# Patient Record
Sex: FEMALE | Race: WHITE | NOT HISPANIC OR LATINO | Employment: OTHER | URBAN - METROPOLITAN AREA
[De-identification: names, ages, dates, MRNs, and addresses within clinical notes are randomized per-mention and may not be internally consistent; named-entity substitution may affect disease eponyms.]

---

## 2017-11-05 ENCOUNTER — OFFICE VISIT (OUTPATIENT)
Dept: URGENT CARE | Facility: CLINIC | Age: 67
End: 2017-11-05

## 2017-11-05 PROCEDURE — G0463 HOSPITAL OUTPT CLINIC VISIT: HCPCS

## 2017-11-05 PROCEDURE — 99213 OFFICE O/P EST LOW 20 MIN: CPT

## 2017-11-05 PROCEDURE — 90714 TD VACC NO PRESV 7 YRS+ IM: CPT

## 2017-11-05 PROCEDURE — 12042 INTMD RPR N-HF/GENIT2.6-7.5: CPT

## 2017-11-06 NOTE — PROGRESS NOTES
Assessment  1  Thumb laceration, right, initial encounter (883 0) (S60 011S)    Discussion/Summary  Discussion Summary:   Tdap given10 days for suture removalwound dry for 48 hoursw soap and water and apply abx ointment daily covered while at work  Understands and agrees with treatment plan: The treatment plan was reviewed with the patient/guardian  The patient/guardian understands and agrees with the treatment plan      Chief Complaint  1  Hand Problem  Chief Complaint Free Text Note Form: Washing a mug and the handle broke  Laceration right hand under thumb  Happened at 10:30 this morning  History of Present Illness  HPI: Pt cut her R thumb on a broken mug handle  No numbness, normal movement and ROM of the thumb  Hospital Based Practices Required Assessment:    Prefered Language is  english  Primary Language is  english  Review of Systems  Focused-Female:   Integumentary: as noted in HPI  Social History   · Never a smoker    Current Meds   1  Levothroid 75 MCG TABS; Therapy: (Recorded:05Nov2017) to Recorded   2  Propranolol HCl - 10 MG Oral Tablet; Therapy: (Recorded:05Nov2017) to Recorded   3  TraMADol HCl - 50 MG Oral Tablet; Therapy: (Recorded:05Nov2017) to Recorded    Allergies  1  Codeine   2  Keflex   3  Penicillins    Vitals  Signs   Recorded: 72MPG2504 11:37AM   Temperature: 96 9 F  Heart Rate: 88  Respiration: 16  Height: 5 ft 6 in  Weight: 124 lb   BMI Calculated: 20 01 kg/m2  BSA Calculated: 1 63 m2  O2 Saturation: 97    Physical Exam    Skin   Skin and subcutaneous tissue: Abnormal  -- 3cm curvilinear laceration R  thenar eminence deep to subcutaneous tissue, FAROM thumb, normal tendon function, NSI  Procedure    Procedure: wound repair  The wound was located on the R  thenar eminence,-- and was 3 cm in length  The wound was simple  The wound involved the underlying fascia-- and-- was curved  The wound was linear   the neurovascular exam was normal, but-- there was no motor deficit  there was no tendon injury  The site was prepped with Betadine, cleansed and irrigated extensively  Anesthesia: Local anesthetic was administered  Lidocaine 3 ml, 2%, without epinephrine was injected  Closure: The cutaneous layer was closed with 7 sutures of 4-0 Prolene--   simple interrupted sutures were used for the skin closure  Dressing: a sterile dressing was placed-- and-- an antibiotic ointment was applied  Patient Status:  the patient tolerated the procedure well  Complications:  excessive bleeding was noted        Signatures   Electronically signed by : Mk Ching DO; Nov 5 2017 12:18PM EST                       (Author)

## 2017-11-15 ENCOUNTER — GENERIC CONVERSION - ENCOUNTER (OUTPATIENT)
Dept: OTHER | Facility: OTHER | Age: 67
End: 2017-11-15

## 2018-01-18 NOTE — MISCELLANEOUS
Message  Return to work or school:   Bravo Watters is under my professional care  She was seen in my office on 11/5/2017        TYLER Navarro        Signatures   Electronically signed by : Jyoti Jain DO; Nov 5 2017  3:15PM EST                       (Author)

## 2018-01-22 VITALS
HEART RATE: 86 BPM | TEMPERATURE: 98 F | DIASTOLIC BLOOD PRESSURE: 90 MMHG | RESPIRATION RATE: 18 BRPM | SYSTOLIC BLOOD PRESSURE: 160 MMHG | OXYGEN SATURATION: 98 %

## 2019-08-02 ENCOUNTER — TELEPHONE (OUTPATIENT)
Dept: GASTROENTEROLOGY | Facility: CLINIC | Age: 69
End: 2019-08-02

## 2019-08-02 NOTE — TELEPHONE ENCOUNTER
Received referral from pt's pcp to schedule pt for ov; scanned referral into pt's chart  Please call pt to schedule, thank you!

## 2019-09-13 ENCOUNTER — TELEPHONE (OUTPATIENT)
Dept: GASTROENTEROLOGY | Facility: CLINIC | Age: 69
End: 2019-09-13

## 2019-09-13 ENCOUNTER — OFFICE VISIT (OUTPATIENT)
Dept: GASTROENTEROLOGY | Facility: CLINIC | Age: 69
End: 2019-09-13

## 2019-09-13 VITALS
DIASTOLIC BLOOD PRESSURE: 82 MMHG | HEART RATE: 86 BPM | HEIGHT: 66 IN | WEIGHT: 96 LBS | SYSTOLIC BLOOD PRESSURE: 136 MMHG | BODY MASS INDEX: 15.43 KG/M2

## 2019-09-13 DIAGNOSIS — R11.2 NAUSEA AND VOMITING, INTRACTABILITY OF VOMITING NOT SPECIFIED, UNSPECIFIED VOMITING TYPE: Primary | ICD-10-CM

## 2019-09-13 DIAGNOSIS — R79.89 ABNORMAL LFTS: ICD-10-CM

## 2019-09-13 DIAGNOSIS — R63.4 WEIGHT LOSS: ICD-10-CM

## 2019-09-13 DIAGNOSIS — R19.7 DIARRHEA, UNSPECIFIED TYPE: ICD-10-CM

## 2019-09-13 DIAGNOSIS — Z11.59 SCREENING FOR VIRAL DISEASE: ICD-10-CM

## 2019-09-13 LAB — HCV AB SER-ACNC: NEGATIVE

## 2019-09-13 PROCEDURE — 99214 OFFICE O/P EST MOD 30 MIN: CPT | Performed by: INTERNAL MEDICINE

## 2019-09-13 RX ORDER — CYCLOBENZAPRINE HCL 10 MG
10 TABLET ORAL 3 TIMES DAILY PRN
COMMUNITY

## 2019-09-13 RX ORDER — CALCIUM/MAGNESIUM/ZINC 333-133 MG
TABLET ORAL 3 TIMES DAILY PRN
COMMUNITY

## 2019-09-13 RX ORDER — CHROMIUM 200 MCG
TABLET ORAL 3 TIMES DAILY PRN
COMMUNITY

## 2019-09-13 RX ORDER — PROCHLORPERAZINE MALEATE 10 MG
10 TABLET ORAL EVERY 6 HOURS PRN
Status: ON HOLD | COMMUNITY
End: 2020-12-22 | Stop reason: CLARIF

## 2019-09-13 RX ORDER — BIOTIN 5 MG
CAPSULE ORAL 3 TIMES DAILY
COMMUNITY

## 2019-09-13 RX ORDER — FEXOFENADINE HCL AND PSEUDOEPHEDRINE HCI 60; 120 MG/1; MG/1
1 TABLET, EXTENDED RELEASE ORAL 2 TIMES DAILY PRN
COMMUNITY

## 2019-09-13 RX ORDER — ASTRAGALUS ROOT 100 %
470 POWDER (GRAM) MISCELLANEOUS 3 TIMES DAILY PRN
COMMUNITY

## 2019-09-13 RX ORDER — COVID-19 ANTIGEN TEST
KIT MISCELLANEOUS EVERY 12 HOURS PRN
COMMUNITY
End: 2020-12-07

## 2019-09-13 RX ORDER — MELATONIN
7000 DAILY
COMMUNITY

## 2019-09-13 RX ORDER — LOPERAMIDE HYDROCHLORIDE 2 MG/1
2 CAPSULE ORAL DAILY
COMMUNITY

## 2019-09-13 NOTE — TELEPHONE ENCOUNTER
Patient cancelled her combo that I scheduled today  Patient did not want to keep her procedure at this time d/t self pay  She will call us back to schedule  Please follow up with her  She did not have her prep today

## 2019-09-13 NOTE — PROGRESS NOTES
[facility] - Outpatient Consultation  Ramiro Landa 81 y o  female MRN: [id number]  Encounter: [id number]          ASSESSMENT AND PLAN:      1  Nausea and vomiting, intractability of vomiting not specified, unspecified vomiting type  2  Diarrhea, unspecified type  3  Weight loss  Eight months of persistent symptoms of unclear etiology  Malignancy needs to be excluded  Inflammatory bowel disease, peptic ulcer disease, celiac disease, and functional bowel disease are all in the differential   - EGD and colonoscopy at [place] [address]    4  Abnormal LFTs  Mildly increased ALT, AST, and ALK PHOS  Unclear if related to systemic issue or primary liver problem  - Antimitochondrial antibody; Future  - JULEE Screen w/ Reflex to Titer/Pattern; Future  - Celiac Disease Comprehensive Panel; Future  - Ceruloplasmin; Future  - Hepatitis C Ab W/Refl To HCV RNA, Qn, PCR; Future  - Hepatitis B surface antigen; Future  - Hepatitis B surface antibody; Future  - Iron, TIBC and Ferritin Panel; Future  - Hepatic function panel; Future  - US abdomen limited; Future    ______________________________________________________________________    HPI:  The patient is seen in the office for evaluation per the request of Dr Penny Blanchard  She reports that she was well from a GI standpoint until the beginning of the year when she began having significant nausea with intermittent vomiting and diarrhea  She reports that she has vomiting about 5 times a week  She will throw up either liquid or just dry heaves  She denies any hematemesis or coffee-ground emesis  Her symptoms are not exacerbated by eating but she reports that she has no appetite  She has lost about 25 pounds since this is been going on  He she reports some mild upper abdominal pain  She reports diarrhea  She is moving her bowels 7-8 times a day  Her baseline bowel movement pattern is 1 bowel movement every 2-3 days    They are anywhere from watery to formed  She denies any rectal bleeding  She had 1 episode of black stool that she attributed to beef broth  She has never had a colonoscopy  She admits to nocturnal stooling but denies any fecal incontinence  She denies any change in her medications, supplements, or diet when the symptoms started  An evaluation by her PCP included negative stool studies  Blood work was normal except for a minimally increased LFTs, elevated BUN, and hypokalemia  REVIEW OF SYSTEMS:    CONSTITUTIONAL: Denies any fever, chills, & rigors  Admits to fatigue and weight loss  HEENT: No earache or tinnitus  Denies hearing loss or visual disturbances  CARDIOVASCULAR: No chest pain or palpitations  RESPIRATORY: Denies any cough, hemoptysis, shortness of breath  Admits to some dyspnea on exertion  GASTROINTESTINAL: As noted in the History of Present Illness  GENITOURINARY: No problems with urination  Denies any hematuria or dysuria  NEUROLOGIC: No dizziness or vertigo, denies headaches  MUSCULOSKELETAL:  Admits to muscle or joint pain  SKIN: Denies skin rashes or itching  ENDOCRINE: Denies excessive thirst  Denies intolerance to heat or cold  PSYCHOSOCIAL: Denies depression but admits to some anxiety  Denies any recent memory loss         Historical Information   Past Medical History:   Diagnosis Date    Chronic fatigue syndrome with fibromyalgia     Hx of migraine headaches     Hypothyroidism      Past Surgical History:   Procedure Laterality Date    BARTHOLIN GLAND CYST EXCISION      CARPAL TUNNEL RELEASE Bilateral     TONSILLECTOMY      TUBAL LIGATION       Social History   Social History     Substance and Sexual Activity   Alcohol Use Not Currently     Social History     Substance and Sexual Activity   Drug Use Not on file     Social History     Tobacco Use   Smoking Status Current Every Day Smoker   Smokeless Tobacco Never Used     Family History   Problem Relation Age of Onset    Colon polyps Mother     Heart disease Mother     Heart disease Father     Colon cancer Neg Hx        Meds/Allergies       Current Outpatient Medications:     5-Hydroxytryptophan (5-HTP PO)    B Complex-Biotin-FA (TH VITAMIN B 50/B-COMPLEX) TABS    bismuth subsalicylate (PEPTO BISMOL) 524 mg/30 mL oral suspension    Capsaicin 0 025 % GEL    cholecalciferol (VITAMIN D3) 1,000 units tablet    COLCHICINE PO    cyclobenzaprine (FLEXERIL) 10 mg tablet    Echinacea 400 MG CAPS    fexofenadine-pseudoephedrine (ALLEGRA-D)  MG per tablet    GINKGO BILOBA PLUS PO    Hyaluronic Acid 20-60 MG CAPS    L-Lysine 500 MG CAPS    loperamide (IMODIUM) 2 mg capsule    Naproxen Sodium (ALEVE) 220 MG CAPS    prochlorperazine (COMPAZINE) 10 mg tablet    S-Adenosylmethionine (RADHA-E) 200 MG TABS    Tragacanth (ASTRAGALUS ROOT) POWD    Allergies   Allergen Reactions    Clarithromycin     Codeine     Naproxen     Other      PHOSPHATE    Oxycodone     Penicillins     Potassium Chloride     Trazodone            Objective     Blood pressure 136/82, pulse 86, height 5' 6" (1 676 m), weight 43 5 kg (96 lb)  Body mass index is 15 49 kg/m²  PHYSICAL EXAM:      General Appearance:   Alert, cooperative, no distress   HEENT:   Normocephalic, atraumatic, anicteric      Neck:  Supple, symmetrical, trachea midline   Lungs:   Clear to auscultation bilaterally; no rales, rhonchi or wheezing; respirations unlabored    Heart[de-identified]   Regular rate and rhythm; no murmur, rub, or gallop     Abdomen:   Soft, mild right upper quadrant tenderness without rebound or guarding, non-distended; normal bowel sounds; no masses, no organomegaly    Genitalia:   Deferred    Rectal:   Deferred    Extremities:  No cyanosis, clubbing or edema    Pulses:  2+ and symmetric    Skin:  No jaundice, rashes, or lesions    Lymph nodes:  No palpable cervical lymphadenopathy        Lab Results:   Sodium 138, potassium 3 0, chloride 103, bicarb 28, BUN 27, creatinine 1 09, glucose 81  Albumin 3 8, total bilirubin 0 4, AST 72, ALT 56, alkaline phosphatase 150  CBC and sed rate normal  (8/2/19)      Radiology Results:   No results found

## 2019-09-13 NOTE — LETTER
September 13, 2019     [address] [place] [address]    Patient: Chrissy Flanagan   YOB: 1950   Date of Visit: 9/13/2019       Dear Dr Luis Enrique Correia:    Thank you for referring Broderick Grad to me for evaluation  Below are my notes for this consultation  If you have questions, please do not hesitate to call me  I look forward to following your patient along with you  Sincerely,        Matthew Hyde MD        CC: No Recipients  Matthew Hyde MD  9/13/2019  1:45 PM  Sign at close encounter  [facility] - Outpatient Consultation  Chrissy Flanagan 81 y o  female MRN: [id number]  Encounter: [id number]          ASSESSMENT AND PLAN:      1  Nausea and vomiting, intractability of vomiting not specified, unspecified vomiting type  2  Diarrhea, unspecified type  3  Weight loss  Eight months of persistent symptoms of unclear etiology  Malignancy needs to be excluded  Inflammatory bowel disease, peptic ulcer disease, celiac disease, and functional bowel disease are all in the differential   - EGD and colonoscopy at [place] [address]    4  Abnormal LFTs  Mildly increased ALT, AST, and ALK PHOS  Unclear if related to systemic issue or primary liver problem  - Antimitochondrial antibody; Future  - JULEE Screen w/ Reflex to Titer/Pattern; Future  - Celiac Disease Comprehensive Panel; Future  - Ceruloplasmin; Future  - Hepatitis C Ab W/Refl To HCV RNA, Qn, PCR; Future  - Hepatitis B surface antigen; Future  - Hepatitis B surface antibody; Future  - Iron, TIBC and Ferritin Panel; Future  - Hepatic function panel; Future  - US abdomen limited; Future    ______________________________________________________________________    HPI:  The patient is seen in the office for evaluation per the request of Dr Luis Enrique Correia    She reports that she was well from a GI standpoint until the beginning of the year when she began having significant nausea with intermittent vomiting and diarrhea  She reports that she has vomiting about 5 times a week  She will throw up either liquid or just dry heaves  She denies any hematemesis or coffee-ground emesis  Her symptoms are not exacerbated by eating but she reports that she has no appetite  She has lost about 25 pounds since this is been going on  He she reports some mild upper abdominal pain  She reports diarrhea  She is moving her bowels 7-8 times a day  Her baseline bowel movement pattern is 1 bowel movement every 2-3 days  They are anywhere from watery to formed  She denies any rectal bleeding  She had 1 episode of black stool that she attributed to beef broth  She has never had a colonoscopy  She admits to nocturnal stooling but denies any fecal incontinence  She denies any change in her medications, supplements, or diet when the symptoms started  An evaluation by her PCP included negative stool studies  Blood work was normal except for a minimally increased LFTs, elevated BUN, and hypokalemia  REVIEW OF SYSTEMS:    CONSTITUTIONAL: Denies any fever, chills, & rigors  Admits to fatigue and weight loss  HEENT: No earache or tinnitus  Denies hearing loss or visual disturbances  CARDIOVASCULAR: No chest pain or palpitations  RESPIRATORY: Denies any cough, hemoptysis, shortness of breath  Admits to some dyspnea on exertion  GASTROINTESTINAL: As noted in the History of Present Illness  GENITOURINARY: No problems with urination  Denies any hematuria or dysuria  NEUROLOGIC: No dizziness or vertigo, denies headaches  MUSCULOSKELETAL:  Admits to muscle or joint pain  SKIN: Denies skin rashes or itching  ENDOCRINE: Denies excessive thirst  Denies intolerance to heat or cold  PSYCHOSOCIAL: Denies depression but admits to some anxiety  Denies any recent memory loss         Historical Information   Past Medical History:   Diagnosis Date    Chronic fatigue syndrome with fibromyalgia     Hx of migraine headaches     Hypothyroidism      Past Surgical History:   Procedure Laterality Date    BARTHOLIN GLAND CYST EXCISION      CARPAL TUNNEL RELEASE Bilateral     TONSILLECTOMY      TUBAL LIGATION       Social History   Social History     Substance and Sexual Activity   Alcohol Use Not Currently     Social History     Substance and Sexual Activity   Drug Use Not on file     Social History     Tobacco Use   Smoking Status Current Every Day Smoker   Smokeless Tobacco Never Used     Family History   Problem Relation Age of Onset    Colon polyps Mother     Heart disease Mother     Heart disease Father     Colon cancer Neg Hx        Meds/Allergies       Current Outpatient Medications:     5-Hydroxytryptophan (5-HTP PO)    B Complex-Biotin-FA (TH VITAMIN B 50/B-COMPLEX) TABS    bismuth subsalicylate (PEPTO BISMOL) 524 mg/30 mL oral suspension    Capsaicin 0 025 % GEL    cholecalciferol (VITAMIN D3) 1,000 units tablet    COLCHICINE PO    cyclobenzaprine (FLEXERIL) 10 mg tablet    Echinacea 400 MG CAPS    fexofenadine-pseudoephedrine (ALLEGRA-D)  MG per tablet    GINKGO BILOBA PLUS PO    Hyaluronic Acid 20-60 MG CAPS    L-Lysine 500 MG CAPS    loperamide (IMODIUM) 2 mg capsule    Naproxen Sodium (ALEVE) 220 MG CAPS    prochlorperazine (COMPAZINE) 10 mg tablet    S-Adenosylmethionine (RADHA-E) 200 MG TABS    Tragacanth (ASTRAGALUS ROOT) POWD    Allergies   Allergen Reactions    Clarithromycin     Codeine     Naproxen     Other      PHOSPHATE    Oxycodone     Penicillins     Potassium Chloride     Trazodone            Objective     Blood pressure 136/82, pulse 86, height 5' 6" (1 676 m), weight 43 5 kg (96 lb)  Body mass index is 15 49 kg/m²          PHYSICAL EXAM:      General Appearance:   Alert, cooperative, no distress   HEENT:   Normocephalic, atraumatic, anicteric      Neck:  Supple, symmetrical, trachea midline   Lungs:   Clear to auscultation bilaterally; no rales, rhonchi or wheezing; respirations unlabored    Heart[de-identified]   Regular rate and rhythm; no murmur, rub, or gallop  Abdomen:   Soft, mild right upper quadrant tenderness without rebound or guarding, non-distended; normal bowel sounds; no masses, no organomegaly    Genitalia:   Deferred    Rectal:   Deferred    Extremities:  No cyanosis, clubbing or edema    Pulses:  2+ and symmetric    Skin:  No jaundice, rashes, or lesions    Lymph nodes:  No palpable cervical lymphadenopathy        Lab Results:   Sodium 138, potassium 3 0, chloride 103, bicarb 28, BUN 27, creatinine 1 09, glucose 81  Albumin 3 8, total bilirubin 0 4, AST 72, ALT 56, alkaline phosphatase 150  CBC and sed rate normal  (8/2/19)      Radiology Results:   No results found

## 2019-09-23 NOTE — TELEPHONE ENCOUNTER
I returned call, no answer (identifies as patient)  Left message that serologic workup is negative per [facility] note and that Imodium is listed on med list  Is she taking that (Adalberto Morel is only mentioned in her message)  Asked that she call 9/24 to update

## 2019-09-23 NOTE — TELEPHONE ENCOUNTER
Pt states she talked w/ [place] but has nurse questions: asks if Dr Jesus Fitch can prescribe something for diarrhea?/Pepto not working; is OK for 2 days then loose  Also, asks for results of labs 9/13 at [facility] [phone]

## 2019-09-23 NOTE — TELEPHONE ENCOUNTER
Called patient, she questioned the payment policy  I placed her on a brief hold to verify with billing  She hung up    Tried calling back and it went to her voicemail where I left a message   Per Lyly Wolf, ok to pay $200/month if scheduled at [address] [place] [address]

## 2019-09-23 NOTE — TELEPHONE ENCOUNTER
Talked to patient, she cannot afford that payment plan  She is applying for medical assistance/social security and will call us after she is enrolled

## 2019-09-24 ENCOUNTER — TRANSCRIBE ORDERS (OUTPATIENT)
Dept: ADMINISTRATIVE | Facility: HOSPITAL | Age: 69
End: 2019-09-24

## 2019-09-24 DIAGNOSIS — R79.89 ABNORMAL LFTS: Primary | ICD-10-CM

## 2019-09-24 NOTE — TELEPHONE ENCOUNTER
Pt left [facility] stating June Sammijhonathan called about her labs and script for medicine; asks for  [phone]

## 2019-09-24 NOTE — TELEPHONE ENCOUNTER
I spoke with patient and reviewed that she can take the loperamide 2mg up to 4 times a day if needed  She will schedule procedure when insurance obtained  Patient called back requesting hard copy of lab results be mailed to her home address

## 2019-10-21 ENCOUNTER — TELEPHONE (OUTPATIENT)
Dept: GASTROENTEROLOGY | Facility: CLINIC | Age: 69
End: 2019-10-21

## 2019-10-21 NOTE — TELEPHONE ENCOUNTER
Pt states she had blood work done 9/16; got some results but celiac and mitochondrial ab were pending/asks for CB w/ results to [phone]

## 2019-10-21 NOTE — TELEPHONE ENCOUNTER
Returned call, no answer, no identifying info, left message to call back   Celiac in normal range and mitochondrial ab resulted as negative

## 2019-12-06 ENCOUNTER — TRANSCRIBE ORDERS (OUTPATIENT)
Dept: ADMINISTRATIVE | Facility: HOSPITAL | Age: 69
End: 2019-12-06

## 2019-12-06 DIAGNOSIS — R63.4 LOSS OF WEIGHT: ICD-10-CM

## 2019-12-06 DIAGNOSIS — R19.7 DIARRHEA, UNSPECIFIED TYPE: Primary | ICD-10-CM

## 2019-12-11 ENCOUNTER — HOSPITAL ENCOUNTER (OUTPATIENT)
Dept: RADIOLOGY | Facility: HOSPITAL | Age: 69
Discharge: HOME | End: 2019-12-11
Payer: COMMERCIAL

## 2019-12-11 DIAGNOSIS — R19.7 DIARRHEA, UNSPECIFIED TYPE: ICD-10-CM

## 2019-12-11 DIAGNOSIS — R63.4 LOSS OF WEIGHT: ICD-10-CM

## 2019-12-11 PROCEDURE — 74220 X-RAY XM ESOPHAGUS 1CNTRST: CPT

## 2020-05-08 ENCOUNTER — TRANSCRIBE ORDERS (OUTPATIENT)
Dept: ADMINISTRATIVE | Facility: HOSPITAL | Age: 70
End: 2020-05-08

## 2020-05-08 DIAGNOSIS — R10.11 RUQ ABDOMINAL PAIN: Primary | ICD-10-CM

## 2020-06-11 ENCOUNTER — TELEPHONE (OUTPATIENT)
Dept: GASTROENTEROLOGY | Facility: CLINIC | Age: 70
End: 2020-06-11

## 2020-07-02 ENCOUNTER — CLINICAL SUPPORT (OUTPATIENT)
Dept: GASTROENTEROLOGY | Facility: CLINIC | Age: 70
End: 2020-07-02

## 2020-07-02 VITALS — BODY MASS INDEX: 16.88 KG/M2 | WEIGHT: 105 LBS | HEIGHT: 66 IN

## 2020-07-02 DIAGNOSIS — R19.7 DIARRHEA, UNSPECIFIED TYPE: Primary | ICD-10-CM

## 2020-07-02 NOTE — PROGRESS NOTES
Phone prep for combo dx diarrhea meds reviewed instructions given for clenpiq rx sample at the    Pt aware of covid test

## 2020-07-03 DIAGNOSIS — Z11.59 SCREENING FOR VIRAL DISEASE: ICD-10-CM

## 2020-07-03 PROCEDURE — U0003 INFECTIOUS AGENT DETECTION BY NUCLEIC ACID (DNA OR RNA); SEVERE ACUTE RESPIRATORY SYNDROME CORONAVIRUS 2 (SARS-COV-2) (CORONAVIRUS DISEASE [COVID-19]), AMPLIFIED PROBE TECHNIQUE, MAKING USE OF HIGH THROUGHPUT TECHNOLOGIES AS DESCRIBED BY CMS-2020-01-R: HCPCS

## 2020-07-06 ENCOUNTER — HOSPITAL ENCOUNTER (OUTPATIENT)
Dept: ULTRASOUND IMAGING | Facility: HOSPITAL | Age: 70
Discharge: HOME | End: 2020-07-06
Payer: MEDICARE

## 2020-07-06 DIAGNOSIS — R10.11 RUQ ABDOMINAL PAIN: ICD-10-CM

## 2020-07-06 LAB — SARS-COV-2 RNA SPEC QL NAA+PROBE: NOT DETECTED

## 2020-07-06 PROCEDURE — 76705 ECHO EXAM OF ABDOMEN: CPT

## 2020-07-09 ENCOUNTER — ANESTHESIA EVENT (OUTPATIENT)
Dept: GASTROENTEROLOGY | Facility: AMBULATORY SURGERY CENTER | Age: 70
End: 2020-07-09

## 2020-07-09 ENCOUNTER — HOSPITAL ENCOUNTER (OUTPATIENT)
Dept: GASTROENTEROLOGY | Facility: AMBULATORY SURGERY CENTER | Age: 70
Discharge: HOME | End: 2020-07-09
Payer: MEDICARE

## 2020-07-09 ENCOUNTER — ANESTHESIA (OUTPATIENT)
Dept: GASTROENTEROLOGY | Facility: AMBULATORY SURGERY CENTER | Age: 70
End: 2020-07-09

## 2020-07-09 VITALS
OXYGEN SATURATION: 95 % | HEART RATE: 79 BPM | TEMPERATURE: 98.9 F | DIASTOLIC BLOOD PRESSURE: 87 MMHG | SYSTOLIC BLOOD PRESSURE: 184 MMHG | RESPIRATION RATE: 22 BRPM

## 2020-07-09 DIAGNOSIS — R63.4 WEIGHT LOSS: ICD-10-CM

## 2020-07-09 DIAGNOSIS — R11.2 NAUSEA AND VOMITING, INTRACTABILITY OF VOMITING NOT SPECIFIED, UNSPECIFIED VOMITING TYPE: ICD-10-CM

## 2020-07-09 DIAGNOSIS — R19.7 DIARRHEA, UNSPECIFIED TYPE: ICD-10-CM

## 2020-07-09 PROCEDURE — 88305 TISSUE EXAM BY PATHOLOGIST: CPT | Performed by: PATHOLOGY

## 2020-07-09 PROCEDURE — 45385 COLONOSCOPY W/LESION REMOVAL: CPT | Performed by: INTERNAL MEDICINE

## 2020-07-09 PROCEDURE — 45381 COLONOSCOPY SUBMUCOUS NJX: CPT | Performed by: INTERNAL MEDICINE

## 2020-07-09 PROCEDURE — 43239 EGD BIOPSY SINGLE/MULTIPLE: CPT | Performed by: INTERNAL MEDICINE

## 2020-07-09 PROCEDURE — 1123F ACP DISCUSS/DSCN MKR DOCD: CPT | Performed by: INTERNAL MEDICINE

## 2020-07-09 RX ORDER — SODIUM CHLORIDE 9 MG/ML
50 INJECTION, SOLUTION INTRAVENOUS ONCE
Status: COMPLETED | OUTPATIENT
Start: 2020-07-09 | End: 2020-07-09

## 2020-07-09 RX ORDER — PROPOFOL 10 MG/ML
INJECTION, EMULSION INTRAVENOUS AS NEEDED
Status: DISCONTINUED | OUTPATIENT
Start: 2020-07-09 | End: 2020-07-09 | Stop reason: SURG

## 2020-07-09 RX ORDER — SODIUM CHLORIDE 9 MG/ML
INJECTION, SOLUTION INTRAVENOUS CONTINUOUS PRN
Status: DISCONTINUED | OUTPATIENT
Start: 2020-07-09 | End: 2020-07-09 | Stop reason: SURG

## 2020-07-09 RX ADMIN — PROPOFOL 40 MG: 10 INJECTION, EMULSION INTRAVENOUS at 11:30

## 2020-07-09 RX ADMIN — PROPOFOL 20 MG: 10 INJECTION, EMULSION INTRAVENOUS at 11:57

## 2020-07-09 RX ADMIN — PROPOFOL 20 MG: 10 INJECTION, EMULSION INTRAVENOUS at 12:05

## 2020-07-09 RX ADMIN — PROPOFOL 20 MG: 10 INJECTION, EMULSION INTRAVENOUS at 11:53

## 2020-07-09 RX ADMIN — PROPOFOL 10 MG: 10 INJECTION, EMULSION INTRAVENOUS at 11:34

## 2020-07-09 RX ADMIN — PROPOFOL 10 MG: 10 INJECTION, EMULSION INTRAVENOUS at 12:16

## 2020-07-09 RX ADMIN — PROPOFOL 20 MG: 10 INJECTION, EMULSION INTRAVENOUS at 11:41

## 2020-07-09 RX ADMIN — PROPOFOL 20 MG: 10 INJECTION, EMULSION INTRAVENOUS at 11:59

## 2020-07-09 RX ADMIN — PROPOFOL 20 MG: 10 INJECTION, EMULSION INTRAVENOUS at 12:11

## 2020-07-09 RX ADMIN — PROPOFOL 20 MG: 10 INJECTION, EMULSION INTRAVENOUS at 11:55

## 2020-07-09 RX ADMIN — PROPOFOL 20 MG: 10 INJECTION, EMULSION INTRAVENOUS at 11:37

## 2020-07-09 RX ADMIN — PROPOFOL 20 MG: 10 INJECTION, EMULSION INTRAVENOUS at 11:49

## 2020-07-09 RX ADMIN — PROPOFOL 20 MG: 10 INJECTION, EMULSION INTRAVENOUS at 11:51

## 2020-07-09 RX ADMIN — PROPOFOL 20 MG: 10 INJECTION, EMULSION INTRAVENOUS at 11:33

## 2020-07-09 RX ADMIN — PROPOFOL 20 MG: 10 INJECTION, EMULSION INTRAVENOUS at 11:43

## 2020-07-09 RX ADMIN — PROPOFOL 20 MG: 10 INJECTION, EMULSION INTRAVENOUS at 12:14

## 2020-07-09 RX ADMIN — PROPOFOL 20 MG: 10 INJECTION, EMULSION INTRAVENOUS at 11:35

## 2020-07-09 RX ADMIN — PROPOFOL 20 MG: 10 INJECTION, EMULSION INTRAVENOUS at 12:01

## 2020-07-09 RX ADMIN — SODIUM CHLORIDE 50 ML/HR: 9 INJECTION, SOLUTION INTRAVENOUS at 10:59

## 2020-07-09 RX ADMIN — PROPOFOL 20 MG: 10 INJECTION, EMULSION INTRAVENOUS at 12:03

## 2020-07-09 RX ADMIN — PROPOFOL 20 MG: 10 INJECTION, EMULSION INTRAVENOUS at 11:31

## 2020-07-09 RX ADMIN — PROPOFOL 20 MG: 10 INJECTION, EMULSION INTRAVENOUS at 11:45

## 2020-07-09 RX ADMIN — SODIUM CHLORIDE: 9 INJECTION, SOLUTION INTRAVENOUS at 11:23

## 2020-07-09 RX ADMIN — PROPOFOL 20 MG: 10 INJECTION, EMULSION INTRAVENOUS at 12:09

## 2020-07-09 RX ADMIN — PROPOFOL 20 MG: 10 INJECTION, EMULSION INTRAVENOUS at 11:47

## 2020-07-09 RX ADMIN — PROPOFOL 20 MG: 10 INJECTION, EMULSION INTRAVENOUS at 11:39

## 2020-07-09 RX ADMIN — PROPOFOL 20 MG: 10 INJECTION, EMULSION INTRAVENOUS at 12:07

## 2020-07-09 NOTE — QUICK NOTE
bp remains elevated postop 140-186/80-90 recommended that pt keep a record of her BPs and to see her family doctor about her elevated BP

## 2020-07-09 NOTE — ANESTHESIA PREPROCEDURE EVALUATION
Review of Systems/Medical History  Patient summary reviewed  Chart reviewed  No history of anesthetic complications     Cardiovascular  Exercise tolerance (METS): >4,  Hypertension ,   Comment: High BP, no meds  ,  Pulmonary  Smoker cigarette smoker  , COPD , Shortness of breath,        GI/Hepatic  Dysphagia,   GERD poorly controlled, Bowel prep       Negative  ROS        Endo/Other  History of thyroid disease , hypothyroidism,      GYN  Negative gynecology ROS          Hematology  Negative hematology ROS      Musculoskeletal  Back pain , cervical pain, Osteoarthritis,   Arthritis     Neurology    Fibromyalgia   Psychology   Anxiety,              Physical Exam    Airway    Mallampati score: I  TM Distance: >3 FB  Neck ROM: full     Dental   lower dentures and upper dentures,     Cardiovascular  Rhythm: regular, Rate: normal, Cardiovascular exam normal    Pulmonary  Decreased breath sounds,     Other Findings        Anesthesia Plan  ASA Score- 3     Anesthesia Type- IV sedation with anesthesia with ASA Monitors  Additional Monitors:   Airway Plan:         Plan Factors- Patient instructed to abstain from smoking on day of procedure  Patient smoked on day of surgery  Induction- intravenous  Postoperative Plan-     Informed Consent- Anesthetic plan and risks discussed with patient

## 2020-07-09 NOTE — DISCHARGE INSTRUCTIONS
Colorectal Polyps   WHAT YOU NEED TO KNOW:   What are colorectal polyps? Colorectal polyps are small growths of tissue in the lining of the colon and rectum  Most polyps are hyperplastic polyps and are usually benign (noncancerous)  Certain types of polyps, called adenomatous polyps, may turn into cancer  What increases my risk of colorectal polyps? The exact cause of colorectal polyps is unknown  The following may increase your risk:  · Older age    · A diet of foods high in fat and low in fiber     · Family history of polyps    · Intestinal diseases, such as Crohn's disease or ulcerative colitis    · An unhealthy lifestyle, such as physical inactivity, smoking, or drinking alcohol    · Obesity  What are the signs and symptoms of colorectal polyps? · Blood in your bowel movement or bleeding from the rectum    · Change in bowel movement habits, such as diarrhea and constipation    · Abdominal pain  How are colorectal polyps diagnosed? You should have fecal blood screening once a year for colorectal disease if you are over 48years old  You should be screened earlier if you have an intestinal disease or a family history of polyps or colorectal cancer  During this screening, a sample of your bowel movement is checked for blood, which may be an early sign of colorectal polyps or cancer  You may also need any of the following tests:  · Digital rectal exam:  Your healthcare provider will examine your anus and use a finger to check your rectum for polyps  · Barium enema: A barium enema is an x-ray of the colon  A tube is put into your anus, and a liquid called barium is put through the tube  Barium is used so that caregivers can see your colon better on the x-ray film  · Virtual colonoscopy: This is a CT scan that takes pictures of the inside of your colon and rectum  A small, flexible tube is put into your rectum and air or carbon dioxide (gas) is used to expand your colon   This lets healthcare providers clearly see your colon and any polyps on a monitor  · Colonoscopy or sigmoidoscopy: These procedures help your healthcare provider see the inside of your colon using a flexible tube with a small light and camera on the end  During a sigmoidoscopy, your healthcare provider will only look at rectum and lower colon  During a colonoscopy, healthcare providers will look at the full length of your colon  Healthcare providers may remove a small amount of tissue from the colon for a biopsy  How are colorectal polyps treated? · Polyp removal:  Polyps may be removed during your sigmoidoscopy or colonoscopy  · Polypectomy: This is surgery to remove your polyps  You may need laparoscopic or open surgery, depending on the type, size, and number of polyps that you have  Laparoscopy is done by inserting a small, flexible scope into incisions made on your abdomen  Open surgery is done by making a larger incision on your abdomen   What are the risks of colorectal polyps? You may bleed during a colonoscopy procedure  Your bowel may be perforated (torn) when polyps are removed  This may lead to an open abdominal surgery  During surgery, you may bleed too much or get an infection  Adenomatous polyps that are not removed may turn into cancer and become more difficult to treat  Where can I find support and more information? · Shaye Rees ([facility])  [address], [place] [address]  Phone: [phone]  Web Address: [facility]  When should I contact my healthcare provider? · You have a fever  · You have chills, a cough, or feel weak and achy  · You have abdominal pain that does not go away or gets worse after you take medicine  · Your abdomen is swollen  · You are losing weight without trying  · You have questions or concerns about your condition or care  When should I seek immediate care or call 911? · You have sudden shortness of breath  · You have a fast heart rate, fast breathing, or are too dizzy to stand up  · You have severe abdominal pain  · You see blood in your bowel movement  CARE AGREEMENT:   You have the right to help plan your care  Learn about your health condition and how it may be treated  Discuss treatment options with your caregivers to decide what care you want to receive  You always have the right to refuse treatment  The above information is an  only  It is not intended as medical advice for individual conditions or treatments  Talk to your doctor, nurse or pharmacist before following any medical regimen to see if it is safe and effective for you  © 2017 [address]  Information is for End User's use only and may not be sold, redistributed or otherwise used for commercial purposes  All illustrations and images included in CareNotes® are the copyrighted property of [employer] M [employer]  or Moses Brannon  Hemorrhoids   WHAT YOU NEED TO KNOW:   What are hemorrhoids? Hemorrhoids are swollen blood vessels inside your rectum (internal hemorrhoids) or on your anus (external hemorrhoids)  Sometimes a hemorrhoid may prolapse  This means it extends out of your anus  What increases my risk for hemorrhoids? · Pregnancy or obesity    · Straining or sitting for a long time during bowel movements    · Liver disease    · Weak muscles around the anus caused by older age, rectal surgery, or anal intercourse    · A lack of physical activity    · Chronic diarrhea or constipation    · A low-fiber diet  What are the signs and symptoms of hemorrhoids?    · Pain or itching around your anus or inside your rectum    · Swelling or bumps around your anus    · Bright red blood in your bowel movement, on the toilet paper, or in the toilet bowl    · Tissue bulging out of your anus (prolapsed hemorrhoids)    · Incontinence (poor control over urine or bowel movements)  How are hemorrhoids diagnosed? Your healthcare provider will ask about your symptoms, the foods you eat, and your bowel movements  He will examine your anus for external hemorrhoids  You may need the following:  · A digital rectal exam  is a test to check for hemorrhoids  Your healthcare provider will put a gloved finger inside your anus to feel for the hemorrhoids  · An anoscopy  is a test that uses a scope (small tube with a light and camera on the end) to look at your hemorrhoids  How are hemorrhoids treated? Treatment will depend on your symptoms  You may need any of the following:  · Medicines  can help decrease pain and swelling, and soften your bowel movement  The medicine may be a pill, pad, cream, or ointment  · Procedures  may be used to shrink or remove your hemorrhoid  Examples include rubber-band ligation, sclerotherapy, and photocoagulation  These procedures may be done in your healthcare provider's office  Ask your healthcare provider for more information about these procedures  · Surgery  may be needed to shrink or remove your hemorrhoids  How can I manage my symptoms? · Apply ice on your anus for 15 to 20 minutes every hour or as directed  Use an ice pack, or put crushed ice in a plastic bag  Cover it with a towel before you apply it to your anus  Ice helps prevent tissue damage and decreases swelling and pain  · Take a sitz bath  Fill a bathtub with 4 to 6 inches of warm water  You may also use a sitz bath pan that fits inside a toilet bowl  Sit in the sitz bath for 15 minutes  Do this 3 times a day, and after each bowel movement  The warm water can help decrease pain and swelling  · Keep your anal area clean  Gently wash the area with warm water daily  Soap may irritate the area  After a bowel movement, wipe with moist towelettes or wet toilet paper  Dry toilet paper can irritate the area  How can I help prevent hemorrhoids?    · Do not strain to have a bowel movement  Do not sit on the toilet too long  These actions can increase pressure on the tissues in your rectum and anus  · Drink plenty of liquids  Liquids can help prevent constipation  Ask how much liquid to drink each day and which liquids are best for you  · Eat a variety of high-fiber foods  Examples include fruits, vegetables, and whole grains  Ask your healthcare provider how much fiber you need each day  You may need to take a fiber supplement  · Exercise as directed  Exercise, such as walking, may make it easier to have a bowel movement  Ask your healthcare provider to help you create an exercise plan  · Do not have anal sex  Anal sex can weaken the skin around your rectum and anus  · Avoid heavy lifting  This can cause straining and increase your risk for another hemorrhoid  When should I seek immediate care? · You have severe pain in your rectum or around your anus  · You have severe pain in your abdomen and you are vomiting  · You have bleeding from your anus that soaks through your underwear  When should I contact my healthcare provider? · You have frequent and painful bowel movements  · Your hemorrhoid looks or feels more swollen than usual      · You do not have a bowel movement for 2 days or more  · You see or feel tissue coming through your anus  · You have questions or concerns about your condition or care  CARE AGREEMENT:   You have the right to help plan your care  Learn about your health condition and how it may be treated  Discuss treatment options with your caregivers to decide what care you want to receive  You always have the right to refuse treatment  The above information is an  only  It is not intended as medical advice for individual conditions or treatments  Talk to your doctor, nurse or pharmacist before following any medical regimen to see if it is safe and effective for you    © 2017 [facility] Information is for End User's use only and may not be sold, redistributed or otherwise used for commercial purposes  All illustrations and images included in CareNotes® are the copyrighted property of [employer] M [employer]  or Moses Brannon  Gastritis   WHAT YOU NEED TO KNOW:   What is gastritis? Gastritis is inflammation or irritation of the lining of your stomach  What increases my risk for gastritis? · Infection with bacteria, a virus, or a parasite    · NSAIDs, aspirin, or steroid medicine    · Use of tobacco products or alcohol    · Trauma such as an injury to your stomach or intestine    · Autoimmune disorders such as diabetes, thyroid disease, or Crohn disease    · Stress    · Age older than 60 years    · Illegal drugs, such as cocaine  What are the signs and symptoms of gastritis? · Stomach pain, burning, or tenderness when you press on it    · Stomach fullness or tightness    · Nausea or vomiting    · Loss of appetite, or feeling full quickly when you eat    · Bad breath    · Fatigue or feeling more tired than usual    · Heartburn  How is gastritis diagnosed? Your healthcare provider will ask about your signs and symptoms and examine you  You may need any of the following:  · Blood tests  may be used to show an infection, dehydration, or anemia (low red blood cell levels)  · A bowel movement sample  may be tested for blood or the germ that may be causing your gastritis  · A breath test  may show if H pylori is causing your gastritis  You will be given a liquid to drink  Then you will breathe into a bag  Your healthcare provider will measure the amount of carbon dioxide in your breath  Extra amounts of carbon dioxide may mean you have an H pylori infection  · An endoscopy  may be used to look for irritation or bleeding in your stomach  Your healthcare provider will use an endoscope (tube with a light and camera on the end) during the procedure   He or she may take a sample from your stomach to be tested  How is gastritis treated? Your symptoms may go away without treatment  Treatment will depend on what is causing your gastritis  Your healthcare provider may recommend changes to the medicines you take  Medicines may be given to help treat a bacterial infection or decrease stomach acid  How can I manage or prevent gastritis? · Do not smoke  Nicotine and other chemicals in cigarettes and cigars can make your symptoms worse and cause lung damage  Ask your healthcare provider for information if you currently smoke and need help to quit  E-cigarettes or smokeless tobacco still contain nicotine  Talk to your healthcare provider before you use these products  · Do not drink alcohol  Alcohol can prevent healing and make your gastritis worse  Talk to your healthcare provider if you need help to stop drinking  · Do not take NSAIDs or aspirin unless directed  These and similar medicines can cause irritation of your stomach lining  If your healthcare provider says it is okay to take NSAIDs, take them with food  · Do not eat foods that cause irritation  Foods such as oranges and salsa can cause burning or pain  Eat a variety of healthy foods  Examples include fruits (not citrus), vegetables, low-fat dairy products, beans, whole-grain breads, and lean meats and fish  Try to eat small meals, and drink water with your meals  Do not eat for at least 3 hours before you go to bed  · Find ways to relax and decrease stress  Stress can increase stomach acid and make gastritis worse  Activities such as yoga, meditation, or listening to music can help you relax  Spend time with friends, or do things you enjoy  Call 911 for any of the following:   · You develop chest pain or shortness of breath  When should I seek immediate care? · You vomit blood  · You have black or bloody bowel movements  · You have severe stomach or back pain  When should I contact my healthcare provider? · You have a fever  · You have new or worsening symptoms, even after treatment  · You have questions or concerns about your condition or care  CARE AGREEMENT:   You have the right to help plan your care  Learn about your health condition and how it may be treated  Discuss treatment options with your caregivers to decide what care you want to receive  You always have the right to refuse treatment  The above information is an  only  It is not intended as medical advice for individual conditions or treatments  Talk to your doctor, nurse or pharmacist before following any medical regimen to see if it is safe and effective for you  © 2017 [facility] Information is for End User's use only and may not be sold, redistributed or otherwise used for commercial purposes  All illustrations and images included in CareNotes® are the copyrighted property of A D A M , Inc  or Moses Brannon

## 2020-07-09 NOTE — H&P
History and Physical - [facility]  Joshua Childress 78 y o  female MRN: [id number]    HPI: Joshua Childress is a 78y o  year old female who presents for EGD and colonoscopy secondary to nausea/vomiting, diarrhea, and weight loss    REVIEW OF SYSTEMS: Per the HPI, and otherwise unremarkable      Historical Information   Past Medical History:   Diagnosis Date    Chronic fatigue syndrome with fibromyalgia     DDD (degenerative disc disease), cervical     Hx of migraine headaches     Hypothyroidism      Past Surgical History:   Procedure Laterality Date    BARTHOLIN GLAND CYST EXCISION      CARPAL TUNNEL RELEASE Bilateral     TONSILLECTOMY      TUBAL LIGATION       Social History   Social History     Substance and Sexual Activity   Alcohol Use Not Currently     Social History     Substance and Sexual Activity   Drug Use Not on file     Social History     Tobacco Use   Smoking Status Current Every Day Smoker    Packs/day: 1 00    Types: Cigarettes   Smokeless Tobacco Never Used     Family History   Problem Relation Age of Onset    Colon polyps Mother     Heart disease Mother     Heart disease Father     Colon cancer Neg Hx        Meds/Allergies       Current Outpatient Medications:     Capsaicin 0 025 % GEL    L-Lysine 500 MG CAPS    Naproxen Sodium (ALEVE) 220 MG CAPS    S-Adenosylmethionine (RADHA-E) 200 MG TABS    Sod Picosulfate-Mag Ox-Cit Acd (Clenpiq) 10-3 5-12 MG-GM -GM/160ML SOLN    Tragacanth (ASTRAGALUS ROOT) POWD    5-Hydroxytryptophan (5-HTP PO)    B Complex-Biotin-FA (TH VITAMIN B 50/B-COMPLEX) TABS    bismuth subsalicylate (PEPTO BISMOL) 524 mg/30 mL oral suspension    cholecalciferol (VITAMIN D3) 1,000 units tablet    COLCHICINE PO    cyclobenzaprine (FLEXERIL) 10 mg tablet    Echinacea 400 MG CAPS    fexofenadine-pseudoephedrine (ALLEGRA-D)  MG per tablet    GINKGO BILOBA PLUS PO    Hyaluronic Acid 20-60 MG CAPS    loperamide (IMODIUM) 2 mg capsule    prochlorperazine (COMPAZINE) 10 mg tablet  No current facility-administered medications for this encounter  Allergies   Allergen Reactions    Clarithromycin     Codeine Other (See Comments)     lethergic    Naproxen     Other      PHOSPHATE    Oxycodone Other (See Comments)     lethergic    Trazodone Other (See Comments)     lethergic    Penicillins Rash       Objective     BP (!) 194/93   Pulse 99   Temp 98 9 °F (37 2 °C) (Temporal)   Resp 19   SpO2 97%     PHYSICAL EXAM    Gen: NAD AAOx3  CV: S1S2 RRR no m/r/g  CHEST: Clear b/l no c/r/w  ABD: soft, +BS NT/ND  EXT: no edema    ASSESSMENT/PLAN:  This is a 78y o  year old female here for EGD and colonoscopy secondary to nausea/vomiting, diarrhea, and weight loss, and she is stable and optimized for her procedure

## 2020-07-09 NOTE — ANESTHESIA POSTPROCEDURE EVALUATION
Post-Op Assessment Note    CV Status:  Stable  Pain Score: 0    Pain management: adequate     Mental Status:  Sleepy   Hydration Status:  Euvolemic   PONV Controlled:  Controlled   Airway Patency:  Patent   Post Op Vitals Reviewed: Yes      Staff: Anesthesiologist           BP     Temp      Pulse     Resp      SpO2

## 2020-07-21 ENCOUNTER — TELEPHONE (OUTPATIENT)
Dept: GASTROENTEROLOGY | Facility: CLINIC | Age: 70
End: 2020-07-21

## 2020-08-20 ENCOUNTER — OFFICE VISIT (OUTPATIENT)
Dept: GASTROENTEROLOGY | Facility: CLINIC | Age: 70
End: 2020-08-20
Payer: MEDICARE

## 2020-08-20 VITALS
TEMPERATURE: 96.9 F | DIASTOLIC BLOOD PRESSURE: 84 MMHG | BODY MASS INDEX: 16.07 KG/M2 | HEIGHT: 66 IN | WEIGHT: 100 LBS | SYSTOLIC BLOOD PRESSURE: 160 MMHG

## 2020-08-20 DIAGNOSIS — R10.31 RLQ ABDOMINAL PAIN: ICD-10-CM

## 2020-08-20 DIAGNOSIS — R63.4 WEIGHT LOSS: ICD-10-CM

## 2020-08-20 DIAGNOSIS — R10.31 RIGHT LOWER QUADRANT ABDOMINAL PAIN: ICD-10-CM

## 2020-08-20 DIAGNOSIS — R19.7 DIARRHEA, UNSPECIFIED TYPE: Primary | ICD-10-CM

## 2020-08-20 DIAGNOSIS — R79.89 ABNORMAL LFTS: ICD-10-CM

## 2020-08-20 DIAGNOSIS — Z86.010 HISTORY OF COLON POLYPS: ICD-10-CM

## 2020-08-20 PROCEDURE — 99214 OFFICE O/P EST MOD 30 MIN: CPT | Performed by: NURSE PRACTITIONER

## 2020-08-20 NOTE — PROGRESS NOTES
[facility] - Outpatient Follow-up Note  Darian Barnes 78 y o  female MRN: [id number]  Encounter: [id number]    ASSESSMENT AND PLAN:      1  Diarrhea, unspecified type  History of diarrhea that has resolved by taking Imodium daily  Celiac panel negative  A recent upper endoscopy with small-bowel biopsy and colonoscopy without any evidence of celiac disease, microscopic colitis or inflammatory bowel disease  Suspect diarrhea predominant IBS  Exclude thyroid dysregulation  - check TSH level  - continue Imodium as needed    2  Weight loss  Reports a 20 lb weight loss over the past couple of months  Exclude malignancy  Consider hypothyroidism  A recent abdominal ultrasound, EGD and colonoscopy without any explanation  Possibly a component of anxiety contributing, as she does admit to being under tremendous amount of stress recently    - TSH + Free T4; Future  - CBC  - CMP  - CT abdomen pelvis w contrast; Future    3  History of colon polyps  4  Family history of colon polyps  5  Screening colonoscopy  Large sessile polyp measuring 15 mm in the cecum status post partial piecemeal removal by cold biopsy; removed in piecemeal fashion hot snare with saline lift and retrieval of specimen; tattooed  A 3 year recall colonoscopy advised  - will arrange for a repeat colonoscopy in October of this year    4  RLQ abdominal pain  Episodic issues with right lower quadrant abdominal pain for the past several months  Unlikely an appendicitis  A recent colonoscopy without ileitis  - CT abdomen pelvis w contrast; Future    5  Abnormal LFTs  History of elevated LFTs, resolved  Viral, metabolic and autoimmune etiologies excluded via the appropriate serology  Denies frequent alcohol intake         Followup Appointment:  After procedure  ______________________________________________________________________    Chief Complaint   Patient presents with    Follow-up     scope HPI:    Returns to the office following a recent upper and lower endoscopy that was performed for the indication of nausea, vomiting, diarrhea, right lower quadrant abdominal pain and weight loss  No further issues with nausea vomiting  Diarrhea controlled by taking Imodium daily  Symptoms began a few months ago  Right lower quadrant abdominal pain tends to be episodic without any precipitating or alleviating factors  Denies any fevers, chills, melena or rectal bleeding  She does admit to being under an increased amount of stress over this time frame  Historical Information   Past Medical History:   Diagnosis Date    Chronic fatigue syndrome with fibromyalgia     DDD (degenerative disc disease), cervical     Hx of migraine headaches     Hypothyroidism      Past Surgical History:   Procedure Laterality Date    BARTHOLIN GLAND CYST EXCISION      CARPAL TUNNEL RELEASE Bilateral     COLONOSCOPY  07/09/2020     15 mm sessile polyp in the cecum removed by polypectomy  A 3 recall    TONSILLECTOMY      TUBAL LIGATION      UPPER GASTROINTESTINAL ENDOSCOPY  07/09/2020     small hiatal hernia  Pathology negative       Social History     Substance and Sexual Activity   Alcohol Use Not Currently     Social History     Substance and Sexual Activity   Drug Use Not on file     Social History     Tobacco Use   Smoking Status Current Every Day Smoker    Packs/day: 1 00    Types: Cigarettes   Smokeless Tobacco Never Used     Family History   Problem Relation Age of Onset    Colon polyps Mother     Heart disease Mother     Heart disease Father     Colon cancer Neg Hx          Current Outpatient Medications:     5-Hydroxytryptophan (5-HTP PO)    B Complex-Biotin-FA (TH VITAMIN B 50/B-COMPLEX) TABS    bismuth subsalicylate (PEPTO BISMOL) 524 mg/30 mL oral suspension    Capsaicin 0 025 % GEL    cholecalciferol (VITAMIN D3) 1,000 units tablet    COLCHICINE PO    cyclobenzaprine (FLEXERIL) 10 mg tablet    Echinacea 400 MG CAPS    fexofenadine-pseudoephedrine (ALLEGRA-D)  MG per tablet    GINKGO BILOBA PLUS PO    Hyaluronic Acid 20-60 MG CAPS    L-Lysine 500 MG CAPS    loperamide (IMODIUM) 2 mg capsule    Naproxen Sodium (ALEVE) 220 MG CAPS    prochlorperazine (COMPAZINE) 10 mg tablet    S-Adenosylmethionine (RADHA-E) 200 MG TABS    Tragacanth (ASTRAGALUS ROOT) POWD  Allergies   Allergen Reactions    Clarithromycin     Codeine Other (See Comments)     lethergic    Naproxen     Other      PHOSPHATE    Oxycodone Other (See Comments)     lethergic    Trazodone Other (See Comments)     lethergic    Penicillins Rash     Reviewed medications and allergies and updated as indicated    ROS:  Positive for anxiety, fatigue, weight loss, muscle and joint pain  Otherwise 10 point review of systems negative  PHYSICAL EXAM:    Blood pressure 160/84, temperature (!) 96 9 °F (36 1 °C), height 5' 6" (1 676 m), weight 45 4 kg (100 lb)  Body mass index is 16 14 kg/m²  General Appearance: NAD, cooperative, alert, cachectic  Eyes: Anicteric  ENT:  Normocephalic   Neck:  Appears normal  Resp:  Clear to auscultation bilaterally; no rales, rhonchi or wheezing; respirations unlabored   CV:  S1 S2, Regular rate and rhythm; no murmur, rub, or gallop  GI:  Soft, non-tender, non-distended; normal bowel sounds; no masses, no organomegaly   Rectal: Deferred  Musculoskeletal: No cyanosis, clubbing or edema  Normal ROM  Skin:  No jaundice, rashes, or lesions   Psych: Normal affect, good eye contact  Neuro: No gross deficits, AAOx3    Lab Results:   No results found for: WBC, HGB, HCT, MCV, PLT  No results found for: NA, K, CL, CO2, ANIONGAP, BUN, CREATININE, GLUCOSE, GLUF, CALCIUM, CORRECTEDCA, AST, ALT, ALKPHOS, PROT, BILITOT, EGFR  No results found for: IRON, TIBC, FERRITIN  No results found for: LIPASE    Radiology Results:   No results found

## 2020-11-02 ENCOUNTER — APPOINTMENT (EMERGENCY)
Dept: RADIOLOGY | Facility: HOSPITAL | Age: 70
DRG: 180 | End: 2020-11-02
Payer: MEDICARE

## 2020-11-02 ENCOUNTER — HOSPITAL ENCOUNTER (INPATIENT)
Facility: HOSPITAL | Age: 70
LOS: 3 days | Discharge: HOME | DRG: 180 | End: 2020-11-05
Attending: EMERGENCY MEDICINE | Admitting: INTERNAL MEDICINE
Payer: MEDICARE

## 2020-11-02 ENCOUNTER — APPOINTMENT (EMERGENCY)
Dept: CT IMAGING | Facility: HOSPITAL | Age: 70
DRG: 180 | End: 2020-11-02
Payer: MEDICARE

## 2020-11-02 DIAGNOSIS — R09.02 HYPOXIA: ICD-10-CM

## 2020-11-02 DIAGNOSIS — J90 PLEURAL EFFUSION: Primary | ICD-10-CM

## 2020-11-02 DIAGNOSIS — R91.8 LUNG MASS: ICD-10-CM

## 2020-11-02 DIAGNOSIS — R06.02 SHORTNESS OF BREATH: ICD-10-CM

## 2020-11-02 PROBLEM — R03.0 ELEVATED BLOOD PRESSURE READING: Status: ACTIVE | Noted: 2020-11-02

## 2020-11-02 PROBLEM — Z72.0 TOBACCO ABUSE: Status: ACTIVE | Noted: 2020-11-02

## 2020-11-02 PROBLEM — R93.89 ABNORMAL CT OF THE CHEST: Status: ACTIVE | Noted: 2020-11-02

## 2020-11-02 PROBLEM — R77.8 ELEVATED TROPONIN: Status: ACTIVE | Noted: 2020-11-02

## 2020-11-02 LAB
ALBUMIN SERPL BCP-MCNC: 3 G/DL (ref 3.5–5)
ALP SERPL-CCNC: 135 U/L (ref 46–116)
ALT SERPL W P-5'-P-CCNC: 21 U/L (ref 12–78)
ANION GAP SERPL CALCULATED.3IONS-SCNC: 7 MMOL/L (ref 4–13)
AST SERPL W P-5'-P-CCNC: 18 U/L (ref 5–45)
BASOPHILS # BLD AUTO: 0.04 THOUSANDS/ΜL (ref 0–0.1)
BASOPHILS NFR BLD AUTO: 0 % (ref 0–1)
BILIRUB SERPL-MCNC: 0.4 MG/DL (ref 0.2–1)
BUN SERPL-MCNC: 17 MG/DL (ref 5–25)
CALCIUM ALBUM COR SERPL-MCNC: 9.4 MG/DL (ref 8.3–10.1)
CALCIUM SERPL-MCNC: 8.6 MG/DL (ref 8.3–10.1)
CHLORIDE SERPL-SCNC: 104 MMOL/L (ref 100–108)
CHOLEST SERPL-MCNC: 110 MG/DL (ref 50–200)
CO2 SERPL-SCNC: 28 MMOL/L (ref 21–32)
CREAT SERPL-MCNC: 0.84 MG/DL (ref 0.6–1.3)
EOSINOPHIL # BLD AUTO: 0.05 THOUSAND/ΜL (ref 0–0.61)
EOSINOPHIL NFR BLD AUTO: 1 % (ref 0–6)
ERYTHROCYTE [DISTWIDTH] IN BLOOD BY AUTOMATED COUNT: 12.3 % (ref 11.6–15.1)
GFR SERPL CREATININE-BSD FRML MDRD: 72 ML/MIN/1.73SQ M
GLUCOSE SERPL-MCNC: 116 MG/DL (ref 65–140)
HCT VFR BLD AUTO: 42.3 % (ref 34.8–46.1)
HDLC SERPL-MCNC: 44 MG/DL
HGB BLD-MCNC: 14.4 G/DL (ref 11.5–15.4)
IMM GRANULOCYTES # BLD AUTO: 0.04 THOUSAND/UL (ref 0–0.2)
IMM GRANULOCYTES NFR BLD AUTO: 0 % (ref 0–2)
LDLC SERPL CALC-MCNC: 54 MG/DL (ref 0–100)
LYMPHOCYTES # BLD AUTO: 1.04 THOUSANDS/ΜL (ref 0.6–4.47)
LYMPHOCYTES NFR BLD AUTO: 12 % (ref 14–44)
MCH RBC QN AUTO: 32.8 PG (ref 26.8–34.3)
MCHC RBC AUTO-ENTMCNC: 34 G/DL (ref 31.4–37.4)
MCV RBC AUTO: 96 FL (ref 82–98)
MONOCYTES # BLD AUTO: 0.75 THOUSAND/ΜL (ref 0.17–1.22)
MONOCYTES NFR BLD AUTO: 8 % (ref 4–12)
NEUTROPHILS # BLD AUTO: 7.04 THOUSANDS/ΜL (ref 1.85–7.62)
NEUTS SEG NFR BLD AUTO: 79 % (ref 43–75)
NRBC BLD AUTO-RTO: 0 /100 WBCS
PLATELET # BLD AUTO: 376 THOUSANDS/UL (ref 149–390)
PMV BLD AUTO: 8.9 FL (ref 8.9–12.7)
POTASSIUM SERPL-SCNC: 3.5 MMOL/L (ref 3.5–5.3)
PROT SERPL-MCNC: 6.4 G/DL (ref 6.4–8.2)
RBC # BLD AUTO: 4.39 MILLION/UL (ref 3.81–5.12)
SARS-COV-2 RNA RESP QL NAA+PROBE: NEGATIVE
SODIUM SERPL-SCNC: 139 MMOL/L (ref 136–145)
TRIGL SERPL-MCNC: 60 MG/DL
TROPONIN I SERPL-MCNC: 0.05 NG/ML
TROPONIN I SERPL-MCNC: 0.06 NG/ML
TSH SERPL DL<=0.05 MIU/L-ACNC: 1.66 UIU/ML (ref 0.36–3.74)
WBC # BLD AUTO: 8.96 THOUSAND/UL (ref 4.31–10.16)

## 2020-11-02 PROCEDURE — 93005 ELECTROCARDIOGRAM TRACING: CPT

## 2020-11-02 PROCEDURE — 99285 EMERGENCY DEPT VISIT HI MDM: CPT | Performed by: PHYSICIAN ASSISTANT

## 2020-11-02 PROCEDURE — 99285 EMERGENCY DEPT VISIT HI MDM: CPT

## 2020-11-02 PROCEDURE — 84484 ASSAY OF TROPONIN QUANT: CPT | Performed by: NURSE PRACTITIONER

## 2020-11-02 PROCEDURE — 99222 1ST HOSP IP/OBS MODERATE 55: CPT | Performed by: INTERNAL MEDICINE

## 2020-11-02 PROCEDURE — 84484 ASSAY OF TROPONIN QUANT: CPT | Performed by: PHYSICIAN ASSISTANT

## 2020-11-02 PROCEDURE — G1004 CDSM NDSC: HCPCS

## 2020-11-02 PROCEDURE — 71045 X-RAY EXAM CHEST 1 VIEW: CPT

## 2020-11-02 PROCEDURE — 84443 ASSAY THYROID STIM HORMONE: CPT | Performed by: NURSE PRACTITIONER

## 2020-11-02 PROCEDURE — 87635 SARS-COV-2 COVID-19 AMP PRB: CPT | Performed by: PHYSICIAN ASSISTANT

## 2020-11-02 PROCEDURE — 71260 CT THORAX DX C+: CPT

## 2020-11-02 PROCEDURE — 85025 COMPLETE CBC W/AUTO DIFF WBC: CPT | Performed by: PHYSICIAN ASSISTANT

## 2020-11-02 PROCEDURE — 87040 BLOOD CULTURE FOR BACTERIA: CPT | Performed by: NURSE PRACTITIONER

## 2020-11-02 PROCEDURE — 80061 LIPID PANEL: CPT | Performed by: NURSE PRACTITIONER

## 2020-11-02 PROCEDURE — 36415 COLL VENOUS BLD VENIPUNCTURE: CPT | Performed by: PHYSICIAN ASSISTANT

## 2020-11-02 PROCEDURE — 80053 COMPREHEN METABOLIC PANEL: CPT | Performed by: PHYSICIAN ASSISTANT

## 2020-11-02 PROCEDURE — 83036 HEMOGLOBIN GLYCOSYLATED A1C: CPT | Performed by: NURSE PRACTITIONER

## 2020-11-02 PROCEDURE — 84145 PROCALCITONIN (PCT): CPT | Performed by: NURSE PRACTITIONER

## 2020-11-02 RX ORDER — PROCHLORPERAZINE MALEATE 5 MG/1
10 TABLET ORAL ONCE
Status: COMPLETED | OUTPATIENT
Start: 2020-11-02 | End: 2020-11-02

## 2020-11-02 RX ORDER — CYCLOBENZAPRINE HCL 10 MG
10 TABLET ORAL 3 TIMES DAILY PRN
Status: DISCONTINUED | OUTPATIENT
Start: 2020-11-02 | End: 2020-11-02

## 2020-11-02 RX ORDER — COLCHICINE 0.6 MG/1
0.6 TABLET ORAL 2 TIMES DAILY PRN
Status: DISCONTINUED | OUTPATIENT
Start: 2020-11-02 | End: 2020-11-02

## 2020-11-02 RX ORDER — METOPROLOL TARTRATE 5 MG/5ML
5 INJECTION INTRAVENOUS EVERY 8 HOURS PRN
Status: DISCONTINUED | OUTPATIENT
Start: 2020-11-02 | End: 2020-11-05 | Stop reason: HOSPADM

## 2020-11-02 RX ORDER — POTASSIUM CHLORIDE 20 MEQ/1
40 TABLET, EXTENDED RELEASE ORAL ONCE
Status: COMPLETED | OUTPATIENT
Start: 2020-11-02 | End: 2020-11-02

## 2020-11-02 RX ORDER — NICOTINE 21 MG/24HR
1 PATCH, TRANSDERMAL 24 HOURS TRANSDERMAL DAILY
Status: DISCONTINUED | OUTPATIENT
Start: 2020-11-03 | End: 2020-11-05 | Stop reason: HOSPADM

## 2020-11-02 RX ADMIN — METOPROLOL TARTRATE 5 MG: 5 INJECTION INTRAVENOUS at 22:42

## 2020-11-02 RX ADMIN — PROCHLORPERAZINE MALEATE 10 MG: 5 TABLET ORAL at 17:17

## 2020-11-02 RX ADMIN — IOHEXOL 85 ML: 350 INJECTION, SOLUTION INTRAVENOUS at 18:02

## 2020-11-02 RX ADMIN — POTASSIUM CHLORIDE 40 MEQ: 1500 TABLET, EXTENDED RELEASE ORAL at 22:41

## 2020-11-03 ENCOUNTER — APPOINTMENT (INPATIENT)
Dept: NON INVASIVE DIAGNOSTICS | Facility: HOSPITAL | Age: 70
DRG: 180 | End: 2020-11-03
Payer: MEDICARE

## 2020-11-03 ENCOUNTER — APPOINTMENT (INPATIENT)
Dept: INTERVENTIONAL RADIOLOGY/VASCULAR | Facility: HOSPITAL | Age: 70
DRG: 180 | End: 2020-11-03
Payer: MEDICARE

## 2020-11-03 PROBLEM — J96.01 ACUTE RESPIRATORY FAILURE WITH HYPOXIA (HCC): Status: ACTIVE | Noted: 2020-11-03

## 2020-11-03 PROBLEM — J43.9 PULMONARY EMPHYSEMA (HCC): Status: ACTIVE | Noted: 2020-11-03

## 2020-11-03 LAB
ALBUMIN SERPL BCP-MCNC: 2.9 G/DL (ref 3.5–5)
ALP SERPL-CCNC: 124 U/L (ref 46–116)
ALT SERPL W P-5'-P-CCNC: 18 U/L (ref 12–78)
ANION GAP SERPL CALCULATED.3IONS-SCNC: 7 MMOL/L (ref 4–13)
APPEARANCE FLD: ABNORMAL
AST SERPL W P-5'-P-CCNC: 17 U/L (ref 5–45)
BASOPHILS # BLD AUTO: 0.04 THOUSANDS/ΜL (ref 0–0.1)
BASOPHILS NFR BLD AUTO: 0 % (ref 0–1)
BILIRUB SERPL-MCNC: 0.5 MG/DL (ref 0.2–1)
BUN SERPL-MCNC: 14 MG/DL (ref 5–25)
CALCIUM ALBUM COR SERPL-MCNC: 9.2 MG/DL (ref 8.3–10.1)
CALCIUM SERPL-MCNC: 8.3 MG/DL (ref 8.3–10.1)
CHLORIDE SERPL-SCNC: 104 MMOL/L (ref 100–108)
CO2 SERPL-SCNC: 29 MMOL/L (ref 21–32)
COLOR FLD: ABNORMAL
CREAT SERPL-MCNC: 0.77 MG/DL (ref 0.6–1.3)
EOSINOPHIL # BLD AUTO: 0.05 THOUSAND/ΜL (ref 0–0.61)
EOSINOPHIL NFR BLD AUTO: 1 % (ref 0–6)
ERYTHROCYTE [DISTWIDTH] IN BLOOD BY AUTOMATED COUNT: 12.5 % (ref 11.6–15.1)
EST. AVERAGE GLUCOSE BLD GHB EST-MCNC: 114 MG/DL
GFR SERPL CREATININE-BSD FRML MDRD: 80 ML/MIN/1.73SQ M
GLUCOSE FLD-MCNC: 95 MG/DL
GLUCOSE SERPL-MCNC: 133 MG/DL (ref 65–140)
HBA1C MFR BLD: 5.6 %
HCT VFR BLD AUTO: 42.4 % (ref 34.8–46.1)
HGB BLD-MCNC: 14.3 G/DL (ref 11.5–15.4)
IMM GRANULOCYTES # BLD AUTO: 0.04 THOUSAND/UL (ref 0–0.2)
IMM GRANULOCYTES NFR BLD AUTO: 0 % (ref 0–2)
INR PPP: 1.16 (ref 0.84–1.19)
LDH FLD L TO P-CCNC: 1226 U/L
LYMPHOCYTES # BLD AUTO: 1.1 THOUSANDS/ΜL (ref 0.6–4.47)
LYMPHOCYTES NFR BLD AUTO: 10 % (ref 14–44)
MAGNESIUM SERPL-MCNC: 2.1 MG/DL (ref 1.6–2.6)
MCH RBC QN AUTO: 32.8 PG (ref 26.8–34.3)
MCHC RBC AUTO-ENTMCNC: 33.7 G/DL (ref 31.4–37.4)
MCV RBC AUTO: 97 FL (ref 82–98)
MONOCYTES # BLD AUTO: 0.67 THOUSAND/ΜL (ref 0.17–1.22)
MONOCYTES NFR BLD AUTO: 6 % (ref 4–12)
NEUTROPHILS # BLD AUTO: 8.84 THOUSANDS/ΜL (ref 1.85–7.62)
NEUTS SEG NFR BLD AUTO: 83 % (ref 43–75)
NRBC BLD AUTO-RTO: 0 /100 WBCS
PH BODY FLUID: 7.5
PHOSPHATE SERPL-MCNC: 3.3 MG/DL (ref 2.3–4.1)
PLATELET # BLD AUTO: 357 THOUSANDS/UL (ref 149–390)
PMV BLD AUTO: 8.8 FL (ref 8.9–12.7)
POTASSIUM SERPL-SCNC: 3.5 MMOL/L (ref 3.5–5.3)
PROCALCITONIN SERPL-MCNC: <0.05 NG/ML
PROCALCITONIN SERPL-MCNC: <0.05 NG/ML
PROT FLD-MCNC: 3.8 G/DL
PROT SERPL-MCNC: 6.1 G/DL (ref 6.4–8.2)
PROTHROMBIN TIME: 14.8 SECONDS (ref 11.6–14.5)
RBC # BLD AUTO: 4.36 MILLION/UL (ref 3.81–5.12)
SITE: ABNORMAL
SODIUM SERPL-SCNC: 140 MMOL/L (ref 136–145)
TROPONIN I SERPL-MCNC: 0.05 NG/ML
WBC # BLD AUTO: 10.74 THOUSAND/UL (ref 4.31–10.16)
WBC # FLD MANUAL: 2641 /UL

## 2020-11-03 PROCEDURE — 93306 TTE W/DOPPLER COMPLETE: CPT | Performed by: INTERNAL MEDICINE

## 2020-11-03 PROCEDURE — 88112 CYTOPATH CELL ENHANCE TECH: CPT | Performed by: PATHOLOGY

## 2020-11-03 PROCEDURE — 89051 BODY FLUID CELL COUNT: CPT | Performed by: INTERNAL MEDICINE

## 2020-11-03 PROCEDURE — 85610 PROTHROMBIN TIME: CPT | Performed by: INTERNAL MEDICINE

## 2020-11-03 PROCEDURE — 88305 TISSUE EXAM BY PATHOLOGIST: CPT | Performed by: PATHOLOGY

## 2020-11-03 PROCEDURE — NC001 PR NO CHARGE: Performed by: PATHOLOGY

## 2020-11-03 PROCEDURE — 83735 ASSAY OF MAGNESIUM: CPT | Performed by: INTERNAL MEDICINE

## 2020-11-03 PROCEDURE — 80053 COMPREHEN METABOLIC PANEL: CPT | Performed by: INTERNAL MEDICINE

## 2020-11-03 PROCEDURE — 85025 COMPLETE CBC W/AUTO DIFF WBC: CPT | Performed by: INTERNAL MEDICINE

## 2020-11-03 PROCEDURE — 83615 LACTATE (LD) (LDH) ENZYME: CPT | Performed by: INTERNAL MEDICINE

## 2020-11-03 PROCEDURE — 88341 IMHCHEM/IMCYTCHM EA ADD ANTB: CPT | Performed by: PATHOLOGY

## 2020-11-03 PROCEDURE — 93306 TTE W/DOPPLER COMPLETE: CPT

## 2020-11-03 PROCEDURE — 84157 ASSAY OF PROTEIN OTHER: CPT | Performed by: INTERNAL MEDICINE

## 2020-11-03 PROCEDURE — 83986 ASSAY PH BODY FLUID NOS: CPT | Performed by: INTERNAL MEDICINE

## 2020-11-03 PROCEDURE — 32555 ASPIRATE PLEURA W/ IMAGING: CPT

## 2020-11-03 PROCEDURE — 82945 GLUCOSE OTHER FLUID: CPT | Performed by: INTERNAL MEDICINE

## 2020-11-03 PROCEDURE — 88342 IMHCHEM/IMCYTCHM 1ST ANTB: CPT | Performed by: PATHOLOGY

## 2020-11-03 PROCEDURE — 93005 ELECTROCARDIOGRAM TRACING: CPT

## 2020-11-03 PROCEDURE — 0W9B3ZX DRAINAGE OF LEFT PLEURAL CAVITY, PERCUTANEOUS APPROACH, DIAGNOSTIC: ICD-10-PCS | Performed by: RADIOLOGY

## 2020-11-03 PROCEDURE — 99232 SBSQ HOSP IP/OBS MODERATE 35: CPT | Performed by: INTERNAL MEDICINE

## 2020-11-03 PROCEDURE — 84484 ASSAY OF TROPONIN QUANT: CPT | Performed by: INTERNAL MEDICINE

## 2020-11-03 PROCEDURE — 84145 PROCALCITONIN (PCT): CPT | Performed by: NURSE PRACTITIONER

## 2020-11-03 PROCEDURE — 84100 ASSAY OF PHOSPHORUS: CPT | Performed by: INTERNAL MEDICINE

## 2020-11-03 RX ORDER — LOPERAMIDE HYDROCHLORIDE 2 MG/1
2 CAPSULE ORAL DAILY
Status: DISCONTINUED | OUTPATIENT
Start: 2020-11-03 | End: 2020-11-05 | Stop reason: HOSPADM

## 2020-11-03 RX ORDER — LEVALBUTEROL 1.25 MG/.5ML
1.25 SOLUTION, CONCENTRATE RESPIRATORY (INHALATION) EVERY 8 HOURS PRN
Status: DISCONTINUED | OUTPATIENT
Start: 2020-11-03 | End: 2020-11-05 | Stop reason: HOSPADM

## 2020-11-03 RX ORDER — NAPROXEN 500 MG/1
500 TABLET ORAL 2 TIMES DAILY WITH MEALS
Status: DISCONTINUED | OUTPATIENT
Start: 2020-11-03 | End: 2020-11-05 | Stop reason: HOSPADM

## 2020-11-03 RX ADMIN — LOPERAMIDE HYDROCHLORIDE 2 MG: 2 CAPSULE ORAL at 16:23

## 2020-11-03 RX ADMIN — ENOXAPARIN SODIUM 30 MG: 30 INJECTION SUBCUTANEOUS at 10:52

## 2020-11-03 RX ADMIN — Medication 1 PATCH: at 10:24

## 2020-11-03 RX ADMIN — NAPROXEN 500 MG: 500 TABLET ORAL at 16:23

## 2020-11-04 ENCOUNTER — APPOINTMENT (INPATIENT)
Dept: CT IMAGING | Facility: HOSPITAL | Age: 70
DRG: 180 | End: 2020-11-04
Payer: MEDICARE

## 2020-11-04 DIAGNOSIS — C78.2 PLEURAL METASTASIS (HCC): ICD-10-CM

## 2020-11-04 DIAGNOSIS — J90 PLEURAL EFFUSION: Primary | ICD-10-CM

## 2020-11-04 LAB
ANION GAP SERPL CALCULATED.3IONS-SCNC: 4 MMOL/L (ref 4–13)
ATRIAL RATE: 91 BPM
ATRIAL RATE: 98 BPM
BUN SERPL-MCNC: 16 MG/DL (ref 5–25)
CALCIUM SERPL-MCNC: 8 MG/DL (ref 8.3–10.1)
CHLORIDE SERPL-SCNC: 104 MMOL/L (ref 100–108)
CO2 SERPL-SCNC: 31 MMOL/L (ref 21–32)
CREAT SERPL-MCNC: 0.77 MG/DL (ref 0.6–1.3)
ERYTHROCYTE [DISTWIDTH] IN BLOOD BY AUTOMATED COUNT: 12.5 % (ref 11.6–15.1)
GFR SERPL CREATININE-BSD FRML MDRD: 80 ML/MIN/1.73SQ M
GLUCOSE SERPL-MCNC: 99 MG/DL (ref 65–140)
HCT VFR BLD AUTO: 43.2 % (ref 34.8–46.1)
HGB BLD-MCNC: 14.3 G/DL (ref 11.5–15.4)
HISTIOCYTES NFR FLD: 23 %
LYMPHOCYTES NFR BLD AUTO: 15 %
MCH RBC QN AUTO: 32.3 PG (ref 26.8–34.3)
MCHC RBC AUTO-ENTMCNC: 33.1 G/DL (ref 31.4–37.4)
MCV RBC AUTO: 98 FL (ref 82–98)
MONO+MESO NFR FLD MANUAL: 20 %
MONOCYTES NFR BLD AUTO: 3 %
NEUTS SEG NFR BLD AUTO: 35 %
P AXIS: 55 DEGREES
P AXIS: 55 DEGREES
PATHOLOGIST INTERPRETATION: NORMAL
PATHOLOGY REVIEW: YES
PLATELET # BLD AUTO: 351 THOUSANDS/UL (ref 149–390)
PMV BLD AUTO: 9 FL (ref 8.9–12.7)
POTASSIUM SERPL-SCNC: 4 MMOL/L (ref 3.5–5.3)
PR INTERVAL: 120 MS
PR INTERVAL: 120 MS
QRS AXIS: 42 DEGREES
QRS AXIS: 57 DEGREES
QRSD INTERVAL: 86 MS
QRSD INTERVAL: 86 MS
QT INTERVAL: 360 MS
QT INTERVAL: 374 MS
QTC INTERVAL: 442 MS
QTC INTERVAL: 477 MS
RBC # BLD AUTO: 4.43 MILLION/UL (ref 3.81–5.12)
SODIUM SERPL-SCNC: 139 MMOL/L (ref 136–145)
T WAVE AXIS: 100 DEGREES
T WAVE AXIS: 53 DEGREES
TOTAL CELLS COUNTED SPEC: 100
VENTRICULAR RATE: 91 BPM
VENTRICULAR RATE: 98 BPM
WBC # BLD AUTO: 8.94 THOUSAND/UL (ref 4.31–10.16)
WBC OTHER NFR FLD MANUAL: 4 %

## 2020-11-04 PROCEDURE — 93010 ELECTROCARDIOGRAM REPORT: CPT | Performed by: INTERNAL MEDICINE

## 2020-11-04 PROCEDURE — 71260 CT THORAX DX C+: CPT

## 2020-11-04 PROCEDURE — 99221 1ST HOSP IP/OBS SF/LOW 40: CPT | Performed by: INTERNAL MEDICINE

## 2020-11-04 PROCEDURE — 99232 SBSQ HOSP IP/OBS MODERATE 35: CPT | Performed by: INTERNAL MEDICINE

## 2020-11-04 PROCEDURE — 85027 COMPLETE CBC AUTOMATED: CPT | Performed by: INTERNAL MEDICINE

## 2020-11-04 PROCEDURE — G1004 CDSM NDSC: HCPCS

## 2020-11-04 PROCEDURE — 80048 BASIC METABOLIC PNL TOTAL CA: CPT | Performed by: INTERNAL MEDICINE

## 2020-11-04 RX ADMIN — Medication 1 PATCH: at 08:28

## 2020-11-04 RX ADMIN — IOHEXOL 85 ML: 350 INJECTION, SOLUTION INTRAVENOUS at 14:30

## 2020-11-04 RX ADMIN — NAPROXEN 500 MG: 500 TABLET ORAL at 16:30

## 2020-11-04 RX ADMIN — ENOXAPARIN SODIUM 30 MG: 30 INJECTION SUBCUTANEOUS at 08:28

## 2020-11-04 RX ADMIN — NAPROXEN 500 MG: 500 TABLET ORAL at 08:27

## 2020-11-04 RX ADMIN — LOPERAMIDE HYDROCHLORIDE 2 MG: 2 CAPSULE ORAL at 08:27

## 2020-11-05 VITALS
OXYGEN SATURATION: 91 % | HEIGHT: 67 IN | SYSTOLIC BLOOD PRESSURE: 145 MMHG | RESPIRATION RATE: 20 BRPM | WEIGHT: 105.16 LBS | DIASTOLIC BLOOD PRESSURE: 76 MMHG | HEART RATE: 100 BPM | TEMPERATURE: 98 F | BODY MASS INDEX: 16.51 KG/M2

## 2020-11-05 DIAGNOSIS — C78.2 PLEURAL METASTASIS (HCC): Primary | ICD-10-CM

## 2020-11-05 LAB
ANION GAP SERPL CALCULATED.3IONS-SCNC: 8 MMOL/L (ref 4–13)
BUN SERPL-MCNC: 18 MG/DL (ref 5–25)
CALCIUM SERPL-MCNC: 8.2 MG/DL (ref 8.3–10.1)
CHLORIDE SERPL-SCNC: 105 MMOL/L (ref 100–108)
CO2 SERPL-SCNC: 26 MMOL/L (ref 21–32)
CREAT SERPL-MCNC: 0.6 MG/DL (ref 0.6–1.3)
ERYTHROCYTE [DISTWIDTH] IN BLOOD BY AUTOMATED COUNT: 12.3 % (ref 11.6–15.1)
GFR SERPL CREATININE-BSD FRML MDRD: 94 ML/MIN/1.73SQ M
GLUCOSE SERPL-MCNC: 83 MG/DL (ref 65–140)
HCT VFR BLD AUTO: 41.9 % (ref 34.8–46.1)
HGB BLD-MCNC: 14 G/DL (ref 11.5–15.4)
MCH RBC QN AUTO: 32.1 PG (ref 26.8–34.3)
MCHC RBC AUTO-ENTMCNC: 33.4 G/DL (ref 31.4–37.4)
MCV RBC AUTO: 96 FL (ref 82–98)
PLATELET # BLD AUTO: 372 THOUSANDS/UL (ref 149–390)
PMV BLD AUTO: 9.5 FL (ref 8.9–12.7)
POTASSIUM SERPL-SCNC: 3.8 MMOL/L (ref 3.5–5.3)
RBC # BLD AUTO: 4.36 MILLION/UL (ref 3.81–5.12)
SODIUM SERPL-SCNC: 139 MMOL/L (ref 136–145)
WBC # BLD AUTO: 8.48 THOUSAND/UL (ref 4.31–10.16)

## 2020-11-05 PROCEDURE — 99232 SBSQ HOSP IP/OBS MODERATE 35: CPT | Performed by: INTERNAL MEDICINE

## 2020-11-05 PROCEDURE — 80048 BASIC METABOLIC PNL TOTAL CA: CPT | Performed by: INTERNAL MEDICINE

## 2020-11-05 PROCEDURE — 99239 HOSP IP/OBS DSCHRG MGMT >30: CPT | Performed by: INTERNAL MEDICINE

## 2020-11-05 PROCEDURE — 94761 N-INVAS EAR/PLS OXIMETRY MLT: CPT

## 2020-11-05 PROCEDURE — 85027 COMPLETE CBC AUTOMATED: CPT | Performed by: INTERNAL MEDICINE

## 2020-11-05 RX ADMIN — LOPERAMIDE HYDROCHLORIDE 2 MG: 2 CAPSULE ORAL at 13:17

## 2020-11-05 RX ADMIN — NAPROXEN 500 MG: 500 TABLET ORAL at 09:19

## 2020-11-05 RX ADMIN — ENOXAPARIN SODIUM 30 MG: 30 INJECTION SUBCUTANEOUS at 09:22

## 2020-11-05 RX ADMIN — Medication 1 PATCH: at 09:22

## 2020-11-07 ENCOUNTER — HOSPITAL ENCOUNTER (EMERGENCY)
Facility: HOSPITAL | Age: 70
Discharge: HOME | End: 2020-11-07
Attending: EMERGENCY MEDICINE | Admitting: EMERGENCY MEDICINE
Payer: MEDICARE

## 2020-11-07 ENCOUNTER — TELEPHONE (OUTPATIENT)
Dept: PULMONOLOGY | Facility: CLINIC | Age: 70
End: 2020-11-07

## 2020-11-07 ENCOUNTER — TELEPHONE (OUTPATIENT)
Dept: OTHER | Facility: OTHER | Age: 70
End: 2020-11-07

## 2020-11-07 ENCOUNTER — APPOINTMENT (EMERGENCY)
Dept: RADIOLOGY | Facility: HOSPITAL | Age: 70
End: 2020-11-07
Payer: MEDICARE

## 2020-11-07 VITALS
SYSTOLIC BLOOD PRESSURE: 154 MMHG | HEIGHT: 66 IN | HEART RATE: 97 BPM | RESPIRATION RATE: 24 BRPM | BODY MASS INDEX: 16.88 KG/M2 | DIASTOLIC BLOOD PRESSURE: 67 MMHG | WEIGHT: 105 LBS | OXYGEN SATURATION: 93 %

## 2020-11-07 DIAGNOSIS — J90 PLEURAL EFFUSION: Primary | ICD-10-CM

## 2020-11-07 DIAGNOSIS — J90 RECURRENT PLEURAL EFFUSION ON LEFT: ICD-10-CM

## 2020-11-07 DIAGNOSIS — C34.90 LUNG MALIGNANCY (HCC): ICD-10-CM

## 2020-11-07 DIAGNOSIS — J96.01 ACUTE RESPIRATORY FAILURE WITH HYPOXIA (HCC): ICD-10-CM

## 2020-11-07 LAB
ALBUMIN SERPL BCP-MCNC: 2.6 G/DL (ref 3.5–5)
ALP SERPL-CCNC: 119 U/L (ref 46–116)
ALT SERPL W P-5'-P-CCNC: 22 U/L (ref 12–78)
ANION GAP SERPL CALCULATED.3IONS-SCNC: 8 MMOL/L (ref 4–13)
AST SERPL W P-5'-P-CCNC: 20 U/L (ref 5–45)
BASOPHILS # BLD AUTO: 0.05 THOUSANDS/ΜL (ref 0–0.1)
BASOPHILS NFR BLD AUTO: 1 % (ref 0–1)
BILIRUB SERPL-MCNC: 0.5 MG/DL (ref 0.2–1)
BUN SERPL-MCNC: 18 MG/DL (ref 5–25)
CALCIUM ALBUM COR SERPL-MCNC: 9.9 MG/DL (ref 8.3–10.1)
CALCIUM SERPL-MCNC: 8.8 MG/DL (ref 8.3–10.1)
CHLORIDE SERPL-SCNC: 101 MMOL/L (ref 100–108)
CO2 SERPL-SCNC: 28 MMOL/L (ref 21–32)
CREAT SERPL-MCNC: 0.88 MG/DL (ref 0.6–1.3)
EOSINOPHIL # BLD AUTO: 0.03 THOUSAND/ΜL (ref 0–0.61)
EOSINOPHIL NFR BLD AUTO: 0 % (ref 0–6)
ERYTHROCYTE [DISTWIDTH] IN BLOOD BY AUTOMATED COUNT: 12.4 % (ref 11.6–15.1)
GFR SERPL CREATININE-BSD FRML MDRD: 68 ML/MIN/1.73SQ M
GLUCOSE SERPL-MCNC: 123 MG/DL (ref 65–140)
HCT VFR BLD AUTO: 43.5 % (ref 34.8–46.1)
HGB BLD-MCNC: 14.5 G/DL (ref 11.5–15.4)
IMM GRANULOCYTES # BLD AUTO: 0.04 THOUSAND/UL (ref 0–0.2)
IMM GRANULOCYTES NFR BLD AUTO: 0 % (ref 0–2)
LYMPHOCYTES # BLD AUTO: 1.05 THOUSANDS/ΜL (ref 0.6–4.47)
LYMPHOCYTES NFR BLD AUTO: 10 % (ref 14–44)
MCH RBC QN AUTO: 32.6 PG (ref 26.8–34.3)
MCHC RBC AUTO-ENTMCNC: 33.3 G/DL (ref 31.4–37.4)
MCV RBC AUTO: 98 FL (ref 82–98)
MONOCYTES # BLD AUTO: 0.83 THOUSAND/ΜL (ref 0.17–1.22)
MONOCYTES NFR BLD AUTO: 8 % (ref 4–12)
NEUTROPHILS # BLD AUTO: 8.11 THOUSANDS/ΜL (ref 1.85–7.62)
NEUTS SEG NFR BLD AUTO: 81 % (ref 43–75)
NRBC BLD AUTO-RTO: 0 /100 WBCS
NT-PROBNP SERPL-MCNC: 627 PG/ML
PLATELET # BLD AUTO: 429 THOUSANDS/UL (ref 149–390)
PMV BLD AUTO: 9.1 FL (ref 8.9–12.7)
POTASSIUM SERPL-SCNC: 3.8 MMOL/L (ref 3.5–5.3)
PROT SERPL-MCNC: 6.7 G/DL (ref 6.4–8.2)
RBC # BLD AUTO: 4.45 MILLION/UL (ref 3.81–5.12)
SODIUM SERPL-SCNC: 137 MMOL/L (ref 136–145)
TROPONIN I SERPL-MCNC: <0.02 NG/ML
WBC # BLD AUTO: 10.11 THOUSAND/UL (ref 4.31–10.16)

## 2020-11-07 PROCEDURE — 36415 COLL VENOUS BLD VENIPUNCTURE: CPT | Performed by: PHYSICIAN ASSISTANT

## 2020-11-07 PROCEDURE — 84484 ASSAY OF TROPONIN QUANT: CPT | Performed by: PHYSICIAN ASSISTANT

## 2020-11-07 PROCEDURE — 80053 COMPREHEN METABOLIC PANEL: CPT | Performed by: PHYSICIAN ASSISTANT

## 2020-11-07 PROCEDURE — 85025 COMPLETE CBC W/AUTO DIFF WBC: CPT | Performed by: PHYSICIAN ASSISTANT

## 2020-11-07 PROCEDURE — 99285 EMERGENCY DEPT VISIT HI MDM: CPT

## 2020-11-07 PROCEDURE — 99285 EMERGENCY DEPT VISIT HI MDM: CPT | Performed by: PHYSICIAN ASSISTANT

## 2020-11-07 PROCEDURE — 94761 N-INVAS EAR/PLS OXIMETRY MLT: CPT

## 2020-11-07 PROCEDURE — 83880 ASSAY OF NATRIURETIC PEPTIDE: CPT | Performed by: PHYSICIAN ASSISTANT

## 2020-11-07 PROCEDURE — 71045 X-RAY EXAM CHEST 1 VIEW: CPT

## 2020-11-07 PROCEDURE — 93005 ELECTROCARDIOGRAM TRACING: CPT

## 2020-11-08 LAB
ATRIAL RATE: 97 BPM
BACTERIA BLD CULT: NORMAL
BACTERIA BLD CULT: NORMAL
P AXIS: 63 DEGREES
PR INTERVAL: 114 MS
QRS AXIS: 60 DEGREES
QRSD INTERVAL: 86 MS
QT INTERVAL: 376 MS
QTC INTERVAL: 477 MS
T WAVE AXIS: 92 DEGREES
VENTRICULAR RATE: 97 BPM

## 2020-11-08 PROCEDURE — 93010 ELECTROCARDIOGRAM REPORT: CPT | Performed by: INTERNAL MEDICINE

## 2020-11-09 ENCOUNTER — PREP FOR PROCEDURE (OUTPATIENT)
Dept: CCU | Facility: HOSPITAL | Age: 70
End: 2020-11-09

## 2020-11-09 ENCOUNTER — TELEPHONE (OUTPATIENT)
Dept: PULMONOLOGY | Facility: CLINIC | Age: 70
End: 2020-11-09

## 2020-11-09 DIAGNOSIS — J90 PLEURAL EFFUSION: Primary | ICD-10-CM

## 2020-11-10 ENCOUNTER — APPOINTMENT (EMERGENCY)
Dept: RADIOLOGY | Facility: HOSPITAL | Age: 70
End: 2020-11-10
Attending: EMERGENCY MEDICINE
Payer: MEDICARE

## 2020-11-10 ENCOUNTER — APPOINTMENT (EMERGENCY)
Dept: RADIOLOGY | Facility: HOSPITAL | Age: 70
End: 2020-11-10
Payer: MEDICARE

## 2020-11-10 ENCOUNTER — HOSPITAL ENCOUNTER (EMERGENCY)
Facility: HOSPITAL | Age: 70
Discharge: HOME | End: 2020-11-10
Attending: EMERGENCY MEDICINE | Admitting: EMERGENCY MEDICINE
Payer: MEDICARE

## 2020-11-10 VITALS
SYSTOLIC BLOOD PRESSURE: 154 MMHG | BODY MASS INDEX: 16.95 KG/M2 | DIASTOLIC BLOOD PRESSURE: 74 MMHG | RESPIRATION RATE: 26 BRPM | OXYGEN SATURATION: 94 % | WEIGHT: 105 LBS | HEART RATE: 99 BPM | TEMPERATURE: 98 F

## 2020-11-10 DIAGNOSIS — J90 PLEURAL EFFUSION: Primary | ICD-10-CM

## 2020-11-10 DIAGNOSIS — Z98.890 S/P THORACENTESIS: ICD-10-CM

## 2020-11-10 PROCEDURE — 99215 OFFICE O/P EST HI 40 MIN: CPT | Performed by: INTERNAL MEDICINE

## 2020-11-10 PROCEDURE — 99285 EMERGENCY DEPT VISIT HI MDM: CPT

## 2020-11-10 PROCEDURE — 88342 IMHCHEM/IMCYTCHM 1ST ANTB: CPT | Performed by: PATHOLOGY

## 2020-11-10 PROCEDURE — 71045 X-RAY EXAM CHEST 1 VIEW: CPT

## 2020-11-10 PROCEDURE — 88313 SPECIAL STAINS GROUP 2: CPT | Performed by: PATHOLOGY

## 2020-11-10 PROCEDURE — 88112 CYTOPATH CELL ENHANCE TECH: CPT | Performed by: PATHOLOGY

## 2020-11-10 PROCEDURE — 88305 TISSUE EXAM BY PATHOLOGIST: CPT | Performed by: PATHOLOGY

## 2020-11-10 PROCEDURE — 99285 EMERGENCY DEPT VISIT HI MDM: CPT | Performed by: EMERGENCY MEDICINE

## 2020-11-10 PROCEDURE — NC001 PR NO CHARGE: Performed by: INTERNAL MEDICINE

## 2020-11-10 PROCEDURE — 88341 IMHCHEM/IMCYTCHM EA ADD ANTB: CPT | Performed by: PATHOLOGY

## 2020-11-10 PROCEDURE — 32555 ASPIRATE PLEURA W/ IMAGING: CPT | Performed by: INTERNAL MEDICINE

## 2020-11-11 ENCOUNTER — PREP FOR PROCEDURE (OUTPATIENT)
Dept: PULMONOLOGY | Facility: CLINIC | Age: 70
End: 2020-11-11

## 2020-11-12 ENCOUNTER — TELEPHONE (OUTPATIENT)
Dept: PULMONOLOGY | Facility: CLINIC | Age: 70
End: 2020-11-12

## 2020-11-12 ENCOUNTER — OFFICE VISIT (OUTPATIENT)
Dept: PULMONOLOGY | Facility: HOSPITAL | Age: 70
End: 2020-11-12
Payer: MEDICARE

## 2020-11-12 VITALS
DIASTOLIC BLOOD PRESSURE: 60 MMHG | SYSTOLIC BLOOD PRESSURE: 118 MMHG | TEMPERATURE: 96.7 F | HEIGHT: 66 IN | WEIGHT: 101 LBS | BODY MASS INDEX: 16.23 KG/M2 | RESPIRATION RATE: 18 BRPM

## 2020-11-12 DIAGNOSIS — J96.11 CHRONIC RESPIRATORY FAILURE WITH HYPOXIA (HCC): ICD-10-CM

## 2020-11-12 DIAGNOSIS — J90 PLEURAL EFFUSION: Primary | ICD-10-CM

## 2020-11-12 DIAGNOSIS — J43.9 PULMONARY EMPHYSEMA, UNSPECIFIED EMPHYSEMA TYPE (HCC): ICD-10-CM

## 2020-11-12 DIAGNOSIS — R06.02 SHORTNESS OF BREATH: ICD-10-CM

## 2020-11-12 DIAGNOSIS — Z72.0 TOBACCO ABUSE: ICD-10-CM

## 2020-11-12 DIAGNOSIS — J96.11 CHRONIC HYPOXEMIC RESPIRATORY FAILURE (HCC): Primary | ICD-10-CM

## 2020-11-12 PROCEDURE — 99215 OFFICE O/P EST HI 40 MIN: CPT | Performed by: PHYSICIAN ASSISTANT

## 2020-11-12 RX ORDER — ALBUTEROL SULFATE 90 UG/1
2 AEROSOL, METERED RESPIRATORY (INHALATION) EVERY 4 HOURS PRN
Qty: 18 G | Refills: 5 | Status: SHIPPED | OUTPATIENT
Start: 2020-11-12 | End: 2020-12-19 | Stop reason: CLARIF

## 2020-11-13 ENCOUNTER — HOSPITAL ENCOUNTER (OUTPATIENT)
Dept: INTERVENTIONAL RADIOLOGY/VASCULAR | Facility: HOSPITAL | Age: 70
Discharge: HOME | End: 2020-11-13
Admitting: RADIOLOGY
Payer: MEDICARE

## 2020-11-13 VITALS
TEMPERATURE: 97.9 F | RESPIRATION RATE: 18 BRPM | HEART RATE: 90 BPM | SYSTOLIC BLOOD PRESSURE: 136 MMHG | OXYGEN SATURATION: 95 % | DIASTOLIC BLOOD PRESSURE: 62 MMHG

## 2020-11-13 DIAGNOSIS — J90 PLEURAL EFFUSION: ICD-10-CM

## 2020-11-13 PROCEDURE — 99152 MOD SED SAME PHYS/QHP 5/>YRS: CPT

## 2020-11-13 PROCEDURE — 32550 INSERT PLEURAL CATH: CPT

## 2020-11-13 PROCEDURE — 99153 MOD SED SAME PHYS/QHP EA: CPT

## 2020-11-13 PROCEDURE — 32550 INSERT PLEURAL CATH: CPT | Performed by: RADIOLOGY

## 2020-11-13 PROCEDURE — 75989 ABSCESS DRAINAGE UNDER X-RAY: CPT

## 2020-11-13 PROCEDURE — 99152 MOD SED SAME PHYS/QHP 5/>YRS: CPT | Performed by: RADIOLOGY

## 2020-11-13 PROCEDURE — 75989 ABSCESS DRAINAGE UNDER X-RAY: CPT | Performed by: RADIOLOGY

## 2020-11-13 PROCEDURE — C1729 CATH, DRAINAGE: HCPCS

## 2020-11-13 RX ORDER — SODIUM CHLORIDE 9 MG/ML
75 INJECTION, SOLUTION INTRAVENOUS CONTINUOUS
Status: DISCONTINUED | OUTPATIENT
Start: 2020-11-13 | End: 2020-11-14 | Stop reason: HOSPADM

## 2020-11-13 RX ORDER — CEFAZOLIN SODIUM 1 G/50ML
SOLUTION INTRAVENOUS
Status: COMPLETED | OUTPATIENT
Start: 2020-11-13 | End: 2020-11-13

## 2020-11-13 RX ORDER — FENTANYL CITRATE 50 UG/ML
INJECTION, SOLUTION INTRAMUSCULAR; INTRAVENOUS CODE/TRAUMA/SEDATION MEDICATION
Status: COMPLETED | OUTPATIENT
Start: 2020-11-13 | End: 2020-11-13

## 2020-11-13 RX ORDER — MIDAZOLAM HYDROCHLORIDE 2 MG/2ML
INJECTION, SOLUTION INTRAMUSCULAR; INTRAVENOUS CODE/TRAUMA/SEDATION MEDICATION
Status: COMPLETED | OUTPATIENT
Start: 2020-11-13 | End: 2020-11-13

## 2020-11-13 RX ADMIN — MIDAZOLAM 0.5 MG: 1 INJECTION INTRAMUSCULAR; INTRAVENOUS at 08:55

## 2020-11-13 RX ADMIN — FENTANYL CITRATE 50 MCG: 50 INJECTION, SOLUTION INTRAMUSCULAR; INTRAVENOUS at 08:43

## 2020-11-13 RX ADMIN — FENTANYL CITRATE 50 MCG: 50 INJECTION, SOLUTION INTRAMUSCULAR; INTRAVENOUS at 08:58

## 2020-11-13 RX ADMIN — Medication 25 MG: at 08:44

## 2020-11-13 RX ADMIN — MIDAZOLAM 0.5 MG: 1 INJECTION INTRAMUSCULAR; INTRAVENOUS at 08:43

## 2020-11-13 RX ADMIN — CEFAZOLIN SODIUM 1000 MG: 1 SOLUTION INTRAVENOUS at 08:40

## 2020-11-17 ENCOUNTER — TELEPHONE (OUTPATIENT)
Dept: SURGICAL ONCOLOGY | Facility: CLINIC | Age: 70
End: 2020-11-17

## 2020-11-18 ENCOUNTER — HOSPITAL ENCOUNTER (OUTPATIENT)
Dept: RADIOLOGY | Age: 70
Discharge: HOME | End: 2020-11-18
Payer: MEDICARE

## 2020-11-18 ENCOUNTER — TELEPHONE (OUTPATIENT)
Dept: PULMONOLOGY | Facility: HOSPITAL | Age: 70
End: 2020-11-18

## 2020-11-18 DIAGNOSIS — C78.2 PLEURAL METASTASIS (HCC): ICD-10-CM

## 2020-11-18 LAB — GLUCOSE SERPL-MCNC: 82 MG/DL (ref 65–140)

## 2020-11-18 PROCEDURE — G1004 CDSM NDSC: HCPCS

## 2020-11-18 PROCEDURE — 78815 PET IMAGE W/CT SKULL-THIGH: CPT

## 2020-11-18 PROCEDURE — 82948 REAGENT STRIP/BLOOD GLUCOSE: CPT

## 2020-11-18 PROCEDURE — A9552 F18 FDG: HCPCS

## 2020-11-20 ENCOUNTER — APPOINTMENT (OUTPATIENT)
Dept: LAB | Facility: HOSPITAL | Age: 70
End: 2020-11-20
Attending: INTERNAL MEDICINE
Payer: MEDICARE

## 2020-11-20 ENCOUNTER — TELEPHONE (OUTPATIENT)
Dept: HEMATOLOGY ONCOLOGY | Facility: CLINIC | Age: 70
End: 2020-11-20

## 2020-11-20 ENCOUNTER — CONSULT (OUTPATIENT)
Dept: HEMATOLOGY ONCOLOGY | Facility: HOSPITAL | Age: 70
End: 2020-11-20
Payer: MEDICARE

## 2020-11-20 ENCOUNTER — TELEPHONE (OUTPATIENT)
Dept: PULMONOLOGY | Facility: CLINIC | Age: 70
End: 2020-11-20

## 2020-11-20 VITALS
HEART RATE: 95 BPM | TEMPERATURE: 97.4 F | SYSTOLIC BLOOD PRESSURE: 118 MMHG | WEIGHT: 105 LBS | DIASTOLIC BLOOD PRESSURE: 68 MMHG | OXYGEN SATURATION: 100 % | RESPIRATION RATE: 16 BRPM | BODY MASS INDEX: 16.88 KG/M2 | HEIGHT: 66 IN

## 2020-11-20 DIAGNOSIS — D70.1 CHEMOTHERAPY INDUCED NEUTROPENIA (HCC): ICD-10-CM

## 2020-11-20 DIAGNOSIS — C34.90 SMALL CELL LUNG CANCER (HCC): Primary | ICD-10-CM

## 2020-11-20 DIAGNOSIS — T45.1X5A CHEMOTHERAPY INDUCED NEUTROPENIA (HCC): ICD-10-CM

## 2020-11-20 DIAGNOSIS — C34.90 SMALL CELL LUNG CANCER (HCC): ICD-10-CM

## 2020-11-20 LAB
ALBUMIN SERPL BCP-MCNC: 2 G/DL (ref 3.5–5)
ALP SERPL-CCNC: 134 U/L (ref 46–116)
ALT SERPL W P-5'-P-CCNC: 19 U/L (ref 12–78)
ANION GAP SERPL CALCULATED.3IONS-SCNC: 9 MMOL/L (ref 4–13)
AST SERPL W P-5'-P-CCNC: 28 U/L (ref 5–45)
BASOPHILS # BLD AUTO: 0.08 THOUSANDS/ΜL (ref 0–0.1)
BASOPHILS NFR BLD AUTO: 1 % (ref 0–1)
BILIRUB SERPL-MCNC: 0.3 MG/DL (ref 0.2–1)
BUN SERPL-MCNC: 16 MG/DL (ref 5–25)
CALCIUM ALBUM COR SERPL-MCNC: 10 MG/DL (ref 8.3–10.1)
CALCIUM SERPL-MCNC: 8.4 MG/DL (ref 8.3–10.1)
CHLORIDE SERPL-SCNC: 100 MMOL/L (ref 100–108)
CO2 SERPL-SCNC: 26 MMOL/L (ref 21–32)
CREAT SERPL-MCNC: 0.73 MG/DL (ref 0.6–1.3)
EOSINOPHIL # BLD AUTO: 0.08 THOUSAND/ΜL (ref 0–0.61)
EOSINOPHIL NFR BLD AUTO: 1 % (ref 0–6)
ERYTHROCYTE [DISTWIDTH] IN BLOOD BY AUTOMATED COUNT: 12.4 % (ref 11.6–15.1)
GFR SERPL CREATININE-BSD FRML MDRD: 85 ML/MIN/1.73SQ M
GLUCOSE P FAST SERPL-MCNC: 101 MG/DL (ref 65–99)
HCT VFR BLD AUTO: 42.8 % (ref 34.8–46.1)
HGB BLD-MCNC: 13.8 G/DL (ref 11.5–15.4)
IMM GRANULOCYTES # BLD AUTO: 0.04 THOUSAND/UL (ref 0–0.2)
IMM GRANULOCYTES NFR BLD AUTO: 0 % (ref 0–2)
LYMPHOCYTES # BLD AUTO: 0.9 THOUSANDS/ΜL (ref 0.6–4.47)
LYMPHOCYTES NFR BLD AUTO: 8 % (ref 14–44)
MCH RBC QN AUTO: 31.4 PG (ref 26.8–34.3)
MCHC RBC AUTO-ENTMCNC: 32.2 G/DL (ref 31.4–37.4)
MCV RBC AUTO: 97 FL (ref 82–98)
MONOCYTES # BLD AUTO: 0.74 THOUSAND/ΜL (ref 0.17–1.22)
MONOCYTES NFR BLD AUTO: 7 % (ref 4–12)
NEUTROPHILS # BLD AUTO: 8.99 THOUSANDS/ΜL (ref 1.85–7.62)
NEUTS SEG NFR BLD AUTO: 83 % (ref 43–75)
NRBC BLD AUTO-RTO: 0 /100 WBCS
PLATELET # BLD AUTO: 717 THOUSANDS/UL (ref 149–390)
PMV BLD AUTO: 9.5 FL (ref 8.9–12.7)
POTASSIUM SERPL-SCNC: 4.1 MMOL/L (ref 3.5–5.3)
PROT SERPL-MCNC: 6.3 G/DL (ref 6.4–8.2)
RBC # BLD AUTO: 4.4 MILLION/UL (ref 3.81–5.12)
SODIUM SERPL-SCNC: 135 MMOL/L (ref 136–145)
TSH SERPL DL<=0.05 MIU/L-ACNC: 2.8 UIU/ML (ref 0.36–3.74)
WBC # BLD AUTO: 10.83 THOUSAND/UL (ref 4.31–10.16)

## 2020-11-20 PROCEDURE — 85025 COMPLETE CBC W/AUTO DIFF WBC: CPT

## 2020-11-20 PROCEDURE — 80053 COMPREHEN METABOLIC PANEL: CPT

## 2020-11-20 PROCEDURE — 84443 ASSAY THYROID STIM HORMONE: CPT

## 2020-11-20 PROCEDURE — 99205 OFFICE O/P NEW HI 60 MIN: CPT | Performed by: INTERNAL MEDICINE

## 2020-11-20 PROCEDURE — 36415 COLL VENOUS BLD VENIPUNCTURE: CPT

## 2020-11-20 RX ORDER — SODIUM CHLORIDE 9 MG/ML
20 INJECTION, SOLUTION INTRAVENOUS ONCE
Status: CANCELLED | OUTPATIENT
Start: 2020-11-25

## 2020-11-20 RX ORDER — SODIUM CHLORIDE 9 MG/ML
20 INJECTION, SOLUTION INTRAVENOUS ONCE
Status: CANCELLED | OUTPATIENT
Start: 2020-11-23

## 2020-11-20 RX ORDER — PROCHLORPERAZINE MALEATE 10 MG
10 TABLET ORAL EVERY 6 HOURS PRN
Qty: 45 TABLET | Refills: 3 | Status: SHIPPED | OUTPATIENT
Start: 2020-11-20 | End: 2020-11-24 | Stop reason: CLARIF

## 2020-11-20 RX ORDER — SODIUM CHLORIDE 9 MG/ML
20 INJECTION, SOLUTION INTRAVENOUS ONCE
Status: CANCELLED | OUTPATIENT
Start: 2020-11-24

## 2020-11-23 ENCOUNTER — HOSPITAL ENCOUNTER (OUTPATIENT)
Dept: INFUSION CENTER | Facility: HOSPITAL | Age: 70
Discharge: HOME | End: 2020-11-23
Attending: INTERNAL MEDICINE
Payer: MEDICARE

## 2020-11-23 ENCOUNTER — TELEPHONE (OUTPATIENT)
Dept: HEMATOLOGY ONCOLOGY | Facility: CLINIC | Age: 70
End: 2020-11-23

## 2020-11-23 VITALS
HEIGHT: 66 IN | BODY MASS INDEX: 17.15 KG/M2 | OXYGEN SATURATION: 98 % | TEMPERATURE: 98 F | SYSTOLIC BLOOD PRESSURE: 154 MMHG | DIASTOLIC BLOOD PRESSURE: 72 MMHG | WEIGHT: 106.7 LBS | HEART RATE: 100 BPM | RESPIRATION RATE: 16 BRPM

## 2020-11-23 DIAGNOSIS — T45.1X5A CHEMOTHERAPY INDUCED NEUTROPENIA (HCC): ICD-10-CM

## 2020-11-23 DIAGNOSIS — C34.90 SMALL CELL LUNG CANCER (HCC): Primary | ICD-10-CM

## 2020-11-23 DIAGNOSIS — D70.1 CHEMOTHERAPY INDUCED NEUTROPENIA (HCC): ICD-10-CM

## 2020-11-23 PROCEDURE — 96413 CHEMO IV INFUSION 1 HR: CPT

## 2020-11-23 PROCEDURE — 96417 CHEMO IV INFUS EACH ADDL SEQ: CPT

## 2020-11-23 PROCEDURE — 96367 TX/PROPH/DG ADDL SEQ IV INF: CPT

## 2020-11-23 RX ORDER — MORPHINE SULFATE 20 MG/5ML
5 SOLUTION ORAL EVERY 4 HOURS PRN
Qty: 60 ML | Refills: 0 | Status: SHIPPED | OUTPATIENT
Start: 2020-11-23 | End: 2020-12-08 | Stop reason: CLARIF

## 2020-11-23 RX ORDER — SODIUM CHLORIDE 9 MG/ML
20 INJECTION, SOLUTION INTRAVENOUS ONCE
Status: COMPLETED | OUTPATIENT
Start: 2020-11-23 | End: 2020-11-23

## 2020-11-23 RX ORDER — IBUPROFEN 400 MG/1
400 TABLET ORAL ONCE
Status: CANCELLED
Start: 2020-11-23

## 2020-11-23 RX ORDER — IBUPROFEN 200 MG
400 TABLET ORAL ONCE
Status: COMPLETED | OUTPATIENT
Start: 2020-11-23 | End: 2020-11-23

## 2020-11-23 RX ADMIN — SODIUM CHLORIDE 20 ML/HR: 0.9 INJECTION, SOLUTION INTRAVENOUS at 10:52

## 2020-11-23 RX ADMIN — CARBOPLATIN 402 MG: 10 INJECTION, SOLUTION INTRAVENOUS at 14:27

## 2020-11-23 RX ADMIN — DURVALUMAB 1500 MG: 500 INJECTION, SOLUTION INTRAVENOUS at 12:02

## 2020-11-23 RX ADMIN — ONDANSETRON HYDROCHLORIDE: 2 INJECTION, SOLUTION INTRAMUSCULAR; INTRAVENOUS at 10:53

## 2020-11-23 RX ADMIN — ETOPOSIDE 152 MG: 20 INJECTION, SOLUTION, CONCENTRATE INTRAVENOUS at 13:18

## 2020-11-23 RX ADMIN — FOSAPREPITANT 150 MG: 150 INJECTION, POWDER, LYOPHILIZED, FOR SOLUTION INTRAVENOUS at 11:17

## 2020-11-23 RX ADMIN — IBUPROFEN 400 MG: 400 TABLET ORAL at 12:49

## 2020-11-24 ENCOUNTER — TELEPHONE (OUTPATIENT)
Dept: HEMATOLOGY ONCOLOGY | Facility: CLINIC | Age: 70
End: 2020-11-24

## 2020-11-24 ENCOUNTER — TELEPHONE (OUTPATIENT)
Dept: GASTROENTEROLOGY | Facility: CLINIC | Age: 70
End: 2020-11-24

## 2020-11-24 ENCOUNTER — APPOINTMENT (EMERGENCY)
Dept: RADIOLOGY | Facility: HOSPITAL | Age: 70
End: 2020-11-24
Payer: MEDICARE

## 2020-11-24 ENCOUNTER — HOSPITAL ENCOUNTER (OUTPATIENT)
Facility: HOSPITAL | Age: 70
Setting detail: OBSERVATION
Discharge: HOME | End: 2020-11-25
Attending: EMERGENCY MEDICINE | Admitting: INTERNAL MEDICINE
Payer: MEDICARE

## 2020-11-24 ENCOUNTER — HOSPITAL ENCOUNTER (OUTPATIENT)
Dept: INFUSION CENTER | Facility: HOSPITAL | Age: 70
Discharge: HOME | End: 2020-11-24
Attending: INTERNAL MEDICINE
Payer: MEDICARE

## 2020-11-24 ENCOUNTER — APPOINTMENT (EMERGENCY)
Dept: CT IMAGING | Facility: HOSPITAL | Age: 70
End: 2020-11-24
Payer: MEDICARE

## 2020-11-24 ENCOUNTER — TELEPHONE (OUTPATIENT)
Dept: PULMONOLOGY | Facility: CLINIC | Age: 70
End: 2020-11-24

## 2020-11-24 VITALS
HEIGHT: 66 IN | TEMPERATURE: 97.6 F | HEART RATE: 108 BPM | SYSTOLIC BLOOD PRESSURE: 142 MMHG | BODY MASS INDEX: 17.43 KG/M2 | RESPIRATION RATE: 16 BRPM | WEIGHT: 108.47 LBS | OXYGEN SATURATION: 95 % | DIASTOLIC BLOOD PRESSURE: 76 MMHG

## 2020-11-24 DIAGNOSIS — R91.1 PULMONARY NODULE: ICD-10-CM

## 2020-11-24 DIAGNOSIS — D72.829 LEUKOCYTOSIS: ICD-10-CM

## 2020-11-24 DIAGNOSIS — R60.0 BILATERAL LOWER EXTREMITY EDEMA: Primary | ICD-10-CM

## 2020-11-24 DIAGNOSIS — J90 PLEURAL EFFUSION, LEFT: ICD-10-CM

## 2020-11-24 DIAGNOSIS — J43.9 PULMONARY EMPHYSEMA, UNSPECIFIED EMPHYSEMA TYPE (HCC): ICD-10-CM

## 2020-11-24 DIAGNOSIS — T45.1X5A CHEMOTHERAPY INDUCED NEUTROPENIA (HCC): ICD-10-CM

## 2020-11-24 DIAGNOSIS — C34.90 SMALL CELL LUNG CANCER (HCC): ICD-10-CM

## 2020-11-24 DIAGNOSIS — C34.90 SMALL CELL LUNG CANCER (HCC): Primary | ICD-10-CM

## 2020-11-24 DIAGNOSIS — D70.1 CHEMOTHERAPY INDUCED NEUTROPENIA (HCC): ICD-10-CM

## 2020-11-24 DIAGNOSIS — R07.9 CHEST PAIN: ICD-10-CM

## 2020-11-24 DIAGNOSIS — G89.3 CANCER RELATED PAIN: Primary | ICD-10-CM

## 2020-11-24 DIAGNOSIS — J90 PLEURAL EFFUSION: ICD-10-CM

## 2020-11-24 LAB
ALBUMIN SERPL BCP-MCNC: 2.9 G/DL (ref 3–5.2)
ALP SERPL-CCNC: 138 U/L (ref 43–122)
ALT SERPL W P-5'-P-CCNC: 16 U/L (ref 9–52)
ANION GAP SERPL CALCULATED.3IONS-SCNC: 3 MMOL/L (ref 5–14)
AST SERPL W P-5'-P-CCNC: 27 U/L (ref 14–36)
ATRIAL RATE: 103 BPM
BILIRUB SERPL-MCNC: 0.4 MG/DL
BUN SERPL-MCNC: 21 MG/DL (ref 5–25)
CALCIUM ALBUM COR SERPL-MCNC: 9.3 MG/DL (ref 8.3–10.1)
CALCIUM SERPL-MCNC: 8.4 MG/DL (ref 8.4–10.2)
CHLORIDE SERPL-SCNC: 105 MMOL/L (ref 97–108)
CO2 SERPL-SCNC: 27 MMOL/L (ref 22–30)
CREAT SERPL-MCNC: 0.56 MG/DL (ref 0.6–1.2)
ERYTHROCYTE [DISTWIDTH] IN BLOOD BY AUTOMATED COUNT: 13.6 %
FLUAV RNA RESP QL NAA+PROBE: NEGATIVE
FLUBV RNA RESP QL NAA+PROBE: NEGATIVE
GFR SERPL CREATININE-BSD FRML MDRD: 96 ML/MIN/1.73SQ M
GLUCOSE SERPL-MCNC: 167 MG/DL (ref 70–99)
HCT VFR BLD AUTO: 35 % (ref 36–46)
HGB BLD-MCNC: 11.9 G/DL (ref 12–16)
LG PLATELETS BLD QL SMEAR: PRESENT
LIPASE SERPL-CCNC: 60 U/L (ref 23–300)
LYMPHOCYTES # BLD AUTO: 0.81 THOUSAND/UL (ref 0.5–4)
LYMPHOCYTES # BLD AUTO: 4 % (ref 25–45)
MCH RBC QN AUTO: 32.2 PG (ref 26–34)
MCHC RBC AUTO-ENTMCNC: 33.9 G/DL (ref 31–36)
MCV RBC AUTO: 95 FL (ref 80–100)
MONOCYTES # BLD AUTO: 0.2 THOUSAND/UL (ref 0.2–0.9)
MONOCYTES NFR BLD AUTO: 1 % (ref 1–10)
NEUTS SEG # BLD: 19.29 THOUSAND/UL (ref 1.8–7.8)
NEUTS SEG NFR BLD AUTO: 95 %
NT-PROBNP SERPL-MCNC: 597 PG/ML (ref 0–299)
P AXIS: 72 DEGREES
PLATELET # BLD AUTO: 595 THOUSANDS/UL (ref 150–450)
PLATELET # BLD AUTO: 649 THOUSANDS/UL (ref 150–450)
PLATELET BLD QL SMEAR: ABNORMAL
PMV BLD AUTO: 6.9 FL (ref 8.9–12.7)
POIKILOCYTOSIS BLD QL SMEAR: PRESENT
POTASSIUM SERPL-SCNC: 4.4 MMOL/L (ref 3.6–5)
PR INTERVAL: 120 MS
PROT SERPL-MCNC: 6 G/DL (ref 5.9–8.4)
QRS AXIS: 52 DEGREES
QRSD INTERVAL: 90 MS
QT INTERVAL: 342 MS
QTC INTERVAL: 448 MS
RBC # BLD AUTO: 3.68 MILLION/UL (ref 4–5.2)
RBC MORPH BLD: ABNORMAL
RSV RNA RESP QL NAA+PROBE: NEGATIVE
SARS-COV-2 RNA RESP QL NAA+PROBE: NEGATIVE
SODIUM SERPL-SCNC: 135 MMOL/L (ref 137–147)
T WAVE AXIS: 79 DEGREES
TOTAL CELLS COUNTED SPEC: 100
TROPONIN I SERPL-MCNC: <0.01 NG/ML (ref 0–0.03)
TROPONIN I SERPL-MCNC: <0.01 NG/ML (ref 0–0.03)
VENTRICULAR RATE: 103 BPM
WBC # BLD AUTO: 20.3 THOUSAND/UL (ref 4.5–11)

## 2020-11-24 PROCEDURE — 99220 PR INITIAL OBSERVATION CARE/DAY 70 MINUTES: CPT | Performed by: INTERNAL MEDICINE

## 2020-11-24 PROCEDURE — 96413 CHEMO IV INFUSION 1 HR: CPT

## 2020-11-24 PROCEDURE — 93010 ELECTROCARDIOGRAM REPORT: CPT | Performed by: INTERNAL MEDICINE

## 2020-11-24 PROCEDURE — 0241U HB NFCT DS VIR RESP RNA 4 TRGT: CPT | Performed by: PHYSICIAN ASSISTANT

## 2020-11-24 PROCEDURE — 36415 COLL VENOUS BLD VENIPUNCTURE: CPT | Performed by: PHYSICIAN ASSISTANT

## 2020-11-24 PROCEDURE — 85049 AUTOMATED PLATELET COUNT: CPT | Performed by: INTERNAL MEDICINE

## 2020-11-24 PROCEDURE — 83880 ASSAY OF NATRIURETIC PEPTIDE: CPT | Performed by: PHYSICIAN ASSISTANT

## 2020-11-24 PROCEDURE — 84484 ASSAY OF TROPONIN QUANT: CPT | Performed by: INTERNAL MEDICINE

## 2020-11-24 PROCEDURE — 99285 EMERGENCY DEPT VISIT HI MDM: CPT

## 2020-11-24 PROCEDURE — 71275 CT ANGIOGRAPHY CHEST: CPT

## 2020-11-24 PROCEDURE — 80053 COMPREHEN METABOLIC PANEL: CPT | Performed by: PHYSICIAN ASSISTANT

## 2020-11-24 PROCEDURE — 84484 ASSAY OF TROPONIN QUANT: CPT | Performed by: PHYSICIAN ASSISTANT

## 2020-11-24 PROCEDURE — 96367 TX/PROPH/DG ADDL SEQ IV INF: CPT

## 2020-11-24 PROCEDURE — 85027 COMPLETE CBC AUTOMATED: CPT | Performed by: PHYSICIAN ASSISTANT

## 2020-11-24 PROCEDURE — 83690 ASSAY OF LIPASE: CPT | Performed by: PHYSICIAN ASSISTANT

## 2020-11-24 PROCEDURE — 99285 EMERGENCY DEPT VISIT HI MDM: CPT | Performed by: PHYSICIAN ASSISTANT

## 2020-11-24 PROCEDURE — 85007 BL SMEAR W/DIFF WBC COUNT: CPT | Performed by: PHYSICIAN ASSISTANT

## 2020-11-24 RX ORDER — SODIUM CHLORIDE 9 MG/ML
75 INJECTION, SOLUTION INTRAVENOUS CONTINUOUS
Status: CANCELLED | OUTPATIENT
Start: 2020-12-01

## 2020-11-24 RX ORDER — SODIUM CHLORIDE 9 MG/ML
20 INJECTION, SOLUTION INTRAVENOUS ONCE
Status: COMPLETED | OUTPATIENT
Start: 2020-11-24 | End: 2020-11-24

## 2020-11-24 RX ORDER — NICOTINE 21 MG/24HR
1 PATCH, TRANSDERMAL 24 HOURS TRANSDERMAL DAILY
Status: DISCONTINUED | OUTPATIENT
Start: 2020-11-25 | End: 2020-11-25 | Stop reason: HOSPADM

## 2020-11-24 RX ORDER — COLCHICINE 0.6 MG/1
0.6 TABLET ORAL 2 TIMES DAILY
Status: DISCONTINUED | OUTPATIENT
Start: 2020-11-25 | End: 2020-11-25 | Stop reason: HOSPADM

## 2020-11-24 RX ORDER — LOPERAMIDE HYDROCHLORIDE 2 MG/1
2 CAPSULE ORAL 3 TIMES DAILY PRN
Status: DISCONTINUED | OUTPATIENT
Start: 2020-11-24 | End: 2020-11-25 | Stop reason: HOSPADM

## 2020-11-24 RX ORDER — CYCLOBENZAPRINE HCL 10 MG
10 TABLET ORAL 3 TIMES DAILY PRN
Status: DISCONTINUED | OUTPATIENT
Start: 2020-11-24 | End: 2020-11-25 | Stop reason: HOSPADM

## 2020-11-24 RX ORDER — LANOLIN ALCOHOL/MO/W.PET/CERES
3 CREAM (GRAM) TOPICAL
Status: DISCONTINUED | OUTPATIENT
Start: 2020-11-24 | End: 2020-11-25 | Stop reason: HOSPADM

## 2020-11-24 RX ORDER — PROCHLORPERAZINE MALEATE 5 MG/1
10 TABLET ORAL EVERY 6 HOURS PRN
Status: DISCONTINUED | OUTPATIENT
Start: 2020-11-24 | End: 2020-11-25 | Stop reason: HOSPADM

## 2020-11-24 RX ORDER — VANCOMYCIN HYDROCHLORIDE 1 G/200ML
20 INJECTION, SOLUTION INTRAVENOUS ONCE
Status: CANCELLED | OUTPATIENT
Start: 2020-12-01

## 2020-11-24 RX ADMIN — MELATONIN TAB 3 MG 3 MG: 3 TAB at 23:20

## 2020-11-24 RX ADMIN — ONDANSETRON HYDROCHLORIDE: 2 INJECTION, SOLUTION INTRAMUSCULAR; INTRAVENOUS at 12:43

## 2020-11-24 RX ADMIN — ETOPOSIDE 152 MG: 20 INJECTION INTRAVENOUS at 13:22

## 2020-11-24 RX ADMIN — IOHEXOL 85 ML: 350 INJECTION, SOLUTION INTRAVENOUS at 15:29

## 2020-11-24 RX ADMIN — SODIUM CHLORIDE 20 ML/HR: 0.9 INJECTION, SOLUTION INTRAVENOUS at 12:28

## 2020-11-25 ENCOUNTER — HOSPITAL ENCOUNTER (OUTPATIENT)
Dept: INFUSION CENTER | Facility: HOSPITAL | Age: 70
Discharge: HOME | End: 2020-11-25
Attending: INTERNAL MEDICINE
Payer: MEDICARE

## 2020-11-25 ENCOUNTER — PATIENT OUTREACH (OUTPATIENT)
Dept: CASE MANAGEMENT | Facility: HOSPITAL | Age: 70
End: 2020-11-25

## 2020-11-25 ENCOUNTER — TELEPHONE (OUTPATIENT)
Dept: OTHER | Facility: OTHER | Age: 70
End: 2020-11-25

## 2020-11-25 ENCOUNTER — HOSPITAL ENCOUNTER (OUTPATIENT)
Dept: INFUSION CENTER | Facility: HOSPITAL | Age: 70
Discharge: HOME | End: 2020-11-25
Attending: INTERNAL MEDICINE

## 2020-11-25 VITALS
OXYGEN SATURATION: 93 % | RESPIRATION RATE: 18 BRPM | SYSTOLIC BLOOD PRESSURE: 155 MMHG | TEMPERATURE: 98.1 F | HEIGHT: 66 IN | WEIGHT: 109.13 LBS | DIASTOLIC BLOOD PRESSURE: 87 MMHG | BODY MASS INDEX: 17.54 KG/M2 | HEART RATE: 110 BPM

## 2020-11-25 VITALS
TEMPERATURE: 97.9 F | RESPIRATION RATE: 18 BRPM | HEART RATE: 105 BPM | DIASTOLIC BLOOD PRESSURE: 83 MMHG | OXYGEN SATURATION: 94 % | HEIGHT: 66 IN | BODY MASS INDEX: 17.54 KG/M2 | SYSTOLIC BLOOD PRESSURE: 175 MMHG | WEIGHT: 109.13 LBS

## 2020-11-25 DIAGNOSIS — T45.1X5A CHEMOTHERAPY INDUCED NEUTROPENIA (HCC): ICD-10-CM

## 2020-11-25 DIAGNOSIS — C34.90 SMALL CELL LUNG CANCER (HCC): Primary | ICD-10-CM

## 2020-11-25 DIAGNOSIS — D70.1 CHEMOTHERAPY INDUCED NEUTROPENIA (HCC): ICD-10-CM

## 2020-11-25 LAB
ATRIAL RATE: 105 BPM
ATRIAL RATE: 106 BPM
P AXIS: 67 DEGREES
P AXIS: 67 DEGREES
PR INTERVAL: 122 MS
PR INTERVAL: 124 MS
QRS AXIS: 40 DEGREES
QRS AXIS: 43 DEGREES
QRSD INTERVAL: 90 MS
QRSD INTERVAL: 92 MS
QT INTERVAL: 334 MS
QT INTERVAL: 340 MS
QTC INTERVAL: 441 MS
QTC INTERVAL: 451 MS
T WAVE AXIS: 57 DEGREES
T WAVE AXIS: 62 DEGREES
TROPONIN I SERPL-MCNC: <0.01 NG/ML (ref 0–0.03)
VENTRICULAR RATE: 105 BPM
VENTRICULAR RATE: 106 BPM

## 2020-11-25 PROCEDURE — 93005 ELECTROCARDIOGRAM TRACING: CPT

## 2020-11-25 PROCEDURE — 99217 PR OBSERVATION CARE DISCHARGE MANAGEMENT: CPT | Performed by: INTERNAL MEDICINE

## 2020-11-25 PROCEDURE — 93010 ELECTROCARDIOGRAM REPORT: CPT

## 2020-11-25 PROCEDURE — 96377 APPLICATON ON-BODY INJECTOR: CPT

## 2020-11-25 PROCEDURE — 96413 CHEMO IV INFUSION 1 HR: CPT

## 2020-11-25 PROCEDURE — 96367 TX/PROPH/DG ADDL SEQ IV INF: CPT

## 2020-11-25 RX ORDER — TRAMADOL HYDROCHLORIDE 50 MG/1
50 TABLET ORAL EVERY 6 HOURS PRN
Qty: 40 TABLET | Refills: 0 | Status: SHIPPED | OUTPATIENT
Start: 2020-11-25 | End: 2020-12-05

## 2020-11-25 RX ORDER — SODIUM CHLORIDE 9 MG/ML
20 INJECTION, SOLUTION INTRAVENOUS ONCE
Status: COMPLETED | OUTPATIENT
Start: 2020-11-25 | End: 2020-11-25

## 2020-11-25 RX ADMIN — ETOPOSIDE 152 MG: 20 INJECTION INTRAVENOUS at 12:54

## 2020-11-25 RX ADMIN — NICOTINE 1 PATCH: 14 PATCH, EXTENDED RELEASE TRANSDERMAL at 08:45

## 2020-11-25 RX ADMIN — COLCHICINE 0.6 MG: 0.6 TABLET, FILM COATED ORAL at 08:43

## 2020-11-25 RX ADMIN — SODIUM CHLORIDE 20 ML/HR: 0.9 INJECTION, SOLUTION INTRAVENOUS at 12:18

## 2020-11-25 RX ADMIN — Medication: at 12:20

## 2020-11-25 RX ADMIN — PEGFILGRASTIM 6 MG: KIT SUBCUTANEOUS at 14:10

## 2020-11-27 ENCOUNTER — TELEPHONE (OUTPATIENT)
Dept: HEMATOLOGY ONCOLOGY | Facility: CLINIC | Age: 70
End: 2020-11-27

## 2020-11-27 ENCOUNTER — TELEPHONE (OUTPATIENT)
Dept: INTERVENTIONAL RADIOLOGY/VASCULAR | Facility: HOSPITAL | Age: 70
End: 2020-11-27

## 2020-11-27 DIAGNOSIS — B37.0 ORAL THRUSH: Primary | ICD-10-CM

## 2020-11-27 RX ORDER — FLUCONAZOLE 100 MG/1
100 TABLET ORAL DAILY
Qty: 7 TABLET | Refills: 0 | Status: SHIPPED | OUTPATIENT
Start: 2020-11-27 | End: 2020-12-04

## 2020-11-30 ENCOUNTER — TELEPHONE (OUTPATIENT)
Dept: HEMATOLOGY ONCOLOGY | Facility: CLINIC | Age: 70
End: 2020-11-30

## 2020-11-30 RX ORDER — SODIUM CHLORIDE 9 MG/ML
20 INJECTION, SOLUTION INTRAVENOUS ONCE
Status: CANCELLED | OUTPATIENT
Start: 2020-12-16

## 2020-11-30 RX ORDER — SODIUM CHLORIDE 9 MG/ML
20 INJECTION, SOLUTION INTRAVENOUS ONCE
Status: CANCELLED | OUTPATIENT
Start: 2020-12-15

## 2020-11-30 RX ORDER — SODIUM CHLORIDE 9 MG/ML
20 INJECTION, SOLUTION INTRAVENOUS ONCE
Status: CANCELLED | OUTPATIENT
Start: 2020-12-14

## 2020-12-01 ENCOUNTER — TELEPHONE (OUTPATIENT)
Dept: GASTROENTEROLOGY | Facility: CLINIC | Age: 70
End: 2020-12-01

## 2020-12-01 ENCOUNTER — DOCUMENTATION (OUTPATIENT)
Dept: SURGERY | Facility: HOSPITAL | Age: 70
End: 2020-12-01

## 2020-12-01 ENCOUNTER — TELEPHONE (OUTPATIENT)
Dept: HEMATOLOGY ONCOLOGY | Facility: CLINIC | Age: 70
End: 2020-12-01

## 2020-12-01 ENCOUNTER — TELEPHONE (OUTPATIENT)
Dept: PULMONOLOGY | Facility: CLINIC | Age: 70
End: 2020-12-01

## 2020-12-01 DIAGNOSIS — R74.8 ELEVATED ALKALINE PHOSPHATASE LEVEL: Primary | ICD-10-CM

## 2020-12-01 DIAGNOSIS — N39.0 URINARY TRACT INFECTION WITHOUT HEMATURIA, SITE UNSPECIFIED: Primary | ICD-10-CM

## 2020-12-01 RX ORDER — SULFAMETHOXAZOLE AND TRIMETHOPRIM 800; 160 MG/1; MG/1
1 TABLET ORAL EVERY 12 HOURS SCHEDULED
Qty: 20 TABLET | Refills: 0 | Status: SHIPPED | OUTPATIENT
Start: 2020-12-01 | End: 2020-12-11

## 2020-12-01 NOTE — PROGRESS NOTES
During check in patient states she has an UTI  UA and CBS were ordered and drawn  Dr Jeffery Reynoso reviewed results and procedure cancelled  Patient given AVS with rescheduled date and time for procedure

## 2020-12-02 ENCOUNTER — TELEPHONE (OUTPATIENT)
Dept: HEMATOLOGY ONCOLOGY | Facility: CLINIC | Age: 70
End: 2020-12-02

## 2020-12-04 ENCOUNTER — TELEPHONE (OUTPATIENT)
Dept: HEMATOLOGY ONCOLOGY | Facility: CLINIC | Age: 70
End: 2020-12-04

## 2020-12-04 ENCOUNTER — TELEPHONE (OUTPATIENT)
Dept: PULMONOLOGY | Facility: CLINIC | Age: 70
End: 2020-12-04

## 2020-12-04 DIAGNOSIS — J90 PLEURAL EFFUSION: Primary | ICD-10-CM

## 2020-12-05 ENCOUNTER — HOSPITAL ENCOUNTER (OUTPATIENT)
Dept: RADIOLOGY | Facility: HOSPITAL | Age: 70
Discharge: HOME | End: 2020-12-05
Attending: INTERNAL MEDICINE
Payer: MEDICARE

## 2020-12-05 ENCOUNTER — TRANSCRIBE ORDERS (OUTPATIENT)
Dept: ADMINISTRATIVE | Facility: HOSPITAL | Age: 70
End: 2020-12-05

## 2020-12-05 ENCOUNTER — APPOINTMENT (OUTPATIENT)
Dept: LAB | Facility: HOSPITAL | Age: 70
End: 2020-12-05
Payer: MEDICARE

## 2020-12-05 DIAGNOSIS — J90 PLEURAL EFFUSION: ICD-10-CM

## 2020-12-05 DIAGNOSIS — R74.02 NONSPECIFIC ELEVATION OF LEVELS OF TRANSAMINASE OR LACTIC ACID DEHYDROGENASE (LDH): ICD-10-CM

## 2020-12-05 DIAGNOSIS — R74.01 NONSPECIFIC ELEVATION OF LEVELS OF TRANSAMINASE OR LACTIC ACID DEHYDROGENASE (LDH): ICD-10-CM

## 2020-12-05 DIAGNOSIS — R74.02 NONSPECIFIC ELEVATION OF LEVELS OF TRANSAMINASE OR LACTIC ACID DEHYDROGENASE (LDH): Primary | ICD-10-CM

## 2020-12-05 DIAGNOSIS — R74.01 NONSPECIFIC ELEVATION OF LEVELS OF TRANSAMINASE OR LACTIC ACID DEHYDROGENASE (LDH): Primary | ICD-10-CM

## 2020-12-05 LAB
ALBUMIN SERPL BCP-MCNC: 2.8 G/DL (ref 3.5–5)
ALP SERPL-CCNC: 174 U/L (ref 46–116)
ALT SERPL W P-5'-P-CCNC: 20 U/L (ref 12–78)
AST SERPL W P-5'-P-CCNC: 20 U/L (ref 5–45)
BILIRUB DIRECT SERPL-MCNC: 0.1 MG/DL (ref 0–0.2)
BILIRUB SERPL-MCNC: 0.19 MG/DL (ref 0.2–1)
GGT SERPL-CCNC: 53 U/L (ref 5–85)
PROT SERPL-MCNC: 6.5 G/DL (ref 6.4–8.2)
T3FREE SERPL-MCNC: 1.97 PG/ML (ref 2.3–4.2)

## 2020-12-05 PROCEDURE — 80076 HEPATIC FUNCTION PANEL: CPT

## 2020-12-05 PROCEDURE — 36415 COLL VENOUS BLD VENIPUNCTURE: CPT

## 2020-12-05 PROCEDURE — 84481 FREE ASSAY (FT-3): CPT

## 2020-12-05 PROCEDURE — 71046 X-RAY EXAM CHEST 2 VIEWS: CPT

## 2020-12-05 PROCEDURE — 82977 ASSAY OF GGT: CPT

## 2020-12-07 ENCOUNTER — OFFICE VISIT (OUTPATIENT)
Dept: PULMONOLOGY | Facility: CLINIC | Age: 70
End: 2020-12-07
Payer: MEDICARE

## 2020-12-07 ENCOUNTER — TELEPHONE (OUTPATIENT)
Dept: HEMATOLOGY ONCOLOGY | Facility: HOSPITAL | Age: 70
End: 2020-12-07

## 2020-12-07 VITALS
BODY MASS INDEX: 15.84 KG/M2 | TEMPERATURE: 96.1 F | OXYGEN SATURATION: 92 % | SYSTOLIC BLOOD PRESSURE: 120 MMHG | WEIGHT: 98.6 LBS | HEIGHT: 66 IN | DIASTOLIC BLOOD PRESSURE: 68 MMHG | HEART RATE: 106 BPM

## 2020-12-07 DIAGNOSIS — C34.90 SMALL CELL LUNG CANCER (HCC): ICD-10-CM

## 2020-12-07 DIAGNOSIS — J43.9 PULMONARY EMPHYSEMA, UNSPECIFIED EMPHYSEMA TYPE (HCC): Primary | ICD-10-CM

## 2020-12-07 DIAGNOSIS — J90 PLEURAL EFFUSION: ICD-10-CM

## 2020-12-07 PROCEDURE — 99215 OFFICE O/P EST HI 40 MIN: CPT | Performed by: INTERNAL MEDICINE

## 2020-12-07 PROCEDURE — 94010 BREATHING CAPACITY TEST: CPT | Performed by: INTERNAL MEDICINE

## 2020-12-08 ENCOUNTER — TELEPHONE (OUTPATIENT)
Dept: RADIOLOGY | Facility: HOSPITAL | Age: 70
End: 2020-12-08

## 2020-12-08 RX ORDER — TRAMADOL HYDROCHLORIDE 50 MG/1
50 TABLET ORAL EVERY 6 HOURS PRN
COMMUNITY
End: 2021-01-04 | Stop reason: ALTCHOICE

## 2020-12-08 RX ORDER — SODIUM CHLORIDE 9 MG/ML
75 INJECTION, SOLUTION INTRAVENOUS CONTINUOUS
Status: CANCELLED | OUTPATIENT
Start: 2020-12-08

## 2020-12-09 ENCOUNTER — OFFICE VISIT (OUTPATIENT)
Dept: HEMATOLOGY ONCOLOGY | Facility: HOSPITAL | Age: 70
End: 2020-12-09
Payer: MEDICARE

## 2020-12-09 VITALS
SYSTOLIC BLOOD PRESSURE: 120 MMHG | WEIGHT: 97.6 LBS | TEMPERATURE: 97.7 F | BODY MASS INDEX: 15.69 KG/M2 | DIASTOLIC BLOOD PRESSURE: 58 MMHG | HEIGHT: 66 IN

## 2020-12-09 DIAGNOSIS — C34.90 SMALL CELL LUNG CANCER (HCC): Primary | ICD-10-CM

## 2020-12-09 PROCEDURE — 99214 OFFICE O/P EST MOD 30 MIN: CPT | Performed by: NURSE PRACTITIONER

## 2020-12-11 ENCOUNTER — LAB (OUTPATIENT)
Dept: LAB | Facility: HOSPITAL | Age: 70
End: 2020-12-11
Attending: INTERNAL MEDICINE
Payer: MEDICARE

## 2020-12-11 ENCOUNTER — HOSPITAL ENCOUNTER (OUTPATIENT)
Dept: INTERVENTIONAL RADIOLOGY/VASCULAR | Facility: HOSPITAL | Age: 70
Discharge: HOME | End: 2020-12-11
Attending: RADIOLOGY | Admitting: RADIOLOGY
Payer: MEDICARE

## 2020-12-11 VITALS
OXYGEN SATURATION: 92 % | TEMPERATURE: 97.7 F | RESPIRATION RATE: 18 BRPM | SYSTOLIC BLOOD PRESSURE: 119 MMHG | DIASTOLIC BLOOD PRESSURE: 66 MMHG | HEART RATE: 98 BPM

## 2020-12-11 DIAGNOSIS — T45.1X5A CHEMOTHERAPY INDUCED NEUTROPENIA (HCC): ICD-10-CM

## 2020-12-11 DIAGNOSIS — C34.90 SMALL CELL LUNG CANCER (HCC): ICD-10-CM

## 2020-12-11 DIAGNOSIS — D70.1 CHEMOTHERAPY INDUCED NEUTROPENIA (HCC): ICD-10-CM

## 2020-12-11 LAB
ALBUMIN SERPL BCP-MCNC: 2.6 G/DL (ref 3.5–5)
ALP SERPL-CCNC: 148 U/L (ref 46–116)
ALT SERPL W P-5'-P-CCNC: 23 U/L (ref 12–78)
ANION GAP SERPL CALCULATED.3IONS-SCNC: 9 MMOL/L (ref 4–13)
AST SERPL W P-5'-P-CCNC: 23 U/L (ref 5–45)
BASOPHILS # BLD AUTO: 0.11 THOUSANDS/ΜL (ref 0–0.1)
BASOPHILS NFR BLD AUTO: 1 % (ref 0–1)
BILIRUB SERPL-MCNC: 0.2 MG/DL (ref 0.2–1)
BUN SERPL-MCNC: 15 MG/DL (ref 5–25)
CALCIUM ALBUM COR SERPL-MCNC: 9.9 MG/DL (ref 8.3–10.1)
CALCIUM SERPL-MCNC: 8.8 MG/DL (ref 8.3–10.1)
CHLORIDE SERPL-SCNC: 102 MMOL/L (ref 100–108)
CO2 SERPL-SCNC: 23 MMOL/L (ref 21–32)
CREAT SERPL-MCNC: 0.82 MG/DL (ref 0.6–1.3)
EOSINOPHIL # BLD AUTO: 0.02 THOUSAND/ΜL (ref 0–0.61)
EOSINOPHIL NFR BLD AUTO: 0 % (ref 0–6)
ERYTHROCYTE [DISTWIDTH] IN BLOOD BY AUTOMATED COUNT: 14.6 % (ref 11.6–15.1)
GFR SERPL CREATININE-BSD FRML MDRD: 74 ML/MIN/1.73SQ M
GLUCOSE P FAST SERPL-MCNC: 100 MG/DL (ref 65–99)
HCT VFR BLD AUTO: 37.9 % (ref 34.8–46.1)
HGB BLD-MCNC: 11.9 G/DL (ref 11.5–15.4)
IMM GRANULOCYTES # BLD AUTO: 0.29 THOUSAND/UL (ref 0–0.2)
IMM GRANULOCYTES NFR BLD AUTO: 2 % (ref 0–2)
LYMPHOCYTES # BLD AUTO: 1.18 THOUSANDS/ΜL (ref 0.6–4.47)
LYMPHOCYTES NFR BLD AUTO: 9 % (ref 14–44)
MCH RBC QN AUTO: 30.7 PG (ref 26.8–34.3)
MCHC RBC AUTO-ENTMCNC: 31.4 G/DL (ref 31.4–37.4)
MCV RBC AUTO: 98 FL (ref 82–98)
MONOCYTES # BLD AUTO: 0.72 THOUSAND/ΜL (ref 0.17–1.22)
MONOCYTES NFR BLD AUTO: 6 % (ref 4–12)
NEUTROPHILS # BLD AUTO: 10.66 THOUSANDS/ΜL (ref 1.85–7.62)
NEUTS SEG NFR BLD AUTO: 82 % (ref 43–75)
NRBC BLD AUTO-RTO: 0 /100 WBCS
PLATELET # BLD AUTO: 490 THOUSANDS/UL (ref 149–390)
PMV BLD AUTO: 10 FL (ref 8.9–12.7)
POTASSIUM SERPL-SCNC: 4.4 MMOL/L (ref 3.5–5.3)
PROT SERPL-MCNC: 6.3 G/DL (ref 6.4–8.2)
RBC # BLD AUTO: 3.87 MILLION/UL (ref 3.81–5.12)
SODIUM SERPL-SCNC: 134 MMOL/L (ref 136–145)
WBC # BLD AUTO: 12.98 THOUSAND/UL (ref 4.31–10.16)

## 2020-12-11 PROCEDURE — 36561 INSERT TUNNELED CV CATH: CPT | Performed by: RADIOLOGY

## 2020-12-11 PROCEDURE — 76937 US GUIDE VASCULAR ACCESS: CPT

## 2020-12-11 PROCEDURE — 36561 INSERT TUNNELED CV CATH: CPT

## 2020-12-11 PROCEDURE — 84439 ASSAY OF FREE THYROXINE: CPT

## 2020-12-11 PROCEDURE — 99152 MOD SED SAME PHYS/QHP 5/>YRS: CPT

## 2020-12-11 PROCEDURE — 36415 COLL VENOUS BLD VENIPUNCTURE: CPT

## 2020-12-11 PROCEDURE — 76937 US GUIDE VASCULAR ACCESS: CPT | Performed by: RADIOLOGY

## 2020-12-11 PROCEDURE — 77001 FLUOROGUIDE FOR VEIN DEVICE: CPT

## 2020-12-11 PROCEDURE — 99152 MOD SED SAME PHYS/QHP 5/>YRS: CPT | Performed by: RADIOLOGY

## 2020-12-11 PROCEDURE — 80053 COMPREHEN METABOLIC PANEL: CPT

## 2020-12-11 PROCEDURE — 77001 FLUOROGUIDE FOR VEIN DEVICE: CPT | Performed by: RADIOLOGY

## 2020-12-11 PROCEDURE — 84443 ASSAY THYROID STIM HORMONE: CPT

## 2020-12-11 PROCEDURE — 85025 COMPLETE CBC W/AUTO DIFF WBC: CPT

## 2020-12-11 PROCEDURE — 99153 MOD SED SAME PHYS/QHP EA: CPT

## 2020-12-11 PROCEDURE — C1788 PORT, INDWELLING, IMP: HCPCS

## 2020-12-11 RX ORDER — SODIUM CHLORIDE 9 MG/ML
75 INJECTION, SOLUTION INTRAVENOUS CONTINUOUS
Status: DISCONTINUED | OUTPATIENT
Start: 2020-12-11 | End: 2020-12-12 | Stop reason: HOSPADM

## 2020-12-11 RX ORDER — MIDAZOLAM HYDROCHLORIDE 2 MG/2ML
INJECTION, SOLUTION INTRAMUSCULAR; INTRAVENOUS CODE/TRAUMA/SEDATION MEDICATION
Status: COMPLETED | OUTPATIENT
Start: 2020-12-11 | End: 2020-12-11

## 2020-12-11 RX ORDER — FENTANYL CITRATE 50 UG/ML
INJECTION, SOLUTION INTRAMUSCULAR; INTRAVENOUS CODE/TRAUMA/SEDATION MEDICATION
Status: COMPLETED | OUTPATIENT
Start: 2020-12-11 | End: 2020-12-11

## 2020-12-11 RX ORDER — VANCOMYCIN HYDROCHLORIDE 1 G/200ML
1000 INJECTION, SOLUTION INTRAVENOUS ONCE
Status: COMPLETED | OUTPATIENT
Start: 2020-12-11 | End: 2020-12-11

## 2020-12-11 RX ADMIN — MIDAZOLAM 0.5 MG: 1 INJECTION INTRAMUSCULAR; INTRAVENOUS at 09:01

## 2020-12-11 RX ADMIN — FENTANYL CITRATE 25 MCG: 50 INJECTION, SOLUTION INTRAMUSCULAR; INTRAVENOUS at 08:51

## 2020-12-11 RX ADMIN — SODIUM CHLORIDE 75 ML/HR: 0.9 INJECTION, SOLUTION INTRAVENOUS at 08:05

## 2020-12-11 RX ADMIN — MIDAZOLAM 0.5 MG: 1 INJECTION INTRAMUSCULAR; INTRAVENOUS at 08:58

## 2020-12-11 RX ADMIN — FENTANYL CITRATE 25 MCG: 50 INJECTION, SOLUTION INTRAMUSCULAR; INTRAVENOUS at 08:58

## 2020-12-11 RX ADMIN — MIDAZOLAM 0.5 MG: 1 INJECTION INTRAMUSCULAR; INTRAVENOUS at 09:05

## 2020-12-11 RX ADMIN — MIDAZOLAM 1 MG: 1 INJECTION INTRAMUSCULAR; INTRAVENOUS at 08:42

## 2020-12-11 RX ADMIN — FENTANYL CITRATE 25 MCG: 50 INJECTION, SOLUTION INTRAMUSCULAR; INTRAVENOUS at 09:01

## 2020-12-11 RX ADMIN — FENTANYL CITRATE 25 MCG: 50 INJECTION, SOLUTION INTRAMUSCULAR; INTRAVENOUS at 09:05

## 2020-12-11 RX ADMIN — VANCOMYCIN HYDROCHLORIDE 1000 MG: 1 INJECTION, SOLUTION INTRAVENOUS at 08:06

## 2020-12-11 RX ADMIN — MIDAZOLAM 0.5 MG: 1 INJECTION INTRAMUSCULAR; INTRAVENOUS at 08:50

## 2020-12-11 RX ADMIN — FENTANYL CITRATE 50 MCG: 50 INJECTION, SOLUTION INTRAMUSCULAR; INTRAVENOUS at 08:42

## 2020-12-12 LAB
T4 FREE SERPL-MCNC: 0.97 NG/DL (ref 0.76–1.46)
TSH SERPL DL<=0.05 MIU/L-ACNC: 4.86 UIU/ML (ref 0.36–3.74)

## 2020-12-14 ENCOUNTER — HOSPITAL ENCOUNTER (OUTPATIENT)
Dept: INFUSION CENTER | Facility: HOSPITAL | Age: 70
Discharge: HOME | End: 2020-12-14
Attending: INTERNAL MEDICINE
Payer: MEDICARE

## 2020-12-14 VITALS
DIASTOLIC BLOOD PRESSURE: 68 MMHG | OXYGEN SATURATION: 94 % | BODY MASS INDEX: 16.42 KG/M2 | SYSTOLIC BLOOD PRESSURE: 115 MMHG | HEART RATE: 105 BPM | HEIGHT: 65 IN | TEMPERATURE: 97.7 F | RESPIRATION RATE: 18 BRPM | WEIGHT: 98.55 LBS

## 2020-12-14 DIAGNOSIS — T45.1X5A CHEMOTHERAPY INDUCED NEUTROPENIA (HCC): ICD-10-CM

## 2020-12-14 DIAGNOSIS — C34.90 SMALL CELL LUNG CANCER (HCC): Primary | ICD-10-CM

## 2020-12-14 DIAGNOSIS — D70.1 CHEMOTHERAPY INDUCED NEUTROPENIA (HCC): ICD-10-CM

## 2020-12-14 PROCEDURE — 96417 CHEMO IV INFUS EACH ADDL SEQ: CPT

## 2020-12-14 PROCEDURE — 96413 CHEMO IV INFUSION 1 HR: CPT

## 2020-12-14 PROCEDURE — 96367 TX/PROPH/DG ADDL SEQ IV INF: CPT

## 2020-12-14 RX ORDER — ACETAMINOPHEN 500 MG
500 TABLET ORAL EVERY 6 HOURS PRN
COMMUNITY
End: 2021-01-04 | Stop reason: SDUPTHER

## 2020-12-14 RX ORDER — SODIUM CHLORIDE 9 MG/ML
20 INJECTION, SOLUTION INTRAVENOUS ONCE
Status: COMPLETED | OUTPATIENT
Start: 2020-12-14 | End: 2020-12-14

## 2020-12-14 RX ADMIN — CARBOPLATIN 371.5 MG: 10 INJECTION, SOLUTION INTRAVENOUS at 12:31

## 2020-12-14 RX ADMIN — DEXAMETHASONE SODIUM PHOSPHATE: 10 INJECTION, SOLUTION INTRAMUSCULAR; INTRAVENOUS at 09:06

## 2020-12-14 RX ADMIN — SODIUM CHLORIDE 20 ML/HR: 0.9 INJECTION, SOLUTION INTRAVENOUS at 09:06

## 2020-12-14 RX ADMIN — ETOPOSIDE 152 MG: 20 INJECTION INTRAVENOUS at 11:23

## 2020-12-14 RX ADMIN — SODIUM CHLORIDE 150 MG: 0.9 INJECTION, SOLUTION INTRAVENOUS at 09:40

## 2020-12-14 RX ADMIN — DURVALUMAB 1500 MG: 500 INJECTION, SOLUTION INTRAVENOUS at 10:19

## 2020-12-15 ENCOUNTER — HOSPITAL ENCOUNTER (OUTPATIENT)
Dept: INFUSION CENTER | Facility: HOSPITAL | Age: 70
Discharge: HOME | DRG: 189 | End: 2020-12-15
Attending: INTERNAL MEDICINE
Payer: MEDICARE

## 2020-12-15 ENCOUNTER — HOSPITAL ENCOUNTER (OUTPATIENT)
Dept: RADIOLOGY | Facility: HOSPITAL | Age: 70
Discharge: HOME | DRG: 189 | End: 2020-12-15
Attending: INTERNAL MEDICINE
Payer: MEDICARE

## 2020-12-15 ENCOUNTER — TELEPHONE (OUTPATIENT)
Dept: PULMONOLOGY | Facility: CLINIC | Age: 70
End: 2020-12-15

## 2020-12-15 VITALS
RESPIRATION RATE: 18 BRPM | OXYGEN SATURATION: 94 % | WEIGHT: 100.75 LBS | SYSTOLIC BLOOD PRESSURE: 145 MMHG | BODY MASS INDEX: 16.79 KG/M2 | TEMPERATURE: 97.7 F | HEART RATE: 104 BPM | HEIGHT: 65 IN | DIASTOLIC BLOOD PRESSURE: 66 MMHG

## 2020-12-15 DIAGNOSIS — T45.1X5A CHEMOTHERAPY INDUCED NEUTROPENIA (HCC): ICD-10-CM

## 2020-12-15 DIAGNOSIS — J90 PLEURAL EFFUSION: Primary | ICD-10-CM

## 2020-12-15 DIAGNOSIS — D70.1 CHEMOTHERAPY INDUCED NEUTROPENIA (HCC): ICD-10-CM

## 2020-12-15 DIAGNOSIS — Z95.828 PORT-A-CATH IN PLACE: Primary | ICD-10-CM

## 2020-12-15 DIAGNOSIS — J90 PLEURAL EFFUSION: ICD-10-CM

## 2020-12-15 DIAGNOSIS — C34.90 SMALL CELL LUNG CANCER (HCC): Primary | ICD-10-CM

## 2020-12-15 PROCEDURE — 96367 TX/PROPH/DG ADDL SEQ IV INF: CPT

## 2020-12-15 PROCEDURE — 96413 CHEMO IV INFUSION 1 HR: CPT

## 2020-12-15 PROCEDURE — 71046 X-RAY EXAM CHEST 2 VIEWS: CPT

## 2020-12-15 RX ORDER — LIDOCAINE AND PRILOCAINE 25; 25 MG/G; MG/G
CREAM TOPICAL AS NEEDED
Qty: 30 G | Refills: 3 | Status: SHIPPED | OUTPATIENT
Start: 2020-12-15 | End: 2020-12-19 | Stop reason: CLARIF

## 2020-12-15 RX ORDER — SODIUM CHLORIDE 9 MG/ML
20 INJECTION, SOLUTION INTRAVENOUS ONCE
Status: COMPLETED | OUTPATIENT
Start: 2020-12-15 | End: 2020-12-15

## 2020-12-15 RX ADMIN — SODIUM CHLORIDE 20 ML/HR: 9 INJECTION, SOLUTION INTRAVENOUS at 12:40

## 2020-12-15 RX ADMIN — ETOPOSIDE 152 MG: 20 INJECTION INTRAVENOUS at 13:06

## 2020-12-15 RX ADMIN — DEXAMETHASONE SODIUM PHOSPHATE: 10 INJECTION, SOLUTION INTRAMUSCULAR; INTRAVENOUS at 12:40

## 2020-12-16 ENCOUNTER — HOSPITAL ENCOUNTER (OUTPATIENT)
Dept: INFUSION CENTER | Facility: HOSPITAL | Age: 70
Discharge: HOME | DRG: 189 | End: 2020-12-16
Attending: INTERNAL MEDICINE
Payer: MEDICARE

## 2020-12-16 ENCOUNTER — TELEPHONE (OUTPATIENT)
Dept: INTERVENTIONAL RADIOLOGY/VASCULAR | Facility: CLINIC | Age: 70
End: 2020-12-16

## 2020-12-16 ENCOUNTER — PATIENT OUTREACH (OUTPATIENT)
Dept: CASE MANAGEMENT | Facility: HOSPITAL | Age: 70
End: 2020-12-16

## 2020-12-16 VITALS
HEART RATE: 105 BPM | WEIGHT: 104.72 LBS | SYSTOLIC BLOOD PRESSURE: 152 MMHG | OXYGEN SATURATION: 92 % | HEIGHT: 65 IN | BODY MASS INDEX: 17.45 KG/M2 | TEMPERATURE: 97.4 F | DIASTOLIC BLOOD PRESSURE: 86 MMHG | RESPIRATION RATE: 18 BRPM

## 2020-12-16 DIAGNOSIS — J90 PLEURAL EFFUSION: Primary | ICD-10-CM

## 2020-12-16 DIAGNOSIS — C34.90 SMALL CELL LUNG CANCER (HCC): Primary | ICD-10-CM

## 2020-12-16 DIAGNOSIS — D70.1 CHEMOTHERAPY INDUCED NEUTROPENIA (HCC): ICD-10-CM

## 2020-12-16 DIAGNOSIS — T45.1X5A CHEMOTHERAPY INDUCED NEUTROPENIA (HCC): ICD-10-CM

## 2020-12-16 PROCEDURE — 96413 CHEMO IV INFUSION 1 HR: CPT

## 2020-12-16 PROCEDURE — 96377 APPLICATON ON-BODY INJECTOR: CPT

## 2020-12-16 PROCEDURE — 96367 TX/PROPH/DG ADDL SEQ IV INF: CPT

## 2020-12-16 RX ORDER — SODIUM CHLORIDE 9 MG/ML
20 INJECTION, SOLUTION INTRAVENOUS ONCE
Status: COMPLETED | OUTPATIENT
Start: 2020-12-16 | End: 2020-12-16

## 2020-12-16 RX ORDER — SODIUM CHLORIDE 9 MG/ML
75 INJECTION, SOLUTION INTRAVENOUS CONTINUOUS
Status: CANCELLED | OUTPATIENT
Start: 2020-12-16

## 2020-12-16 RX ADMIN — SODIUM CHLORIDE 20 ML/HR: 9 INJECTION, SOLUTION INTRAVENOUS at 09:56

## 2020-12-16 RX ADMIN — PEGFILGRASTIM 6 MG: KIT SUBCUTANEOUS at 11:58

## 2020-12-16 RX ADMIN — DEXAMETHASONE SODIUM PHOSPHATE: 10 INJECTION, SOLUTION INTRAMUSCULAR; INTRAVENOUS at 09:57

## 2020-12-16 RX ADMIN — ETOPOSIDE 152 MG: 20 INJECTION INTRAVENOUS at 10:50

## 2020-12-18 ENCOUNTER — HOSPITAL ENCOUNTER (INPATIENT)
Facility: HOSPITAL | Age: 70
LOS: 4 days | Discharge: HOME | DRG: 189 | End: 2020-12-22
Attending: EMERGENCY MEDICINE | Admitting: INTERNAL MEDICINE
Payer: MEDICARE

## 2020-12-18 ENCOUNTER — TELEPHONE (OUTPATIENT)
Dept: PALLIATIVE MEDICINE | Facility: CLINIC | Age: 70
End: 2020-12-18

## 2020-12-18 ENCOUNTER — APPOINTMENT (EMERGENCY)
Dept: CT IMAGING | Facility: HOSPITAL | Age: 70
DRG: 189 | End: 2020-12-18
Payer: MEDICARE

## 2020-12-18 DIAGNOSIS — J43.9 PULMONARY EMPHYSEMA, UNSPECIFIED EMPHYSEMA TYPE (HCC): ICD-10-CM

## 2020-12-18 DIAGNOSIS — C34.90 SMALL CELL LUNG CANCER (HCC): ICD-10-CM

## 2020-12-18 DIAGNOSIS — E87.6 HYPOKALEMIA: ICD-10-CM

## 2020-12-18 DIAGNOSIS — R13.19 ESOPHAGEAL DYSPHAGIA: ICD-10-CM

## 2020-12-18 DIAGNOSIS — R06.02 SHORTNESS OF BREATH: ICD-10-CM

## 2020-12-18 DIAGNOSIS — R79.89 ELEVATED BRAIN NATRIURETIC PEPTIDE (BNP) LEVEL: ICD-10-CM

## 2020-12-18 DIAGNOSIS — R06.00 DYSPNEA: Primary | ICD-10-CM

## 2020-12-18 DIAGNOSIS — R11.2 NON-INTRACTABLE VOMITING WITH NAUSEA, UNSPECIFIED VOMITING TYPE: ICD-10-CM

## 2020-12-18 DIAGNOSIS — J90 PLEURAL EFFUSION: ICD-10-CM

## 2020-12-18 LAB
ALBUMIN SERPL BCP-MCNC: 2.7 G/DL (ref 3.5–5)
ALP SERPL-CCNC: 159 U/L (ref 46–116)
ALT SERPL W P-5'-P-CCNC: 21 U/L (ref 12–78)
ANION GAP SERPL CALCULATED.3IONS-SCNC: 4 MMOL/L (ref 4–13)
AST SERPL W P-5'-P-CCNC: 25 U/L (ref 5–45)
BASOPHILS # BLD MANUAL: 0 THOUSAND/UL (ref 0–0.1)
BASOPHILS NFR MAR MANUAL: 0 % (ref 0–1)
BILIRUB SERPL-MCNC: 0.4 MG/DL (ref 0.2–1)
BILIRUB UR QL STRIP: NEGATIVE
BUN SERPL-MCNC: 13 MG/DL (ref 5–25)
CALCIUM ALBUM COR SERPL-MCNC: 9.8 MG/DL (ref 8.3–10.1)
CALCIUM SERPL-MCNC: 8.8 MG/DL (ref 8.3–10.1)
CHLORIDE SERPL-SCNC: 97 MMOL/L (ref 100–108)
CLARITY UR: CLEAR
CO2 SERPL-SCNC: 29 MMOL/L (ref 21–32)
COLOR UR: YELLOW
CREAT SERPL-MCNC: 0.47 MG/DL (ref 0.6–1.3)
D DIMER PPP FEU-MCNC: 4.78 UG/ML FEU
EOSINOPHIL # BLD MANUAL: 0 THOUSAND/UL (ref 0–0.4)
EOSINOPHIL NFR BLD MANUAL: 0 % (ref 0–6)
ERYTHROCYTE [DISTWIDTH] IN BLOOD BY AUTOMATED COUNT: 15 % (ref 11.6–15.1)
FLUAV RNA RESP QL NAA+PROBE: NEGATIVE
FLUBV RNA RESP QL NAA+PROBE: NEGATIVE
GFR SERPL CREATININE-BSD FRML MDRD: 102 ML/MIN/1.73SQ M
GLUCOSE SERPL-MCNC: 107 MG/DL (ref 65–140)
GLUCOSE UR STRIP-MCNC: NEGATIVE MG/DL
HCT VFR BLD AUTO: 36.1 % (ref 34.8–46.1)
HGB BLD-MCNC: 11.9 G/DL (ref 11.5–15.4)
HGB UR QL STRIP.AUTO: NEGATIVE
KETONES UR STRIP-MCNC: NEGATIVE MG/DL
LACTATE SERPL-SCNC: 1.2 MMOL/L (ref 0.5–2)
LEUKOCYTE ESTERASE UR QL STRIP: NEGATIVE
LG PLATELETS BLD QL SMEAR: PRESENT
LIPASE SERPL-CCNC: 77 U/L (ref 73–393)
LYMPHOCYTES # BLD AUTO: 1.8 THOUSAND/UL (ref 0.6–4.47)
LYMPHOCYTES # BLD AUTO: 3 % (ref 14–44)
MCH RBC QN AUTO: 31 PG (ref 26.8–34.3)
MCHC RBC AUTO-ENTMCNC: 33 G/DL (ref 31.4–37.4)
MCV RBC AUTO: 94 FL (ref 82–98)
MONOCYTES # BLD AUTO: 0 THOUSAND/UL (ref 0–1.22)
MONOCYTES NFR BLD: 0 % (ref 4–12)
NEUTROPHILS # BLD MANUAL: 58.11 THOUSAND/UL (ref 1.85–7.62)
NEUTS BAND NFR BLD MANUAL: 11 % (ref 0–8)
NEUTS SEG NFR BLD AUTO: 86 % (ref 43–75)
NITRITE UR QL STRIP: NEGATIVE
NRBC BLD AUTO-RTO: 0 /100 WBCS
NT-PROBNP SERPL-MCNC: 1541 PG/ML
PH UR STRIP.AUTO: 7 [PH]
PLATELET # BLD AUTO: 532 THOUSANDS/UL (ref 149–390)
PLATELET BLD QL SMEAR: ABNORMAL
PMV BLD AUTO: 9.1 FL (ref 8.9–12.7)
POTASSIUM SERPL-SCNC: 3.2 MMOL/L (ref 3.5–5.3)
PROT SERPL-MCNC: 6.3 G/DL (ref 6.4–8.2)
PROT UR STRIP-MCNC: NEGATIVE MG/DL
RBC # BLD AUTO: 3.84 MILLION/UL (ref 3.81–5.12)
RBC MORPH BLD: PRESENT
RSV RNA RESP QL NAA+PROBE: NEGATIVE
SARS-COV-2 RNA RESP QL NAA+PROBE: NEGATIVE
SODIUM SERPL-SCNC: 130 MMOL/L (ref 136–145)
SP GR UR STRIP.AUTO: <=1.005 (ref 1–1.03)
TOTAL CELLS COUNTED SPEC: 100
TROPONIN I SERPL-MCNC: 0.03 NG/ML
UROBILINOGEN UR QL STRIP.AUTO: 0.2 E.U./DL
WBC # BLD AUTO: 59.91 THOUSAND/UL (ref 4.31–10.16)

## 2020-12-18 PROCEDURE — 84484 ASSAY OF TROPONIN QUANT: CPT | Performed by: EMERGENCY MEDICINE

## 2020-12-18 PROCEDURE — 83690 ASSAY OF LIPASE: CPT | Performed by: EMERGENCY MEDICINE

## 2020-12-18 PROCEDURE — 84300 ASSAY OF URINE SODIUM: CPT | Performed by: PHYSICIAN ASSISTANT

## 2020-12-18 PROCEDURE — 96374 THER/PROPH/DIAG INJ IV PUSH: CPT

## 2020-12-18 PROCEDURE — 85027 COMPLETE CBC AUTOMATED: CPT | Performed by: EMERGENCY MEDICINE

## 2020-12-18 PROCEDURE — 83605 ASSAY OF LACTIC ACID: CPT | Performed by: EMERGENCY MEDICINE

## 2020-12-18 PROCEDURE — 80053 COMPREHEN METABOLIC PANEL: CPT | Performed by: EMERGENCY MEDICINE

## 2020-12-18 PROCEDURE — G1004 CDSM NDSC: HCPCS

## 2020-12-18 PROCEDURE — 71275 CT ANGIOGRAPHY CHEST: CPT

## 2020-12-18 PROCEDURE — 93005 ELECTROCARDIOGRAM TRACING: CPT

## 2020-12-18 PROCEDURE — 84550 ASSAY OF BLOOD/URIC ACID: CPT | Performed by: PHYSICIAN ASSISTANT

## 2020-12-18 PROCEDURE — 74177 CT ABD & PELVIS W/CONTRAST: CPT

## 2020-12-18 PROCEDURE — 83935 ASSAY OF URINE OSMOLALITY: CPT | Performed by: PHYSICIAN ASSISTANT

## 2020-12-18 PROCEDURE — 36415 COLL VENOUS BLD VENIPUNCTURE: CPT | Performed by: EMERGENCY MEDICINE

## 2020-12-18 PROCEDURE — 99283 EMERGENCY DEPT VISIT LOW MDM: CPT | Performed by: EMERGENCY MEDICINE

## 2020-12-18 PROCEDURE — 81003 URINALYSIS AUTO W/O SCOPE: CPT | Performed by: EMERGENCY MEDICINE

## 2020-12-18 PROCEDURE — 0241U HB NFCT DS VIR RESP RNA 4 TRGT: CPT | Performed by: EMERGENCY MEDICINE

## 2020-12-18 PROCEDURE — 85007 BL SMEAR W/DIFF WBC COUNT: CPT | Performed by: EMERGENCY MEDICINE

## 2020-12-18 PROCEDURE — 85379 FIBRIN DEGRADATION QUANT: CPT | Performed by: EMERGENCY MEDICINE

## 2020-12-18 PROCEDURE — 87040 BLOOD CULTURE FOR BACTERIA: CPT | Performed by: EMERGENCY MEDICINE

## 2020-12-18 PROCEDURE — 99285 EMERGENCY DEPT VISIT HI MDM: CPT

## 2020-12-18 PROCEDURE — 83880 ASSAY OF NATRIURETIC PEPTIDE: CPT | Performed by: EMERGENCY MEDICINE

## 2020-12-18 PROCEDURE — 84443 ASSAY THYROID STIM HORMONE: CPT | Performed by: PHYSICIAN ASSISTANT

## 2020-12-18 RX ORDER — ONDANSETRON 2 MG/ML
4 INJECTION INTRAMUSCULAR; INTRAVENOUS ONCE
Status: COMPLETED | OUTPATIENT
Start: 2020-12-18 | End: 2020-12-18

## 2020-12-18 RX ORDER — POTASSIUM CHLORIDE 20 MEQ/1
40 TABLET, EXTENDED RELEASE ORAL ONCE
Status: COMPLETED | OUTPATIENT
Start: 2020-12-18 | End: 2020-12-18

## 2020-12-18 RX ORDER — ACETAMINOPHEN 325 MG/1
650 TABLET ORAL ONCE
Status: COMPLETED | OUTPATIENT
Start: 2020-12-18 | End: 2020-12-18

## 2020-12-18 RX ORDER — FUROSEMIDE 10 MG/ML
20 INJECTION INTRAMUSCULAR; INTRAVENOUS ONCE
Status: COMPLETED | OUTPATIENT
Start: 2020-12-18 | End: 2020-12-18

## 2020-12-18 RX ADMIN — FUROSEMIDE 20 MG: 10 INJECTION, SOLUTION INTRAVENOUS at 21:39

## 2020-12-18 RX ADMIN — POTASSIUM CHLORIDE 40 MEQ: 1500 TABLET, EXTENDED RELEASE ORAL at 21:19

## 2020-12-18 RX ADMIN — ACETAMINOPHEN 650 MG: 325 TABLET, FILM COATED ORAL at 18:07

## 2020-12-18 RX ADMIN — ONDANSETRON 4 MG: 2 INJECTION INTRAMUSCULAR; INTRAVENOUS at 18:08

## 2020-12-18 RX ADMIN — IOHEXOL 100 ML: 350 INJECTION, SOLUTION INTRAVENOUS at 19:10

## 2020-12-19 PROBLEM — B37.0 ORAL THRUSH: Status: ACTIVE | Noted: 2020-12-19

## 2020-12-19 PROBLEM — E87.1 HYPONATREMIA: Status: ACTIVE | Noted: 2020-12-19

## 2020-12-19 PROBLEM — R79.89 ELEVATED D-DIMER: Status: ACTIVE | Noted: 2020-12-19

## 2020-12-19 PROBLEM — J96.21 ACUTE ON CHRONIC RESPIRATORY FAILURE WITH HYPOXIA (HCC): Status: ACTIVE | Noted: 2020-11-03

## 2020-12-19 LAB
ANION GAP SERPL CALCULATED.3IONS-SCNC: 8 MMOL/L (ref 4–13)
BUN SERPL-MCNC: 11 MG/DL (ref 5–25)
CALCIUM SERPL-MCNC: 8.3 MG/DL (ref 8.3–10.1)
CHLORIDE SERPL-SCNC: 100 MMOL/L (ref 100–108)
CO2 SERPL-SCNC: 27 MMOL/L (ref 21–32)
CREAT SERPL-MCNC: 0.5 MG/DL (ref 0.6–1.3)
ERYTHROCYTE [DISTWIDTH] IN BLOOD BY AUTOMATED COUNT: 15 % (ref 11.6–15.1)
GFR SERPL CREATININE-BSD FRML MDRD: 100 ML/MIN/1.73SQ M
GLUCOSE SERPL-MCNC: 92 MG/DL (ref 65–140)
HCT VFR BLD AUTO: 32.6 % (ref 34.8–46.1)
HGB BLD-MCNC: 10.9 G/DL (ref 11.5–15.4)
MCH RBC QN AUTO: 31.6 PG (ref 26.8–34.3)
MCHC RBC AUTO-ENTMCNC: 33.4 G/DL (ref 31.4–37.4)
MCV RBC AUTO: 95 FL (ref 82–98)
OSMOLALITY UR: 181 MMOL/KG
PLATELET # BLD AUTO: 421 THOUSANDS/UL (ref 149–390)
PMV BLD AUTO: 8.7 FL (ref 8.9–12.7)
POTASSIUM SERPL-SCNC: 3.6 MMOL/L (ref 3.5–5.3)
RBC # BLD AUTO: 3.45 MILLION/UL (ref 3.81–5.12)
SODIUM 24H UR-SCNC: 34 MOL/L
SODIUM SERPL-SCNC: 135 MMOL/L (ref 136–145)
TSH SERPL DL<=0.05 MIU/L-ACNC: 14.53 UIU/ML (ref 0.36–3.74)
URATE SERPL-MCNC: 2.9 MG/DL (ref 2–6.8)
WBC # BLD AUTO: 47.72 THOUSAND/UL (ref 4.31–10.16)

## 2020-12-19 PROCEDURE — 99223 1ST HOSP IP/OBS HIGH 75: CPT | Performed by: INTERNAL MEDICINE

## 2020-12-19 PROCEDURE — 36415 COLL VENOUS BLD VENIPUNCTURE: CPT | Performed by: PHYSICIAN ASSISTANT

## 2020-12-19 PROCEDURE — 80048 BASIC METABOLIC PNL TOTAL CA: CPT | Performed by: PHYSICIAN ASSISTANT

## 2020-12-19 PROCEDURE — 85027 COMPLETE CBC AUTOMATED: CPT | Performed by: PHYSICIAN ASSISTANT

## 2020-12-19 RX ORDER — COLCHICINE 0.6 MG/1
0.6 TABLET ORAL 3 TIMES DAILY PRN
Status: DISCONTINUED | OUTPATIENT
Start: 2020-12-19 | End: 2020-12-22 | Stop reason: HOSPADM

## 2020-12-19 RX ORDER — SODIUM CHLORIDE 9 MG/ML
75 INJECTION, SOLUTION INTRAVENOUS CONTINUOUS
Status: DISCONTINUED | OUTPATIENT
Start: 2020-12-19 | End: 2020-12-21

## 2020-12-19 RX ORDER — TRAMADOL HYDROCHLORIDE 50 MG/1
50 TABLET ORAL EVERY 6 HOURS PRN
Status: DISCONTINUED | OUTPATIENT
Start: 2020-12-19 | End: 2020-12-22 | Stop reason: HOSPADM

## 2020-12-19 RX ORDER — CLOTRIMAZOLE 10 MG/1
10 LOZENGE ORAL; TOPICAL
Status: DISCONTINUED | OUTPATIENT
Start: 2020-12-19 | End: 2020-12-22 | Stop reason: HOSPADM

## 2020-12-19 RX ORDER — COLCHICINE 0.6 MG/1
0.6 TABLET ORAL 3 TIMES DAILY
Status: DISCONTINUED | OUTPATIENT
Start: 2020-12-19 | End: 2020-12-19

## 2020-12-19 RX ORDER — ONDANSETRON 2 MG/ML
4 INJECTION INTRAMUSCULAR; INTRAVENOUS EVERY 6 HOURS PRN
Status: DISCONTINUED | OUTPATIENT
Start: 2020-12-19 | End: 2020-12-22 | Stop reason: HOSPADM

## 2020-12-19 RX ORDER — CALCIUM CARBONATE 200(500)MG
1000 TABLET,CHEWABLE ORAL DAILY PRN
Status: DISCONTINUED | OUTPATIENT
Start: 2020-12-19 | End: 2020-12-22 | Stop reason: HOSPADM

## 2020-12-19 RX ORDER — LEVALBUTEROL INHALATION SOLUTION 0.63 MG/3ML
0.63 SOLUTION RESPIRATORY (INHALATION) EVERY 8 HOURS PRN
Status: DISCONTINUED | OUTPATIENT
Start: 2020-12-19 | End: 2020-12-22 | Stop reason: HOSPADM

## 2020-12-19 RX ORDER — ZOLPIDEM TARTRATE 5 MG/1
5 TABLET ORAL
Status: DISCONTINUED | OUTPATIENT
Start: 2020-12-19 | End: 2020-12-22 | Stop reason: HOSPADM

## 2020-12-19 RX ORDER — FLUTICASONE FUROATE AND VILANTEROL 200; 25 UG/1; UG/1
1 POWDER RESPIRATORY (INHALATION) DAILY
Status: DISCONTINUED | OUTPATIENT
Start: 2020-12-19 | End: 2020-12-22 | Stop reason: HOSPADM

## 2020-12-19 RX ORDER — NICOTINE 21 MG/24HR
14 PATCH, TRANSDERMAL 24 HOURS TRANSDERMAL DAILY
Status: DISCONTINUED | OUTPATIENT
Start: 2020-12-19 | End: 2020-12-22 | Stop reason: HOSPADM

## 2020-12-19 RX ORDER — CYCLOBENZAPRINE HCL 10 MG
10 TABLET ORAL 3 TIMES DAILY PRN
Status: DISCONTINUED | OUTPATIENT
Start: 2020-12-19 | End: 2020-12-22 | Stop reason: HOSPADM

## 2020-12-19 RX ORDER — ACETAMINOPHEN 325 MG/1
650 TABLET ORAL EVERY 6 HOURS PRN
Status: DISCONTINUED | OUTPATIENT
Start: 2020-12-19 | End: 2020-12-22 | Stop reason: HOSPADM

## 2020-12-19 RX ADMIN — ZOLPIDEM TARTRATE 5 MG: 5 TABLET, COATED ORAL at 22:08

## 2020-12-19 RX ADMIN — ONDANSETRON 4 MG: 2 INJECTION INTRAMUSCULAR; INTRAVENOUS at 03:22

## 2020-12-19 RX ADMIN — COLCHICINE 0.6 MG: 0.6 TABLET, FILM COATED ORAL at 03:49

## 2020-12-19 RX ADMIN — CLOTRIMAZOLE 10 MG: 10 LOZENGE ORAL; TOPICAL at 18:12

## 2020-12-19 RX ADMIN — FLUTICASONE FUROATE AND VILANTEROL TRIFENATATE 1 PUFF: 200; 25 POWDER RESPIRATORY (INHALATION) at 09:48

## 2020-12-19 RX ADMIN — Medication 14 MG: at 16:03

## 2020-12-20 ENCOUNTER — APPOINTMENT (INPATIENT)
Dept: RADIOLOGY | Facility: HOSPITAL | Age: 70
DRG: 189 | End: 2020-12-20
Payer: MEDICARE

## 2020-12-20 PROBLEM — E43 SEVERE PROTEIN-CALORIE MALNUTRITION (HCC): Status: ACTIVE | Noted: 2020-12-20

## 2020-12-20 PROCEDURE — 99232 SBSQ HOSP IP/OBS MODERATE 35: CPT | Performed by: INTERNAL MEDICINE

## 2020-12-20 PROCEDURE — 99024 POSTOP FOLLOW-UP VISIT: CPT | Performed by: RADIOLOGY

## 2020-12-20 PROCEDURE — 99223 1ST HOSP IP/OBS HIGH 75: CPT | Performed by: INTERNAL MEDICINE

## 2020-12-20 PROCEDURE — 71045 X-RAY EXAM CHEST 1 VIEW: CPT

## 2020-12-20 RX ORDER — LOPERAMIDE HYDROCHLORIDE 2 MG/1
2 CAPSULE ORAL 3 TIMES DAILY PRN
Status: DISCONTINUED | OUTPATIENT
Start: 2020-12-20 | End: 2020-12-22 | Stop reason: HOSPADM

## 2020-12-20 RX ADMIN — FLUTICASONE FUROATE AND VILANTEROL TRIFENATATE 1 PUFF: 200; 25 POWDER RESPIRATORY (INHALATION) at 09:45

## 2020-12-20 RX ADMIN — LOPERAMIDE HYDROCHLORIDE 2 MG: 2 CAPSULE ORAL at 19:51

## 2020-12-20 RX ADMIN — ZOLPIDEM TARTRATE 5 MG: 5 TABLET, COATED ORAL at 22:22

## 2020-12-20 RX ADMIN — ACETAMINOPHEN 650 MG: 325 TABLET, FILM COATED ORAL at 22:22

## 2020-12-20 RX ADMIN — Medication 14 MG: at 09:44

## 2020-12-20 RX ADMIN — ONDANSETRON 4 MG: 2 INJECTION INTRAMUSCULAR; INTRAVENOUS at 22:22

## 2020-12-20 RX ADMIN — ENOXAPARIN SODIUM 40 MG: 40 INJECTION SUBCUTANEOUS at 09:45

## 2020-12-21 ENCOUNTER — ANESTHESIA (INPATIENT)
Dept: GASTROENTEROLOGY | Facility: HOSPITAL | Age: 70
DRG: 189 | End: 2020-12-21
Payer: MEDICARE

## 2020-12-21 ENCOUNTER — APPOINTMENT (INPATIENT)
Dept: NON INVASIVE DIAGNOSTICS | Facility: HOSPITAL | Age: 70
DRG: 189 | End: 2020-12-21
Payer: MEDICARE

## 2020-12-21 ENCOUNTER — APPOINTMENT (INPATIENT)
Dept: GASTROENTEROLOGY | Facility: HOSPITAL | Age: 70
DRG: 189 | End: 2020-12-21
Attending: INTERNAL MEDICINE
Payer: MEDICARE

## 2020-12-21 ENCOUNTER — APPOINTMENT (INPATIENT)
Dept: INTERVENTIONAL RADIOLOGY/VASCULAR | Facility: HOSPITAL | Age: 70
DRG: 189 | End: 2020-12-21
Attending: RADIOLOGY
Payer: MEDICARE

## 2020-12-21 ENCOUNTER — APPOINTMENT (INPATIENT)
Dept: CT IMAGING | Facility: HOSPITAL | Age: 70
DRG: 189 | End: 2020-12-21
Payer: MEDICARE

## 2020-12-21 ENCOUNTER — ANESTHESIA EVENT (INPATIENT)
Dept: GASTROENTEROLOGY | Facility: HOSPITAL | Age: 70
DRG: 189 | End: 2020-12-21
Payer: MEDICARE

## 2020-12-21 VITALS — HEART RATE: 89 BPM

## 2020-12-21 LAB
ALBUMIN SERPL BCP-MCNC: 2.3 G/DL (ref 3.5–5)
ALP SERPL-CCNC: 180 U/L (ref 46–116)
ALT SERPL W P-5'-P-CCNC: 16 U/L (ref 12–78)
ANION GAP SERPL CALCULATED.3IONS-SCNC: 9 MMOL/L (ref 4–13)
AST SERPL W P-5'-P-CCNC: 18 U/L (ref 5–45)
ATRIAL RATE: 107 BPM
BILIRUB SERPL-MCNC: 0.4 MG/DL (ref 0.2–1)
BUN SERPL-MCNC: 13 MG/DL (ref 5–25)
CALCIUM ALBUM COR SERPL-MCNC: 9.8 MG/DL (ref 8.3–10.1)
CALCIUM SERPL-MCNC: 8.4 MG/DL (ref 8.3–10.1)
CHLORIDE SERPL-SCNC: 101 MMOL/L (ref 100–108)
CO2 SERPL-SCNC: 24 MMOL/L (ref 21–32)
CREAT SERPL-MCNC: 0.49 MG/DL (ref 0.6–1.3)
ERYTHROCYTE [DISTWIDTH] IN BLOOD BY AUTOMATED COUNT: 14.6 % (ref 11.6–15.1)
GFR SERPL CREATININE-BSD FRML MDRD: 100 ML/MIN/1.73SQ M
GLUCOSE SERPL-MCNC: 77 MG/DL (ref 65–140)
HCT VFR BLD AUTO: 31.6 % (ref 34.8–46.1)
HGB BLD-MCNC: 10.3 G/DL (ref 11.5–15.4)
MCH RBC QN AUTO: 30.7 PG (ref 26.8–34.3)
MCHC RBC AUTO-ENTMCNC: 32.6 G/DL (ref 31.4–37.4)
MCV RBC AUTO: 94 FL (ref 82–98)
P AXIS: 83 DEGREES
PLATELET # BLD AUTO: 337 THOUSANDS/UL (ref 149–390)
PMV BLD AUTO: 9.3 FL (ref 8.9–12.7)
POTASSIUM SERPL-SCNC: 3.7 MMOL/L (ref 3.5–5.3)
PR INTERVAL: 130 MS
PROCALCITONIN SERPL-MCNC: 0.89 NG/ML
PROT SERPL-MCNC: 5.6 G/DL (ref 6.4–8.2)
QRS AXIS: 71 DEGREES
QRSD INTERVAL: 88 MS
QT INTERVAL: 356 MS
QTC INTERVAL: 475 MS
RBC # BLD AUTO: 3.35 MILLION/UL (ref 3.81–5.12)
SODIUM SERPL-SCNC: 134 MMOL/L (ref 136–145)
T WAVE AXIS: 93 DEGREES
VENTRICULAR RATE: 107 BPM
WBC # BLD AUTO: 15.31 THOUSAND/UL (ref 4.31–10.16)

## 2020-12-21 PROCEDURE — 99232 SBSQ HOSP IP/OBS MODERATE 35: CPT | Performed by: INTERNAL MEDICINE

## 2020-12-21 PROCEDURE — 93306 TTE W/DOPPLER COMPLETE: CPT | Performed by: INTERNAL MEDICINE

## 2020-12-21 PROCEDURE — 99152 MOD SED SAME PHYS/QHP 5/>YRS: CPT

## 2020-12-21 PROCEDURE — 88342 IMHCHEM/IMCYTCHM 1ST ANTB: CPT | Performed by: PATHOLOGY

## 2020-12-21 PROCEDURE — 99153 MOD SED SAME PHYS/QHP EA: CPT

## 2020-12-21 PROCEDURE — 70450 CT HEAD/BRAIN W/O DYE: CPT

## 2020-12-21 PROCEDURE — 84145 PROCALCITONIN (PCT): CPT | Performed by: INTERNAL MEDICINE

## 2020-12-21 PROCEDURE — 88312 SPECIAL STAINS GROUP 1: CPT | Performed by: PATHOLOGY

## 2020-12-21 PROCEDURE — 0DB48ZX EXCISION OF ESOPHAGOGASTRIC JUNCTION, VIA NATURAL OR ARTIFICIAL OPENING ENDOSCOPIC, DIAGNOSTIC: ICD-10-PCS | Performed by: INTERNAL MEDICINE

## 2020-12-21 PROCEDURE — 88305 TISSUE EXAM BY PATHOLOGIST: CPT | Performed by: PATHOLOGY

## 2020-12-21 PROCEDURE — 32552 REMOVE LUNG CATHETER: CPT | Performed by: RADIOLOGY

## 2020-12-21 PROCEDURE — 93306 TTE W/DOPPLER COMPLETE: CPT

## 2020-12-21 PROCEDURE — 93010 ELECTROCARDIOGRAM REPORT: CPT | Performed by: INTERNAL MEDICINE

## 2020-12-21 PROCEDURE — 85027 COMPLETE CBC AUTOMATED: CPT | Performed by: INTERNAL MEDICINE

## 2020-12-21 PROCEDURE — 0WPBX0Z REMOVAL OF DRAINAGE DEVICE FROM LEFT PLEURAL CAVITY, EXTERNAL APPROACH: ICD-10-PCS | Performed by: RADIOLOGY

## 2020-12-21 PROCEDURE — 99152 MOD SED SAME PHYS/QHP 5/>YRS: CPT | Performed by: RADIOLOGY

## 2020-12-21 PROCEDURE — 43239 EGD BIOPSY SINGLE/MULTIPLE: CPT | Performed by: INTERNAL MEDICINE

## 2020-12-21 PROCEDURE — 0DB68ZX EXCISION OF STOMACH, VIA NATURAL OR ARTIFICIAL OPENING ENDOSCOPIC, DIAGNOSTIC: ICD-10-PCS | Performed by: INTERNAL MEDICINE

## 2020-12-21 PROCEDURE — 32552 REMOVE LUNG CATHETER: CPT

## 2020-12-21 PROCEDURE — G1004 CDSM NDSC: HCPCS

## 2020-12-21 PROCEDURE — 80053 COMPREHEN METABOLIC PANEL: CPT | Performed by: INTERNAL MEDICINE

## 2020-12-21 RX ORDER — PROPOFOL 10 MG/ML
INJECTION, EMULSION INTRAVENOUS AS NEEDED
Status: DISCONTINUED | OUTPATIENT
Start: 2020-12-21 | End: 2020-12-21

## 2020-12-21 RX ORDER — FENTANYL CITRATE 50 UG/ML
INJECTION, SOLUTION INTRAMUSCULAR; INTRAVENOUS CODE/TRAUMA/SEDATION MEDICATION
Status: COMPLETED | OUTPATIENT
Start: 2020-12-21 | End: 2020-12-21

## 2020-12-21 RX ORDER — KETOROLAC TROMETHAMINE 30 MG/ML
15 INJECTION, SOLUTION INTRAMUSCULAR; INTRAVENOUS EVERY 8 HOURS PRN
Status: DISCONTINUED | OUTPATIENT
Start: 2020-12-21 | End: 2020-12-22 | Stop reason: HOSPADM

## 2020-12-21 RX ORDER — MIDAZOLAM HYDROCHLORIDE 2 MG/2ML
INJECTION, SOLUTION INTRAMUSCULAR; INTRAVENOUS CODE/TRAUMA/SEDATION MEDICATION
Status: COMPLETED | OUTPATIENT
Start: 2020-12-21 | End: 2020-12-21

## 2020-12-21 RX ADMIN — Medication 14 MG: at 09:00

## 2020-12-21 RX ADMIN — FENTANYL CITRATE 50 MCG: 50 INJECTION, SOLUTION INTRAMUSCULAR; INTRAVENOUS at 11:14

## 2020-12-21 RX ADMIN — ONDANSETRON 4 MG: 2 INJECTION INTRAMUSCULAR; INTRAVENOUS at 22:24

## 2020-12-21 RX ADMIN — COLCHICINE 0.6 MG: 0.6 TABLET, FILM COATED ORAL at 12:05

## 2020-12-21 RX ADMIN — PROPOFOL 40 MG: 10 INJECTION, EMULSION INTRAVENOUS at 07:58

## 2020-12-21 RX ADMIN — MIDAZOLAM 1 MG: 1 INJECTION INTRAMUSCULAR; INTRAVENOUS at 11:17

## 2020-12-21 RX ADMIN — MIDAZOLAM 1 MG: 1 INJECTION INTRAMUSCULAR; INTRAVENOUS at 11:12

## 2020-12-21 RX ADMIN — FENTANYL CITRATE 50 MCG: 50 INJECTION, SOLUTION INTRAMUSCULAR; INTRAVENOUS at 11:07

## 2020-12-21 RX ADMIN — LOPERAMIDE HYDROCHLORIDE 2 MG: 2 CAPSULE ORAL at 12:05

## 2020-12-21 RX ADMIN — FLUTICASONE FUROATE AND VILANTEROL TRIFENATATE 1 PUFF: 200; 25 POWDER RESPIRATORY (INHALATION) at 09:06

## 2020-12-21 RX ADMIN — PROPOFOL 10 MG: 10 INJECTION, EMULSION INTRAVENOUS at 08:00

## 2020-12-21 RX ADMIN — MIDAZOLAM 1 MG: 1 INJECTION INTRAMUSCULAR; INTRAVENOUS at 11:07

## 2020-12-21 RX ADMIN — SODIUM CHLORIDE: 0.9 INJECTION, SOLUTION INTRAVENOUS at 07:52

## 2020-12-21 RX ADMIN — DIPHENHYDRAMINE HYDROCHLORIDE 50 MG: 50 INJECTION, SOLUTION INTRAMUSCULAR; INTRAVENOUS at 11:04

## 2020-12-21 RX ADMIN — ACETAMINOPHEN 650 MG: 325 TABLET, FILM COATED ORAL at 12:05

## 2020-12-21 RX ADMIN — ZOLPIDEM TARTRATE 5 MG: 5 TABLET, COATED ORAL at 23:32

## 2020-12-21 RX ADMIN — ENOXAPARIN SODIUM 40 MG: 40 INJECTION SUBCUTANEOUS at 09:04

## 2020-12-22 ENCOUNTER — TELEPHONE (OUTPATIENT)
Dept: HEMATOLOGY ONCOLOGY | Facility: CLINIC | Age: 70
End: 2020-12-22

## 2020-12-22 ENCOUNTER — TELEPHONE (OUTPATIENT)
Dept: PALLIATIVE MEDICINE | Facility: CLINIC | Age: 70
End: 2020-12-22

## 2020-12-22 VITALS
OXYGEN SATURATION: 91 % | HEIGHT: 65 IN | BODY MASS INDEX: 15.86 KG/M2 | WEIGHT: 95.2 LBS | RESPIRATION RATE: 13 BRPM | SYSTOLIC BLOOD PRESSURE: 141 MMHG | TEMPERATURE: 97.8 F | DIASTOLIC BLOOD PRESSURE: 60 MMHG | HEART RATE: 96 BPM

## 2020-12-22 DIAGNOSIS — Z95.828 PORT-A-CATH IN PLACE: Primary | ICD-10-CM

## 2020-12-22 PROBLEM — E87.1 HYPONATREMIA: Status: RESOLVED | Noted: 2020-12-19 | Resolved: 2020-12-22

## 2020-12-22 PROBLEM — R11.2 NAUSEA AND VOMITING: Status: RESOLVED | Noted: 2019-09-13 | Resolved: 2020-12-22

## 2020-12-22 LAB
ANION GAP SERPL CALCULATED.3IONS-SCNC: 7 MMOL/L (ref 4–13)
BASOPHILS # BLD MANUAL: 0 THOUSAND/UL (ref 0–0.1)
BASOPHILS NFR MAR MANUAL: 0 % (ref 0–1)
BUN SERPL-MCNC: 17 MG/DL (ref 5–25)
CALCIUM SERPL-MCNC: 8.6 MG/DL (ref 8.3–10.1)
CHLORIDE SERPL-SCNC: 100 MMOL/L (ref 100–108)
CO2 SERPL-SCNC: 28 MMOL/L (ref 21–32)
CREAT SERPL-MCNC: 0.53 MG/DL (ref 0.6–1.3)
DOHLE BOD BLD QL SMEAR: PRESENT
EOSINOPHIL # BLD MANUAL: 0.13 THOUSAND/UL (ref 0–0.4)
EOSINOPHIL NFR BLD MANUAL: 1 % (ref 0–6)
ERYTHROCYTE [DISTWIDTH] IN BLOOD BY AUTOMATED COUNT: 14.6 % (ref 11.6–15.1)
GFR SERPL CREATININE-BSD FRML MDRD: 98 ML/MIN/1.73SQ M
GLUCOSE SERPL-MCNC: 87 MG/DL (ref 65–140)
HCT VFR BLD AUTO: 34.6 % (ref 34.8–46.1)
HGB BLD-MCNC: 11.2 G/DL (ref 11.5–15.4)
LYMPHOCYTES # BLD AUTO: 1.41 THOUSAND/UL (ref 0.6–4.47)
LYMPHOCYTES # BLD AUTO: 11 % (ref 14–44)
MACROCYTES BLD QL AUTO: PRESENT
MCH RBC QN AUTO: 30.8 PG (ref 26.8–34.3)
MCHC RBC AUTO-ENTMCNC: 32.4 G/DL (ref 31.4–37.4)
MCV RBC AUTO: 95 FL (ref 82–98)
MONOCYTES # BLD AUTO: 0.51 THOUSAND/UL (ref 0–1.22)
MONOCYTES NFR BLD: 4 % (ref 4–12)
NEUTROPHILS # BLD MANUAL: 10.78 THOUSAND/UL (ref 1.85–7.62)
NEUTS BAND NFR BLD MANUAL: 5 % (ref 0–8)
NEUTS SEG NFR BLD AUTO: 79 % (ref 43–75)
NRBC BLD AUTO-RTO: 0 /100 WBCS
PLATELET # BLD AUTO: 295 THOUSANDS/UL (ref 149–390)
PLATELET BLD QL SMEAR: ADEQUATE
PLATELET CLUMP BLD QL SMEAR: PRESENT
PMV BLD AUTO: 9.3 FL (ref 8.9–12.7)
POIKILOCYTOSIS BLD QL SMEAR: PRESENT
POTASSIUM SERPL-SCNC: 4.7 MMOL/L (ref 3.5–5.3)
PROCALCITONIN SERPL-MCNC: 0.36 NG/ML
RBC # BLD AUTO: 3.64 MILLION/UL (ref 3.81–5.12)
RBC MORPH BLD: PRESENT
SODIUM SERPL-SCNC: 135 MMOL/L (ref 136–145)
TOTAL CELLS COUNTED SPEC: 100
WBC # BLD AUTO: 12.83 THOUSAND/UL (ref 4.31–10.16)

## 2020-12-22 PROCEDURE — 99239 HOSP IP/OBS DSCHRG MGMT >30: CPT | Performed by: PHYSICIAN ASSISTANT

## 2020-12-22 PROCEDURE — 85027 COMPLETE CBC AUTOMATED: CPT | Performed by: INTERNAL MEDICINE

## 2020-12-22 PROCEDURE — 80048 BASIC METABOLIC PNL TOTAL CA: CPT | Performed by: INTERNAL MEDICINE

## 2020-12-22 PROCEDURE — 84145 PROCALCITONIN (PCT): CPT | Performed by: INTERNAL MEDICINE

## 2020-12-22 PROCEDURE — 85007 BL SMEAR W/DIFF WBC COUNT: CPT | Performed by: INTERNAL MEDICINE

## 2020-12-22 RX ORDER — LIDOCAINE AND PRILOCAINE 25; 25 MG/G; MG/G
CREAM TOPICAL AS NEEDED
Qty: 30 G | Refills: 0 | Status: SHIPPED | OUTPATIENT
Start: 2020-12-22

## 2020-12-22 RX ORDER — PROCHLORPERAZINE MALEATE 10 MG
10 TABLET ORAL EVERY 6 HOURS PRN
COMMUNITY

## 2020-12-22 RX ORDER — ONDANSETRON 4 MG/1
4 TABLET, FILM COATED ORAL EVERY 8 HOURS PRN
Qty: 20 TABLET | Refills: 0 | Status: SHIPPED | OUTPATIENT
Start: 2020-12-22 | End: 2021-01-04 | Stop reason: SDUPTHER

## 2020-12-22 RX ADMIN — Medication 14 MG: at 08:55

## 2020-12-22 RX ADMIN — FLUTICASONE FUROATE AND VILANTEROL TRIFENATATE 1 PUFF: 200; 25 POWDER RESPIRATORY (INHALATION) at 08:55

## 2020-12-23 LAB
BACTERIA BLD CULT: NORMAL
BACTERIA BLD CULT: NORMAL

## 2020-12-24 ENCOUNTER — TELEPHONE (OUTPATIENT)
Dept: PULMONOLOGY | Facility: CLINIC | Age: 70
End: 2020-12-24

## 2020-12-28 ENCOUNTER — TELEPHONE (OUTPATIENT)
Dept: HEMATOLOGY ONCOLOGY | Facility: CLINIC | Age: 70
End: 2020-12-28

## 2020-12-29 ENCOUNTER — TELEPHONE (OUTPATIENT)
Dept: GASTROENTEROLOGY | Facility: CLINIC | Age: 70
End: 2020-12-29

## 2020-12-29 ENCOUNTER — TELEPHONE (OUTPATIENT)
Dept: PALLIATIVE MEDICINE | Facility: CLINIC | Age: 70
End: 2020-12-29

## 2020-12-29 DIAGNOSIS — R74.8 ELEVATED ALKALINE PHOSPHATASE LEVEL: Primary | ICD-10-CM

## 2020-12-31 ENCOUNTER — TELEPHONE (OUTPATIENT)
Dept: HEMATOLOGY ONCOLOGY | Facility: CLINIC | Age: 70
End: 2020-12-31

## 2020-12-31 ENCOUNTER — LAB (OUTPATIENT)
Dept: LAB | Facility: HOSPITAL | Age: 70
End: 2020-12-31
Attending: INTERNAL MEDICINE
Payer: MEDICARE

## 2020-12-31 DIAGNOSIS — C34.90 SMALL CELL LUNG CANCER (HCC): ICD-10-CM

## 2020-12-31 DIAGNOSIS — R74.8 ELEVATED ALKALINE PHOSPHATASE LEVEL: ICD-10-CM

## 2020-12-31 DIAGNOSIS — T45.1X5A CHEMOTHERAPY INDUCED NEUTROPENIA (HCC): ICD-10-CM

## 2020-12-31 DIAGNOSIS — D70.1 CHEMOTHERAPY INDUCED NEUTROPENIA (HCC): ICD-10-CM

## 2020-12-31 LAB
ALBUMIN SERPL BCP-MCNC: 3 G/DL (ref 3.5–5)
ALP SERPL-CCNC: 167 U/L (ref 46–116)
ALT SERPL W P-5'-P-CCNC: 18 U/L (ref 12–78)
ANION GAP SERPL CALCULATED.3IONS-SCNC: 5 MMOL/L (ref 4–13)
AST SERPL W P-5'-P-CCNC: 22 U/L (ref 5–45)
BASOPHILS # BLD AUTO: 0.15 THOUSANDS/ΜL (ref 0–0.1)
BASOPHILS NFR BLD AUTO: 1 % (ref 0–1)
BILIRUB SERPL-MCNC: 0.23 MG/DL (ref 0.2–1)
BUN SERPL-MCNC: 20 MG/DL (ref 5–25)
CALCIUM ALBUM COR SERPL-MCNC: 10 MG/DL (ref 8.3–10.1)
CALCIUM SERPL-MCNC: 9.2 MG/DL (ref 8.3–10.1)
CHLORIDE SERPL-SCNC: 104 MMOL/L (ref 100–108)
CO2 SERPL-SCNC: 27 MMOL/L (ref 21–32)
CREAT SERPL-MCNC: 0.67 MG/DL (ref 0.6–1.3)
EOSINOPHIL # BLD AUTO: 0.07 THOUSAND/ΜL (ref 0–0.61)
EOSINOPHIL NFR BLD AUTO: 1 % (ref 0–6)
ERYTHROCYTE [DISTWIDTH] IN BLOOD BY AUTOMATED COUNT: 16.8 % (ref 11.6–15.1)
GFR SERPL CREATININE-BSD FRML MDRD: 91 ML/MIN/1.73SQ M
GLUCOSE P FAST SERPL-MCNC: 94 MG/DL (ref 65–99)
HCT VFR BLD AUTO: 40.2 % (ref 34.8–46.1)
HGB BLD-MCNC: 12.4 G/DL (ref 11.5–15.4)
IMM GRANULOCYTES # BLD AUTO: 0.14 THOUSAND/UL (ref 0–0.2)
IMM GRANULOCYTES NFR BLD AUTO: 1 % (ref 0–2)
LYMPHOCYTES # BLD AUTO: 1.04 THOUSANDS/ΜL (ref 0.6–4.47)
LYMPHOCYTES NFR BLD AUTO: 7 % (ref 14–44)
MCH RBC QN AUTO: 30.1 PG (ref 26.8–34.3)
MCHC RBC AUTO-ENTMCNC: 30.8 G/DL (ref 31.4–37.4)
MCV RBC AUTO: 98 FL (ref 82–98)
MONOCYTES # BLD AUTO: 1.01 THOUSAND/ΜL (ref 0.17–1.22)
MONOCYTES NFR BLD AUTO: 7 % (ref 4–12)
NEUTROPHILS # BLD AUTO: 11.58 THOUSANDS/ΜL (ref 1.85–7.62)
NEUTS SEG NFR BLD AUTO: 83 % (ref 43–75)
NRBC BLD AUTO-RTO: 0 /100 WBCS
PLATELET # BLD AUTO: 516 THOUSANDS/UL (ref 149–390)
PMV BLD AUTO: 9.5 FL (ref 8.9–12.7)
POTASSIUM SERPL-SCNC: 4.2 MMOL/L (ref 3.5–5.3)
PROT SERPL-MCNC: 6.7 G/DL (ref 6.4–8.2)
RBC # BLD AUTO: 4.12 MILLION/UL (ref 3.81–5.12)
SODIUM SERPL-SCNC: 136 MMOL/L (ref 136–145)
TSH SERPL DL<=0.05 MIU/L-ACNC: 3.24 UIU/ML (ref 0.36–3.74)
WBC # BLD AUTO: 13.99 THOUSAND/UL (ref 4.31–10.16)

## 2020-12-31 PROCEDURE — 85025 COMPLETE CBC W/AUTO DIFF WBC: CPT

## 2020-12-31 PROCEDURE — 84080 ASSAY ALKALINE PHOSPHATASES: CPT

## 2020-12-31 PROCEDURE — 80053 COMPREHEN METABOLIC PANEL: CPT

## 2020-12-31 PROCEDURE — 36415 COLL VENOUS BLD VENIPUNCTURE: CPT

## 2020-12-31 PROCEDURE — 84075 ASSAY ALKALINE PHOSPHATASE: CPT

## 2020-12-31 PROCEDURE — 84443 ASSAY THYROID STIM HORMONE: CPT

## 2020-12-31 RX ORDER — SODIUM CHLORIDE 9 MG/ML
20 INJECTION, SOLUTION INTRAVENOUS ONCE
Status: CANCELLED | OUTPATIENT
Start: 2021-01-05

## 2020-12-31 RX ORDER — SODIUM CHLORIDE 9 MG/ML
20 INJECTION, SOLUTION INTRAVENOUS ONCE
Status: CANCELLED | OUTPATIENT
Start: 2021-01-06

## 2020-12-31 RX ORDER — SODIUM CHLORIDE 9 MG/ML
20 INJECTION, SOLUTION INTRAVENOUS ONCE
Status: CANCELLED | OUTPATIENT
Start: 2021-01-04

## 2021-01-02 NOTE — PROGRESS NOTES
Hematology/Oncology Outpatient Follow- up Note  Karin Kwon, 1950, [id number]  1/4/2021        Chief Complaint   Patient presents with    Follow-up       HPI:  Karin Kwon is a 71-year-old female with newly diagnosed stage IV small cell lung cancer  The patient had a pleural effusion which was drained which revealed poorly differentiated malignant neoplasm with neuroendocrine differentiation   She had a PET-CT scan done which showed FDG avid left lower lobe nodule suspicious for malignancy along with focal FDG uptake in the left perihilar region for which glenn metastases could not be excluded   There was extensive heterogeneous FDG uptake along the left pleural margin compatible with pleural metastases  Buzzy Mad was no other hypermetabolic metastases in the neck abdomen or pelvis   There was decreased left pleural fluid posteriorly secondary to pleural catheter placement but loculated fluid was increasing anteriorly  She has a PleurX catheter in place    Patient was seen for initial consultation by Dr Tierra Ma on 11/20/2020    She has been initiated on treatment with carboplatin, etoposide and Imfinzi       Previous Hematologic/ Oncologic History:    Oncology History   [facility] )   11/20/2020 Initial Diagnosis    Small cell lung cancer ([facility] )     11/23/2020 -  Chemotherapy    pegfilgrastim (NEULASTA ONPRO) subcutaneous injection kit 6 mg, 6 mg, Subcutaneous, Once, 2 of 6 cycles  Administration: 6 mg (11/25/2020), 6 mg (12/16/2020)  fosaprepitant (EMEND) 150 mg in sodium chloride 0 9 % 250 mL IVPB, 150 mg, Intravenous, Once, 2 of 6 cycles  Administration: 150 mg (11/23/2020), 150 mg (12/14/2020)  CARBOplatin (PARAPLATIN) 402 mg in sodium chloride 0 9 % 250 mL IVPB, 402 mg, Intravenous, Once, 2 of 6 cycles  Administration: 402 mg (11/23/2020), 371 5 mg (12/14/2020)  Durvalumab 1,500 mg in sodium chloride 0 9 % 100 mL IVPB, 1,500 mg (original dose 10 mg/kg), Intravenous, Once, 2 of 6 cycles  Dose modification: 1,500 mg (original dose 10 mg/kg, Cycle 1, Reason: Other (See Comments))  Administration: 1,500 mg (2020), 1,500 mg (2020)  etoposide (TOPOSAR) 152 mg in sodium chloride 0 9 % 500 mL chemo infusion, 100 mg/m2 = 152 mg, Intravenous, Once, 2 of 6 cycles  Administration: 152 mg (2020), 152 mg (2020), 152 mg (2020), 152 mg (2020), 152 mg (12/15/2020), 152 mg (2020)         Current Hematologic/ Oncologic Treatment:    Carboplatin/etoposide plus Imfinzi every 21 days      ECO - Symptomatic but completely ambulatory    Interval History:   The patient presents for routine follow up  She has completed 2 cycles of chemo  Was hospitalized (-) with shortness of breath  Imaging r/o PE, demonstrated known 2 2 cm nodule in the superior segment left lower lobe without change consistent with malignancy  She had her PleurX catheter removed while inpatient  Due for treatment today  Most recent blood work completed on  was reviewed and is in acceptable treatment range  As far symptoms are concerned, patient is endorsing multiple symptoms including fatigue, loss of appetite, nausea, cancer related pain in the left thoracic region that radiates around to her upper back  Patient states pain is constant and has not been very well managed with just Tylenol  She was prescribed tramadol while in the hospital and has taken this without much relief of her symptoms  Patient has an appointment with palliative care next week but is concerned about the amount of pain she has been experiencing  Per review of her medical record oxycodone and codeine are listed as allergies but I do not believe these are true allergies as patient states they just caused feelings of lethargy  Her pain is all in the region of her lung cancer and at site of previous PleurX catheter  She may have residual nerve pain    I explained the goal chemotherapy is to reduce tumor burden and hopefully alleviate some of her pain symptoms in the meantime she may need something stronger to help manage her pain  I explained all opioids do have side effects of fatigue, lethargy and even constipation  I do believe she needs something stronger for pain than just Tylenol  Again, she will be seen palliative care next week  In the meantime, I did discuss benefits of starting her on a low-dose opioid to help manage her pain and patient is agreeable  I will also start her on low-dose steroids to help with up spectrum of palliative symptom management including decreased appetite, nausea, pain, fatigue  Today, she denies any chest pain, shortness of breath  Her weight remains stable  Cancer Staging:  Cancer Staging  No matching staging information was found for the patient  Molecular Testing:         Test Results:    Imaging: Xr Chest Portable    Result Date: 12/20/2020  Narrative: CHEST INDICATION:   pleural effusion  COMPARISON:  Chest radiograph from 12/15/2020 and chest CT from 12/18/2020  EXAM PERFORMED/VIEWS:  XR CHEST PORTABLE FINDINGS:  Right port at cavoatrial junction  Cardiomediastinal silhouette appears unremarkable  Left pleural drainage catheter with persistent moderate loculated left effusion and left left base atelectasis  No pneumothorax  Osseous structures appear within normal limits for patient age  Impression: Persistent moderate loculated left effusion and left base atelectasis  Workstation performed: [id number]     Xr Chest Pa & Lateral    Result Date: 12/15/2020  Narrative: CHEST INDICATION:   J90: Pleural effusion, not elsewhere classified  COMPARISON:  Multiple priors, most recently 12/5/2020 EXAM PERFORMED/VIEWS:  XR CHEST PA & LATERAL  The frontal view was performed utilizing dual energy radiographic technique  Images: 4 FINDINGS:  Right chest wall MediPort tip overlying the superior cavoatrial junction  Left pleural drainage catheter in place   Cardiac silhouette is obscured  Left lower lobe pulmonary mass partially scared  Mildly decreased consolidation in the left mid lung  Small left pleural effusion is similar in extent compared to radiographs of 12/5/2020  Emphysematous changes throughout the lungs  No pneumothorax  Osseous structures appear within normal limits for patient age  Impression: Small left pleural effusion with pleural drainage catheter is similar in size to the prior examination  Left lower lobe mass partially visualized and somewhat obscured by adjacent opacities  Workstation performed: [id number]     Xr Chest Pa & Lateral    Result Date: 12/7/2020  Narrative: CHEST INDICATION:   J90: Pleural effusion, not elsewhere classified  Small cell lung cancer  COMPARISON:  Chest radiograph from 11/10/2020 and chest CT from 11/24/2020  EXAM PERFORMED/VIEWS:  XR CHEST PA & LATERAL  DUAL ENERGY SUBTRACTION FINDINGS: Cardiomediastinal silhouette appears unremarkable  Redemonstration of left lower lobe mass  Left pleural drainage catheter with persistent small loculated left pleural effusion and left base atelectasis  No pneumothorax  Emphysema  Osseous structures appear within normal limits for patient age  Impression: Persistent left pleural drainage catheter with no change in small loculated left pleural effusion and left base atelectasis  Left lower lobe tumor corresponding with the CT  Emphysema  Workstation performed: [id number]     Ct Head Wo Contrast    Result Date: 12/21/2020  Narrative: CT BRAIN - WITHOUT CONTRAST INDICATION:   Headache, acute, normal neuro exam Headache  COMPARISON:  None  TECHNIQUE:  CT examination of the brain was performed  In addition to axial images, sagittal and coronal 2D reformatted images were created and submitted for interpretation  Radiation dose length product (DLP) for this visit:  729 mGy-cm     This examination, like all CT scans performed in the [facility], was performed utilizing techniques to minimize radiation dose exposure, including the use of iterative reconstruction and automated exposure control  IMAGE QUALITY:  Diagnostic  FINDINGS: PARENCHYMA:  No intracranial mass, mass effect or midline shift  No CT signs of acute infarction  No acute parenchymal hemorrhage  VENTRICLES AND EXTRA-AXIAL SPACES:  Normal for the patient's age  VISUALIZED ORBITS AND PARANASAL SINUSES:  Unremarkable  CALVARIUM AND EXTRACRANIAL SOFT TISSUES:  Normal      Impression: No acute intracranial abnormality  Workstation performed: [id number]     Pe Study With Ct Abdomen And Pelvis With Contrast    Result Date: 12/18/2020  Narrative: CT PULMONARY ANGIOGRAM OF THE CHEST AND CT ABDOMEN AND PELVIS WITH INTRAVENOUS CONTRAST INDICATION:   Shortness of breath; abdominal pain; history lung cancer  Elevated d-dimer COMPARISON:  CTA chest 11/24/2020; PET/CT 11/18/2020 TECHNIQUE:  CT examination of the chest, abdomen and pelvis was performed  Thin section CT angiographic technique was used in the chest in order to evaluate for pulmonary embolus and coronal 3D MIP postprocessing was performed on the acquisition scanner  Axial, sagittal, and coronal 2D reformatted images were created from the source data and submitted for interpretation  Radiation dose length product (DLP) for this visit:  596 mGy-cm   This examination, like all CT scans performed in the [facility], was performed utilizing techniques to minimize radiation dose exposure, including the use of iterative reconstruction and automated exposure control  IV Contrast:  100 mL of iohexol (OMNIPAQUE) Enteric Contrast:  Enteric contrast was not administered  FINDINGS: CHEST PULMONARY ARTERIAL TREE:  There is adequate opacification of the pulmonary arterial tree with no filling defects identified to suggest acute pulmonary emboli  Pulmonary arteries are of normal caliber   LUNGS:  Extensive diffuse centrilobular emphysematous change is again noted bilaterally  Some dependent atelectatic changes are present in the lung bases  Again identified is a nodule located peripherally in the superior segment of the left lower lobe measuring 2 2 cm in greatest dimension, similar the prior study  PLEURA:  Again identified is a pleural drainage catheter in the posterior medial left hemithorax directed superiorly  There remains a small loculated left pleural effusion with greatest volume inferiorly similar the prior exam with some adjacent atelectatic lung  There is some nodular pleural thickening present diffusely, including within the major fissure concerning for pleural metastatic disease  No pneumothorax identified  HEART/AORTA:  The heart is normal in size  Great vessels are normal in course and caliber  MEDIASTINUM AND MINA:  No discrete mediastinal adenopathy  Loculated fluid about the aortic arch and descending thoracic aorta again noted  CHEST WALL AND LOWER NECK:   Unremarkable  ABDOMEN LIVER/BILIARY TREE:  Unremarkable  GALLBLADDER:  No calcified gallstones  No pericholecystic inflammatory change  SPLEEN:  Unremarkable  PANCREAS:  Unremarkable  ADRENAL GLANDS:  Unremarkable  KIDNEYS/URETERS:  Unremarkable  No hydronephrosis  STOMACH AND BOWEL:  Moderate retained fecal material consistent with constipation  Scattered colonic diverticula  APPENDIX:  A normal appendix was visualized  ABDOMINOPELVIC CAVITY:  Trace free fluid in the pelvis  VESSELS:  Unremarkable for patient's age  PELVIS REPRODUCTIVE ORGANS:  Unremarkable for patient's age  URINARY BLADDER:  Unremarkable  ABDOMINAL WALL/INGUINAL REGIONS:  Unremarkable  OSSEOUS STRUCTURES:  No acute fracture or destructive osseous lesion  Impression: 1  No evidence for acute pulmonary embolism  2   2 2 cm nodule in the superior segment left lower lobe again noted without change consistent with malignancy   3   All drainage catheter with small loculated left pleural effusion and pleural nodularity without change from the prior study consistent with pleural metastatic disease  4   No acute abnormality identified in the abdomen or pelvis  5   Additional findings as noted  Workstation performed: [id number]     Ir Port Placement    Result Date: 12/11/2020  Narrative: INDICATION: Lung cancer  PROCEDURE: 1  Right internal jugular approach single lumen port-a-cath placement FINDINGS: 1  Single lumen port placed via right internal jugular vein with tip in the right atrium  Impression: Successful placement of a single lumen port  The catheter is ready for use  _______________________________________________________________ COMPARISON: CTA chest 11/24/2020 PROCEDURE DETAILS: Operators: Dr Geo Berry, [address] [place] [address] attending, performed the procedure  Anesthesia: Conscious sedation was provided throughout a total intra-service time of 48 minutes during which the patient's hemodynamic parameters were continuously monitored by an independent trained radiology nurse  1% lidocaine with epinephrine was injected in the skin and subcutaneous tissues overlying the access site  Medications: 1% lidocaine, fentanyl, Versed Contrast: 0 mL of Omnipaque 300 Fluoroscopy time: 0 8 minutes Images: 5 COMMENTS: Following the discussion of the risks, benefits and alternatives to the procedure, written informed consent was obtained from the patient  The patient was placed supine on the imaging table  The site was prepped and draped in the usual sterile fashion  All elements of maximal sterile barrier technique were followed (cap, mask, sterile gown, sterile gloves, large sterile full-body drape, hand hygiene, and 2% chlorhexidine for cutaneous antisepsis)  Sterile ultrasound technique with sterile gel and sterile probe cover was also utilized  A preprocedure timeout was performed per [facility] protocol  Under continuous ultrasound guidance, the patent right internal jugular vein was compressible and accessed using a micropuncture needle    A static ultrasound image was saved to PACS  Over a microwire, the needle was exchanged for a micropuncture sheath followed by exchange for a J-wire advanced into the inferior vena cava  Next, attention was turned towards creation of a subcutaneous pocket over the upper anterior chest wall  After instilling superficial and deeper local anesthesia using lidocaine mixed with epinephrine, a 2 5 cm transverse incision was made and a subcutaneous pocket was created by using blunt dissection  The catheter of the port was then tunneled from the subcutaneous pocket towards the venotomy site  Following micropuncture sheath exchange for a peel-away sheath, the catheter was threaded into the right atrium using fluoroscopic guidance  The peel-away sheath was then removed  Catheter length was confirmed with fluoroscopic imaging  The catheter was cut and connected to the port hub  The hub was then placed within the subcutaneous pocket  The port was accessed using a noncoring Jacobs needle, aspirated and flushed  The subcutaneous pocket was closed in layers with 3-0 interrupted Vicryl sutures and subcuticular continuous sutures using a Stratafix absorbable suture  3-0 Vicryl suture of the venotomy was performed  Exofin glue was applied over the venotomy and chest incisions  Sterile dressings were then applied  Final spot fluoroscopic image demonstrated good alignment of the catheter, no kinking and with its tip in the right atrium  Workstation performed: [id number]     Ir Pleural Effusion Long-term Catheter Removal    Result Date: 12/28/2020  Narrative: Procedure: Removal of tunneled aseptic catheter Indication not draining, removal requested  Fluoroscopy time: None minutes Images:  none Contrast: none  Sedation: Sedation: Moderate conscious sedation was utilized under my direct supervision administered by trained independent provider  A total of 40 minutes sedation time was utilized   I was present at the initial dose of sedation medication  Findings: Indwelling catheter was identified for removal  The site was prepped and draped in the usual sterile fashion  All elements of maximal sterile barrier technique were followed (cap, mask, sterile gown, sterile gloves, large sterile sheet, hand hygiene, and 2% chlorhexidine for cutaneous antisepsis)  1% local lidocaine was administered  Catheter was dissected and removed in sterile fashion  Sterile dressing was placed  Impression: Impression: Removal of left tunneled aseptic catheter in sterile fashion  Workstation performed: [id number]       Labs:   Lab Results   Component Value Date    WBC 13 99 (H) 12/31/2020    HGB 12 4 12/31/2020    HCT 40 2 12/31/2020    MCV 98 12/31/2020     (H) 12/31/2020     Lab Results   Component Value Date    K 4 2 12/31/2020     12/31/2020    CO2 27 12/31/2020    BUN 20 12/31/2020    CREATININE 0 67 12/31/2020    GLUF 94 12/31/2020    CALCIUM 9 2 12/31/2020    CORRECTEDCA 10 0 12/31/2020    AST 22 12/31/2020    ALT 18 12/31/2020    ALKPHOS 167 (H) 12/31/2020    EGFR 91 12/31/2020           Review of Systems   Constitutional: Positive for appetite change and fatigue  Respiratory: Positive for chest tightness (left thoracic region)  Gastrointestinal: Positive for nausea  Musculoskeletal: Positive for back pain  Psychiatric/Behavioral: Positive for sleep disturbance           Active Problems:   Patient Active Problem List   Diagnosis    Chronic fatigue syndrome with fibromyalgia    Diarrhea    Weight loss    Abnormal LFTs    History of colon polyps    Right lower quadrant abdominal pain    Shortness of breath    Pleural effusion    Abnormal CT of the chest    Tobacco abuse    Elevated troponin    Elevated blood pressure reading    Acute on chronic respiratory failure with hypoxia (HCC)    Pulmonary emphysema (HCC)    Small cell lung cancer ([facility]    Chemotherapy induced neutropenia (HCC)    Chest pain    Lower extremity edema    Leukocytosis    Elevated d-dimer    Oral thrush    Severe protein-calorie malnutrition (HCC)    Lung cancer (HCC)    Cancer associated pain       Past Medical History:   Past Medical History:   Diagnosis Date    Chronic fatigue syndrome with fibromyalgia     COPD (chronic obstructive pulmonary disease) (HCC)     DDD (degenerative disc disease), cervical     Emphy[facility] lung [facility] )     Hx of migraine headaches     Hypothyroidism     [facility] ([facility] )        Surgical History:   Past Surgical History:   Procedure Laterality Date    BARTHOLIN GLAND CYST EXCISION      CARPAL TUNNEL RELEASE Bilateral     COLONOSCOPY  07/09/2020     15 mm sessile polyp in the cecum removed by polypectomy  A 3 recall    IR PLEURAL EFFUSION LONG-TERM CATHETER PLACEMENT  11/13/2020    IR PLEURAL EFFUSION LONG-TERM CATHETER REMOVAL  12/21/2020    IR PORT PLACEMENT  12/11/2020    IR THORACENTESIS  11/3/2020    TONSILLECTOMY      TUBAL LIGATION      UPPER GASTROINTESTINAL ENDOSCOPY  07/09/2020     small hiatal hernia  Pathology negative  Family History:    Family History   Problem Relation Age of Onset    Colon polyps Mother     Heart disease Mother     Heart disease Father     Colon cancer Neg Hx        Cancer-related family history is negative for Colon cancer      Social History:   Social History     Socioeconomic History    Marital status:      Spouse name: Not on file    Number of children: Not on file    Years of education: Not on file    Highest education level: Not on file   Occupational History    Not on file   Social Needs    Financial resource strain: Not on file    Food insecurity     Worry: Not on file     Inability: Not on file   [employer] needs     Medical: Not on file     Non-medical: Not on file   Tobacco Use    Smoking status: Current Every Day Smoker     Packs/day: 0 50     Years: 50 00     Pack years: 25 00     Types: Cigarettes     Start date: 5  Smokeless tobacco: Never Used    Tobacco comment: 2 cigarettes daily   Substance and Sexual Activity    Alcohol use: Not Currently    Drug use: Not Currently    Sexual activity: Not Currently   Lifestyle    Physical activity     Days per week: Not on file     Minutes per session: Not on file    Stress: Not on file   Relationships    Social connections     Talks on phone: Not on file     Gets together: Not on file     Attends Muslim service: Not on file     Active member of club or organization: Not on file     Attends meetings of clubs or organizations: Not on file     Relationship status: Not on file    Intimate partner violence     Fear of current or ex partner: Not on file     Emotionally abused: Not on file     Physically abused: Not on file     Forced sexual activity: Not on file   Other Topics Concern    Not on file   Social History Narrative    Not on file       Current Medications:   Current Outpatient Medications   Medication Sig Dispense Refill    acetaminophen (TYLENOL) 500 mg tablet Take 2 tablets (1,000 mg total) by mouth every 8 (eight) hours      B Complex-Biotin-FA (TH VITAMIN B 50/B-COMPLEX) TABS Take by mouth daily      Capsaicin 0 025 % GEL Apply topically 3 (three) times a day as needed       cholecalciferol (VITAMIN D3) 1,000 units tablet Take 7,000 Units by mouth daily      COLCHICINE PO Take 0 5 mg by mouth 3 (three) times a day as needed       cyclobenzaprine (FLEXERIL) 10 mg tablet Take 10 mg by mouth 3 (three) times a day as needed for muscle spasms      Echinacea 400 MG CAPS Take by mouth 3 (three) times a day as needed      fexofenadine-pseudoephedrine (ALLEGRA-D)  MG per tablet Take 1 tablet by mouth 2 (two) times a day as needed for allergies      GINKGO BILOBA PLUS PO Take by mouth as needed 2 capsules every 2 mornings       Hyaluronic Acid 20-60 MG CAPS Take by mouth 3 (three) times a day       L-Lysine 500 MG CAPS Take by mouth 3 (three) times a day as needed      lidocaine-prilocaine (EMLA) cream Apply topically as needed for mild pain 30-60 min prior to port access  30 g 0    loperamide (IMODIUM) 2 mg capsule Take 2 mg by mouth daily       ondansetron (ZOFRAN) 4 mg tablet Take 1 tablet (4 mg total) by mouth every 8 (eight) hours as needed for nausea or vomiting 30 tablet 0    prochlorperazine (COMPAZINE) 10 mg tablet Take 10 mg by mouth every 6 (six) hours as needed for nausea or vomiting (migraines)      S-Adenosylmethionine (RADHA-E) 200 MG TABS Take by mouth 3 (three) times a day      Tragacanth (ASTRAGALUS ROOT) POWD 470 mg 3 (three) times a day as needed      dexamethasone (DECADRON) 2 mg tablet Take 1 tablet (2 mg total) by mouth 2 (two) times a day with meals 60 tablet 0    morphine (MSIR) 15 mg tablet Take 0 5 tablets (7 5 mg total) by mouth every 4 (four) hours as needed for moderate painMax Daily Amount: 45 mg 30 tablet 0    omeprazole (PriLOSEC) 20 mg delayed release capsule Take 1 capsule (20 mg total) by mouth daily 30 capsule 0    umeclidinium-vilanterol (ANORO ELLIPTA) 62 5-25 MCG/INH inhaler Inhale 1 puff daily 1 Inhaler 5     No current facility-administered medications for this visit  Allergies: Allergies   Allergen Reactions    Clarithromycin     Codeine Other (See Comments)     lethergic    Other      PHOSPHATE    Oxycodone Other (See Comments)     lethergic    Trazodone Other (See Comments)     lethergic    Penicillins Rash       Physical Exam:  /78 (BP Location: Left arm)   Pulse (!) 109   Temp 97 5 °F (36 4 °C) (Temporal)   Resp 16   Ht 5' 4 61" (1 641 m)   Wt 44 kg (97 lb)   SpO2 95%   BMI 16 34 kg/m²   Body surface area is 1 45 meters squared  Wt Readings from Last 3 Encounters:   01/04/21 44 2 kg (97 lb 7 1 oz)   01/04/21 44 kg (97 lb)   12/22/20 43 2 kg (95 lb 3 2 oz)           Physical Exam  Constitutional:       Comments: Alert, pleasant, thin appearing female   NAD   HENT:      Head: Normocephalic and atraumatic  Eyes:      General: No scleral icterus  Right eye: No discharge  Left eye: No discharge  Neck:      Musculoskeletal: Normal range of motion  Cardiovascular:      Rate and Rhythm: Normal rate and regular rhythm  Pulmonary:      Effort: Pulmonary effort is normal  No respiratory distress  Breath sounds: Normal breath sounds  No wheezing  Chest:      Chest wall: Tenderness (left thoracic region) present  Abdominal:      General: Abdomen is flat  Bowel sounds are normal       Palpations: Abdomen is soft  Musculoskeletal: Normal range of motion  General: Tenderness (left upper back) present  Right lower leg: No edema  Left lower leg: No edema  Lymphadenopathy:      Cervical: No cervical adenopathy  Skin:     General: Skin is warm and dry  Neurological:      General: No focal deficit present  Mental Status: She is alert and oriented to person, place, and time  Psychiatric:         Mood and Affect: Mood normal          Behavior: Behavior normal          Assessment / Plan:    1  Small cell lung cancer [facility])   The patient is a very pleasant 82-year-old female with 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  Based on her PET-CT scan this seems to be a lung primary as a nodule is seen on the left side  She has been initiated on treatment with carboplatin along with etoposide in combination with immunotherapy with Imfinzi every 3 weeks along with growth factor support       Patient has completed 2 cycles of treatment  She was recently hospitalized with shortness of breath  CT of C/A/P was done while inpatient, negative for PE and demonstrates stable disease  She has had her PleurX catheter removed    Cycle 3 is scheduled to be given today  Patient's most recent blood work is within acceptable treatment range  Patient will proceed with treatment as planned    I will reorder imaging after she has completed 4 cycles  If she has localized disease and her pleural effusion resolves will consider referring to Radiation Oncology for evaluation  Once she completes 6 cycles of chemotherapy plus or minus radiation she will go on immunotherapy maintenance     She will return for a follow-up visit in 3 weeks     2  Cancer associated pain    3  Nausea without vomiting, decreased appetite, fatigue  She is experiencing multiple symptoms as a result of her cancer diagnosis in addition to treatment related side effects including pain, nausea, decreased appetite, and fatigue  She is scheduled to be seen by palliative care next week  In the meantime, I will address some of her symptoms to hopefully allow her to have better management of her pain which will enable her to continue with her disease directed therapies  I spent time educating patient on use of opioids to manage cancer related pain  She does have side effects of lethargy associated with use of oxycodone in the past   I did explain that lethargy is a side effect of opioids but as she becomes more tolerant this should hopefully be more manageable  I will start her on low-dose morphine  Pain regimen will include:  · Tylenol 1000 mg every 8 hours  · Discontinue Tramadol  · MSIR 7 5 mg every 4 hours as needed  MSIR only comes as 15 mg tablets, Patient was instructed to cut tablet in half  She will follow up with palliative care next week to evaluate whether her pain regimen needs to be adjusted  Patient was instructed that palliative care would take over her pain management  PDMP queried and shows no concerns  · Dexamethasone 2 mg b i d  with food  Low-dose steroid will help management spectrum of palliative symptom control to include pain, nausea, decreased appetite and fatigue  Patient advised to avoid NSAIDs while on steroids  I have also prescribed omeprazole to help prevent any gastritis      In regards to nausea, patient is receiving Emend, Zofran and dexamethasone as a premed  She has both Zofran and Compazine ordered as needed  She was instructed how to use these medications interchangeably to help ED with symptoms of nausea  Patient verbalized understanding on how to take medications as prescribed today  Prescriptions were E prescribed to her local pharmacy  She was instructed to call with any questions or concerns at any time     Goals and Barriers:  Current Goal:  Prolong Survival from Small cell lung cancer   Barriers: None  Patient's Capacity to Self Care:  Patient able to self care  Portions of the record may have been created with voice recognition software  Occasional wrong word or "sound a like" substitutions may have occurred due to the inherent limitations of voice recognition software  Read the chart carefully and recognize, using context, where substitutions have occurred

## 2021-01-04 ENCOUNTER — OFFICE VISIT (OUTPATIENT)
Dept: HEMATOLOGY ONCOLOGY | Facility: HOSPITAL | Age: 71
End: 2021-01-04
Payer: MEDICARE

## 2021-01-04 ENCOUNTER — HOSPITAL ENCOUNTER (OUTPATIENT)
Dept: INFUSION CENTER | Facility: HOSPITAL | Age: 71
Discharge: HOME | End: 2021-01-04
Attending: INTERNAL MEDICINE
Payer: MEDICARE

## 2021-01-04 ENCOUNTER — TELEPHONE (OUTPATIENT)
Dept: HEMATOLOGY ONCOLOGY | Facility: CLINIC | Age: 71
End: 2021-01-04

## 2021-01-04 VITALS
RESPIRATION RATE: 16 BRPM | TEMPERATURE: 97.1 F | HEART RATE: 108 BPM | BODY MASS INDEX: 16.24 KG/M2 | SYSTOLIC BLOOD PRESSURE: 141 MMHG | DIASTOLIC BLOOD PRESSURE: 69 MMHG | OXYGEN SATURATION: 92 % | WEIGHT: 97.44 LBS | HEIGHT: 65 IN

## 2021-01-04 VITALS
BODY MASS INDEX: 16.16 KG/M2 | TEMPERATURE: 97.5 F | HEIGHT: 65 IN | OXYGEN SATURATION: 95 % | HEART RATE: 109 BPM | RESPIRATION RATE: 16 BRPM | WEIGHT: 97 LBS | SYSTOLIC BLOOD PRESSURE: 142 MMHG | DIASTOLIC BLOOD PRESSURE: 78 MMHG

## 2021-01-04 DIAGNOSIS — D70.1 CHEMOTHERAPY INDUCED NEUTROPENIA (HCC): ICD-10-CM

## 2021-01-04 DIAGNOSIS — R11.2 NON-INTRACTABLE VOMITING WITH NAUSEA, UNSPECIFIED VOMITING TYPE: ICD-10-CM

## 2021-01-04 DIAGNOSIS — G89.3 CANCER ASSOCIATED PAIN: ICD-10-CM

## 2021-01-04 DIAGNOSIS — C34.90 SMALL CELL LUNG CANCER (HCC): Primary | ICD-10-CM

## 2021-01-04 DIAGNOSIS — T45.1X5A CHEMOTHERAPY INDUCED NEUTROPENIA (HCC): ICD-10-CM

## 2021-01-04 LAB — T3FREE SERPL-MCNC: 2.87 PG/ML (ref 2.3–4.2)

## 2021-01-04 PROCEDURE — 84481 FREE ASSAY (FT-3): CPT

## 2021-01-04 PROCEDURE — 96417 CHEMO IV INFUS EACH ADDL SEQ: CPT

## 2021-01-04 PROCEDURE — 96413 CHEMO IV INFUSION 1 HR: CPT

## 2021-01-04 PROCEDURE — 36415 COLL VENOUS BLD VENIPUNCTURE: CPT

## 2021-01-04 PROCEDURE — 99215 OFFICE O/P EST HI 40 MIN: CPT | Performed by: NURSE PRACTITIONER

## 2021-01-04 PROCEDURE — 96367 TX/PROPH/DG ADDL SEQ IV INF: CPT

## 2021-01-04 RX ORDER — SODIUM CHLORIDE 9 MG/ML
20 INJECTION, SOLUTION INTRAVENOUS ONCE
Status: COMPLETED | OUTPATIENT
Start: 2021-01-04 | End: 2021-01-04

## 2021-01-04 RX ORDER — DEXAMETHASONE 2 MG/1
2 TABLET ORAL 2 TIMES DAILY WITH MEALS
Qty: 60 TABLET | Refills: 0 | Status: SHIPPED | OUTPATIENT
Start: 2021-01-04 | End: 2021-01-12

## 2021-01-04 RX ORDER — OMEPRAZOLE 20 MG/1
20 CAPSULE, DELAYED RELEASE ORAL DAILY
Qty: 30 CAPSULE | Refills: 0 | Status: SHIPPED | OUTPATIENT
Start: 2021-01-04 | End: 2021-01-26 | Stop reason: SDUPTHER

## 2021-01-04 RX ORDER — MORPHINE SULFATE 15 MG/1
7.5 TABLET ORAL EVERY 4 HOURS PRN
Qty: 30 TABLET | Refills: 0 | Status: SHIPPED | OUTPATIENT
Start: 2021-01-04 | End: 2021-01-12 | Stop reason: SDUPTHER

## 2021-01-04 RX ORDER — ONDANSETRON 4 MG/1
4 TABLET, FILM COATED ORAL EVERY 8 HOURS PRN
Qty: 30 TABLET | Refills: 0 | Status: SHIPPED | OUTPATIENT
Start: 2021-01-04 | End: 2021-01-26 | Stop reason: SDUPTHER

## 2021-01-04 RX ORDER — ACETAMINOPHEN 500 MG
1000 TABLET ORAL EVERY 8 HOURS
Start: 2021-01-04

## 2021-01-04 RX ADMIN — ETOPOSIDE 144 MG: 20 INJECTION INTRAVENOUS at 12:56

## 2021-01-04 RX ADMIN — CARBOPLATIN 387.5 MG: 10 INJECTION, SOLUTION INTRAVENOUS at 14:07

## 2021-01-04 RX ADMIN — SODIUM CHLORIDE 150 MG: 0.9 INJECTION, SOLUTION INTRAVENOUS at 11:08

## 2021-01-04 RX ADMIN — DEXAMETHASONE SODIUM PHOSPHATE: 10 INJECTION, SOLUTION INTRAMUSCULAR; INTRAVENOUS at 10:28

## 2021-01-04 RX ADMIN — SODIUM CHLORIDE 20 ML/HR: 0.9 INJECTION, SOLUTION INTRAVENOUS at 10:30

## 2021-01-04 RX ADMIN — DURVALUMAB 1500 MG: 500 INJECTION, SOLUTION INTRAVENOUS at 11:53

## 2021-01-04 NOTE — PROGRESS NOTES
Pt here getting chemo; pt requested having Dr Fernando Fitch office prescribe something to help her sleep; spoke with Sekou Martinez RN/ Dr Ramakrishna Siegel who recommended melatonin stating they don't want her to take anything stronger since they prescribed morphine today for pain; relayed info to pt who verb understanding; will cont to monitor

## 2021-01-04 NOTE — PROGRESS NOTES
Pt tolerated all chemo today with no adverse reactions  New prescriptions were called into pt's pharmacy and pt is in understanding of how to take the medication  Next appt scheduled  Pt ambulated off unit with steady gait for d/c to home

## 2021-01-05 ENCOUNTER — HOSPITAL ENCOUNTER (OUTPATIENT)
Dept: INFUSION CENTER | Facility: HOSPITAL | Age: 71
Discharge: HOME | End: 2021-01-05
Attending: INTERNAL MEDICINE
Payer: MEDICARE

## 2021-01-05 VITALS
OXYGEN SATURATION: 97 % | BODY MASS INDEX: 16.75 KG/M2 | RESPIRATION RATE: 18 BRPM | SYSTOLIC BLOOD PRESSURE: 152 MMHG | WEIGHT: 100.53 LBS | HEIGHT: 65 IN | TEMPERATURE: 98 F | DIASTOLIC BLOOD PRESSURE: 72 MMHG | HEART RATE: 111 BPM

## 2021-01-05 DIAGNOSIS — C34.90 SMALL CELL LUNG CANCER (HCC): Primary | ICD-10-CM

## 2021-01-05 DIAGNOSIS — D70.1 CHEMOTHERAPY INDUCED NEUTROPENIA (HCC): ICD-10-CM

## 2021-01-05 DIAGNOSIS — T45.1X5A CHEMOTHERAPY INDUCED NEUTROPENIA (HCC): ICD-10-CM

## 2021-01-05 PROCEDURE — 96413 CHEMO IV INFUSION 1 HR: CPT

## 2021-01-05 PROCEDURE — 96367 TX/PROPH/DG ADDL SEQ IV INF: CPT

## 2021-01-05 RX ORDER — SODIUM CHLORIDE 9 MG/ML
20 INJECTION, SOLUTION INTRAVENOUS ONCE
Status: COMPLETED | OUTPATIENT
Start: 2021-01-05 | End: 2021-01-05

## 2021-01-05 RX ADMIN — ETOPOSIDE 144 MG: 20 INJECTION INTRAVENOUS at 12:37

## 2021-01-05 RX ADMIN — SODIUM CHLORIDE 20 ML/HR: 0.9 INJECTION, SOLUTION INTRAVENOUS at 12:07

## 2021-01-05 RX ADMIN — DEXAMETHASONE SODIUM PHOSPHATE: 10 INJECTION, SOLUTION INTRAMUSCULAR; INTRAVENOUS at 12:07

## 2021-01-05 NOTE — PROGRESS NOTES
Pt tolerated treatment with no adv reactions; refused AVS; pt left unit ambulatory with steady gait

## 2021-01-06 ENCOUNTER — HOSPITAL ENCOUNTER (OUTPATIENT)
Dept: INFUSION CENTER | Facility: HOSPITAL | Age: 71
Discharge: HOME | End: 2021-01-06
Attending: INTERNAL MEDICINE
Payer: MEDICARE

## 2021-01-06 VITALS
RESPIRATION RATE: 18 BRPM | OXYGEN SATURATION: 92 % | WEIGHT: 103.62 LBS | DIASTOLIC BLOOD PRESSURE: 93 MMHG | SYSTOLIC BLOOD PRESSURE: 146 MMHG | BODY MASS INDEX: 17.26 KG/M2 | HEIGHT: 65 IN | TEMPERATURE: 98.2 F | HEART RATE: 110 BPM

## 2021-01-06 DIAGNOSIS — T45.1X5A CHEMOTHERAPY INDUCED NEUTROPENIA (HCC): ICD-10-CM

## 2021-01-06 DIAGNOSIS — B37.0 THRUSH: Primary | ICD-10-CM

## 2021-01-06 DIAGNOSIS — D70.1 CHEMOTHERAPY INDUCED NEUTROPENIA (HCC): ICD-10-CM

## 2021-01-06 DIAGNOSIS — C34.90 SMALL CELL LUNG CANCER (HCC): Primary | ICD-10-CM

## 2021-01-06 LAB
ALP BONE CFR SERPL: 36 % (ref 14–68)
ALP INTEST CFR SERPL: 7 % (ref 0–18)
ALP LIVER CFR SERPL: 57 % (ref 18–85)
ALP SERPL-CCNC: 166 IU/L (ref 39–117)

## 2021-01-06 PROCEDURE — 96367 TX/PROPH/DG ADDL SEQ IV INF: CPT

## 2021-01-06 PROCEDURE — 96377 APPLICATON ON-BODY INJECTOR: CPT

## 2021-01-06 PROCEDURE — 96413 CHEMO IV INFUSION 1 HR: CPT

## 2021-01-06 RX ORDER — SODIUM CHLORIDE 9 MG/ML
20 INJECTION, SOLUTION INTRAVENOUS ONCE
Status: COMPLETED | OUTPATIENT
Start: 2021-01-06 | End: 2021-01-06

## 2021-01-06 RX ADMIN — DEXAMETHASONE SODIUM PHOSPHATE: 10 INJECTION, SOLUTION INTRAMUSCULAR; INTRAVENOUS at 13:19

## 2021-01-06 RX ADMIN — PEGFILGRASTIM 6 MG: KIT SUBCUTANEOUS at 15:04

## 2021-01-06 RX ADMIN — SODIUM CHLORIDE 20 ML/HR: 9 INJECTION, SOLUTION INTRAVENOUS at 13:19

## 2021-01-06 RX ADMIN — ETOPOSIDE 144 MG: 20 INJECTION INTRAVENOUS at 13:54

## 2021-01-06 NOTE — PROGRESS NOTES
Rec'd pt ambulatory today with complaints of "coughing up blood in the mornings for the past 3 mornings", " Severe back pain radiating from the lower parts to the upper parts 10/10  I needed to take morphine , tylenol and benadryl last night in order to get a good night sleep", " I woke up with a swollen R eye", " I started getting Sharene Mandaeism in my mouth yesterday", and my Left ankle was swollen"  Pt states that she has not shared these symptoms with the Oncology office yet  On admission today, pt had level 2/10 back pain , white patches at the back of her tongue easily visible, no facial swelling noted  Pt did produce a bright red bloody 2x2 gauze pad  Dr Ashok Ruiz office made aware  Bethany Espana RN related new orders to discontinue oral steroid home meds immediately  Also, Pt is to  a pharmacy prescription for an oral rinse to treat the thrush  Pt further instructed to take a whole morphine pill at night rather than cutting it half to treat her back pain  All of this was discussed with the pt and she was agreeable

## 2021-01-06 NOTE — PROGRESS NOTES
Port easily accessed and infusions tolerated well  Port then  deaccessed and pt discharged to home with steady slow gait

## 2021-01-07 ENCOUNTER — TELEPHONE (OUTPATIENT)
Dept: HEMATOLOGY ONCOLOGY | Facility: CLINIC | Age: 71
End: 2021-01-07

## 2021-01-07 ENCOUNTER — TELEPHONE (OUTPATIENT)
Dept: PULMONOLOGY | Facility: CLINIC | Age: 71
End: 2021-01-07

## 2021-01-07 NOTE — TELEPHONE ENCOUNTER
Please schedule a sick visit  If she has worsening hemoptysis in the meantime, she needs to go to the ER      SUSY Walter

## 2021-01-07 NOTE — TELEPHONE ENCOUNTER
Call from patient asking for review of pain meds  Reviewed with patient  Patient also mentions episode of "moderate" hemoptysis this AM which has resolved  Patient does have call in to pulmonary MD   Will update Dr Misty Benton RN  Patient aware to go to ED with acute symptoms

## 2021-01-07 NOTE — TELEPHONE ENCOUNTER
Spoke with Mitra Rain  She said she has been coughing up blood since November off and on  She just started recording it through as of 12/31  She said it is mostly "slight" and it comes out as a straight line  She said it is not mixed with any saliva or mucous  She said this morning was a moderate amount  She said it has some little clots in with the streak  This always happens once when she first wakes up and not during the day  She said she did not make Dr Collette Sinclair aware of this at her appt last month  I asked her if she had any other new symptoms going on and she said she has been out of breath when she wakes up in the morning  She has to run to the bathroom and then she gets dressed  By the time she gets downstairs, she said she feels "pooped"  She says these out of breath episodes last 15 minutes and have been random as well  She told me she has oxygen but is trying not use it  I asked her if she has a pulse ox to check her levels when she has these episodes and she does  She said when she got downstairs this morning she was 80%  She said she coughed and then she was 83%  It took about 10-15 minutes for her levels to come back up to the 90's  I asked her if she ever checks her pulse ox when she first sits up put of bed and she said no because the oximeter is downstairs  I advised her she should probably be using her oxygen when she first gets up and around until she gets downstairs and gets settled  I advised her 80% is not an okay level  She had no clue this was a big deal and seemed to understand once I explained to her why it was  She also said she is out of Anoro  I advised her she has refills  She said it is too expensive  Looking back at her chart, we gave her samples of Stiolto and she returned them because she said that is too expensive as well  She was asking about Trelegy because she saw it on TV  I then had to explain how to use her rescue inhaler   I advised her to have it by her bedside, so when she wakes up out of breath,she can see if this helps  Along with that and using the oxygen  She seemed very confused on the use of everything and what to do  I tried to explain everything

## 2021-01-07 NOTE — TELEPHONE ENCOUNTER
I didn't think this required a response sorry  She has cancer and some blood streaked mucous for months  Does she need something new?

## 2021-01-07 NOTE — TELEPHONE ENCOUNTER
Patient calling stating spitting up blood in small amounts 12/31, 1/1/1 ,1/6 1/7 was a larger amount     [phone]

## 2021-01-08 ENCOUNTER — TELEPHONE (OUTPATIENT)
Dept: HEMATOLOGY ONCOLOGY | Facility: CLINIC | Age: 71
End: 2021-01-08

## 2021-01-08 ENCOUNTER — OFFICE VISIT (OUTPATIENT)
Dept: PULMONOLOGY | Facility: HOSPITAL | Age: 71
End: 2021-01-08
Payer: MEDICARE

## 2021-01-08 ENCOUNTER — HOSPITAL ENCOUNTER (OUTPATIENT)
Dept: RADIOLOGY | Facility: HOSPITAL | Age: 71
Discharge: HOME | End: 2021-01-08
Attending: INTERNAL MEDICINE
Payer: MEDICARE

## 2021-01-08 VITALS
HEIGHT: 66 IN | HEART RATE: 108 BPM | DIASTOLIC BLOOD PRESSURE: 60 MMHG | TEMPERATURE: 98.1 F | SYSTOLIC BLOOD PRESSURE: 100 MMHG | WEIGHT: 100 LBS | BODY MASS INDEX: 16.07 KG/M2 | RESPIRATION RATE: 18 BRPM | OXYGEN SATURATION: 94 %

## 2021-01-08 DIAGNOSIS — R04.2 HEMOPTYSIS: Primary | ICD-10-CM

## 2021-01-08 DIAGNOSIS — R04.2 HEMOPTYSIS: ICD-10-CM

## 2021-01-08 DIAGNOSIS — R06.02 SHORTNESS OF BREATH: ICD-10-CM

## 2021-01-08 DIAGNOSIS — J43.9 PULMONARY EMPHYSEMA, UNSPECIFIED EMPHYSEMA TYPE (HCC): ICD-10-CM

## 2021-01-08 DIAGNOSIS — B37.0 THRUSH: Primary | ICD-10-CM

## 2021-01-08 DIAGNOSIS — C34.32 MALIGNANT NEOPLASM OF LOWER LOBE OF LEFT LUNG (HCC): ICD-10-CM

## 2021-01-08 DIAGNOSIS — C34.90 SMALL CELL LUNG CANCER (HCC): ICD-10-CM

## 2021-01-08 PROCEDURE — 99214 OFFICE O/P EST MOD 30 MIN: CPT | Performed by: INTERNAL MEDICINE

## 2021-01-08 PROCEDURE — 71046 X-RAY EXAM CHEST 2 VIEWS: CPT

## 2021-01-08 RX ORDER — FLUCONAZOLE 100 MG/1
100 TABLET ORAL DAILY
Qty: 5 TABLET | Refills: 0 | Status: SHIPPED | OUTPATIENT
Start: 2021-01-08 | End: 2021-01-13

## 2021-01-08 NOTE — TELEPHONE ENCOUNTER
Patient is calling to see if a pill can be called in for her thrush  Patient states nystatin mouth rinse is not helping  Patient took a pill in the past and she states this cleared up her mouth in 1 day    She has white patches inside her mouth, on her tongue and on the back of her throat     Will send to RN to review with NP - patient requesting a call back with an update on Rx  Best call back number [phone]

## 2021-01-08 NOTE — PROGRESS NOTES
Pulmonary Follow Up Note   Yuri Sorto 78 y o  female MRN: [id number]  1/8/2021      Assessment and Plan:    1  Hemoptysis  Assessment & Plan:  Resolved  The patient had for small brownish dark sputum mucus production on January 7, 2021  Today she had clear mucus with her cough  She denies more shortness of breath than usual or cough and she denies fever or chills  I would like to obtain a new chest x-ray to rule out underlying pneumonia however less likely  Most likely this is secondary to her underlying lung cancer versus her chronic bronchitis with ongoing active tobacco smoking    Orders:  -     XR chest pa & lateral; Future; Expected date: 01/08/2021    2  Shortness of breath  Assessment & Plan: With no change from his baseline  Secondary to her end-stage COPD  She continues to smoke  She is currently on Anoro Ellipta inhaler and she is asking for a list of inhalers that she could use to ask insurance which 1 with the cover now much would cost her co-pay  A list was provided      3  Malignant neoplasm of lower lobe of left lung [facility])  Assessment & Plan:  Small cell lung cancer left lower lobe she is currently undergoing chemotherapy        4  Small cell lung cancer ([facility] [address] )    5  Pulmonary emphysema, unspecified emphysema type [facility])  Assessment & Plan:  Continue Anoro Ellipta for now            No follow-ups on file  History of Present Illness   HPI:  Yuri Sorto is a 78 y o  female who has past medical history of extensive left lower lobe small cell count lung cancer for which she has ongoing chemotherapy and she had malignant pleural effusion the left side status post a PleurX catheter which was removed 2 weeks ago  Coming today for an evaluation as an acute visit for hemoptysis that happened yesterday very small amount of blood that came out on her morning cough, today and her morning cough she only noted mucus production with no blood    She denies dyspnea on exertion more than her usual her baseline and she denies cough more than usual she denies night sweats or fever, no chills or rigors or body aches or malaise and she has not been around sick people  Review of Systems   Constitutional: Negative for chills, diaphoresis, fatigue and fever  HENT: Negative for congestion, postnasal drip, rhinorrhea, sinus pressure and sore throat  Eyes: Negative for redness and visual disturbance  Respiratory: Positive for cough and shortness of breath  Negative for chest tightness, wheezing and stridor  Cardiovascular: Negative for chest pain and leg swelling  Gastrointestinal: Negative for abdominal distention, abdominal pain, diarrhea and vomiting  Endocrine: Negative for polydipsia and polyphagia  Genitourinary: Negative for dysuria and hematuria  Musculoskeletal: Negative for back pain and myalgias  Skin: Negative for pallor and rash  Neurological: Negative for dizziness, weakness and headaches  Psychiatric/Behavioral: Negative for sleep disturbance  The patient is not nervous/anxious  Historical Information   Past Medical History:   Diagnosis Date    Chronic fatigue syndrome with fibromyalgia     COPD (chronic obstructive pulmonary disease) (HCC)     DDD (degenerative disc disease), cervical     Emphysema of lung ([facility] [address] )     Hx of migraine headaches     Hypothyroidism     Lung cancer ([facility] [address] )      Past Surgical History:   Procedure Laterality Date    BARTHOLIN GLAND CYST EXCISION      CARPAL TUNNEL RELEASE Bilateral     COLONOSCOPY  07/09/2020     15 mm sessile polyp in the cecum removed by polypectomy  A 3 recall    IR PLEURAL EFFUSION LONG-TERM CATHETER PLACEMENT  11/13/2020    IR PLEURAL EFFUSION LONG-TERM CATHETER REMOVAL  12/21/2020    IR PORT PLACEMENT  12/11/2020    IR THORACENTESIS  11/3/2020    TONSILLECTOMY      TUBAL LIGATION      UPPER GASTROINTESTINAL ENDOSCOPY  07/09/2020     small hiatal hernia  Pathology negative       Family History   Problem Relation Age of Onset    Colon polyps Mother     Heart disease Mother     Heart disease Father     Colon cancer Neg Hx          Meds/Allergies     Current Outpatient Medications:     acetaminophen (TYLENOL) 500 mg tablet, Take 2 tablets (1,000 mg total) by mouth every 8 (eight) hours, Disp: , Rfl:     B Complex-Biotin-FA (TH VITAMIN B 50/B-COMPLEX) TABS, Take by mouth daily, Disp: , Rfl:     Capsaicin 0 025 % GEL, Apply topically 3 (three) times a day as needed , Disp: , Rfl:     cholecalciferol (VITAMIN D3) 1,000 units tablet, Take 7,000 Units by mouth daily, Disp: , Rfl:     COLCHICINE PO, Take 0 5 mg by mouth 3 (three) times a day as needed , Disp: , Rfl:     cyclobenzaprine (FLEXERIL) 10 mg tablet, Take 10 mg by mouth 3 (three) times a day as needed for muscle spasms, Disp: , Rfl:     dexamethasone (DECADRON) 2 mg tablet, Take 1 tablet (2 mg total) by mouth 2 (two) times a day with meals, Disp: 60 tablet, Rfl: 0    Echinacea 400 MG CAPS, Take by mouth 3 (three) times a day as needed, Disp: , Rfl:     fexofenadine-pseudoephedrine (ALLEGRA-D)  MG per tablet, Take 1 tablet by mouth 2 (two) times a day as needed for allergies, Disp: , Rfl:     GINKGO BILOBA PLUS PO, Take by mouth as needed 2 capsules every 2 mornings , Disp: , Rfl:     Hyaluronic Acid 20-60 MG CAPS, Take by mouth 3 (three) times a day , Disp: , Rfl:     L-Lysine 500 MG CAPS, Take by mouth 3 (three) times a day as needed, Disp: , Rfl:     lidocaine-prilocaine (EMLA) cream, Apply topically as needed for mild pain 30-60 min prior to port access  , Disp: 30 g, Rfl: 0    loperamide (IMODIUM) 2 mg capsule, Take 2 mg by mouth daily , Disp: , Rfl:     morphine (MSIR) 15 mg tablet, Take 0 5 tablets (7 5 mg total) by mouth every 4 (four) hours as needed for moderate painMax Daily Amount: 45 mg, Disp: 30 tablet, Rfl: 0    NON FORMULARY, Take 37 mg by mouth daily Eco Thyro 37 - reported by pt, Disp: , Rfl:     nystatin (MYCOSTATIN) 500,000 units/5 mL suspension, Apply 5 mL (500,000 Units total) to the mouth or throat 4 (four) times a day, Disp: 500 mL, Rfl: 0    omeprazole (PriLOSEC) 20 mg delayed release capsule, Take 1 capsule (20 mg total) by mouth daily, Disp: 30 capsule, Rfl: 0    ondansetron (ZOFRAN) 4 mg tablet, Take 1 tablet (4 mg total) by mouth every 8 (eight) hours as needed for nausea or vomiting, Disp: 30 tablet, Rfl: 0    prochlorperazine (COMPAZINE) 10 mg tablet, Take 10 mg by mouth every 6 (six) hours as needed for nausea or vomiting (migraines), Disp: , Rfl:     S-Adenosylmethionine (RADHA-E) 200 MG TABS, Take by mouth 3 (three) times a day, Disp: , Rfl:     Tragacanth (ASTRAGALUS ROOT) POWD, 470 mg 3 (three) times a day as needed, Disp: , Rfl:     umeclidinium-vilanterol (ANORO ELLIPTA) 62 5-25 MCG/INH inhaler, Inhale 1 puff daily, Disp: 1 Inhaler, Rfl: 5  Allergies   Allergen Reactions    Clarithromycin     Codeine Other (See Comments)     lethergic    Other      PHOSPHATE    Oxycodone Other (See Comments)     lethergic    Trazodone Other (See Comments)     lethergic    Penicillins Rash       Vitals: Blood pressure 100/60, pulse (!) 108, temperature 98 1 °F (36 7 °C), resp  rate 18, height 5' 6" (1 676 m), weight 45 4 kg (100 lb), SpO2 94 %  Body mass index is 16 14 kg/m²  Oxygen Therapy  SpO2: 94 %  Oxygen Therapy: Supplemental oxygen  O2 Delivery Method: Nasal cannula  O2 Flow Rate (L/min): 3 L/min      Physical Exam  Physical Exam  Constitutional:       Appearance: Normal appearance  HENT:      Head: Normocephalic and atraumatic  Nose: No congestion or rhinorrhea  Mouth/Throat:      Mouth: Mucous membranes are moist       Pharynx: Oropharynx is clear  Eyes:      General: No scleral icterus  Right eye: No discharge  Left eye: No discharge  Pupils: Pupils are equal, round, and reactive to light  Neck:      Musculoskeletal: No neck rigidity     Cardiovascular:      Rate and Rhythm: Normal rate and regular rhythm  Pulses: Normal pulses  Heart sounds: Normal heart sounds  No murmur  No gallop  Pulmonary:      Effort: Pulmonary effort is normal  No respiratory distress  Breath sounds: Normal breath sounds  No wheezing, rhonchi or rales  Comments: Left lower lung zone diminished air entry  Abdominal:      General: Abdomen is flat  Bowel sounds are normal  There is no distension  Palpations: Abdomen is soft  Tenderness: There is no abdominal tenderness  Musculoskeletal:         General: No swelling, tenderness or deformity  Skin:     General: Skin is warm and dry  Neurological:      General: No focal deficit present  Mental Status: She is alert and oriented to person, place, and time  Mental status is at baseline  Psychiatric:         Mood and Affect: Mood normal          Behavior: Behavior normal          Thought Content: Thought content normal          Judgment: Judgment normal          Labs: I have personally reviewed pertinent lab results  , ABG: No results found for: PHART, HGK8RYV, PO2ART, UZD0BHV, X6EBCHYP, BEART, SOURCE, BNP: No results found for: BNP, CBC: No results found for: WBC, HGB, HCT, MCV, PLT, ADJUSTEDWBC, MCH, MCHC, RDW, MPV, NRBC, CMP: No results found for: SODIUM, K, CL, CO2, ANIONGAP, BUN, CREATININE, GLUCOSE, CALCIUM, AST, ALT, ALKPHOS, PROT, BILITOT, EGFR, PT/INR: No results found for: PT, INR, Troponin: No results found for: TROPONINI  Lab Results   Component Value Date    WBC 13 99 (H) 12/31/2020    HGB 12 4 12/31/2020    HCT 40 2 12/31/2020    MCV 98 12/31/2020     (H) 12/31/2020     Lab Results   Component Value Date    CALCIUM 9 2 12/31/2020    K 4 2 12/31/2020    CO2 27 12/31/2020     12/31/2020    BUN 20 12/31/2020    CREATININE 0 67 12/31/2020     No results found for: IGE  Lab Results   Component Value Date    ALT 18 12/31/2020    AST 22 12/31/2020    GGT 53 12/05/2020    ALKPHOS 167 (H) 12/31/2020         Imaging and other studies: I have personally reviewed pertinent films in PACS      Mica Cabezas MD  [facility]       Portions of the record may have been created with voice recognition software  Occasional wrong word or "sound a like" substitutions may have occurred due to the inherent limitations of voice recognition software  Read the chart carefully and recognize, using context, where substitutions have occurred

## 2021-01-08 NOTE — ASSESSMENT & PLAN NOTE
Resolved  The patient had for small brownish dark sputum mucus production on January 7, 2021  Today she had clear mucus with her cough  She denies more shortness of breath than usual or cough and she denies fever or chills  I would like to obtain a new chest x-ray to rule out underlying pneumonia however less likely  Most likely this is secondary to her underlying lung cancer versus her chronic bronchitis with ongoing active tobacco smoking

## 2021-01-08 NOTE — ASSESSMENT & PLAN NOTE
With no change from his baseline  Secondary to her end-stage COPD  She continues to smoke  She is currently on Anoro Ellipta inhaler and she is asking for a list of inhalers that she could use to ask insurance which 1 with the cover now much would cost her co-pay  A list was provided

## 2021-01-08 NOTE — PATIENT INSTRUCTIONS
Lung Cancer   WHAT YOU SHOULD KNOW:   Lung cancer is a cancer that generally starts in the cells that line the airways of the lungs  The 2 basic types of lung cancer are non-small cell lung cancer (NSCLC) and small cell lung cancer (SCLC)  INSTRUCTIONS:   Follow up with your oncologist as directed: You will need to see your oncologist for ongoing treatment  Write down your questions so you remember to ask them during your visits  Oxygen: You may be given oxygen through a mask or nasal cannula to help you breathe easier  Do not smoke or let anyone else smoke in the same room while your oxygen is on  This may cause a fire  Self-care during cancer treatment:   · Rest as needed  Return to activities slowly, and do more as you feel stronger  You may have trouble breathing when you are lying down  Use foam wedges or elevate the head of your bed  This may help you breathe easier while you are resting or sleeping  Use a device that will tilt your whole body, or bend your body at the waist  The device should not bend your body at the upper back or neck  · Drink liquids as directed  Ask how much liquid to drink each day and which liquids are best for you  Drink extra liquids to prevent dehydration  You will also need to drink extra liquids if you are vomiting or have diarrhea from cancer treatments  · Eat healthy foods  Healthy foods include fruits, vegetables, whole-grain breads, low-fat dairy products, beans, lean meats, and fish  It may be easier for you to eat several small meals a day rather than a few large meals  · Do not smoke  If you smoke, it is never too late to quit  Ask for information if you need help quitting  Avoid being around others who smoke  Contact your primary healthcare provider or oncologist if:   · You have a fever  · You have blood in your mucus or spit  · You are vomiting and cannot keep food or liquids down      · You have questions or concerns about your condition or care   Return to the emergency department if:   · You cannot think clearly  · You suddenly feel lightheaded or are short of breath  · Your lips or nails look blue or pale  · Your arm or leg feels warm, tender, and painful  It may look swollen and red  · You have chest pain when you take a deep breath or cough  You cough up blood  © 2014 [address] is for End User's use only and may not be sold, redistributed or otherwise used for commercial purposes  All illustrations and images included in CareNotes® are the copyrighted property of A D [employer] M [employer]  or Moses Brannon  The above information is an  only  It is not intended as medical advice for individual conditions or treatments  Talk to your doctor, nurse or pharmacist before following any medical regimen to see if it is safe and effective for you  Hemoptysis   WHAT YOU NEED TO KNOW:   Hemoptysis is coughing up blood  This occurs when blood vessels in your airway or lungs weaken or break, and begin to bleed  You may bleed in small or large amounts that appear in your sputum (spit)  DISCHARGE INSTRUCTIONS:   Return to the emergency department if:   · You have new or worsening chest pain or shortness of breath  · Your bleeding gets worse or you cough up a large amount of blood  · You cannot stop vomiting  · You are so dizzy that you think you may fall or faint  · You have pain or swelling in your legs  · Your legs and arms feel cold or look pale  Contact your healthcare provider if:   · You have new or increasing shortness of breath  · You have a fever  · You lose weight without trying  · You feel more weak and tired than usual     · You have a cough that does not improve or gets worse  · You have questions or concerns about your condition or care  Medicines:   You may need any of the following:  · Antibiotics  may be given to fight or prevent an infection caused by bacteria  Always take your antibiotics exactly as ordered by your healthcare provider  Do not stop taking your medicine unless directed by your healthcare provider  · Antitussives  help control or stop your cough  · Take your medicine as directed  Contact your healthcare provider if you think your medicine is not helping or if you have side effects  Tell him or her if you are allergic to any medicine  Keep a list of the medicines, vitamins, and herbs you take  Include the amounts, and when and why you take them  Bring the list or the pill bottles to follow-up visits  Carry your medicine list with you in case of an emergency  Follow up with your healthcare provider in 2 days or as directed: You may need frequent visits to monitor your condition and prevent further blood loss  Write down your questions so you remember to ask them during your visits  Use caution with medicines:  Certain medicines, such as NSAIDs, increase your risk for bleeding  Herbal supplements also increase your risk  Examples of herbal supplements are garlic, gingko, and ginseng  Ask your healthcare provider before you take any over-the-counter medicines  Do not smoke, and do not go to smoky areas:  Smoke may worsen your hemoptysis  Nicotine and other chemicals in cigarettes and cigars can also cause lung damage  Ask your healthcare provider for information if you currently smoke and need help to quit  E-cigarettes or smokeless tobacco still contain nicotine  Talk to your healthcare provider before you use these products  © Copyright [address] Information is for End User's use only and may not be sold, redistributed or otherwise used for commercial purposes  All illustrations and images included in CareNotes® are the copyrighted property of [employer]  or [address] [place] [address] Edie   The above information is an  only  It is not intended as medical advice for individual conditions or treatments   Talk to your doctor, nurse or pharmacist before following any medical regimen to see if it is safe and effective for you

## 2021-01-11 ENCOUNTER — TELEPHONE (OUTPATIENT)
Dept: PULMONOLOGY | Facility: CLINIC | Age: 71
End: 2021-01-11

## 2021-01-11 DIAGNOSIS — J18.9 PNEUMONIA OF LEFT LUNG DUE TO INFECTIOUS ORGANISM, UNSPECIFIED PART OF LUNG: Primary | ICD-10-CM

## 2021-01-11 DIAGNOSIS — R06.02 SHORTNESS OF BREATH: ICD-10-CM

## 2021-01-11 RX ORDER — ALBUTEROL SULFATE 90 UG/1
2 AEROSOL, METERED RESPIRATORY (INHALATION) EVERY 6 HOURS PRN
Qty: 8.5 G | Refills: 6 | Status: SHIPPED | OUTPATIENT
Start: 2021-01-11

## 2021-01-11 RX ORDER — LEVOFLOXACIN 750 MG/1
750 TABLET ORAL EVERY 24 HOURS
Qty: 7 TABLET | Refills: 0 | Status: SHIPPED | OUTPATIENT
Start: 2021-01-11 | End: 2021-01-18

## 2021-01-11 NOTE — TELEPHONE ENCOUNTER
Patient calling stating Anoro is too expensive and wants to be prescribed Albuterol instead  Patient has only been wearing her oxygen from time to time during the day her pulse ox has been between 86-89%  Patient wants to know if that would be alright to continue doing this  Patient also states she has been very cold lately and tops of shoulders have been very painful     Please advise [phone]

## 2021-01-11 NOTE — PATIENT INSTRUCTIONS
For pain:  Tylenol 500mg q8H on schedule  Morphine 7 5mg to 15mg every 4 hours as needed for moderate to severe pain  Senokot, 1-2 tabs every night as needed for constipation  Nasal spray 4 times to both nostril as needed for congestion  Return to office in 2 weeks, please call for any concerns/questions            PRESCRIPTION REFILL REMINDER:  All medication refills should be requested prior to Noon on Friday  Any refill requests after noon on Friday would be addressed the following Monday  Please protect yourself from the COVID-19! Even though we do not good antiviral drugs for this infection, the following strategies can help you stay healthy:    = Wash your hands! Soap and water, or hand  with at least 60% alcohol, are both effective at killing the virus  = Wear a mask! This will help protect others from any virus particles you might spread  Your mouth and nose BOTH need to be covered  = Keep the distance! Keep 6 feet of distance from others people, even if they seem healthy  Keeping distance protects you from the other person's virus spread     = Get the vaccine! [employer] are approved for emergency use in the [place]  These vaccines have been shown to be 90+% effective at preventing severe infections when combined with masking, hand-washing, and distancing  As of 1/4/2021, only nursing home residents and front-line health workers have access to the first round of vaccines, but more are coming  We are recommending that all our Palliative and Supportive Care patients get two shots of either vaccine, as early as it is available to them  Numbers of Coronavirus cases are spiking in many [place] States  This is not a more dangerous virus, but a sign that more people in a community are spreading the virus  Please check the local disease reports near you if you consider travelling    As of 1/4/21, we do NOT advise travel outside the [facility] or [place])  Check out [employer] for Awad data that are updated daily:    http://www [employer]/     Global Epidemics  Org, from [facility] (OUTPATIENT CAMP[facility]), will give you [id number] information on virus cases:    Https://globalepidemics  org/

## 2021-01-11 NOTE — PROGRESS NOTES
I called the patient updated her about the chest x-ray results, new infilterates in the left upper lobe   She still has mild shortness of breath compared to her baseline a little bit worse, and she had a bloody streaked sputum again today nothing in the last couple of days since she has been on the clinic air but with the new finding on her x-ray with the left upper lobe new infiltrate and being on chemotherapy I prefer to start her on an empirical antibiotic therapy for presumptive left upper lobe pneumonia side prescribed levofloxacin 750 mg once a day tablets for 7 days and I refilled her albuterol based on her request   She has an appointment coming next month with Dr Dimple Wagoner

## 2021-01-12 ENCOUNTER — TELEPHONE (OUTPATIENT)
Dept: PALLIATIVE MEDICINE | Facility: CLINIC | Age: 71
End: 2021-01-12

## 2021-01-12 ENCOUNTER — CONSULT (OUTPATIENT)
Dept: PALLIATIVE MEDICINE | Age: 71
End: 2021-01-12
Payer: MEDICARE

## 2021-01-12 ENCOUNTER — SOCIAL WORK (OUTPATIENT)
Dept: PALLIATIVE MEDICINE | Age: 71
End: 2021-01-12
Payer: MEDICARE

## 2021-01-12 VITALS
HEART RATE: 103 BPM | SYSTOLIC BLOOD PRESSURE: 160 MMHG | BODY MASS INDEX: 16.14 KG/M2 | TEMPERATURE: 97.9 F | DIASTOLIC BLOOD PRESSURE: 80 MMHG | OXYGEN SATURATION: 92 % | RESPIRATION RATE: 12 BRPM | WEIGHT: 100 LBS

## 2021-01-12 DIAGNOSIS — K59.03 DRUG INDUCED CONSTIPATION: ICD-10-CM

## 2021-01-12 DIAGNOSIS — C34.90 SMALL CELL LUNG CANCER (HCC): Primary | ICD-10-CM

## 2021-01-12 DIAGNOSIS — Z71.89 COUNSELING AND COORDINATION OF CARE: Primary | ICD-10-CM

## 2021-01-12 DIAGNOSIS — M54.2 NECK PAIN, MUSCULOSKELETAL: ICD-10-CM

## 2021-01-12 DIAGNOSIS — R06.02 SHORTNESS OF BREATH: ICD-10-CM

## 2021-01-12 DIAGNOSIS — F51.01 PRIMARY INSOMNIA: ICD-10-CM

## 2021-01-12 DIAGNOSIS — R63.0 POOR APPETITE: ICD-10-CM

## 2021-01-12 DIAGNOSIS — M54.6 CHRONIC BILATERAL THORACIC BACK PAIN: ICD-10-CM

## 2021-01-12 DIAGNOSIS — Z71.89 ADVANCED CARE PLANNING/COUNSELING DISCUSSION: ICD-10-CM

## 2021-01-12 DIAGNOSIS — G89.3 CANCER ASSOCIATED PAIN: ICD-10-CM

## 2021-01-12 DIAGNOSIS — R09.81 NASAL CONGESTION: ICD-10-CM

## 2021-01-12 DIAGNOSIS — J90 PLEURAL EFFUSION: ICD-10-CM

## 2021-01-12 DIAGNOSIS — G89.29 CHRONIC BILATERAL THORACIC BACK PAIN: ICD-10-CM

## 2021-01-12 PROCEDURE — NC001 PR NO CHARGE

## 2021-01-12 PROCEDURE — 99205 OFFICE O/P NEW HI 60 MIN: CPT | Performed by: INTERNAL MEDICINE

## 2021-01-12 RX ORDER — MIRTAZAPINE 7.5 MG/1
7.5 TABLET, FILM COATED ORAL
Qty: 15 TABLET | Refills: 0 | Status: SHIPPED | OUTPATIENT
Start: 2021-01-12 | End: 2021-01-25 | Stop reason: DRUGHIGH

## 2021-01-12 RX ORDER — SENNA AND DOCUSATE SODIUM 50; 8.6 MG/1; MG/1
1 TABLET, FILM COATED ORAL DAILY
Qty: 60 TABLET | Refills: 0 | Status: SHIPPED | OUTPATIENT
Start: 2021-01-12

## 2021-01-12 RX ORDER — MORPHINE SULFATE 15 MG/1
TABLET ORAL
Qty: 45 TABLET | Refills: 0 | Status: SHIPPED | OUTPATIENT
Start: 2021-01-12 | End: 2021-02-15 | Stop reason: SDUPTHER

## 2021-01-12 NOTE — TELEPHONE ENCOUNTER
Dr Kt Chisholm, can you update this patient's med list? She called to make us aware she no longer takes Decadron, and the nystatin solution  She had many questions about her medications and what they were for which was adressed  Thank you

## 2021-01-12 NOTE — ADDENDUM NOTE
Addended byLoy Harp on: 1/12/2021 03:07 PM     Modules accepted: Orders Intermediate Repair Preamble Text (Leave Blank If You Do Not Want): Undermining was performed with blunt dissection. Detail Level: Detailed Bilateral Helical Rim Advancement Flap Text: The defect edges were debeveled with a #15 blade scalpel. Given the location of the defect and the proximity to free margins (helical rim) a bilateral helical rim advancement flap was deemed most appropriate. Using a sterile surgical marker, the appropriate advancement flaps were drawn incorporating the defect and placing the expected incisions between the helical rim and antihelix where possible. The area thus outlined was incised through and through with a #15 scalpel blade. With a skin hook and iris scissors, the flaps were gently and sharply undermined and freed up. Complex Repair And Z Plasty Text: The defect edges were debeveled with a #15 scalpel blade. The primary defect was closed partially with a complex linear closure. Given the location of the remaining defect, shape of the defect and the proximity to free margins a Z plasty was deemed most appropriate for complete closure of the defect. Using a sterile surgical marker, an appropriate advancement flap was drawn incorporating the defect and placing the expected incisions within the relaxed skin tension lines where possible. The area thus outlined was incised deep to adipose tissue with a #15 scalpel blade. The skin margins were undermined to an appropriate distance in all directions utilizing iris scissors. Epidermal Autograft Text: The defect edges were debeveled with a #15 scalpel blade. Given the location of the defect, shape of the defect and the proximity to free margins an epidermal autograft was deemed most appropriate. Using a sterile surgical marker, the primary defect shape was transferred to the donor site. The epidermal graft was then harvested. The skin graft was then placed in the primary defect and oriented appropriately. Hemostasis: Pressure Purse String (Simple) Text: Given the location of the defect and the characteristics of the surrounding skin a purse string simple closure was deemed most appropriate. Undermining was performed circumferentially around the surgical defect. A purse string suture was then placed and tightened. Anesthesia Type: 1% lidocaine with epinephrine Graft Donor Site Bandage (Optional-Leave Blank If You Don't Want In Note): Steri-strips and a pressure bandage were applied to the donor site. Show Surgeon Variable: Yes Helical Rim Advancement Flap Text: The defect edges were debeveled with a #15 blade scalpel. Given the location of the defect and the proximity to free margins (helical rim) a double helical rim advancement flap was deemed most appropriate. Using a sterile surgical marker, the appropriate advancement flaps were drawn incorporating the defect and placing the expected incisions between the helical rim and antihelix where possible. The area thus outlined was incised through and through with a #15 scalpel blade. With a skin hook and iris scissors, the flaps were gently and sharply undermined and freed up. O-Z Plasty Text: The defect edges were debeveled with a #15 scalpel blade. Given the location of the defect, shape of the defect and the proximity to free margins an O-Z plasty (double transposition flap) was deemed most appropriate. Using a sterile surgical marker, the appropriate transposition flaps were drawn incorporating the defect and placing the expected incisions within the relaxed skin tension lines where possible. The area thus outlined was incised deep to adipose tissue with a #15 scalpel blade. The skin margins were undermined to an appropriate distance in all directions utilizing iris scissors. Hemostasis was achieved with electrocautery. The flaps were then transposed into place, one clockwise and the other counterclockwise, and anchored with interrupted buried subcutaneous sutures. Complex Repair And Rotation Flap Text: The defect edges were debeveled with a #15 scalpel blade. The primary defect was closed partially with a complex linear closure. Given the location of the remaining defect, shape of the defect and the proximity to free margins a rotation flap was deemed most appropriate for complete closure of the defect. Using a sterile surgical marker, an appropriate advancement flap was drawn incorporating the defect and placing the expected incisions within the relaxed skin tension lines where possible. The area thus outlined was incised deep to adipose tissue with a #15 scalpel blade. The skin margins were undermined to an appropriate distance in all directions utilizing iris scissors. Excision Method: Elliptical Size Of Lesion In Cm: 1 Posterior Auricular Interpolation Flap Text: A decision was made to reconstruct the defect utilizing an interpolation axial flap and a staged reconstruction. A telfa template was made of the defect. This telfa template was then used to outline the posterior auricular interpolation flap. The donor area for the pedicle flap was then injected with anesthesia. The flap was excised through the skin and subcutaneous tissue down to the layer of the underlying musculature. The pedicle flap was carefully excised within this deep plane to maintain its blood supply. The edges of the donor site were undermined. The donor site was closed in a primary fashion. The pedicle was then rotated into position and sutured. Once the tube was sutured into place, adequate blood supply was confirmed with blanching and refill. The pedicle was then wrapped with xeroform gauze and dressed appropriately with a telfa and gauze bandage to ensure continued blood supply and protect the attached pedicle. Complex Repair And Bilobe Flap Text: The defect edges were debeveled with a #15 scalpel blade. The primary defect was closed partially with a complex linear closure. Given the location of the remaining defect, shape of the defect and the proximity to free margins a bilobe flap was deemed most appropriate for complete closure of the defect. Using a sterile surgical marker, an appropriate advancement flap was drawn incorporating the defect and placing the expected incisions within the relaxed skin tension lines where possible. The area thus outlined was incised deep to adipose tissue with a #15 scalpel blade. The skin margins were undermined to an appropriate distance in all directions utilizing iris scissors. Complex Repair And Single Advancement Flap Text: The defect edges were debeveled with a #15 scalpel blade. The primary defect was closed partially with a complex linear closure. Given the location of the remaining defect, shape of the defect and the proximity to free margins a single advancement flap was deemed most appropriate for complete closure of the defect. Using a sterile surgical marker, an appropriate advancement flap was drawn incorporating the defect and placing the expected incisions within the relaxed skin tension lines where possible. The area thus outlined was incised deep to adipose tissue with a #15 scalpel blade. The skin margins were undermined to an appropriate distance in all directions utilizing iris scissors. Ftsg Text: The defect edges were debeveled with a #15 scalpel blade. Given the location of the defect, shape of the defect and the proximity to free margins a full thickness skin graft was deemed most appropriate. Using a sterile surgical marker, the primary defect shape was transferred to the donor site. The area thus outlined was incised deep to adipose tissue with a #15 scalpel blade. The harvested graft was then trimmed of adipose tissue until only dermis and epidermis was left. The skin margins of the secondary defect were undermined to an appropriate distance in all directions utilizing iris scissors. The secondary defect was closed with interrupted buried subcutaneous sutures. The skin edges were then re-apposed with running  sutures. The skin graft was then placed in the primary defect and oriented appropriately. Bi-Rhombic Flap Text: The defect edges were debeveled with a #15 scalpel blade. Given the location of the defect and the proximity to free margins a bi-rhombic flap was deemed most appropriate. Using a sterile surgical marker, an appropriate rhombic flap was drawn incorporating the defect. The area thus outlined was incised deep to adipose tissue with a #15 scalpel blade. The skin margins were undermined to an appropriate distance in all directions utilizing iris scissors. Xenograft Text: The defect edges were debeveled with a #15 scalpel blade. Given the location of the defect, shape of the defect and the proximity to free margins a xenograft was deemed most appropriate. The graft was then trimmed to fit the size of the defect. The graft was then placed in the primary defect and oriented appropriately. Body Location Override (Optional - Billing Will Still Be Based On Selected Body Map Location If Applicable): right lateral scapula Dorsal Nasal Flap Text: The defect edges were debeveled with a #15 scalpel blade. Given the location of the defect and the proximity to free margins a dorsal nasal flap was deemed most appropriate. Using a sterile surgical marker, an appropriate dorsal nasal flap was drawn around the defect. The area thus outlined was incised deep to adipose tissue with a #15 scalpel blade. The skin margins were undermined to an appropriate distance in all directions utilizing iris scissors. Suture Removal: 14 days Purse String (Intermediate) Text: Given the location of the defect and the characteristics of the surrounding skin a pursestring intermediate closure was deemed most appropriate. Undermining was performed circumfirentially around the surgical defect. A purstring suture was then placed and tightened. Surgeon Performing Repair (Optional): Bryan Barriga PA-C Advancement Flap (Single) Text: The defect edges were debeveled with a #15 scalpel blade. Given the location of the defect and the proximity to free margins a single advancement flap was deemed most appropriate. Using a sterile surgical marker, an appropriate advancement flap was drawn incorporating the defect and placing the expected incisions within the relaxed skin tension lines where possible. The area thus outlined was incised deep to adipose tissue with a #15 scalpel blade. The skin margins were undermined to an appropriate distance in all directions utilizing iris scissors. Wound Care: Bacitracin Include Z78.9 (Other Specified Conditions Influencing Health Status) As An Associated Diagnosis?: No Z Plasty Text: The lesion was extirpated to the level of the fat with a #15 scalpel blade. Given the location of the defect, shape of the defect and the proximity to free margins a Z-plasty was deemed most appropriate for repair. Using a sterile surgical marker, the appropriate transposition arms of the Z-plasty were drawn incorporating the defect and placing the expected incisions within the relaxed skin tension lines where possible. The area thus outlined was incised deep to adipose tissue with a #15 scalpel blade. The skin margins were undermined to an appropriate distance in all directions utilizing iris scissors. The opposing transposition arms were then transposed into place in opposite direction and anchored with interrupted buried subcutaneous sutures. Complex Repair And Melolabial Flap Text: The defect edges were debeveled with a #15 scalpel blade. The primary defect was closed partially with a complex linear closure. Given the location of the remaining defect, shape of the defect and the proximity to free margins a melolabial flap was deemed most appropriate for complete closure of the defect. Using a sterile surgical marker, an appropriate advancement flap was drawn incorporating the defect and placing the expected incisions within the relaxed skin tension lines where possible. The area thus outlined was incised deep to adipose tissue with a #15 scalpel blade. The skin margins were undermined to an appropriate distance in all directions utilizing iris scissors. Double Island Pedicle Flap Text: The defect edges were debeveled with a #15 scalpel blade. Given the location of the defect, shape of the defect and the proximity to free margins a double island pedicle advancement flap was deemed most appropriate. Using a sterile surgical marker, an appropriate advancement flap was drawn incorporating the defect, outlining the appropriate donor tissue and placing the expected incisions within the relaxed skin tension lines where possible. The area thus outlined was incised deep to adipose tissue with a #15 scalpel blade. The skin margins were undermined to an appropriate distance in all directions around the primary defect and laterally outward around the island pedicle utilizing iris scissors. There was minimal undermining beneath the pedicle flap. X Size Of Lesion In Cm (Optional): 1.6 Ear Star Wedge Flap Text: The defect edges were debeveled with a #15 blade scalpel. Given the location of the defect and the proximity to free margins (helical rim) an ear star wedge flap was deemed most appropriate. Using a sterile surgical marker, the appropriate flap was drawn incorporating the defect and placing the expected incisions between the helical rim and antihelix where possible. The area thus outlined was incised through and through with a #15 scalpel blade. Epidermal Closure Graft Donor Site (Optional): simple interrupted Rhombic Flap Text: The defect edges were debeveled with a #15 scalpel blade. Given the location of the defect and the proximity to free margins a rhombic flap was deemed most appropriate. Using a sterile surgical marker, an appropriate rhombic flap was drawn incorporating the defect. The area thus outlined was incised deep to adipose tissue with a #15 scalpel blade. The skin margins were undermined to an appropriate distance in all directions utilizing iris scissors. Complex Repair And Dermal Autograft Text: The defect edges were debeveled with a #15 scalpel blade. The primary defect was closed partially with a complex linear closure. Given the location of the defect, shape of the defect and the proximity to free margins an dermal autograft was deemed most appropriate to repair the remaining defect. The graft was trimmed to fit the size of the remaining defect. The graft was then placed in the primary defect, oriented appropriately, and sutured into place. Tissue Cultured Epidermal Autograft Text: The defect edges were debeveled with a #15 scalpel blade. Given the location of the defect, shape of the defect and the proximity to free margins a tissue cultured epidermal autograft was deemed most appropriate. The graft was then trimmed to fit the size of the defect. The graft was then placed in the primary defect and oriented appropriately. Dermal Autograft Text: The defect edges were debeveled with a #15 scalpel blade. Given the location of the defect, shape of the defect and the proximity to free margins a dermal autograft was deemed most appropriate. Using a sterile surgical marker, the primary defect shape was transferred to the donor site. The area thus outlined was incised deep to adipose tissue with a #15 scalpel blade. The harvested graft was then trimmed of adipose and epidermal tissue until only dermis was left. The skin graft was then placed in the primary defect and oriented appropriately. V-Y Flap Text: The defect edges were debeveled with a #15 scalpel blade. Given the location of the defect, shape of the defect and the proximity to free margins a V-Y flap was deemed most appropriate. Using a sterile surgical marker, an appropriate advancement flap was drawn incorporating the defect and placing the expected incisions within the relaxed skin tension lines where possible. The area thus outlined was incised deep to adipose tissue with a #15 scalpel blade. The skin margins were undermined to an appropriate distance in all directions utilizing iris scissors. Estimated Blood Loss (Cc): minimal Epidermal Closure: running Billing Type: [employer] Mastoid Interpolation Flap Text: A decision was made to reconstruct the defect utilizing an interpolation axial flap and a staged reconstruction. A telfa template was made of the defect. This telfa template was then used to outline the mastoid interpolation flap. The donor area for the pedicle flap was then injected with anesthesia. The flap was excised through the skin and subcutaneous tissue down to the layer of the underlying musculature. The pedicle flap was carefully excised within this deep plane to maintain its blood supply. The edges of the donor site were undermined. The donor site was closed in a primary fashion. The pedicle was then rotated into position and sutured. Once the tube was sutured into place, adequate blood supply was confirmed with blanching and refill. The pedicle was then wrapped with xeroform gauze and dressed appropriately with a telfa and gauze bandage to ensure continued blood supply and protect the attached pedicle. H Plasty Text: Given the location of the defect, shape of the defect and the proximity to free margins a H-plasty was deemed most appropriate for repair. Using a sterile surgical marker, the appropriate advancement arms of the H-plasty were drawn incorporating the defect and placing the expected incisions within the relaxed skin tension lines where possible. The area thus outlined was incised deep to adipose tissue with a #15 scalpel blade. The skin margins were undermined to an appropriate distance in all directions utilizing iris scissors. The opposing advancement arms were then advanced into place in opposite direction and anchored with interrupted buried subcutaneous sutures. No Repair - Repaired With Adjacent Surgical Defect Text (Leave Blank If You Do Not Want): After the excision the defect was repaired concurrently with another surgical defect which was in close approximation. S Plasty Text: Given the location and shape of the defect, and the orientation of relaxed skin tension lines, an S-plasty was deemed most appropriate for repair. Using a sterile surgical marker, the appropriate outline of the S-plasty was drawn, incorporating the defect and placing the expected incisions within the relaxed skin tension lines where possible. The area thus outlined was incised deep to adipose tissue with a #15 scalpel blade. The skin margins were undermined to an appropriate distance in all directions utilizing iris scissors. The skin flaps were advanced over the defect. The opposing margins were then approximated with interrupted buried subcutaneous sutures. Primary Defect Width (In Cm): 0 V-Y Plasty Text: The defect edges were debeveled with a #15 scalpel blade. Given the location of the defect, shape of the defect and the proximity to free margins an V-Y advancement flap was deemed most appropriate. Using a sterile surgical marker, an appropriate advancement flap was drawn incorporating the defect and placing the expected incisions within the relaxed skin tension lines where possible. The area thus outlined was incised deep to adipose tissue with a #15 scalpel blade. The skin margins were undermined to an appropriate distance in all directions utilizing iris scissors. Deep Sutures: 3-0 [place]ys[place] Island Pedicle Flap Text: The defect edges were debeveled with a #15 scalpel blade. Given the location of the defect, shape of the defect and the proximity to free margins an island pedicle advancement flap was deemed most appropriate. Using a sterile surgical marker, an appropriate advancement flap was drawn incorporating the defect, outlining the appropriate donor tissue and placing the expected incisions within the relaxed skin tension lines where possible. The area thus outlined was incised deep to adipose tissue with a #15 scalpel blade. The skin margins were undermined to an appropriate distance in all directions around the primary defect and laterally outward around the island pedicle utilizing iris scissors. There was minimal undermining beneath the pedicle flap. Skin Substitute Text: The defect edges were debeveled with a #15 scalpel blade. Given the location of the defect, shape of the defect and the proximity to free margins a skin substitute graft was deemed most appropriate. The graft material was trimmed to fit the size of the defect. The graft was then placed in the primary defect and oriented appropriately. Modified Advancement Flap Text: The defect edges were debeveled with a #15 scalpel blade. Given the location of the defect, shape of the defect and the proximity to free margins a modified advancement flap was deemed most appropriate. Using a sterile surgical marker, an appropriate advancement flap was drawn incorporating the defect and placing the expected incisions within the relaxed skin tension lines where possible. The area thus outlined was incised deep to adipose tissue with a #15 scalpel blade. The skin margins were undermined to an appropriate distance in all directions utilizing iris scissors. Cheek Interpolation Flap Text: A decision was made to reconstruct the defect utilizing an interpolation axial flap and a staged reconstruction. A telfa template was made of the defect. This telfa template was then used to outline the Cheek Interpolation flap. The donor area for the pedicle flap was then injected with anesthesia. The flap was excised through the skin and subcutaneous tissue down to the layer of the underlying musculature. The interpolation flap was carefully excised within this deep plane to maintain its blood supply. The edges of the donor site were undermined. The donor site was closed in a primary fashion. The pedicle was then rotated into position and sutured. Once the tube was sutured into place, adequate blood supply was confirmed with blanching and refill. The pedicle was then wrapped with xeroform gauze and dressed appropriately with a telfa and gauze bandage to ensure continued blood supply and protect the attached pedicle. Complex Repair And Tissue Cultured Epidermal Autograft Text: The defect edges were debeveled with a #15 scalpel blade. The primary defect was closed partially with a complex linear closure. Given the location of the defect, shape of the defect and the proximity to free margins an tissue cultured epidermal autograft was deemed most appropriate to repair the remaining defect. The graft was trimmed to fit the size of the remaining defect. The graft was then placed in the primary defect, oriented appropriately, and sutured into place. Lip Wedge Excision Repair Text: Given the location of the defect and the proximity to free margins a full thickness wedge repair was deemed most appropriate. Using a sterile surgical marker, the appropriate repair was drawn incorporating the defect and placing the expected incisions perpendicular to the vermilion border. The vermilion border was also meticulously outlined to ensure appropriate reapproximation during the repair. The area thus outlined was incised through and through with a #15 scalpel blade. The muscularis and dermis were reaproximated with deep sutures following hemostasis. Care was taken to realign the vermilion border before proceeding with the superficial closure. Once the vermilion was realigned the superfical and mucosal closure was finished. O-L Flap Text: The defect edges were debeveled with a #15 scalpel blade. Given the location of the defect, shape of the defect and the proximity to free margins an O-L flap was deemed most appropriate. Using a sterile surgical marker, an appropriate advancement flap was drawn incorporating the defect and placing the expected incisions within the relaxed skin tension lines where possible. The area thus outlined was incised deep to adipose tissue with a #15 scalpel blade. The skin margins were undermined to an appropriate distance in all directions utilizing iris scissors. Lab Facility: [address] [place] [address] Alar Island Pedicle Flap Text: The defect edges were debeveled with a #15 scalpel blade. Given the location of the defect, shape of the defect and the proximity to the alar rim an island pedicle advancement flap was deemed most appropriate. Using a sterile surgical marker, an appropriate advancement flap was drawn incorporating the defect, outlining the appropriate donor tissue and placing the expected incisions within the nasal ala running parallel to the alar rim. The area thus outlined was incised with a #15 scalpel blade. The skin margins were undermined minimally to an appropriate distance in all directions around the primary defect and laterally outward around the island pedicle utilizing iris scissors. There was minimal undermining beneath the pedicle flap. Complex Repair And W Plasty Text: The defect edges were debeveled with a #15 scalpel blade. The primary defect was closed partially with a complex linear closure. Given the location of the remaining defect, shape of the defect and the proximity to free margins a W plasty was deemed most appropriate for complete closure of the defect. Using a sterile surgical marker, an appropriate advancement flap was drawn incorporating the defect and placing the expected incisions within the relaxed skin tension lines where possible. The area thus outlined was incised deep to adipose tissue with a #15 scalpel blade. The skin margins were undermined to an appropriate distance in all directions utilizing iris scissors. Perilesional Excision Additional Text (Leave Blank If You Do Not Want): The margin was drawn around the clinically apparent lesion. Incisions were then made along these lines to the appropriate tissue plane and the lesion was extirpated. Cheek-To-Nose Interpolation Flap Text: A decision was made to reconstruct the defect utilizing an interpolation axial flap and a staged reconstruction. A telfa template was made of the defect. This telfa template was then used to outline the Cheek-To-Nose Interpolation flap. The donor area for the pedicle flap was then injected with anesthesia. The flap was excised through the skin and subcutaneous tissue down to the layer of the underlying musculature. The interpolation flap was carefully excised within this deep plane to maintain its blood supply. The edges of the donor site were undermined. The donor site was closed in a primary fashion. The pedicle was then rotated into position and sutured. Once the tube was sutured into place, adequate blood supply was confirmed with blanching and refill. The pedicle was then wrapped with xeroform gauze and dressed appropriately with a telfa and gauze bandage to ensure continued blood supply and protect the attached pedicle. Dressing: dry sterile dressing Date Of Previous Biopsy (Optional): 08/16/17 Epidermal Sutures: 3-0 Nylon Complex Repair And M Plasty Text: The defect edges were debeveled with a #15 scalpel blade. The primary defect was closed partially with a complex linear closure. Given the location of the remaining defect, shape of the defect and the proximity to free margins an M plasty was deemed most appropriate for complete closure of the defect. Using a sterile surgical marker, an appropriate advancement flap was drawn incorporating the defect and placing the expected incisions within the relaxed skin tension lines where possible. The area thus outlined was incised deep to adipose tissue with a #15 scalpel blade. The skin margins were undermined to an appropriate distance in all directions utilizing iris scissors. Complex Repair And Rhombic Flap Text: The defect edges were debeveled with a #15 scalpel blade. The primary defect was closed partially with a complex linear closure. Given the location of the remaining defect, shape of the defect and the proximity to free margins a rhombic flap was deemed most appropriate for complete closure of the defect. Using a sterile surgical marker, an appropriate advancement flap was drawn incorporating the defect and placing the expected incisions within the relaxed skin tension lines where possible. The area thus outlined was incised deep to adipose tissue with a #15 scalpel blade. The skin margins were undermined to an appropriate distance in all directions utilizing iris scissors. Melolabial Interpolation Flap Text: A decision was made to reconstruct the defect utilizing an interpolation axial flap and a staged reconstruction. A telfa template was made of the defect. This telfa template was then used to outline the melolabial interpolation flap. The donor area for the pedicle flap was then injected with anesthesia. The flap was excised through the skin and subcutaneous tissue down to the layer of the underlying musculature. The pedicle flap was carefully excised within this deep plane to maintain its blood supply. The edges of the donor site were undermined. The donor site was closed in a primary fashion. The pedicle was then rotated into position and sutured. Once the tube was sutured into place, adequate blood supply was confirmed with blanching and refill. The pedicle was then wrapped with xeroform gauze and dressed appropriately with a telfa and gauze bandage to ensure continued blood supply and protect the attached pedicle. Repair Type: Complex Intermediate / Complex Repair - Final Wound Length In Cm: 3.2 Complex Repair And O-T Advancement Flap Text: The defect edges were debeveled with a #15 scalpel blade. The primary defect was closed partially with a complex linear closure. Given the location of the remaining defect, shape of the defect and the proximity to free margins an O-T advancement flap was deemed most appropriate for complete closure of the defect. Using a sterile surgical marker, an appropriate advancement flap was drawn incorporating the defect and placing the expected incisions within the relaxed skin tension lines where possible. The area thus outlined was incised deep to adipose tissue with a #15 scalpel blade. The skin margins were undermined to an appropriate distance in all directions utilizing iris scissors. Keystone Flap Text: The defect edges were debeveled with a #15 scalpel blade. Given the location of the defect, shape of the defect a keystone flap was deemed most appropriate. Using a sterile surgical marker, an appropriate keystone flap was drawn incorporating the defect, outlining the appropriate donor tissue and placing the expected incisions within the relaxed skin tension lines where possible. The area thus outlined was incised deep to adipose tissue with a #15 scalpel blade. The skin margins were undermined to an appropriate distance in all directions around the primary defect and laterally outward around the flap utilizing iris scissors. Complex Repair And Xenograft Text: The defect edges were debeveled with a #15 scalpel blade. The primary defect was closed partially with a complex linear closure. Given the location of the defect, shape of the defect and the proximity to free margins a xenograft was deemed most appropriate to repair the remaining defect. The graft was trimmed to fit the size of the remaining defect. The graft was then placed in the primary defect, oriented appropriately, and sutured into place. O-T Advancement Flap Text: The defect edges were debeveled with a #15 scalpel blade. Given the location of the defect, shape of the defect and the proximity to free margins an O-T advancement flap was deemed most appropriate. Using a sterile surgical marker, an appropriate advancement flap was drawn incorporating the defect and placing the expected incisions within the relaxed skin tension lines where possible. The area thus outlined was incised deep to adipose tissue with a #15 scalpel blade. The skin margins were undermined to an appropriate distance in all directions utilizing iris scissors. Eliptical Excision Additional Text (Leave Blank If You Do Not Want): The margin was drawn around the clinically apparent lesion. An elliptical shape was then drawn on the skin incorporating the lesion and margins. Incisions were then made along these lines to the appropriate tissue plane and the lesion was extirpated. Excision Depth: adipose tissue Trilobed Flap Text: The defect edges were debeveled with a #15 scalpel blade. Given the location of the defect and the proximity to free margins a trilobed flap was deemed most appropriate. Using a sterile surgical marker, an appropriate trilobed flap drawn around the defect. The area thus outlined was incised deep to adipose tissue with a #15 scalpel blade. The skin margins were undermined to an appropriate distance in all directions utilizing iris scissors. Scalpel Size: 15 blade Muscle Hinge Flap Text: The defect edges were debeveled with a #15 scalpel blade. Given the size, depth and location of the defect and the proximity to free margins a muscle hinge flap was deemed most appropriate. Using a sterile surgical marker, an appropriate hinge flap was drawn incorporating the defect. The area thus outlined was incised with a #15 scalpel blade. The skin margins were undermined to an appropriate distance in all directions utilizing iris scissors. Complex Repair And Ftsg Text: The defect edges were debeveled with a #15 scalpel blade. The primary defect was closed partially with a complex linear closure. Given the location of the defect, shape of the defect and the proximity to free margins a full thickness skin graft was deemed most appropriate to repair the remaining defect. The graft was trimmed to fit the size of the remaining defect. The graft was then placed in the primary defect, oriented appropriately, and sutured into place. Bilobed Flap Text: The defect edges were debeveled with a #15 scalpel blade. Given the location of the defect and the proximity to free margins a bilobe flap was deemed most appropriate. Using a sterile surgical marker, an appropriate bilobe flap drawn around the defect. The area thus outlined was incised deep to adipose tissue with a #15 scalpel blade. The skin margins were undermined to an appropriate distance in all directions utilizing iris scissors. Excisional Biopsy Additional Text (Leave Blank If You Do Not Want): The margin was drawn around the clinically apparent lesion. An elliptical shape was then drawn on the skin incorporating the lesion and margins.  Incisions were then made along these lines to the appropriate tissue plane and the lesion was extirpated. Star Wedge Flap Text: The defect edges were debeveled with a #15 scalpel blade. Given the location of the defect, shape of the defect and the proximity to free margins a star wedge flap was deemed most appropriate. Using a sterile surgical marker, an appropriate rotation flap was drawn incorporating the defect and placing the expected incisions within the relaxed skin tension lines where possible. The area thus outlined was incised deep to adipose tissue with a #15 scalpel blade. The skin margins were undermined to an appropriate distance in all directions utilizing iris scissors. Complex Repair And A-T Advancement Flap Text: The defect edges were debeveled with a #15 scalpel blade. The primary defect was closed partially with a complex linear closure. Given the location of the remaining defect, shape of the defect and the proximity to free margins an A-T advancement flap was deemed most appropriate for complete closure of the defect. Using a sterile surgical marker, an appropriate advancement flap was drawn incorporating the defect and placing the expected incisions within the relaxed skin tension lines where possible. The area thus outlined was incised deep to adipose tissue with a #15 scalpel blade. The skin margins were undermined to an appropriate distance in all directions utilizing iris scissors. Saucerization Excision Additional Text (Leave Blank If You Do Not Want): The margin was drawn around the clinically apparent lesion. Incisions were then made along these lines, in a tangential fashion, to the appropriate tissue plane and the lesion was extirpated. Complex Repair And Double M Plasty Text: The defect edges were debeveled with a #15 scalpel blade. The primary defect was closed partially with a complex linear closure. Given the location of the remaining defect, shape of the defect and the proximity to free margins a double M plasty was deemed most appropriate for complete closure of the defect. Using a sterile surgical marker, an appropriate advancement flap was drawn incorporating the defect and placing the expected incisions within the relaxed skin tension lines where possible. The area thus outlined was incised deep to adipose tissue with a #15 scalpel blade. The skin margins were undermined to an appropriate distance in all directions utilizing iris scissors. Melolabial Transposition Flap Text: The defect edges were debeveled with a #15 scalpel blade. Given the location of the defect and the proximity to free margins a melolabial flap was deemed most appropriate. Using a sterile surgical marker, an appropriate melolabial transposition flap was drawn incorporating the defect. The area thus outlined was incised deep to adipose tissue with a #15 scalpel blade. The skin margins were undermined to an appropriate distance in all directions utilizing iris scissors. Complex Repair Preamble Text (Leave Blank If You Do Not Want): Extensive wide undermining was performed. Transposition Flap Text: The defect edges were debeveled with a #15 scalpel blade. Given the location of the defect and the proximity to free margins a transposition flap was deemed most appropriate. Using a sterile surgical marker, an appropriate transposition flap was drawn incorporating the defect. The area thus outlined was incised deep to adipose tissue with a #15 scalpel blade. The skin margins were undermined to an appropriate distance in all directions utilizing iris scissors. Complex Repair And Dorsal Nasal Flap Text: The defect edges were debeveled with a #15 scalpel blade. The primary defect was closed partially with a complex linear closure. Given the location of the remaining defect, shape of the defect and the proximity to free margins a dorsal nasal flap was deemed most appropriate for complete closure of the defect. Using a sterile surgical marker, an appropriate flap was drawn incorporating the defect and placing the expected incisions within the relaxed skin tension lines where possible. The area thus outlined was incised deep to adipose tissue with a #15 scalpel blade. The skin margins were undermined to an appropriate distance in all directions utilizing iris scissors. Crescentic Advancement Flap Text: The defect edges were debeveled with a #15 scalpel blade. Given the location of the defect and the proximity to free margins a crescentic advancement flap was deemed most appropriate. Using a sterile surgical marker, the appropriate advancement flap was drawn incorporating the defect and placing the expected incisions within the relaxed skin tension lines where possible. The area thus outlined was incised deep to adipose tissue with a #15 scalpel blade. The skin margins were undermined to an appropriate distance in all directions utilizing iris scissors. Path Notes (To The Dermatopathologist): Size:1.0 cm x 1.6 cm\\nClosure: 3.2 Complex Repair And Modified Advancement Flap Text: The defect edges were debeveled with a #15 scalpel blade. The primary defect was closed partially with a complex linear closure. Given the location of the remaining defect, shape of the defect and the proximity to free margins a modified advancement flap was deemed most appropriate for complete closure of the defect. Using a sterile surgical marker, an appropriate advancement flap was drawn incorporating the defect and placing the expected incisions within the relaxed skin tension lines where possible. The area thus outlined was incised deep to adipose tissue with a #15 scalpel blade. The skin margins were undermined to an appropriate distance in all directions utilizing iris scissors. Mucosal Advancement Flap Text: Given the location of the defect, shape of the defect and the proximity to free margins a mucosal advancement flap was deemed most appropriate. Incisions were made with a 15 blade scalpel in the appropriate fashion along the cutaneous vermillion border and the mucosal lip. The remaining actinically damaged mucosal tissue was excised. The mucosal advancement flap was then elevated to the gingival sulcus with care taken to preserve the neurovascular structures and advanced into the primary defect. Care was taken to ensure that precise realignment of the vermilion border was achieved. Island Pedicle Flap With Canthal Suspension Text: The defect edges were debeveled with a #15 scalpel blade. Given the location of the defect, shape of the defect and the proximity to free margins an island pedicle advancement flap was deemed most appropriate. Using a sterile surgical marker, an appropriate advancement flap was drawn incorporating the defect, outlining the appropriate donor tissue and placing the expected incisions within the relaxed skin tension lines where possible. The area thus outlined was incised deep to adipose tissue with a #15 scalpel blade. The skin margins were undermined to an appropriate distance in all directions around the primary defect and laterally outward around the island pedicle utilizing iris scissors. There was minimal undermining beneath the pedicle flap. A suspension suture was placed in the canthal tendon to prevent tension and prevent ectropion. Consent was obtained from the patient. The risks and benefits to therapy were discussed in detail. Specifically, the risks of infection, scarring, bleeding, prolonged wound healing, incomplete removal, allergy to anesthesia, nerve injury and recurrence were addressed. Prior to the procedure, the treatment site was clearly identified and confirmed by the patient. All components of Universal Protocol/PAUSE Rule completed. Cartilage Graft Text: The defect edges were debeveled with a #15 scalpel blade. Given the location of the defect, shape of the defect, the fact the defect involved a full thickness cartilage defect a cartilage graft was deemed most appropriate. An appropriate donor site was identified, cleansed, and anesthetized. The cartilage graft was then harvested and transferred to the recipient site, oriented appropriately and then sutured into place. The secondary defect was then repaired using a primary closure. Island Pedicle Flap-Requiring Vessel Identification Text: The defect edges were debeveled with a #15 scalpel blade. Given the location of the defect, shape of the defect and the proximity to free margins an island pedicle advancement flap was deemed most appropriate. Using a sterile surgical marker, an appropriate advancement flap was drawn, based on the axial vessel mentioned above, incorporating the defect, outlining the appropriate donor tissue and placing the expected incisions within the relaxed skin tension lines where possible. The area thus outlined was incised deep to adipose tissue with a #15 scalpel blade. The skin margins were undermined to an appropriate distance in all directions around the primary defect and laterally outward around the island pedicle utilizing iris scissors. There was minimal undermining beneath the pedicle flap. Interpolation Flap Text: A decision was made to reconstruct the defect utilizing an interpolation axial flap and a staged reconstruction. A telfa template was made of the defect. This telfa template was then used to outline the interpolation flap. The donor area for the pedicle flap was then injected with anesthesia. The flap was excised through the skin and subcutaneous tissue down to the layer of the underlying musculature. The interpolation flap was carefully excised within this deep plane to maintain its blood supply. The edges of the donor site were undermined. The donor site was closed in a primary fashion. The pedicle was then rotated into position and sutured. Once the tube was sutured into place, adequate blood supply was confirmed with blanching and refill. The pedicle was then wrapped with xeroform gauze and dressed appropriately with a telfa and gauze bandage to ensure continued blood supply and protect the attached pedicle. Complex Repair And V-Y Plasty Text: The defect edges were debeveled with a #15 scalpel blade. The primary defect was closed partially with a complex linear closure. Given the location of the remaining defect, shape of the defect and the proximity to free margins a V-Y plasty was deemed most appropriate for complete closure of the defect. Using a sterile surgical marker, an appropriate advancement flap was drawn incorporating the defect and placing the expected incisions within the relaxed skin tension lines where possible. The area thus outlined was incised deep to adipose tissue with a #15 scalpel blade. The skin margins were undermined to an appropriate distance in all directions utilizing iris scissors. Lab: [id number] [facility] Spiral Flap Text: The defect edges were debeveled with a #15 scalpel blade. Given the location of the defect, shape of the defect and the proximity to free margins a spiral flap was deemed most appropriate. Using a sterile surgical marker, an appropriate rotation flap was drawn incorporating the defect and placing the expected incisions within the relaxed skin tension lines where possible. The area thus outlined was incised deep to adipose tissue with a #15 scalpel blade. The skin margins were undermined to an appropriate distance in all directions utilizing iris scissors. Repair Performed By Another Provider Text (Leave Blank If You Do Not Want): After the tissue was excised the defect was repaired by another provider. O-T Plasty Text: The defect edges were debeveled with a #15 scalpel blade. Given the location of the defect, shape of the defect and the proximity to free margins an O-T plasty was deemed most appropriate. Using a sterile surgical marker, an appropriate O-T plasty was drawn incorporating the defect and placing the expected incisions within the relaxed skin tension lines where possible. The area thus outlined was incised deep to adipose tissue with a #15 scalpel blade. The skin margins were undermined to an appropriate distance in all directions utilizing iris scissors. Advancement Flap (Double) Text: The defect edges were debeveled with a #15 scalpel blade. Given the location of the defect and the proximity to free margins a double advancement flap was deemed most appropriate. Using a sterile surgical marker, the appropriate advancement flaps were drawn incorporating the defect and placing the expected incisions within the relaxed skin tension lines where possible. The area thus outlined was incised deep to adipose tissue with a #15 scalpel blade. The skin margins were undermined to an appropriate distance in all directions utilizing iris scissors. Medical Necessity Information: It is in your best interest to select a reason for this procedure from the list below. All of these items fulfill various CMS LCD requirements except lesion extends to a margin. Accession #: [id number] Paramedian Forehead Flap Text: A decision was made to reconstruct the defect utilizing an interpolation axial flap and a staged reconstruction. A telfa template was made of the defect. This telfa template was then used to outline the paramedian forehead pedicle flap. The donor area for the pedicle flap was then injected with anesthesia. The flap was excised through the skin and subcutaneous tissue down to the layer of the underlying musculature. The pedicle flap was carefully excised within this deep plane to maintain its blood supply. The edges of the donor site were undermined. The donor site was closed in a primary fashion. The pedicle was then rotated into position and sutured. Once the tube was sutured into place, adequate blood supply was confirmed with blanching and refill. The pedicle was then wrapped with xeroform gauze and dressed appropriately with a telfa and gauze bandage to ensure continued blood supply and protect the attached pedicle. Hatchet Flap Text: The defect edges were debeveled with a #15 scalpel blade. Given the location of the defect, shape of the defect and the proximity to free margins a hatchet flap was deemed most appropriate. Using a sterile surgical marker, an appropriate hatchet flap was drawn incorporating the defect and placing the expected incisions within the relaxed skin tension lines where possible. The area thus outlined was incised deep to adipose tissue with a #15 scalpel blade. The skin margins were undermined to an appropriate distance in all directions utilizing iris scissors. Complex Repair And Double Advancement Flap Text: The defect edges were debeveled with a #15 scalpel blade. The primary defect was closed partially with a complex linear closure. Given the location of the remaining defect, shape of the defect and the proximity to free margins a double advancement flap was deemed most appropriate for complete closure of the defect. Using a sterile surgical marker, an appropriate advancement flap was drawn incorporating the defect and placing the expected incisions within the relaxed skin tension lines where possible. The area thus outlined was incised deep to adipose tissue with a #15 scalpel blade. The skin margins were undermined to an appropriate distance in all directions utilizing iris scissors. Post-Care Instructions: I reviewed with the patient in detail post-care instructions. Patient is not to engage in any heavy lifting, exercise, or swimming for the next 14 days. Should the patient develop any fevers, chills, bleeding, severe pain patient will contact the office immediately. Bilobed Transposition Flap Text: The defect edges were debeveled with a #15 scalpel blade. Given the location of the defect and the proximity to free margins a bilobed transposition flap was deemed most appropriate. Using a sterile surgical marker, an appropriate bilobe flap drawn around the defect. The area thus outlined was incised deep to adipose tissue with a #15 scalpel blade. The skin margins were undermined to an appropriate distance in all directions utilizing iris scissors. Medical Necessity Clause: This procedure was medically necessary because the lesion that was treated was: Fusiform Excision Additional Text (Leave Blank If You Do Not Want): The margin was drawn around the clinically apparent lesion. A fusiform shape was then drawn on the skin incorporating the lesion and margins. Incisions were then made along these lines to the appropriate tissue plane and the lesion was extirpated. A-T Advancement Flap Text: The defect edges were debeveled with a #15 scalpel blade. Given the location of the defect, shape of the defect and the proximity to free margins an A-T advancement flap was deemed most appropriate. Using a sterile surgical marker, an appropriate advancement flap was drawn incorporating the defect and placing the expected incisions within the relaxed skin tension lines where possible. The area thus outlined was incised deep to adipose tissue with a #15 scalpel blade. The skin margins were undermined to an appropriate distance in all directions utilizing iris scissors. Slit Excision Additional Text (Leave Blank If You Do Not Want): A linear line was drawn on the skin overlying the lesion. An incision was made slowly until the lesion was visualized. Once visualized, the lesion was removed with blunt dissection. Size Of Margin In Cm: 0.2 Complex Repair And Transposition Flap Text: The defect edges were debeveled with a #15 scalpel blade. The primary defect was closed partially with a complex linear closure. Given the location of the remaining defect, shape of the defect and the proximity to free margins a transposition flap was deemed most appropriate for complete closure of the defect. Using a sterile surgical marker, an appropriate advancement flap was drawn incorporating the defect and placing the expected incisions within the relaxed skin tension lines where possible. The area thus outlined was incised deep to adipose tissue with a #15 scalpel blade. The skin margins were undermined to an appropriate distance in all directions utilizing iris scissors. Complex Repair And O-L Flap Text: The defect edges were debeveled with a #15 scalpel blade. The primary defect was closed partially with a complex linear closure. Given the location of the remaining defect, shape of the defect and the proximity to free margins an O-L flap was deemed most appropriate for complete closure of the defect. Using a sterile surgical marker, an appropriate flap was drawn incorporating the defect and placing the expected incisions within the relaxed skin tension lines where possible. The area thus outlined was incised deep to adipose tissue with a #15 scalpel blade. The skin margins were undermined to an appropriate distance in all directions utilizing iris scissors. Complex Repair And Epidermal Autograft Text: The defect edges were debeveled with a #15 scalpel blade. The primary defect was closed partially with a complex linear closure. Given the location of the defect, shape of the defect and the proximity to free margins an epidermal autograft was deemed most appropriate to repair the remaining defect. The graft was trimmed to fit the size of the remaining defect. The graft was then placed in the primary defect, oriented appropriately, and sutured into place. Burow's Advancement Flap Text: The defect edges were debeveled with a #15 scalpel blade. Given the location of the defect and the proximity to free margins a Burow's advancement flap was deemed most appropriate. Using a sterile surgical marker, the appropriate advancement flap was drawn incorporating the defect and placing the expected incisions within the relaxed skin tension lines where possible. The area thus outlined was incised deep to adipose tissue with a #15 scalpel blade. The skin margins were undermined to an appropriate distance in all directions utilizing iris scissors. W Plasty Text: The lesion was extirpated to the level of the fat with a #15 scalpel blade. Given the location of the defect, shape of the defect and the proximity to free margins a W-plasty was deemed most appropriate for repair. Using a sterile surgical marker, the appropriate transposition arms of the W-plasty were drawn incorporating the defect and placing the expected incisions within the relaxed skin tension lines where possible. The area thus outlined was incised deep to adipose tissue with a #15 scalpel blade. The skin margins were undermined to an appropriate distance in all directions utilizing iris scissors. The opposing transposition arms were then transposed into place in opposite direction and anchored with interrupted buried subcutaneous sutures. Composite Graft Text: The defect edges were debeveled with a #15 scalpel blade. Given the location of the defect, shape of the defect, the proximity to free margins and the fact the defect was full thickness a composite graft was deemed most appropriate. The defect was outline and then transferred to the donor site. A full thickness graft was then excised from the donor site. The graft was then placed in the primary defect, oriented appropriately and then sutured into place. The secondary defect was then repaired using a primary closure. Rotation Flap Text: The defect edges were debeveled with a #15 scalpel blade. Given the location of the defect, shape of the defect and the proximity to free margins a rotation flap was deemed most appropriate. Using a sterile surgical marker, an appropriate rotation flap was drawn incorporating the defect and placing the expected incisions within the relaxed skin tension lines where possible. The area thus outlined was incised deep to adipose tissue with a #15 scalpel blade. The skin margins were undermined to an appropriate distance in all directions utilizing iris scissors. Complex Repair And Split-Thickness Skin Graft Text: The defect edges were debeveled with a #15 scalpel blade. The primary defect was closed partially with a complex linear closure. Given the location of the defect, shape of the defect and the proximity to free margins a split thickness skin graft was deemed most appropriate to repair the remaining defect. The graft was trimmed to fit the size of the remaining defect. The graft was then placed in the primary defect, oriented appropriately, and sutured into place. Anesthesia Volume In Cc: 6 Split-Thickness Skin Graft Text: The defect edges were debeveled with a #15 scalpel blade. Given the location of the defect, shape of the defect and the proximity to free margins a split thickness skin graft was deemed most appropriate. Using a sterile surgical marker, the primary defect shape was transferred to the donor site. The split thickness graft was then harvested. The skin graft was then placed in the primary defect and oriented appropriately. Complex Repair And Skin Substitute Graft Text: The defect edges were debeveled with a #15 scalpel blade. The primary defect was closed partially with a complex linear closure. Given the location of the remaining defect, shape of the defect and the proximity to free margins a skin substitute graft was deemed most appropriate to repair the remaining defect. The graft was trimmed to fit the size of the remaining defect. The graft was then placed in the primary defect, oriented appropriately, and sutured into place.

## 2021-01-12 NOTE — PROGRESS NOTES
MSW completed an assessment of need which was completed with patient in the office  Family dynamics:   Relationship status: Single    Duration of relationship:     Name of significant other:   Children and Ages: Son Sandy Quintanilla and daughter Ta Villatoro     Pets:   Other important family information: PT states her children are aware of her diagnosis, however are not supportive  She has a neighbor Pat who she relies on  Living situation: Alone    Patient's primary caregiver: Self  Environmental concerns or barriers:   history: N/A  Employment history:   Disability:    Spirituality/ Nondenominational:  Strong kamran  Patient's strengths, social supports, and resources: PT has a strong support system with her neighbor/friend Pat    Cultural information:   Mental Health current or previous:  Substance use or history:   Sleep: Sleeps well  Exercise: N/A  Diet/nutrition: Poor  Durable Medical Equipment needs: Has oxygen  Transportation: Self or friend Pat  Financial concerns: PT is currently waiting to receive disability  Advanced Directive: Working with /Provided and educated on 5 Wishes  Patient's Interests: enjoys gardening  Other medical or social work providers involved: Trever Hess current level of coping: Pt appears to be coping appropriately    Patient/family concerns and areas of need: Pain control/Support    *All questions may not be answered due to constraints  Follow-up discussions may need to occur    Dr Tory Colon and Tommie Barrett MSW met with new pt this morning  PT is a 78year old woman with a dx of small cell lung cancer  Dr Tory Colon and I introduced ourselves and our roles, as well as educating on palliative care  PT reports that she experiences pain in her neck, back and chest area  She uses a heating pad and capsaisin to alleviate the pain   Dr Tory Colon educated pt on the location of her masses and explained that this is why she is experiencing pain, as well as the chemo may be causing periods of pain as well  PT is currently taking morphine at night for the pain  Dr Becka Prescott educated pt that she is able to take it every 4 hours as needed, as well as how long the pain medication typically lasts  PT states that she will take it as needed for the pain, but really thus far it has been at night  She has difficulty sleeping and takes Tylenol pm, as well as Benadryl  Dr Becka Prescott educated pt on the effects of morphine ( effecting ones breathing) and being on the Tylenol and Benadryl  Dr Becka Prescott ex[ressed her concerns with this combination and asked pt to stop taking those medications for safety reasons  Dr Becka Prescott suggested she try Remeron  PT was educated on Remeron and pt was agreeable to try  PT was also prescribed Senakot for possible constipation  PTS appetite is poor and MSW suggested Boost or Ensure and she states that she does not care for these  MSW stated that a nutritionist can be available to her if she needs  PT states that she eats what she wants when she can tolerate eating  She has been eating a lot of peanut butter and pretzels and states her neighbor has been bringing her meals  PT has a medical POA on file, however she states she is redoing it  MSW also educated pt on the 5 wishes document and provided her a copy  PT has a son and a daughter who both live in the area, however she states that they are not supportive  She relies on a neighbor/friend for support, as well as the [address]  She states that she has a very strong kamran  MSW encouraged her to call if she needs any extra emotional support , PT has already spoken with Ashok Sinclair as well  I expressed that we are both here to support her  PT will follow up in 2 weeks  For additional information please refer to provider notes

## 2021-01-12 NOTE — PROGRESS NOTES
Palliative and Supportive Care   Zaki Foster 78 y o  female [id number]    Assessment/Plan:  1  Poor appetite    2  Small cell lung cancer (HCC)    3  Cancer associated pain    4  Primary insomnia    5  Drug induced constipation    6  Nasal congestion    7  Pleural effusion    8  Shortness of breath    9  Chronic bilateral thoracic back pain    10  Advanced care planning/counseling discussion      · Stop Tylenol pm and bendaryl  · Start regular tylenol 500mg PO q8H ATC  · Continue morphine IR 7 5mg to 15mg PO q4H prn  · Start mirtazapine 7 5mg PO ODHS x 2 weeks, increase to 15mg if appropriate  · Start senokot 1-2 tabs daily prn to prevent OIC  · Start Ocean nasal spray prn  · Refer to PT for neck exercises to help alleviate neck pain  · Reviewed scanned living will/dPOA in the system signed in 10/1993  She said she is in the process of drafting a new one with a   She has paperworks already in her possession for her review and signature  She will bring us a copy once this is formalized  Also provided her a copy of the 5 Wishes for her perusal  This may help guide further ACP that are not traditionally included in the standard living delatorre  · Social/family situations as noted in separate [facility] note  · RTO in 2 weeks for follow up on new meds    Controlled Substance Review    [place] or [place]  reviewed: No red flags were identified; safe to proceed with prescription  Eliceo Barton     2021  1   2021  Morphine Sulfate Ir 15 MG Tab  30 00  10 Pa Kristi   [id number]   Wal ([id number])   0  45 00 MME  [place]   2020  1   2020  Tramadol Hcl 50 MG Tablet  40 00  10 Ma Kri   [id number]   Wal ([id number])   0  20 00 MME         Requested Prescriptions     Signed Prescriptions Disp Refills    sodium chloride (OCEAN) 0 65 % nasal spray 30 mL 0     Si spray into each nostril as needed for congestion or rhinitis    mirtazapine (REMERON) 7 5 MG tablet 15 tablet 0     Sig: Take 1 tablet (7 5 mg total) by mouth daily at bedtime    senna-docusate sodium (SENOKOT-S) 8 6-50 mg per tablet 60 tablet 0     Sig: Take 1 tablet by mouth daily    morphine (MSIR) 15 mg tablet 45 tablet 0     Sig: Take 7 5mg or 15mg PO every 4 hours as needed for moderate to severe pain     Medications Discontinued During This Encounter   Medication Reason    morphine (MSIR) 15 mg tablet Reorder       Representatives have queried the patient's controlled substance dispensing history in the Prescription Drug Monitoring Program in compliance with regulations before I have prescribed any controlled substances  The prescription history is consistent with prescribed therapy and our practice policies  45 minutes were spent face to face with Kenyetta Fox with greater than 50% of the time spent in counseling or coordination of care including discussions of etiology of diagnosis, pathogenesis of diagnosis, prognosis of diagnosis, diagnostic results, impression, and recommendations, risks and benefits of treatment, instructions for disease self management, treatment instructions, follow up requirements, risk factors and risk reduction of disease, patient and family counseling/involvement in care and compliance with treatment regimen   All of the patient's questions were answered during this discussion  No follow-ups on file  Subjective:   Chief Complaint  New consultation for:  symptom management, goal of care assessment and decisional support, disease process education and discussion of prognosis, advance care planning  HPI     Kenyetta Fox is a 78 y o  female with stage IV small cell lung cancer (Dx in 11/2020) with pleural effusion s/p Pleurex cath  PET-CT shows upate in the L perihilar region as well as P pleural margin/pleural mets  She is currently on carboplatin, etoposide and Imfinzi c/o Dr Lorraine Castañdea  She [facility] cares      She was started on morphine IR 7 5mg to 15mg q4H prn by oncology for diffuse posterior chest wall pain that is likely from her cancer  She comes in today by herself  We introduced palliative care  She is mostly complaining of pain in her upper back/mid back and anterior chest wall pain in the area below her L breast  She also has neck pain with some stiffness that causes a decrease in ROM  Reports feeling tight muscle knots there  Chemo seems to aggravate pain  She reports that the morphine appears helpful but only consuming 7 5mg in the am and 15mg at night  We spent time discussing duration of action of morphine and encouraged her to take every 4 hours as needed  She also complains of some congestion and mildly blood-tinged mucus that may be due to her oxygen drying out her mucosa  She also complains of cramping in her legs but admits to poor fluid intake and if she drinks something, she drinks mostly soda  Provided education on increasing water intake as well as electrolyte laden fluids like gatorade  Also advised her to cut back on soda as this can also lead to poor electrolytes  She is also complaining of poor appetite and poor sleep  She is taking tylenol pm and benadryl  We spent time discussing dangerous combination of benadryl and morphine  After outweighing the benefit and risk of both, she is agreeable to recommendation to stop benadryl and continue morphine  To help her sleep and improve her appetite, she is agreeable to remeron  The following portions of the medical history were reviewed: past medical history, problem list, medication list, and social history      Current Outpatient Medications:     acetaminophen (TYLENOL) 500 mg tablet, Take 2 tablets (1,000 mg total) by mouth every 8 (eight) hours, Disp: , Rfl:     albuterol (ProAir HFA) 90 mcg/act inhaler, Inhale 2 puffs every 6 (six) hours as needed for wheezing or shortness of breath, Disp: 8 5 g, Rfl: 6    B Complex-Biotin-FA (TH VITAMIN B 50/B-COMPLEX) TABS, Take by mouth daily, Disp: , Rfl:     Capsaicin 0  025 % GEL, Apply topically 3 (three) times a day as needed , Disp: , Rfl:     cholecalciferol (VITAMIN D3) 1,000 units tablet, Take 7,000 Units by mouth daily, Disp: , Rfl:     COLCHICINE PO, Take 0 5 mg by mouth 3 (three) times a day as needed , Disp: , Rfl:     cyclobenzaprine (FLEXERIL) 10 mg tablet, Take 10 mg by mouth 3 (three) times a day as needed for muscle spasms, Disp: , Rfl:     fexofenadine-pseudoephedrine (ALLEGRA-D)  MG per tablet, Take 1 tablet by mouth 2 (two) times a day as needed for allergies, Disp: , Rfl:     fluconazole (DIFLUCAN) 100 mg tablet, Take 1 tablet (100 mg total) by mouth daily for 5 days, Disp: 5 tablet, Rfl: 0    GINKGO BILOBA PLUS PO, Take by mouth as needed 2 capsules every 2 mornings , Disp: , Rfl:     morphine (MSIR) 15 mg tablet, Take 7 5mg or 15mg PO every 4 hours as needed for moderate to severe pain, Disp: 45 tablet, Rfl: 0    omeprazole (PriLOSEC) 20 mg delayed release capsule, Take 1 capsule (20 mg total) by mouth daily, Disp: 30 capsule, Rfl: 0    ondansetron (ZOFRAN) 4 mg tablet, Take 1 tablet (4 mg total) by mouth every 8 (eight) hours as needed for nausea or vomiting, Disp: 30 tablet, Rfl: 0    dexamethasone (DECADRON) 2 mg tablet, Take 1 tablet (2 mg total) by mouth 2 (two) times a day with meals (Patient not taking: Reported on 1/12/2021), Disp: 60 tablet, Rfl: 0    Echinacea 400 MG CAPS, Take by mouth 3 (three) times a day as needed, Disp: , Rfl:     Hyaluronic Acid 20-60 MG CAPS, Take by mouth 3 (three) times a day , Disp: , Rfl:     L-Lysine 500 MG CAPS, Take by mouth 3 (three) times a day as needed, Disp: , Rfl:     levofloxacin (LEVAQUIN) 750 mg tablet, Take 1 tablet (750 mg total) by mouth every 24 hours for 7 days, Disp: 7 tablet, Rfl: 0    lidocaine-prilocaine (EMLA) cream, Apply topically as needed for mild pain 30-60 min prior to port access  , Disp: 30 g, Rfl: 0    loperamide (IMODIUM) 2 mg capsule, Take 2 mg by mouth daily , Disp: , Rfl:     mirtazapine (REMERON) 7 5 MG tablet, Take 1 tablet (7 5 mg total) by mouth daily at bedtime, Disp: 15 tablet, Rfl: 0    NON FORMULARY, Take 37 mg by mouth daily Eco Thyro 37 - reported by pt, Disp: , Rfl:     nystatin (MYCOSTATIN) 500,000 units/5 mL suspension, Apply 5 mL (500,000 Units total) to the mouth or throat 4 (four) times a day, Disp: 500 mL, Rfl: 0    prochlorperazine (COMPAZINE) 10 mg tablet, Take 10 mg by mouth every 6 (six) hours as needed for nausea or vomiting (migraines), Disp: , Rfl:     S-Adenosylmethionine (RADHA-E) 200 MG TABS, Take by mouth 3 (three) times a day, Disp: , Rfl:     senna-docusate sodium (SENOKOT-S) 8 6-50 mg per tablet, Take 1 tablet by mouth daily, Disp: 60 tablet, Rfl: 0    sodium chloride (OCEAN) 0 65 % nasal spray, 1 spray into each nostril as needed for congestion or rhinitis, Disp: 30 mL, Rfl: 0    Tragacanth (ASTRAGALUS ROOT) POWD, 470 mg 3 (three) times a day as needed, Disp: , Rfl:     umeclidinium-vilanterol (ANORO ELLIPTA) 62 5-25 MCG/INH inhaler, Inhale 1 puff daily, Disp: 1 Inhaler, Rfl: 5  Review of Systems   Constitutional: Positive for activity change, appetite change and fatigue  Negative for chills, fever and unexpected weight change  HENT: Negative for trouble swallowing  Respiratory: Negative for cough and shortness of breath  Cardiovascular: Negative for chest pain  Gastrointestinal: Negative for abdominal pain, constipation, diarrhea, nausea and vomiting  Musculoskeletal: Positive for back pain, neck pain and neck stiffness  Neurological: Negative for weakness  Psychiatric/Behavioral: Positive for sleep disturbance  The patient is not nervous/anxious  All other systems reviewed and are negative        All other systems negative    Objective:  Vital Signs  /80 (BP Location: Left arm, Patient Position: Sitting, Cuff Size: Standard)   Pulse 103   Temp 97 9 °F (36 6 °C) (Skin)   Resp 12   Wt 45 4 kg (100 lb)   SpO2 92% BMI 16 14 kg/m²    Physical Exam    Constitutional: Appears thin, cachectic, chronically ill but not toxic-loioking  In no acute physical or emotional distress  Head: Normocephalic and atraumatic  Eyes: EOM are normal  No ocular discharge  No scleral icterus  Neck: No visible adenopathy or masses  Respiratory: Effort normal  No stridor  No respiratory distress  Wearing oxygen via 4L NC  Gastrointestinal: No abdominal distension  Musculoskeletal: No edema  Neurological: Alert, oriented and appropriately conversant  Skin: No diaphoresis, no rashes seen on exposed areas of skin  Pale/Ashen  Psychiatric: Displays a normal mood and affect   Behavior, judgement and thought content appear normal      Berhane Mclean MD  Palliative Medicine & Supportive Care  Internal Medicine  Available via [facility] Text  Office: [phone]  Fax: [phone]

## 2021-01-15 ENCOUNTER — TELEPHONE (OUTPATIENT)
Dept: HEMATOLOGY ONCOLOGY | Facility: CLINIC | Age: 71
End: 2021-01-15

## 2021-01-15 ENCOUNTER — OFFICE VISIT (OUTPATIENT)
Dept: HEMATOLOGY ONCOLOGY | Facility: HOSPITAL | Age: 71
End: 2021-01-15
Payer: MEDICARE

## 2021-01-15 VITALS
RESPIRATION RATE: 16 BRPM | OXYGEN SATURATION: 89 % | HEART RATE: 120 BPM | WEIGHT: 97 LBS | HEIGHT: 65 IN | TEMPERATURE: 97.4 F | BODY MASS INDEX: 16.16 KG/M2 | DIASTOLIC BLOOD PRESSURE: 66 MMHG | SYSTOLIC BLOOD PRESSURE: 130 MMHG

## 2021-01-15 DIAGNOSIS — C34.90 SMALL CELL LUNG CANCER (HCC): Primary | ICD-10-CM

## 2021-01-15 PROCEDURE — 99214 OFFICE O/P EST MOD 30 MIN: CPT | Performed by: INTERNAL MEDICINE

## 2021-01-15 NOTE — PROGRESS NOTES
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  This would be a stage IV malignancy or since it is small cell extensive stage small cell lung cancer  HPI:      Mike Richards is seen for initial consultation 11/20/2020 regarding newly diagnosed stage IV malignancy  The patient had a pleural effusion which was drained which revealed poorly differentiated malignant neoplasm with neuroendocrine differentiation  She had a PET-CT scan done which showed FDG avid left lower lobe nodule suspicious for malignancy along with focal FDG uptake in the left perihilar region for which glenn metastases could not be excluded  There was extensive heterogeneous FDG uptake along the left pleural margin compatible with pleural metastases  There was no other hypermetabolic metastases in the neck abdomen or pelvis  There was decreased left pleural fluid posteriorly secondary to pleural catheter placement but loculated fluid was increasing anteriorly  The patient states she does get short of breath easy and at that point she knows her fluid is coming back  She has a PleurX catheter in place which he drains daily  Denies any nausea denies any vomiting  Has had diarrhea chronically for years so  EGD and colonoscopy did not show any major abnormalities  Again she is a smoker      Previous Hematologic/ Oncologic History:    Oncology History   [facility] cell [facility] ([facility] )   11/20/2020 Initial Diagnosis    Small cell lung cancer ([facility] )     11/23/2020 -  Chemotherapy    pegfilgrastim (NEULASTA ONPRO) subcutaneous injection kit 6 mg, 6 mg, Subcutaneous, Once, 3 of 6 cycles  Administration: 6 mg (11/25/2020), 6 mg (12/16/2020), 6 mg (1/6/2021)  fosaprepitant (EMEND) 150 mg in sodium chloride 0 9 % 250 mL IVPB, 150 mg, Intravenous, Once, 3 of 6 cycles  Administration: 150 mg (11/23/2020), 150 mg (12/14/2020), 150 mg (1/4/2021)  CARBOplatin (PARAPLATIN) 402 mg in sodium chloride 0 9 % 250 mL IVPB, 402 mg, Intravenous, Once, 3 of 6 cycles  Administration: 402 mg (11/23/2020), 371 5 mg (12/14/2020), 387 5 mg (1/4/2021)  Durvalumab 1,500 mg in sodium chloride 0 9 % 100 mL IVPB, 1,500 mg (original dose 10 mg/kg), Intravenous, Once, 3 of 6 cycles  Dose modification: 1,500 mg (original dose 10 mg/kg, Cycle 1, Reason: Other (See Comments))  Administration: 1,500 mg (11/23/2020), 1,500 mg (12/14/2020), 1,500 mg (1/4/2021)  etoposide (TOPOSAR) 152 mg in sodium chloride 0 9 % 500 mL chemo infusion, 100 mg/m2 = 152 mg, Intravenous, Once, 3 of 6 cycles  Administration: 152 mg (11/23/2020), 152 mg (11/25/2020), 152 mg (11/24/2020), 152 mg (12/14/2020), 152 mg (12/15/2020), 152 mg (12/16/2020), 144 mg (1/4/2021), 144 mg (1/5/2021), 144 mg (1/6/2021)         Current Hematologic/ Oncologic Treatment:    Carboplatin and etoposide for 3 cycles so far    Interval History:    The patient returns for follow-up visit  She was having some thrush and mucositis so seen by our colleagues in [address] was started on Diflucan  Her thrush is resolved on my exam today  She still has some mucositis but I did not see any ulcers and I suspect these are very tiny and she has some stinging sensation when she eats anything  It is tolerable  I will call in Magic mouthwash  Denies any nausea denies any vomiting  She states she feels good for few days after the chemo then gets tired but then her symptoms slowly improved  She has received 3 cycles of chemotherapy so far  Her pleural effusions have resolved  I will do a CT scan and have her see our colleagues in Radiation Oncology to see if they wish to add on radiation for her last 2 cycles  The rest of her 14 point review of systems today was negative        Cancer Staging:  Cancer Staging  Lung cancer [facility])  Staging form: Lung, AJCC 8th Edition  - Clinical: No stage assigned - Unsigned      Test Results:    Imaging: Xr Chest Portable    Result Date: 12/20/2020  Narrative: CHEST INDICATION:   pleural effusion  COMPARISON:  Chest radiograph from 12/15/2020 and chest CT from 12/18/2020  EXAM PERFORMED/VIEWS:  XR CHEST PORTABLE FINDINGS:  Right port at cavoatrial junction  Cardiomediastinal silhouette appears unremarkable  Left pleural drainage catheter with persistent moderate loculated left effusion and left left base atelectasis  No pneumothorax  Osseous structures appear within normal limits for patient age  Impression: Persistent moderate loculated left effusion and left base atelectasis  Workstation performed: [id number]     Xr Chest Pa & Lateral    Result Date: 1/11/2021  Narrative: CHEST INDICATION:   R04 2: Hemoptysis  COMPARISON:  One view chest 12/20/2020 EXAM PERFORMED/VIEWS:  XR CHEST PA & LATERAL  The frontal view was performed utilizing dual energy radiographic technique  FINDINGS:  Right chest wall anoop catheter in place tip at the superior cavoatrial junction  Normal cardiac silhouette  Aortic calcification is present  New left upper lobe infiltrates  Small to moderate left pleural effusion with adjacent subsegmental atelectasis slightly improved allowing for difference in technique  No pneumothorax or pulmonary edema  Hyperlucent changes suggestive of COPD  Mild degenerative changes in the spine  Impression: New left upper lobe infiltrates  Small to moderate left pleural effusion with adjacent subsegmental atelectasis slightly improved  This study demonstrates a significant  finding and was documented as such in [facility] for liaison and referring practitioner notification  Workstation performed: [id number]     Ct Head Wo Contrast    Result Date: 12/21/2020  Narrative: CT BRAIN - WITHOUT CONTRAST INDICATION:   Headache, acute, normal neuro exam Headache  COMPARISON:  None   TECHNIQUE:  CT examination of the brain was performed  In addition to axial images, sagittal and coronal 2D reformatted images were created and submitted for interpretation  Radiation dose length product (DLP) for this visit:  729 mGy-cm   This examination, like all CT scans performed in the [facility], was performed utilizing techniques to minimize radiation dose exposure, including the use of iterative reconstruction and automated exposure control  IMAGE QUALITY:  Diagnostic  FINDINGS: PARENCHYMA:  No intracranial mass, mass effect or midline shift  No CT signs of acute infarction  No acute parenchymal hemorrhage  VENTRICLES AND EXTRA-AXIAL SPACES:  Normal for the patient's age  VISUALIZED ORBITS AND PARANASAL SINUSES:  Unremarkable  CALVARIUM AND EXTRACRANIAL SOFT TISSUES:  Normal      Impression: No acute intracranial abnormality  Workstation performed: [id number]     Pe Study With Ct Abdomen And Pelvis With Contrast    Result Date: 12/18/2020  Narrative: CT PULMONARY ANGIOGRAM OF THE CHEST AND CT ABDOMEN AND PELVIS WITH INTRAVENOUS CONTRAST INDICATION:   Shortness of breath; abdominal pain; history lung cancer  Elevated d-dimer COMPARISON:  CTA chest 11/24/2020; PET/CT 11/18/2020 TECHNIQUE:  CT examination of the chest, abdomen and pelvis was performed  Thin section CT angiographic technique was used in the chest in order to evaluate for pulmonary embolus and coronal 3D MIP postprocessing was performed on the acquisition scanner  Axial, sagittal, and coronal 2D reformatted images were created from the source data and submitted for interpretation  Radiation dose length product (DLP) for this visit:  596 mGy-cm   This examination, like all CT scans performed in the [facility], was performed utilizing techniques to minimize radiation dose exposure, including the use of iterative reconstruction and automated exposure control   IV Contrast:  100 mL of iohexol (OMNIPAQUE) Enteric Contrast:  Enteric contrast was not administered  FINDINGS: CHEST PULMONARY ARTERIAL TREE:  There is adequate opacification of the pulmonary arterial tree with no filling defects identified to suggest acute pulmonary emboli  Pulmonary arteries are of normal caliber  LUNGS:  Extensive diffuse centrilobular emphysematous change is again noted bilaterally  Some dependent atelectatic changes are present in the lung bases  Again identified is a nodule located peripherally in the superior segment of the left lower lobe measuring 2 2 cm in greatest dimension, similar the prior study  PLEURA:  Again identified is a pleural drainage catheter in the posterior medial left hemithorax directed superiorly  There remains a small loculated left pleural effusion with greatest volume inferiorly similar the prior exam with some adjacent atelectatic lung  There is some nodular pleural thickening present diffusely, including within the major fissure concerning for pleural metastatic disease  No pneumothorax identified  HEART/AORTA:  The heart is normal in size  Great vessels are normal in course and caliber  MEDIASTINUM AND MINA:  No discrete mediastinal adenopathy  Loculated fluid about the aortic arch and descending thoracic aorta again noted  CHEST WALL AND LOWER NECK:   Unremarkable  ABDOMEN LIVER/BILIARY TREE:  Unremarkable  GALLBLADDER:  No calcified gallstones  No pericholecystic inflammatory change  SPLEEN:  Unremarkable  PANCREAS:  Unremarkable  ADRENAL GLANDS:  Unremarkable  KIDNEYS/URETERS:  Unremarkable  No hydronephrosis  STOMACH AND BOWEL:  Moderate retained fecal material consistent with constipation  Scattered colonic diverticula  APPENDIX:  A normal appendix was visualized  ABDOMINOPELVIC CAVITY:  Trace free fluid in the pelvis  VESSELS:  Unremarkable for patient's age  PELVIS REPRODUCTIVE ORGANS:  Unremarkable for patient's age  URINARY BLADDER:  Unremarkable  ABDOMINAL WALL/INGUINAL REGIONS:  Unremarkable  OSSEOUS STRUCTURES:  No acute fracture or destructive osseous lesion  Impression: 1  No evidence for acute pulmonary embolism  2   2 2 cm nodule in the superior segment left lower lobe again noted without change consistent with malignancy  3   All drainage catheter with small loculated left pleural effusion and pleural nodularity without change from the prior study consistent with pleural metastatic disease  4   No acute abnormality identified in the abdomen or pelvis  5   Additional findings as noted  Workstation performed: [id number]     Ir Pleural Effusion Long-term Catheter Removal    Result Date: 12/28/2020  Narrative: Procedure: Removal of tunneled aseptic catheter Indication not draining, removal requested  Fluoroscopy time: None minutes Images:  none Contrast: none  Sedation: Sedation: Moderate conscious sedation was utilized under my direct supervision administered by trained independent provider  A total of 40 minutes sedation time was utilized  I was present at the initial dose of sedation medication  Findings: Indwelling catheter was identified for removal  The site was prepped and draped in the usual sterile fashion  All elements of maximal sterile barrier technique were followed (cap, mask, sterile gown, sterile gloves, large sterile sheet, hand hygiene, and 2% chlorhexidine for cutaneous antisepsis)  1% local lidocaine was administered  Catheter was dissected and removed in sterile fashion  Sterile dressing was placed  Impression: Impression: Removal of left tunneled aseptic catheter in sterile fashion   Workstation performed: [id number]       Labs:   Lab Results   Component Value Date    WBC 13 99 (H) 12/31/2020    HGB 12 4 12/31/2020    HCT 40 2 12/31/2020    MCV 98 12/31/2020     (H) 12/31/2020     Lab Results   Component Value Date    K 4 2 12/31/2020     12/31/2020    CO2 27 12/31/2020    BUN 20 12/31/2020    CREATININE 0 67 12/31/2020    GLUF 94 12/31/2020 CALCIUM 9 2 12/31/2020    CORRECTEDCA 10 0 12/31/2020    AST 22 12/31/2020    ALT 18 12/31/2020    ALKPHOS 167 (H) 12/31/2020    EGFR 91 12/31/2020       ROS: As stated in the history of present illness otherwise his 14 point review of systems today was negative  Active Problems:   Patient Active Problem List   Diagnosis    Chronic fatigue syndrome with fibromyalgia    Diarrhea    Weight loss    Abnormal LFTs    History of colon polyps    Right lower quadrant abdominal pain    Shortness of breath    Pleural effusion    Abnormal CT of the chest    Tobacco abuse    Elevated troponin    Elevated blood pressure reading    Acute on chronic respiratory failure with hypoxia (HCC)    Pulmonary emphysema (HCC)    Small cell lung cancer (HCC)    Chemotherapy induced neutropenia (HCC)    Chest pain    Lower extremity edema    Leukocytosis    Elevated d-dimer    Oral thrush    Severe protein-calorie malnutrition (HCC)    Lung cancer (HCC)    Cancer associated pain    Hemoptysis    Chronic bilateral thoracic back pain    Advanced care planning/counseling discussion       Past Medical History:   Past Medical History:   Diagnosis Date    Chronic fatigue syndrome with fibromyalgia     COPD (chronic obstructive pulmonary disease) (HCC)     DDD (degenerative disc disease), cervical     Emphysema of lung (HCC)     Hx of migraine headaches     Hypothyroidism     [facility] ([facility] )        Surgical History:   Past Surgical History:   Procedure Laterality Date    BARTHOLIN GLAND CYST EXCISION      CARPAL TUNNEL RELEASE Bilateral     COLONOSCOPY  07/09/2020     15 mm sessile polyp in the cecum removed by polypectomy    A 3 recall    IR PLEURAL EFFUSION LONG-TERM CATHETER PLACEMENT  11/13/2020    IR PLEURAL EFFUSION LONG-TERM CATHETER REMOVAL  12/21/2020    IR PORT PLACEMENT  12/11/2020    IR THORACENTESIS  11/3/2020    TONSILLECTOMY      TUBAL LIGATION      UPPER GASTROINTESTINAL ENDOSCOPY 07/09/2020     small hiatal hernia  Pathology negative  Family History:    Family History   Problem Relation Age of Onset    Colon polyps Mother     Heart disease Mother     Heart disease Father     Colon cancer Neg Hx        Cancer-related family history is negative for Colon cancer      Social History:   Social History     Socioeconomic History    Marital status:      Spouse name: Not on file    Number of children: Not on file    Years of education: Not on file    Highest education level: Not on file   Occupational History    Not on file   Social Needs    Financial resource strain: Not on file    Food insecurity     Worry: Not on file     Inability: Not on file   [employer] needs     Medical: Not on file     Non-medical: Not on file   Tobacco Use    Smoking status: Former Smoker     Packs/day: 0 50     Years: 53 00     Pack years: 26 50     Types: Cigarettes     Start date: 1967    Smokeless tobacco: Never Used    Tobacco comment: 2 cigarettes daily   Substance and Sexual Activity    Alcohol use: Not Currently    Drug use: Not Currently    Sexual activity: Not Currently   Lifestyle    Physical activity     Days per week: Not on file     Minutes per session: Not on file    Stress: Not on file   Relationships    Social connections     Talks on phone: Not on file     Gets together: Not on file     Attends [employer] service: Not on file     Active member of club or organization: Not on file     Attends meetings of clubs or organizations: Not on file     Relationship status: Not on file    Intimate partner violence     Fear of current or ex partner: Not on file     Emotionally abused: Not on file     Physically abused: Not on file     Forced sexual activity: Not on file   Other Topics Concern    Not on file   Social History Narrative    Not on file       Current Medications:   Current Outpatient Medications   Medication Sig Dispense Refill    acetaminophen (TYLENOL) 500 mg tablet Take 2 tablets (1,000 mg total) by mouth every 8 (eight) hours      albuterol (ProAir HFA) 90 mcg/act inhaler Inhale 2 puffs every 6 (six) hours as needed for wheezing or shortness of breath 8 5 g 6    B Complex-Biotin-FA (TH VITAMIN B 50/B-COMPLEX) TABS Take by mouth daily      Capsaicin 0 025 % GEL Apply topically 3 (three) times a day as needed       cholecalciferol (VITAMIN D3) 1,000 units tablet Take 7,000 Units by mouth daily      COLCHICINE PO Take 0 5 mg by mouth 3 (three) times a day as needed       cyclobenzaprine (FLEXERIL) 10 mg tablet Take 10 mg by mouth 3 (three) times a day as needed for muscle spasms      Echinacea 400 MG CAPS Take by mouth 3 (three) times a day as needed      fexofenadine-pseudoephedrine (ALLEGRA-D)  MG per tablet Take 1 tablet by mouth 2 (two) times a day as needed for allergies      GINKGO BILOBA PLUS PO Take by mouth as needed 2 capsules every 2 mornings       Hyaluronic Acid 20-60 MG CAPS Take by mouth 3 (three) times a day       L-Lysine 500 MG CAPS Take by mouth 3 (three) times a day as needed      levofloxacin (LEVAQUIN) 750 mg tablet Take 1 tablet (750 mg total) by mouth every 24 hours for 7 days 7 tablet 0    lidocaine-prilocaine (EMLA) cream Apply topically as needed for mild pain 30-60 min prior to port access   30 g 0    loperamide (IMODIUM) 2 mg capsule Take 2 mg by mouth daily       mirtazapine (REMERON) 7 5 MG tablet Take 1 tablet (7 5 mg total) by mouth daily at bedtime 15 tablet 0    morphine (MSIR) 15 mg tablet Take 7 5mg or 15mg PO every 4 hours as needed for moderate to severe pain 45 tablet 0    NON FORMULARY Take 37 mg by mouth daily Eco Thyro 37 - reported by pt      nystatin (MYCOSTATIN) 500,000 units/5 mL suspension Apply 5 mL (500,000 Units total) to the mouth or throat 4 (four) times a day 500 mL 0    omeprazole (PriLOSEC) 20 mg delayed release capsule Take 1 capsule (20 mg total) by mouth daily 30 capsule 0    ondansetron (ZOFRAN) 4 mg tablet Take 1 tablet (4 mg total) by mouth every 8 (eight) hours as needed for nausea or vomiting 30 tablet 0    prochlorperazine (COMPAZINE) 10 mg tablet Take 10 mg by mouth every 6 (six) hours as needed for nausea or vomiting (migraines)      S-Adenosylmethionine (RADHA-E) 200 MG TABS Take by mouth 3 (three) times a day      senna-docusate sodium (SENOKOT-S) 8 6-50 mg per tablet Take 1 tablet by mouth daily 60 tablet 0    sodium chloride (OCEAN) 0 65 % nasal spray 1 spray into each nostril as needed for congestion or rhinitis 30 mL 0    Tragacanth (ASTRAGALUS ROOT) POWD 470 mg 3 (three) times a day as needed      umeclidinium-vilanterol (ANORO ELLIPTA) 62 5-25 MCG/INH inhaler Inhale 1 puff daily 1 Inhaler 5     No current facility-administered medications for this visit  Allergies: Allergies   Allergen Reactions    Clarithromycin     Codeine Other (See Comments)     lethergic    Other      PHOSPHATE    Oxycodone Other (See Comments)     lethergic    Trazodone Other (See Comments)     lethergic    Penicillins Rash       Physical Exam:    There is no height or weight on file to calculate BSA  Wt Readings from Last 3 Encounters:   01/12/21 45 4 kg (100 lb)   01/08/21 45 4 kg (100 lb)   01/06/21 47 kg (103 lb 9 9 oz)        Temp Readings from Last 3 Encounters:   01/12/21 97 9 °F (36 6 °C) (Skin)   01/08/21 98 1 °F (36 7 °C)   01/06/21 98 2 °F (36 8 °C)        BP Readings from Last 3 Encounters:   01/12/21 160/80   01/08/21 100/60   01/06/21 146/93         Pulse Readings from Last 3 Encounters:   01/12/21 103   01/08/21 (!) 108   01/06/21 (!) 110        Physical Exam     Constitutional   General appearance: No acute distress, well appearing and well nourished  Eyes   Conjunctiva and lids: No swelling, erythema or discharge  Pupils and irises: Equal, round and reactive to light      Ears, Nose, Mouth, and Throat   External inspection of ears and nose: Normal     Nasal mucosa, septum, and turbinates: Normal without edema or erythema  Oropharynx: Normal with no erythema, edema, exudate or lesions  Pulmonary   Respiratory effort: No increased work of breathing or signs of respiratory distress  Auscultation of lungs: Clear to auscultation  Cardiovascular   Palpation of heart: Normal PMI, no thrills  Auscultation of heart: Normal rate and rhythm, normal S1 and S2, without murmurs  Examination of extremities for edema and/or varicosities: Normal     Carotid pulses: Normal     Abdomen   Abdomen: Non-tender, no masses  Liver and spleen: No hepatomegaly or splenomegaly  Lymphatic   Palpation of lymph nodes in neck: No lymphadenopathy  Musculoskeletal   Gait and station: Normal     Digits and nails: Normal without clubbing or cyanosis  Inspection/palpation of joints, bones, and muscles: Normal     Skin   Skin and subcutaneous tissue: Normal without rashes or lesions  Neurologic   Cranial nerves: Cranial nerves 2-12 intact  Sensation: No sensory loss  Psychiatric   Orientation to person, place, and time: Normal     Mood and affect: Normal         Assessment / Plan:      The patient is a pleasant 51-year-old female with 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  Based on her PET-CT scan this seems to be a lung primary as a nodule is seen on the left side  With neuroendocrine features I would treat this as a small cell lung cancer  Explained my recommendation would be carboplatin along with etoposide in combination with immunotherapy with Imfinzi  the patient agreed to do this  She is doing well on it apart from some side effects  She is now seeing palliative Care were helping with her symptoms  They gave her Diflucan for thrush  I will give her Magic mouthwash as it is Friday and they are closed currently  I will see her back in 3-4 weeks    I will get a scan done as soon as possible and have her see our colleagues in Radiation Oncology to see if radiation is a possibility  If not we will finish out 6 cycles of chemotherapy in combination with immunotherapy to be followed by immunotherapy maintenance  The patient agrees with this  I will set up for imaging  If she has any questions she will call our office  Goals and Barriers:  Current Goal:  Prolong Survival from neuroendocrine cancer   Barriers: None  Patient's Capacity to Self Care:  Patient able to self care  Portions of the record may have been created with voice recognition software   Occasional wrong word or "sound a like" substitutions may have occurred due to the inherent limitations of voice recognition software   Read the chart carefully and recognize, using context, where substitutions have occurred

## 2021-01-15 NOTE — TELEPHONE ENCOUNTER
Patient calling for additional fill for Diflucan  She completed her previous fill from 1/8/21  Patient reports it did help but she still has white patches, soreness in her mouth and is having difficulty eating    Will send to RN to review with NP - can additional diflucan be sent to the pharmacy?     Please let me know and I can return call to patient

## 2021-01-15 NOTE — TELEPHONE ENCOUNTER
daMedication Refill     Who is Calling  Patient   Medication  fluconazole   How many pills left  1   Preferred Pharmacy  [facility]    Call back number  [phone]   Relevant Information

## 2021-01-18 ENCOUNTER — TELEPHONE (OUTPATIENT)
Dept: HEMATOLOGY ONCOLOGY | Facility: CLINIC | Age: 71
End: 2021-01-18

## 2021-01-18 PROBLEM — Z95.828 PORT-A-CATH IN PLACE: Status: ACTIVE | Noted: 2021-01-18

## 2021-01-18 NOTE — TELEPHONE ENCOUNTER
Patient advised that lab orders are active in Epic for her blood work prior to her 1/26/21 chemotherapy  Patient verbalized understanding of above and stated that she will have her blood work drawn at [facility] on 1/21/21  Will notify Dr Ashok Ruiz RN

## 2021-01-20 ENCOUNTER — LAB (OUTPATIENT)
Dept: LAB | Facility: HOSPITAL | Age: 71
End: 2021-01-20
Attending: INTERNAL MEDICINE
Payer: MEDICARE

## 2021-01-20 ENCOUNTER — HOSPITAL ENCOUNTER (OUTPATIENT)
Dept: CT IMAGING | Facility: HOSPITAL | Age: 71
Discharge: HOME | End: 2021-01-20
Attending: INTERNAL MEDICINE
Payer: MEDICARE

## 2021-01-20 ENCOUNTER — TELEPHONE (OUTPATIENT)
Dept: HEMATOLOGY ONCOLOGY | Facility: CLINIC | Age: 71
End: 2021-01-20

## 2021-01-20 ENCOUNTER — HOSPITAL ENCOUNTER (OUTPATIENT)
Dept: INFUSION CENTER | Facility: HOSPITAL | Age: 71
Discharge: HOME | End: 2021-01-20
Payer: MEDICARE

## 2021-01-20 VITALS — TEMPERATURE: 97.3 F

## 2021-01-20 DIAGNOSIS — T45.1X5A CHEMOTHERAPY INDUCED NEUTROPENIA (HCC): ICD-10-CM

## 2021-01-20 DIAGNOSIS — C34.90 SMALL CELL LUNG CANCER (HCC): ICD-10-CM

## 2021-01-20 DIAGNOSIS — D70.1 CHEMOTHERAPY INDUCED NEUTROPENIA (HCC): ICD-10-CM

## 2021-01-20 LAB
ALBUMIN SERPL BCP-MCNC: 2.5 G/DL (ref 3.5–5)
ALP SERPL-CCNC: 130 U/L (ref 46–116)
ALT SERPL W P-5'-P-CCNC: 19 U/L (ref 12–78)
ANION GAP SERPL CALCULATED.3IONS-SCNC: 11 MMOL/L (ref 4–13)
AST SERPL W P-5'-P-CCNC: 23 U/L (ref 5–45)
BASOPHILS # BLD AUTO: 0.08 THOUSANDS/ΜL (ref 0–0.1)
BASOPHILS NFR BLD AUTO: 1 % (ref 0–1)
BILIRUB SERPL-MCNC: 0.3 MG/DL (ref 0.2–1)
BUN SERPL-MCNC: 15 MG/DL (ref 5–25)
CALCIUM ALBUM COR SERPL-MCNC: 9.3 MG/DL (ref 8.3–10.1)
CALCIUM SERPL-MCNC: 8.1 MG/DL (ref 8.3–10.1)
CHLORIDE SERPL-SCNC: 100 MMOL/L (ref 100–108)
CO2 SERPL-SCNC: 24 MMOL/L (ref 21–32)
CREAT SERPL-MCNC: 0.67 MG/DL (ref 0.6–1.3)
EOSINOPHIL # BLD AUTO: 0.02 THOUSAND/ΜL (ref 0–0.61)
EOSINOPHIL NFR BLD AUTO: 0 % (ref 0–6)
ERYTHROCYTE [DISTWIDTH] IN BLOOD BY AUTOMATED COUNT: 17.8 % (ref 11.6–15.1)
GFR SERPL CREATININE-BSD FRML MDRD: 91 ML/MIN/1.73SQ M
GLUCOSE SERPL-MCNC: 100 MG/DL (ref 65–140)
HCT VFR BLD AUTO: 32.8 % (ref 34.8–46.1)
HGB BLD-MCNC: 10.4 G/DL (ref 11.5–15.4)
IMM GRANULOCYTES # BLD AUTO: 0.07 THOUSAND/UL (ref 0–0.2)
IMM GRANULOCYTES NFR BLD AUTO: 1 % (ref 0–2)
LYMPHOCYTES # BLD AUTO: 0.65 THOUSANDS/ΜL (ref 0.6–4.47)
LYMPHOCYTES NFR BLD AUTO: 6 % (ref 14–44)
MCH RBC QN AUTO: 30.1 PG (ref 26.8–34.3)
MCHC RBC AUTO-ENTMCNC: 31.7 G/DL (ref 31.4–37.4)
MCV RBC AUTO: 95 FL (ref 82–98)
MONOCYTES # BLD AUTO: 0.95 THOUSAND/ΜL (ref 0.17–1.22)
MONOCYTES NFR BLD AUTO: 9 % (ref 4–12)
NEUTROPHILS # BLD AUTO: 8.47 THOUSANDS/ΜL (ref 1.85–7.62)
NEUTS SEG NFR BLD AUTO: 83 % (ref 43–75)
NRBC BLD AUTO-RTO: 0 /100 WBCS
PLATELET # BLD AUTO: 359 THOUSANDS/UL (ref 149–390)
PMV BLD AUTO: 9.2 FL (ref 8.9–12.7)
POTASSIUM SERPL-SCNC: 3.6 MMOL/L (ref 3.5–5.3)
PROT SERPL-MCNC: 5.8 G/DL (ref 6.4–8.2)
RBC # BLD AUTO: 3.45 MILLION/UL (ref 3.81–5.12)
SODIUM SERPL-SCNC: 135 MMOL/L (ref 136–145)
TSH SERPL DL<=0.05 MIU/L-ACNC: 2.45 UIU/ML (ref 0.36–3.74)
WBC # BLD AUTO: 10.24 THOUSAND/UL (ref 4.31–10.16)

## 2021-01-20 PROCEDURE — 36415 COLL VENOUS BLD VENIPUNCTURE: CPT

## 2021-01-20 PROCEDURE — 84443 ASSAY THYROID STIM HORMONE: CPT

## 2021-01-20 PROCEDURE — 74177 CT ABD & PELVIS W/CONTRAST: CPT

## 2021-01-20 PROCEDURE — 71260 CT THORAX DX C+: CPT

## 2021-01-20 PROCEDURE — 85025 COMPLETE CBC W/AUTO DIFF WBC: CPT

## 2021-01-20 PROCEDURE — G1004 CDSM NDSC: HCPCS

## 2021-01-20 PROCEDURE — 80053 COMPREHEN METABOLIC PANEL: CPT

## 2021-01-20 RX ADMIN — IOHEXOL 100 ML: 350 INJECTION, SOLUTION INTRAVENOUS at 09:11

## 2021-01-20 NOTE — TELEPHONE ENCOUNTER
IMPRESSION:     CT chest:     2 7 cm left lower lobe subpleural mass not significantly changed since December 18, 2020 when measured in the same fashion      Progressive left pleural carcinomatosis with multifocal chest wall infiltration      New mild left hilar adenopathy      Smaller left multiloculated malignant pleural effusion      CT abdomen and pelvis:     No evidence of abdominopelvic metastatic disease      This study demonstrates a significant  finding and was documented as such in [facility] for liaison and referring practitioner notification         F/U appt 1/25/2021  Dr Dmitry Berry RN aware

## 2021-01-20 NOTE — PROGRESS NOTES
Pt arrived amb for port access for CT scan  Denies c/o  On portable O2  Port accessed [facility]  Brisk blood return  Flushed and secured with Tegaderm  Will de-access after CT scan completed

## 2021-01-21 ENCOUNTER — TELEPHONE (OUTPATIENT)
Dept: PULMONOLOGY | Facility: CLINIC | Age: 71
End: 2021-01-21

## 2021-01-21 ENCOUNTER — OFFICE VISIT (OUTPATIENT)
Dept: PULMONOLOGY | Facility: CLINIC | Age: 71
End: 2021-01-21
Payer: MEDICARE

## 2021-01-21 ENCOUNTER — TELEPHONE (OUTPATIENT)
Dept: HEMATOLOGY ONCOLOGY | Facility: CLINIC | Age: 71
End: 2021-01-21

## 2021-01-21 DIAGNOSIS — J96.21 ACUTE ON CHRONIC RESPIRATORY FAILURE WITH HYPOXIA (HCC): Primary | ICD-10-CM

## 2021-01-21 PROCEDURE — 94618 PULMONARY STRESS TESTING: CPT | Performed by: INTERNAL MEDICINE

## 2021-01-21 RX ORDER — SODIUM CHLORIDE 9 MG/ML
20 INJECTION, SOLUTION INTRAVENOUS ONCE
Status: CANCELLED | OUTPATIENT
Start: 2021-01-26

## 2021-01-21 RX ORDER — SODIUM CHLORIDE 9 MG/ML
20 INJECTION, SOLUTION INTRAVENOUS ONCE
Status: CANCELLED | OUTPATIENT
Start: 2021-01-28

## 2021-01-21 RX ORDER — SODIUM CHLORIDE 9 MG/ML
20 INJECTION, SOLUTION INTRAVENOUS ONCE
Status: CANCELLED | OUTPATIENT
Start: 2021-01-27

## 2021-01-21 NOTE — TELEPHONE ENCOUNTER
Will forward request to review CT scan to Dr Jayleen Donovan RN as there are significant findings on scan

## 2021-01-21 NOTE — TELEPHONE ENCOUNTER
Can we please advise patient Dr Paige Watters will need to review the scan and discuss with her at her follow up visit

## 2021-01-21 NOTE — TELEPHONE ENCOUNTER
Patient calling stating she has an episode last night to where is became very SOB and had to call 911   But then refused to go to the hospital  Please advise

## 2021-01-21 NOTE — TELEPHONE ENCOUNTER
Spoke with Laura Police  She said last night she had an "episode"  She has chest tightness, a cough, mild back pain, HA, neckk pain, SOB and wheezing  She said she took her Remeron to sleep  She only slept 2 hours and she had to "jump up to urinate"  She said she could not catch her breath for 30 minutes  She was wearing oxygen at the time but did not check her pulse ox  She went downstairs to "sit down and breathe"  She ended up calling 911  They took her vitals and wanted to bring her to the hospital, but she refuse  Her pulse ox was 92% when they were there  She used her rescue inhaler but it did not help  She put Vicks on her chest and took her cough medicine  She said at 9pm her back pain was better  She was wearing 4L, but she only has one tank so she has a 50 ft extension tubing going upstairs to her room  The EMS explained to her that she is not really get that much oxygen because the tubing is so long  She only has the one tank  When the hospital ordered her oxygen, it said she only needs 1L on exertion  She requires more than that now  She said no one ever did a 6MW with her  She feels better now  She still has a cough that she had for the past 3 days  She is dealing with incontinence issues  She was wearing 3 5L while on the phone and said her pulse ox was 96%  I schedule her to come in today for a 6MW so we can see what she actually needs and get her more tanks  COVID Pre-Visit Screening     1  Is this a family member screening? No  2  Have you traveled outside of your state in the past 2 weeks? No  3  Do you presently have a fever or flu-like symptoms? No  4  Do you have symptoms of an upper respiratory infection like runny nose, sore throat, or cough? Yes  5  Are you suffering from new headache that you have not had in the past?  No  6  Do you have/have you experienced any new shortness of breath recently? No  7  Do you have any new diarrhea, nausea or vomiting? No  8   Have you been in contact with anyone who has been sick or diagnosed with COVID-19? No  9  Do you have any new loss of taste or smell? No  10  Are you able to wear a mask without a valve for the entire visit? No     She only goes to chemo and her doctor nelly

## 2021-01-21 NOTE — TELEPHONE ENCOUNTER
Patient is requesting the results of the 1/20 CT chest abdomen pelvis w contrast ordered by Dr Jerilyn Andino  Best call back [phone]

## 2021-01-21 NOTE — PROGRESS NOTES
6 minutes walk test:    Patient started with pulse ox 94% on room air and heart rate 114 per minute  She walked total 6 minutes/126 m without stopping and without desaturation  Her pulse ox remained 90-92% on room air and heart rate increased to maximal 127 per minute      Interpretation:  6 minutes walk test with no desaturation, no need for supplemental oxygen

## 2021-01-25 ENCOUNTER — TELEPHONE (OUTPATIENT)
Dept: HEMATOLOGY ONCOLOGY | Facility: MEDICAL CENTER | Age: 71
End: 2021-01-25

## 2021-01-25 ENCOUNTER — OFFICE VISIT (OUTPATIENT)
Dept: HEMATOLOGY ONCOLOGY | Facility: HOSPITAL | Age: 71
End: 2021-01-25
Payer: MEDICARE

## 2021-01-25 ENCOUNTER — HOSPITAL ENCOUNTER (OUTPATIENT)
Dept: INFUSION CENTER | Facility: HOSPITAL | Age: 71
End: 2021-01-25
Attending: INTERNAL MEDICINE

## 2021-01-25 VITALS
WEIGHT: 97 LBS | DIASTOLIC BLOOD PRESSURE: 60 MMHG | RESPIRATION RATE: 16 BRPM | TEMPERATURE: 98 F | HEIGHT: 65 IN | SYSTOLIC BLOOD PRESSURE: 112 MMHG | BODY MASS INDEX: 16.16 KG/M2

## 2021-01-25 DIAGNOSIS — T45.1X5A CHEMOTHERAPY INDUCED NEUTROPENIA (HCC): ICD-10-CM

## 2021-01-25 DIAGNOSIS — G89.3 CANCER RELATED PAIN: ICD-10-CM

## 2021-01-25 DIAGNOSIS — D70.1 CHEMOTHERAPY INDUCED NEUTROPENIA (HCC): ICD-10-CM

## 2021-01-25 DIAGNOSIS — C34.90 SMALL CELL LUNG CANCER (HCC): Primary | ICD-10-CM

## 2021-01-25 DIAGNOSIS — G89.3 CANCER ASSOCIATED PAIN: ICD-10-CM

## 2021-01-25 PROCEDURE — 99214 OFFICE O/P EST MOD 30 MIN: CPT | Performed by: INTERNAL MEDICINE

## 2021-01-25 RX ORDER — TOLTERODINE 2 MG/1
CAPSULE, EXTENDED RELEASE ORAL
COMMUNITY
Start: 2021-01-22

## 2021-01-25 RX ORDER — MIRTAZAPINE 15 MG/1
TABLET, FILM COATED ORAL
COMMUNITY
Start: 2021-01-20 | End: 2021-02-09

## 2021-01-25 NOTE — PATIENT INSTRUCTIONS
PRESCRIPTION REFILL REMINDER:  All medication refills should be requested prior to [facility] on Friday  Any refill requests after noon on Friday would be addressed the following Monday  Please protect yourself from COVID-19! Even though we do not good antiviral drugs for this infection, the following strategies can help you stay healthy:    = Wash your hands! Soap and water, or hand  with at least 60% alcohol, are both effective at killing the virus  = Wear a mask! This will help protect others from any virus particles you might spread  Your mouth and nose BOTH need to be covered  = Keep the distance! Keep 6 feet of distance from others people, even if they seem healthy  Keeping distance protects you from the other person's virus spread     = Get a vaccine! The [employer] are approved for emergency use in the [place]  These vaccines have been shown to be 90+% effective at preventing severe infections when combined with masking, hand-washing, and distancing  As of 1/26/2021, the following priority groups may get either vaccine:  + nursing home residents and staff  + front-line health workers  + ALL persons over age 72 (ages 76 and up can be seen at [facility])  + ANY person over age 12 that has a qualifying risk factor, such as diabetes, lung disease, or obesity  ==> Please use  the following website  to check your eligibility in [place]:  [employer]   ==> If you are under age 72, but still qualify, you may get a shot from many other locations outside [facility]: [facility], [employer], [facility], [place]      We are recommending that ALL our patients get two shots of either vaccine, as early as it is available to them  Please keep an eye on the [employer], Christina Garsia, or call [phone] to see when you will become eligible    If you are eligible by the criteria above, please ask our team to get you a shot! Numbers of Coronavirus cases are spiking in many [place] States  This is not a more dangerous virus, but a sign that more people in a community are spreading the virus  Please check the local disease reports near you if you consider travelling  As of 1/25/21, we do NOT advise travel outside the [facility] ([place] or [place])  Check out clypd for [place] data that are updated daily:    http://www [employer]/     Global Epidemics  Org, from [facility] will give you [id number] information on virus cases:    Https://globalepidemics  org/

## 2021-01-25 NOTE — PROGRESS NOTES
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   This would be a stage IV malignancy or since it is small cell extensive stage small cell lung cancer  HPI:    Michael Contreras seen for initial consultation 11/20/2020 regarding newly diagnosed stage IV malignancy   The patient had a pleural effusion which was drained which revealed poorly differentiated malignant neoplasm with neuroendocrine differentiation   She had a PET-CT scan done which showed FDG avid left lower lobe nodule suspicious for malignancy along with focal FDG uptake in the left perihilar region for which glenn metastases could not be excluded   There was extensive heterogeneous FDG uptake along the left pleural margin compatible with pleural metastases  Fayne Footman was no other hypermetabolic metastases in the neck abdomen or pelvis   There was decreased left pleural fluid posteriorly secondary to pleural catheter placement but loculated fluid was increasing anteriorly   The patient states she does get short of breath easy and at that point she knows her fluid is coming back   She has a PleurX catheter in place which he drains daily   Denies any nausea denies any vomiting   Has had diarrhea chronically for years so   EGD and colonoscopy did not show any major abnormalities  Again she is a smoker      Previous Hematologic/ Oncologic History:    Oncology History   Small cell lung cancer ([facility] [address] )   11/20/2020 Initial Diagnosis    Small cell lung cancer ([facility] [address] )     11/23/2020 -  Chemotherapy    pegfilgrastim (NEULASTA ONPRO) subcutaneous injection kit 6 mg, 6 mg, Subcutaneous, Once, 3 of 6 cycles  Administration: 6 mg (11/25/2020), 6 mg (12/16/2020), 6 mg (1/6/2021)  fosaprepitant (EMEND) 150 mg in sodium chloride 0 9 % 250 mL IVPB, 150 mg, Intravenous, Once, 3 of 6 cycles  Administration: 150 mg (11/23/2020), 150 mg (12/14/2020), 150 mg (1/4/2021)  CARBOplatin (PARAPLATIN) 402 mg in sodium chloride 0 9 % 250 mL IVPB, 402 mg, Intravenous, Once, 3 of 6 cycles  Administration: 402 mg (11/23/2020), 371 5 mg (12/14/2020), 387 5 mg (1/4/2021)  Durvalumab 1,500 mg in sodium chloride 0 9 % 100 mL IVPB, 1,500 mg (original dose 10 mg/kg), Intravenous, Once, 3 of 6 cycles  Dose modification: 1,500 mg (original dose 10 mg/kg, Cycle 1, Reason: Other (See Comments))  Administration: 1,500 mg (11/23/2020), 1,500 mg (12/14/2020), 1,500 mg (1/4/2021)  etoposide (TOPOSAR) 152 mg in sodium chloride 0 9 % 500 mL chemo infusion, 100 mg/m2 = 152 mg, Intravenous, Once, 3 of 6 cycles  Dose modification: 80 mg/m2 (original dose 100 mg/m2, Cycle 4, Reason: Dose Not Tolerated)  Administration: 152 mg (11/23/2020), 152 mg (11/25/2020), 152 mg (11/24/2020), 152 mg (12/14/2020), 152 mg (12/15/2020), 152 mg (12/16/2020), 144 mg (1/4/2021), 144 mg (1/5/2021), 144 mg (1/6/2021)         Current Hematologic/ Oncologic Treatment:    Carboplatin and etoposide along with Darvalumab for 3 cycles so far  Interval History:    The patient returns for follow-up visit  The patient's imaging showed a 2 7 cm left lower lobe subpleural mass which is not significantly changed since December of 2020 but the patient does have progressive left pleural carcinomatosis with multifocal chest wall infiltration  There is new mild left hilar adenopathy  There was a small left multiloculated malignant pleural effusion  There is no evidence of metastatic disease outside the chest   At this point based on this imaging I would like for her to see our colleagues in Radiation Oncology to see if they can add on radiation to help with local control at least   She has no distant disease and I a m  Concerned with this question of pleural carcinomatosis with multifocal chest wall infiltration    This will cause discomfort moving forward so I think we should consider radiation  The patient herself states she does have some discomfort on the left side  She feels a small palpable mass  Does get short of breath on occasion and is following with our colleagues in Pulmonary for her oxygen needs  The rest of her 14 point review of systems today was negative  Appetite is low as is sleeping but she states she is following with our colleagues in [address] and will be seeing them tomorrow  I advised if they wish to consider steroids any other measures for appetite including medical marijuana I would be okay with that  Cancer Staging:  Cancer Staging  Lung cancer [facility])  Staging form: Lung, AJCC 8th Edition  - Clinical: No stage assigned - Unsigned    Test Results:    Imaging: Xr Chest Pa & Lateral    Result Date: 1/11/2021  Narrative: CHEST INDICATION:   R04 2: Hemoptysis  COMPARISON:  One view chest 12/20/2020 EXAM PERFORMED/VIEWS:  XR CHEST PA & LATERAL  The frontal view was performed utilizing dual energy radiographic technique  FINDINGS:  Right chest wall anoop catheter in place tip at the superior cavoatrial junction  Normal cardiac silhouette  Aortic calcification is present  New left upper lobe infiltrates  Small to moderate left pleural effusion with adjacent subsegmental atelectasis slightly improved allowing for difference in technique  No pneumothorax or pulmonary edema  Hyperlucent changes suggestive of COPD  Mild degenerative changes in the spine  Impression: New left upper lobe infiltrates  Small to moderate left pleural effusion with adjacent subsegmental atelectasis slightly improved  This study demonstrates a significant  finding and was documented as such in [facility] for liaison and referring practitioner notification   Workstation performed: [id number]     Ct Chest Abdomen Pelvis W Contrast    Result Date: 1/20/2021  Narrative: CT CHEST, ABDOMEN AND PELVIS WITH IV CONTRAST INDICATION:   C34 90: Malignant neoplasm of unspecified part of unspecified bronchus or lung  COMPARISON:  CT chest abdomen and pelvis 12/18/2020 TECHNIQUE: CT examination of the chest, abdomen and pelvis was performed  Axial, sagittal, and coronal 2D reformatted images were created from the source data and submitted for interpretation  Radiation dose length product (DLP) for this visit:  394 69 mGy-cm   This examination, like all CT scans performed in the [facility], was performed utilizing techniques to minimize radiation dose exposure, including the use of iterative  reconstruction and automated exposure control  IV Contrast:  100 mL of iohexol (OMNIPAQUE)   350 Enteric Contrast: Enteric contrast was administered  FINDINGS: CHEST LUNGS:  1 8 x 1 5 x 2 7 cm mass left lower lobe image 29 series 2 and image 78 series 602 unchanged when measured in the same fashion  Progressive subtotal left basilar atelectasis  Progressive minimal subsegmental atelectasis inferior lingula  Minimal linear subsegmental atelectasis/scar right lung base  Severe pulmonary emphysema  Central airways are clear  PLEURA:  Interval removal of pleural drainage catheter  Small multiloculated left pleural effusion with minimal peripheral enhancement intervally smaller  Diffuse left pleural nodularity slightly increased  For example, medial pleural paracardiac pleural  mass measures 3 3 x 2 2 cm image 49 series 2 previously 2 4 x 1 3 cm  HEART/GREAT VESSELS:  Heart is not enlarged  No pericardial effusion  Aortic calcification  Tip of portacatheter in the right atrium  MEDIASTINUM AND MINA:  Progressive left hilar adenopathy  1 1 cm short axis anterior and posterior left hilar lymph nodes image 33 series 2 previously 0 8 cm  CHEST WALL AND LOWER NECK:   Minimal interval worsening of left chest wall soft tissue nodularity compatible with infiltrative disease   For example, left anterior paramedian deep chest wall soft tissue nodularity measuring 1 3 cm image 30 series 2 previously 1 cm  Left 10th posterolateral intercostal soft tissue lesion measures 1 6 x 0 9 cm image 52 series 2 previously 1 1 x 0 6 cm when measured in the same fashion  Right anterior chest wall anoop catheter  ABDOMEN LIVER/BILIARY TREE:  Unremarkable  GALLBLADDER:  No calcified gallstones  No pericholecystic inflammatory change  SPLEEN:  Unremarkable  PANCREAS:  Unremarkable  ADRENAL GLANDS:  Unremarkable  KIDNEYS/URETERS: One or more sharply circumscribed subcentimeter renal hypodensities are noted  These lesions are too small to accurately characterize, but are statistically most likely to represent benign cortical renal cyst(s)  According to the guidelines published in the [facility] Paper of the ACR Incidental Findings Committee (Radiology 2010), no further workup of these lesions is recommended    Kidneys otherwise unremarkable  No perinephric collection  STOMACH AND BOWEL:  Unremarkable  APPENDIX:  No findings to suggest appendicitis  ABDOMINOPELVIC CAVITY:  No ascites  No pneumoperitoneum  No lymphadenopathy  VESSELS:  Aortoiliac calcification  No aneurysm  PELVIS REPRODUCTIVE ORGANS:  Unremarkable for patient's age  URINARY BLADDER:  Collapsed bladder limits its evaluation  ABDOMINAL WALL/INGUINAL REGIONS:  Unremarkable  OSSEOUS STRUCTURES:  No acute fracture or osseous destructive lesion identified  Degenerative changes of the spine, pubic symphysis, and multiple joints  Stable minimal grade 1 degenerative retrolisthesis L4 on L5  Mild dextroconvex lumbar scoliosis  Impression: CT chest: 2 7 cm left lower lobe subpleural mass not significantly changed since December 18, 2020 when measured in the same fashion  Progressive left pleural carcinomatosis with multifocal chest wall infiltration  New mild left hilar adenopathy  Smaller left multiloculated malignant pleural effusion  CT abdomen and pelvis: No evidence of abdominopelvic metastatic disease   This study demonstrates a significant  finding and was documented as such in [facility] for liaison and referring practitioner notification  Workstation performed: [id number]       Labs:   Lab Results   Component Value Date    WBC 10 24 (H) 01/20/2021    HGB 10 4 (L) 01/20/2021    HCT 32 8 (L) 01/20/2021    MCV 95 01/20/2021     01/20/2021     Lab Results   Component Value Date    K 3 6 01/20/2021     01/20/2021    CO2 24 01/20/2021    BUN 15 01/20/2021    CREATININE 0 67 01/20/2021    GLUF 94 12/31/2020    CALCIUM 8 1 (L) 01/20/2021    CORRECTEDCA 9 3 01/20/2021    AST 23 01/20/2021    ALT 19 01/20/2021    ALKPHOS 130 (H) 01/20/2021    EGFR 91 01/20/2021     ROS: As stated in the history of present illness otherwise his 14 point review of systems today was negative        Active Problems:   Patient Active Problem List   Diagnosis    Chronic fatigue syndrome with fibromyalgia    Diarrhea    Weight loss    Abnormal LFTs    History of colon polyps    Right lower quadrant abdominal pain    Shortness of breath    Pleural effusion    Abnormal CT of the chest    Tobacco abuse    Elevated troponin    Elevated blood pressure reading    Acute on chronic respiratory failure with hypoxia (HCC)    Pulmonary emphysema (HCC)    Small cell lung cancer (HCC)    Chemotherapy induced neutropenia (HCC)    Chest pain    Lower extremity edema    Leukocytosis    Elevated d-dimer    Oral thrush    Severe protein-calorie malnutrition (HCC)    Lung cancer (HCC)    Cancer associated pain    Hemoptysis    Chronic bilateral thoracic back pain    Advanced care planning/counseling discussion    Port-A-Cath in place       Past Medical History:   Past Medical History:   Diagnosis Date    Chronic fatigue syndrome with fibromyalgia     COPD [facility]chronic obstructive pulmonary disease) ([facility] [address] )     DDD (degenerative disc disease), cervical     Emphysema of lung ([facility] [address] )     Hx of migraine headaches     Hypothyroidism     Lung cancer ([facility] [address] ) Surgical History:   Past Surgical History:   Procedure Laterality Date    BARTHOLIN GLAND CYST EXCISION      CARPAL TUNNEL RELEASE Bilateral     COLONOSCOPY  07/09/2020     15 mm sessile polyp in the cecum removed by polypectomy  A 3 recall    IR PLEURAL EFFUSION LONG-TERM CATHETER PLACEMENT  11/13/2020    IR PLEURAL EFFUSION LONG-TERM CATHETER REMOVAL  12/21/2020    IR PORT PLACEMENT  12/11/2020    IR THORACENTESIS  11/3/2020    TONSILLECTOMY      TUBAL LIGATION      UPPER GASTROINTESTINAL ENDOSCOPY  07/09/2020     small hiatal hernia  Pathology negative  Family History:    Family History   Problem Relation Age of Onset    Colon polyps Mother     Heart disease Mother     Heart disease Father     Colon cancer Neg Hx        Cancer-related family history is negative for Colon cancer      Social History:   Social History     Socioeconomic History    Marital status:      Spouse name: Not on file    Number of children: Not on file    Years of education: Not on file    Highest education level: Not on file   Occupational History    Not on file   Social Needs    Financial resource strain: Not on file    Food insecurity     Worry: Not on file     Inability: Not on file   [employer] needs     Medical: Not on file     Non-medical: Not on file   Tobacco Use    Smoking status: Former Smoker     Packs/day: 0 50     Years: 53 00     Pack years: 26 50     Types: Cigarettes     Start date: 1967    Smokeless tobacco: Never Used    Tobacco comment: 2 cigarettes daily   Substance and Sexual Activity    Alcohol use: Not Currently    Drug use: Not Currently    Sexual activity: Not Currently   Lifestyle    Physical activity     Days per week: Not on file     Minutes per session: Not on file    Stress: Not on file   Relationships    Social connections     Talks on phone: Not on file     Gets together: Not on file     Attends Bahai service: Not on file     Active member of club or organization: Not on file     Attends meetings of clubs or organizations: Not on file     Relationship status: Not on file    Intimate partner violence     Fear of current or ex partner: Not on file     Emotionally abused: Not on file     Physically abused: Not on file     Forced sexual activity: Not on file   Other Topics Concern    Not on file   Social History Narrative    Not on file       Current Medications:   Current Outpatient Medications   Medication Sig Dispense Refill    acetaminophen (TYLENOL) 500 mg tablet Take 2 tablets (1,000 mg total) by mouth every 8 (eight) hours      albuterol (ProAir HFA) 90 mcg/act inhaler Inhale 2 puffs every 6 (six) hours as needed for wheezing or shortness of breath 8 5 g 6    B Complex-Biotin-FA (TH VITAMIN B 50/B-COMPLEX) TABS Take by mouth daily      Capsaicin 0 025 % GEL Apply topically 3 (three) times a day as needed       cholecalciferol (VITAMIN D3) 1,000 units tablet Take 7,000 Units by mouth daily      COLCHICINE PO Take 0 5 mg by mouth 3 (three) times a day as needed       cyclobenzaprine (FLEXERIL) 10 mg tablet Take 10 mg by mouth 3 (three) times a day as needed for muscle spasms      Echinacea 400 MG CAPS Take by mouth 3 (three) times a day as needed      fexofenadine-pseudoephedrine (ALLEGRA-D)  MG per tablet Take 1 tablet by mouth 2 (two) times a day as needed for allergies      GINKGO BILOBA PLUS PO Take by mouth as needed 2 capsules every 2 mornings       Hyaluronic Acid 20-60 MG CAPS Take by mouth 3 (three) times a day       L-Lysine 500 MG CAPS Take by mouth 3 (three) times a day as needed      Lactobacillus (PROBIOTIC ACIDOPHILUS PO) Take 1 capsule by mouth daily      lidocaine-prilocaine (EMLA) cream Apply topically as needed for mild pain 30-60 min prior to port access   30 g 0    loperamide (IMODIUM) 2 mg capsule Take 2 mg by mouth daily       mirtazapine (REMERON) 15 mg tablet       morphine (MSIR) 15 mg tablet Take 7 5mg or 15mg PO every 4 hours as needed for moderate to severe pain 45 tablet 0    NON FORMULARY Take 37 mg by mouth daily Eco Thyro 37 - reported by pt      nystatin (MYCOSTATIN) 500,000 units/5 mL suspension Apply 5 mL (500,000 Units total) to the mouth or throat 4 (four) times a day 500 mL 0    omeprazole (PriLOSEC) 20 mg delayed release capsule Take 1 capsule (20 mg total) by mouth daily 30 capsule 0    ondansetron (ZOFRAN) 4 mg tablet Take 1 tablet (4 mg total) by mouth every 8 (eight) hours as needed for nausea or vomiting 30 tablet 0    other medication, see sig, Take 3 capsules by mouth daily [id number]      prochlorperazine (COMPAZINE) 10 mg tablet Take 10 mg by mouth every 6 (six) hours as needed for nausea or vomiting (migraines)      S-Adenosylmethionine (RADHA-E) 200 MG TABS Take by mouth 3 (three) times a day      senna-docusate sodium (SENOKOT-S) 8 6-50 mg per tablet Take 1 tablet by mouth daily 60 tablet 0    sodium chloride (OCEAN) 0 65 % nasal spray 1 spray into each nostril as needed for congestion or rhinitis 30 mL 0    tolterodine (DETROL LA) 2 mg 24 hr capsule       Tragacanth (ASTRAGALUS ROOT) POWD 470 mg 3 (three) times a day as needed      umeclidinium-vilanterol (ANORO ELLIPTA) 62 5-25 MCG/INH inhaler Inhale 1 puff daily 1 Inhaler 5     No current facility-administered medications for this visit  Allergies: Allergies   Allergen Reactions    Clarithromycin     Codeine Other (See Comments)     lethergic    Other      PHOSPHATE    Oxycodone Other (See Comments)     lethergic    Trazodone Other (See Comments)     lethergic    Penicillins Rash       Physical Exam:    Body surface area is 1 45 meters squared      Wt Readings from Last 3 Encounters:   01/25/21 44 kg (97 lb)   01/15/21 44 kg (97 lb)   01/12/21 45 4 kg (100 lb)        Temp Readings from Last 3 Encounters:   01/25/21 98 °F (36 7 °C) (Temporal)   01/20/21 (!) 97 3 °F (36 3 °C) (Temporal)   01/15/21 (!) 97 4 °F (36 3 °C) (Temporal)        BP Readings from Last 3 Encounters:   01/25/21 112/60   01/15/21 130/66   01/12/21 160/80         Pulse Readings from Last 3 Encounters:   01/15/21 (!) 120   01/12/21 103   01/08/21 (!) 108        Physical Exam     Constitutional   General appearance: No acute distress, well appearing and well nourished  Eyes   Conjunctiva and lids: No swelling, erythema or discharge  Pupils and irises: Equal, round and reactive to light  Ears, Nose, Mouth, and Throat   External inspection of ears and nose: Normal     Nasal mucosa, septum, and turbinates: Normal without edema or erythema  Oropharynx: Normal with no erythema, edema, exudate or lesions  Pulmonary   Respiratory effort: No increased work of breathing or signs of respiratory distress  Auscultation of lungs: Clear to auscultation  Cardiovascular   Palpation of heart: Normal PMI, no thrills  Auscultation of heart: Normal rate and rhythm, normal S1 and S2, without murmurs  Examination of extremities for edema and/or varicosities: Normal     Carotid pulses: Normal     Abdomen   Abdomen: Non-tender, no masses  Liver and spleen: No hepatomegaly or splenomegaly  Lymphatic   Palpation of lymph nodes in neck: No lymphadenopathy  Musculoskeletal   Gait and station: Normal     Digits and nails: Normal without clubbing or cyanosis  Inspection/palpation of joints, bones, and muscles: Normal     Skin   Skin and subcutaneous tissue: Normal without rashes or lesions  Neurologic   Cranial nerves: Cranial nerves 2-12 intact  Sensation: No sensory loss      Psychiatric   Orientation to person, place, and time: Normal     Mood and affect: Normal         Assessment / Plan:      The patient is a pleasant 74-year-old female with 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   Based on her PET-CT scan this seems to be a lung primary as a nodule is seen on the left side   With neuroendocrine features I would treat this as a small cell lung cancer   Explained my recommendation would be carboplatin along with etoposide in combination with immunotherapy with Imfinzi  The patient agreed to do this  She is doing well on it apart from some side effects  She is now seeing palliative Care were helping with her symptoms  Her most recent imaging shows a question of local progression in the form of 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 and some areas of invasion of the chest wall  A small palpable 1 cm area is present in the left chest wall posteriorly where I felt it  I am hoping she can get radiation and she has an appointment with them next week  I explained to her it may not cure her disease but it should help with local symptoms  We will try to see if she can get in to see them sooner  I definitely think radiation to this area if possible would help even in the future in terms of comfort from the chest wall invasion and symptoms from local invasion  If they can radiate further to encompass most of the disease that would be optimal   Patient will see me back in 3 weeks  She will stay on her treatment  She will see Radiation Oncology  Goals and Barriers:  Current Goal:  Prolong Survival from poorly differentiated malignancy with neuroendocrine features on pleural cytology  Barriers: None  Patient's Capacity to Self Care:  Patient  able to self care  Portions of the record may have been created with voice recognition software   Occasional wrong word or "sound a like" substitutions may have occurred due to the inherent limitations of voice recognition software   Read the chart carefully and recognize, using context, where substitutions have occurred

## 2021-01-26 ENCOUNTER — SOCIAL WORK (OUTPATIENT)
Dept: PALLIATIVE MEDICINE | Age: 71
End: 2021-01-26
Payer: MEDICARE

## 2021-01-26 ENCOUNTER — HOSPITAL ENCOUNTER (OUTPATIENT)
Dept: INFUSION CENTER | Facility: HOSPITAL | Age: 71
Discharge: HOME | End: 2021-01-26
Attending: INTERNAL MEDICINE
Payer: MEDICARE

## 2021-01-26 ENCOUNTER — OFFICE VISIT (OUTPATIENT)
Dept: PALLIATIVE MEDICINE | Age: 71
End: 2021-01-26
Payer: MEDICARE

## 2021-01-26 VITALS
BODY MASS INDEX: 16.85 KG/M2 | OXYGEN SATURATION: 93 % | HEART RATE: 119 BPM | WEIGHT: 100 LBS | RESPIRATION RATE: 16 BRPM | SYSTOLIC BLOOD PRESSURE: 130 MMHG | DIASTOLIC BLOOD PRESSURE: 78 MMHG | TEMPERATURE: 98.1 F

## 2021-01-26 VITALS
BODY MASS INDEX: 16.75 KG/M2 | HEART RATE: 118 BPM | RESPIRATION RATE: 16 BRPM | SYSTOLIC BLOOD PRESSURE: 138 MMHG | DIASTOLIC BLOOD PRESSURE: 81 MMHG | OXYGEN SATURATION: 85 % | WEIGHT: 100.53 LBS | HEIGHT: 65 IN | TEMPERATURE: 97.8 F

## 2021-01-26 DIAGNOSIS — C34.90 SMALL CELL LUNG CANCER (HCC): Primary | ICD-10-CM

## 2021-01-26 DIAGNOSIS — M54.6 CHRONIC BILATERAL THORACIC BACK PAIN: ICD-10-CM

## 2021-01-26 DIAGNOSIS — R11.2 CINV (CHEMOTHERAPY-INDUCED NAUSEA AND VOMITING): ICD-10-CM

## 2021-01-26 DIAGNOSIS — C80.0 SMALL CELL CARCINOMA CARCINOMATOSIS (HCC): ICD-10-CM

## 2021-01-26 DIAGNOSIS — R11.2 NON-INTRACTABLE VOMITING WITH NAUSEA, UNSPECIFIED VOMITING TYPE: ICD-10-CM

## 2021-01-26 DIAGNOSIS — G89.3 CANCER ASSOCIATED PAIN: ICD-10-CM

## 2021-01-26 DIAGNOSIS — Z95.828 PORT-A-CATH IN PLACE: ICD-10-CM

## 2021-01-26 DIAGNOSIS — G89.29 CHRONIC BILATERAL THORACIC BACK PAIN: ICD-10-CM

## 2021-01-26 DIAGNOSIS — R06.02 SHORTNESS OF BREATH: ICD-10-CM

## 2021-01-26 DIAGNOSIS — E43 SEVERE PROTEIN-CALORIE MALNUTRITION (HCC): ICD-10-CM

## 2021-01-26 DIAGNOSIS — Z71.89 COUNSELING AND COORDINATION OF CARE: Primary | ICD-10-CM

## 2021-01-26 DIAGNOSIS — J90 PLEURAL EFFUSION: ICD-10-CM

## 2021-01-26 DIAGNOSIS — D70.1 CHEMOTHERAPY INDUCED NEUTROPENIA (HCC): ICD-10-CM

## 2021-01-26 DIAGNOSIS — T45.1X5A CHEMOTHERAPY INDUCED NEUTROPENIA (HCC): ICD-10-CM

## 2021-01-26 DIAGNOSIS — T45.1X5A CINV (CHEMOTHERAPY-INDUCED NAUSEA AND VOMITING): ICD-10-CM

## 2021-01-26 LAB — T3FREE SERPL-MCNC: 3.46 PG/ML (ref 2.3–4.2)

## 2021-01-26 PROCEDURE — NC001 PR NO CHARGE

## 2021-01-26 PROCEDURE — 96367 TX/PROPH/DG ADDL SEQ IV INF: CPT

## 2021-01-26 PROCEDURE — 99215 OFFICE O/P EST HI 40 MIN: CPT | Performed by: INTERNAL MEDICINE

## 2021-01-26 PROCEDURE — 96413 CHEMO IV INFUSION 1 HR: CPT

## 2021-01-26 PROCEDURE — 96417 CHEMO IV INFUS EACH ADDL SEQ: CPT

## 2021-01-26 PROCEDURE — 84481 FREE ASSAY (FT-3): CPT | Performed by: INTERNAL MEDICINE

## 2021-01-26 RX ORDER — SODIUM CHLORIDE 9 MG/ML
20 INJECTION, SOLUTION INTRAVENOUS ONCE
Status: COMPLETED | OUTPATIENT
Start: 2021-01-26 | End: 2021-01-26

## 2021-01-26 RX ORDER — ONDANSETRON 4 MG/1
4 TABLET, FILM COATED ORAL EVERY 8 HOURS PRN
Qty: 60 TABLET | Refills: 1 | Status: SHIPPED | OUTPATIENT
Start: 2021-01-26

## 2021-01-26 RX ORDER — OMEPRAZOLE 20 MG/1
20 CAPSULE, DELAYED RELEASE ORAL DAILY
Qty: 90 CAPSULE | Refills: 3 | Status: SHIPPED | OUTPATIENT
Start: 2021-01-26

## 2021-01-26 RX ADMIN — FOSAPREPITANT 150 MG: 150 INJECTION, POWDER, LYOPHILIZED, FOR SOLUTION INTRAVENOUS at 10:32

## 2021-01-26 RX ADMIN — CARBOPLATIN 310 MG: 10 INJECTION, SOLUTION INTRAVENOUS at 13:23

## 2021-01-26 RX ADMIN — DURVALUMAB 1500 MG: 500 INJECTION, SOLUTION INTRAVENOUS at 11:10

## 2021-01-26 RX ADMIN — SODIUM CHLORIDE 20 ML/HR: 0.9 INJECTION, SOLUTION INTRAVENOUS at 10:03

## 2021-01-26 RX ADMIN — DEXAMETHASONE SODIUM PHOSPHATE: 10 INJECTION, SOLUTION INTRAMUSCULAR; INTRAVENOUS at 10:02

## 2021-01-26 RX ADMIN — ETOPOSIDE 115.2 MG: 20 INJECTION INTRAVENOUS at 12:13

## 2021-01-26 NOTE — PROGRESS NOTES
Palliative and Supportive Care   Lesli Yoder 78 y o  female [id number]    Assessment/Plan:  1  Pleural effusion    2  Small cell lung cancer (HCC)    3  Cancer associated pain    4  Chronic bilateral thoracic back pain    5  Shortness of breath    6  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[facility] [address] )    7  Port-A-Cath in place    8  Severe protein-calorie malnutrition ([facility] [address] )    9  CINV (chemotherapy-induced nausea and vomiting)    10  Non-intractable vomiting with nausea, unspecified vomiting type      · Continue tylenol 500mg q8H PO ATC  · Continue morphine but increase dose to 15mg to 30mg PO q4H prn for cancer-related pain and/or SOB/dyspnea - only taking 2 doses a day (max of 30mg)  · Moving bowels regularly  · Capsaicin cream not working  · May consider diazepam in the future if appropriate  Right, not very open in taking more medications like this  Will try to optimize use of morphine for now as she is more willing to take this  · Continue mirtazapine 15mg [facility] - increased by Dr Timothy Nava  · Requesting refill for omeprazole and zofran'  · I placed lidocaine cream on her port per her request  · RTO in 2 weeks for follow up on new meds     Controlled Substance Review    [place] or [place]  reviewed: No red flags were identified; safe to proceed with prescription  Claudia Lancastersayra     01/04/2021  1   01/04/2021  Morphine Sulfate Ir 15 MG Tab  30 00  10 Pa Kristi   [id number]   Wal ([id number])   0  45 00 MME  [place]   11/25/2020  1   11/25/2020  Tramadol Hcl 50 MG Tablet  40 00  10 Ma Kri   [id number]   Wal ([id number])   0  20 00 MME         Requested Prescriptions     Signed Prescriptions Disp Refills    omeprazole (PriLOSEC) 20 mg delayed release capsule 90 capsule 3     Sig: Take 1 capsule (20 mg total) by mouth daily    ondansetron (ZOFRAN) 4 mg tablet 60 tablet 1     Sig: Take 1 tablet (4 mg total) by mouth every 8 (eight) hours as needed for nausea or vomiting     Medications Discontinued During This Encounter   Medication Reason    omeprazole (PriLOSEC) 20 mg delayed release capsule Reorder    ondansetron (ZOFRAN) 4 mg tablet Reorder       Representatives have queried the patient's controlled substance dispensing history in the Prescription Drug Monitoring Program in compliance with regulations before I have prescribed any controlled substances  The prescription history is consistent with prescribed therapy and our practice policies  45 minutes were spent face to face with Willard Haney with greater than 50% of the time spent in counseling or coordination of care including discussions of etiology of diagnosis, pathogenesis of diagnosis, prognosis of diagnosis, diagnostic results, impression, and recommendations, risks and benefits of treatment, instructions for disease self management, treatment instructions, follow up requirements, risk factors and risk reduction of disease, patient and family counseling/involvement in care and compliance with treatment regimen   All of the patient's questions were answered during this discussion  No follow-ups on file  Subjective:   Chief Complaint  Follow up visit for:  symptom management, goal of care assessment and decisional support, disease process education and discussion of prognosis, advance care planning  HPI     Willard Haney is a 78 y o  female with stage IV small cell lung cancer (Dx in 11/2020) with pleural effusion s/p Pleurex cath  PET-CT shows upate in the L perihilar region as well as P pleural margin/pleural mets  She is currently on carboplatin, etoposide and Imfinzi c/o Dr Paige Watters  She is referred to [facility] for supportive cares  CT scan in 1/20 showed stable L lower lobe supleural mass but with progressive L pleural carcinomatosis with multifocal chest wall infiltration and small L multiloculated malignant pleural effusion  She was started on morphine IR 7 5mg to 15mg q4H prn by oncology for diffuse posterior chest wall pain that is likely from her cancer  Reviewed scanned living will/dPOA in the system signed in 10/1993  She said she is in the process of drafting a new one with a   She has paperworks already in her possession for her review and signature  She will bring us a copy once this is formalized  Also provided her a copy of the 5 Wishes for her perusal  This may help guide further ACP that are not traditionally included in the standard living delatorre  She comes in today by herself for a routine follow up  She still complains of L sided back, anterior chest wall, and L sided neck pain  We spent time reviewing her CT scan and the images to better explain to her her symptoms of pain and shortness of breath  She is referred by Dr John Andre to [address] [place] [address] for the progressive carcinomatosis which we also encouraged  She uses 1 tablet of the morphine 2 times a day which are very helpful  She is wary about taking morphine at night with her remeron  At this time, she is not having significant symptoms at night nor upon waking that she can do away with not taking morphine at night  We spoke about using morphine as well for SOB and explained the mechanism of how that works  However, she said that that would mean taking it more frequently as she is short of breath more frequently now and she will not do that  The following portions of the medical history were reviewed: past medical history, problem list, medication list, and social history      Current Outpatient Medications:     acetaminophen (TYLENOL) 500 mg tablet, Take 2 tablets (1,000 mg total) by mouth every 8 (eight) hours, Disp: , Rfl:     albuterol (ProAir HFA) 90 mcg/act inhaler, Inhale 2 puffs every 6 (six) hours as needed for wheezing or shortness of breath, Disp: 8 5 g, Rfl: 6    B Complex-Biotin-FA (TH VITAMIN B 50/B-COMPLEX) TABS, Take by mouth daily, Disp: , Rfl:     Capsaicin 0 025 % GEL, Apply topically 3 (three) times a day as needed , Disp: , Rfl:     cholecalciferol (VITAMIN D3) 1,000 units tablet, Take 7,000 Units by mouth daily, Disp: , Rfl:     COLCHICINE PO, Take 0 5 mg by mouth 3 (three) times a day as needed , Disp: , Rfl:     cyclobenzaprine (FLEXERIL) 10 mg tablet, Take 10 mg by mouth 3 (three) times a day as needed for muscle spasms, Disp: , Rfl:     Echinacea 400 MG CAPS, Take by mouth 3 (three) times a day as needed, Disp: , Rfl:     fexofenadine-pseudoephedrine (ALLEGRA-D)  MG per tablet, Take 1 tablet by mouth 2 (two) times a day as needed for allergies, Disp: , Rfl:     GINKGO BILOBA PLUS PO, Take by mouth as needed 2 capsules every 2 mornings , Disp: , Rfl:     Hyaluronic Acid 20-60 MG CAPS, Take by mouth 3 (three) times a day , Disp: , Rfl:     L-Lysine 500 MG CAPS, Take by mouth 3 (three) times a day as needed, Disp: , Rfl:     lidocaine-prilocaine (EMLA) cream, Apply topically as needed for mild pain 30-60 min prior to port access  , Disp: 30 g, Rfl: 0    loperamide (IMODIUM) 2 mg capsule, Take 2 mg by mouth daily , Disp: , Rfl:     mirtazapine (REMERON) 15 mg tablet, , Disp: , Rfl:     morphine (MSIR) 15 mg tablet, Take 7 5mg or 15mg PO every 4 hours as needed for moderate to severe pain, Disp: 45 tablet, Rfl: 0    nystatin (MYCOSTATIN) 500,000 units/5 mL suspension, Apply 5 mL (500,000 Units total) to the mouth or throat 4 (four) times a day, Disp: 500 mL, Rfl: 0    omeprazole (PriLOSEC) 20 mg delayed release capsule, Take 1 capsule (20 mg total) by mouth daily, Disp: 90 capsule, Rfl: 3    ondansetron (ZOFRAN) 4 mg tablet, Take 1 tablet (4 mg total) by mouth every 8 (eight) hours as needed for nausea or vomiting, Disp: 60 tablet, Rfl: 1    prochlorperazine (COMPAZINE) 10 mg tablet, Take 10 mg by mouth every 6 (six) hours as needed for nausea or vomiting (migraines), Disp: , Rfl:     S-Adenosylmethionine (RADHA-E) 200 MG TABS, Take by mouth 3 (three) times a day, Disp: , Rfl:     senna-docusate sodium (SENOKOT-S) 8 6-50 mg per tablet, Take 1 tablet by mouth daily, Disp: 60 tablet, Rfl: 0    sodium chloride (OCEAN) 0 65 % nasal spray, 1 spray into each nostril as needed for congestion or rhinitis, Disp: 30 mL, Rfl: 0    tolterodine (DETROL LA) 2 mg 24 hr capsule, , Disp: , Rfl:     Tragacanth (ASTRAGALUS ROOT) POWD, 470 mg 3 (three) times a day as needed, Disp: , Rfl:     Lactobacillus (PROBIOTIC ACIDOPHILUS PO), Take 1 capsule by mouth daily, Disp: , Rfl:     NON FORMULARY, Take 37 mg by mouth daily Eco Thyro 37 - reported by pt, Disp: , Rfl:     other medication, see sig,, Take 3 capsules by mouth daily [id number], Disp: , Rfl:     umeclidinium-vilanterol (ANORO ELLIPTA) 62 5-25 MCG/INH inhaler, Inhale 1 puff daily, Disp: 1 Inhaler, Rfl: 5  Review of Systems   Constitutional: Positive for activity change, appetite change and fatigue  Negative for chills, fever and unexpected weight change  HENT: Negative for trouble swallowing  Respiratory: Negative for cough and shortness of breath  Cardiovascular: Negative for chest pain  Gastrointestinal: Negative for abdominal pain, constipation, diarrhea, nausea and vomiting  Musculoskeletal: Positive for back pain, neck pain and neck stiffness  Neurological: Negative for weakness  Psychiatric/Behavioral: Positive for sleep disturbance  The patient is not nervous/anxious  All other systems reviewed and are negative  All other systems negative    Objective:  Vital Signs  /78 (BP Location: Left arm, Cuff Size: Standard)   Pulse (!) 119   Temp 98 1 °F (36 7 °C) (Skin)   Resp 16   Wt 45 4 kg (100 lb)   SpO2 93%   BMI 16 85 kg/m²    Physical Exam    Constitutional: Appears thin, cachectic, chronically ill but not toxic-loioking  In no acute physical or emotional distress  Head: Normocephalic and atraumatic  Eyes: EOM are normal  No ocular discharge  No scleral icterus  Neck: No visible adenopathy or masses  Respiratory: Effort normal  No stridor  No respiratory distress   Wearing oxygen via 4L NC  Gastrointestinal: No abdominal distension  Musculoskeletal: No edema  R sided anterior chest wall port in place  Neurological: Alert, oriented and appropriately conversant  Skin: No diaphoresis, no rashes seen on exposed areas of skin  Pale/Ashen  Psychiatric: Displays a normal mood and affect   Behavior, judgement and thought content appear normal      Nick Bassett MD  Palliative Medicine & Supportive Care  Internal Medicine  Available via [facility] Text  Office: [phone]  Fax: [phone]

## 2021-01-26 NOTE — PROGRESS NOTES
Dr Ibeth Shelton and Palliative Care MSW met with pt this morning for her follow up appointment  PT is a 78year old woman with a dx of small cell lung cancer  PT presents as stating that she has been experiencing pain in her left side of her back and the side  Dr Ibeth Shelton educated pt on her CT scan, as well as showed her, explaining why she is having the pain r/t her lung  PT continues taking the morphine for her pain  Dr Ibeth Shelton encouraged her to take as needed every 4 hours  PT states that she has been taking 2 tablets a day and her mirtazapine at night  She expressed that she called 911 the other day because she was very SOB  She decided that she was not going to go to the hospital  The EMS informed her that the tubing to her oxygen was too long, as she had it extended upstairs and she wasn't receiving enough oxygen  PT did see Pulmonology and is waiting to receive another concentrator to keep upstairs, until then she is sleeping on the couch where she can be close to it  PT reports that she has completed 4 out of the 6 chemotherapy treatments and she has an appointment with rad onc on February 3rd  PT expressed interest in [address] transport  This writer educated her on the service and [place] ([place] assisted her with completing the application  PT will return in 4 weeks  MSW provided active listening and emotional support  For additional information please refer to provider notes

## 2021-01-26 NOTE — PLAN OF CARE
CLINIC PROCEDURE  You underwent a procedure in the clinic today. Please follow the instructions:    ROUTINE CARE OF A SUTURED WOUND   Dressing  •If the wound was dressed, the dressing may be removed the day following the procedure.  After showering, apply vaseline or aquaphor to the site and cover with a bandaid.  We advise against the use of neosporin.   •If any crusting is noted, a wet Q-tip may be used to gently clean the incision line.    Showering  •A sutured wound should be kept dry until 24 hrs after closure.  •After 24 hours, showering is permissible. Do not soak in a bathtub. The sutured wound may be gently washed with plain soap and water.  •Make up, hair care products, aftershave, lotions, etc should not be used near the wound.    Bleeding  •Blood on the bandage is normal after the procedure and during healing.  If it is bleeding through the bandage hold pressure as described below.   •If you experience bleeding, please hold constant pressure for 10 minutes to allow a clot to form.  Repeat this 3 times.  If you continue to experience bleeding please contact our office.  If it is after hours, please go to a walk in or urgent care.    Medications  •If antibiotics were prescribed, continue to take the pills as instructed until they are gone.  •For pain, over-the-counter tylenol or ibuprofen may be used.    Scar Healing  •New scars are sensitive to the sun. Avoid direct/prolonged scar exposure to the sun while it remains red/pink. Use a sun screen to protect the scar if exposure cannot be avoided.  •Redness is to be expected as a part of healing, but if the scar becomes thickened or inflamed, call the office.    Results  •You will be notified of results via phone call, [facility], or by mail.   •If two weeks have passed and you have not heard from us please call our office    Call the Office  •IF you develop significant pain unrelieved by pain medication  •Some swelling will be present. Call if you note  Problem: Knowledge Deficit  Goal: Patient/family/caregiver demonstrates understanding of disease process, treatment plan, medications, and discharge instructions  Description: Complete learning assessment and assess knowledge base    Interventions:  - Provide teaching at level of understanding  - Provide teaching via preferred learning methods  Outcome: Progressing increasing swelling and pain  •IF fever, redness, increasing pain or tenderness develop  •IF you note significant bleeding, drainage, or pus at the surgical site.      Thank you for choosing Kathia Langley, Nurse Practitioner as your dermatology provider!      At [facility], one important tool we use to improve our patient servies is our Patient Survey.  Following your visit you may receive our survey in the mail.      · Please take time to complete the survey.  · If your visit with us was great, we want to hear about it.  · If we can improve, please let us know how.      Get your Prescription filled at [facility]!    · [facility] uses the same medical record your doctor uses. More accurate and timely information for your doctor and your pharmacy means more effective and safer health care for you and your family.  · [facility] accepts all of the major insurance plans which means you pay the same copay wherever you go.  · [facility] has a prescription savings club with over 200 medications available for $3.99 for a 30 day supply. *$5.00 yearly fee to join the club  · [facility] Pharmacists take the time to explain your medication regimen to you.  · [facility] will mail your medications to you free of postage and handling charges. We love bryan.        [facility]  [address] [place] [address]  Phone [phone]  Fax [phone]    [address] [address]  Phone [phone]  Fax [phone]

## 2021-01-27 ENCOUNTER — HOSPITAL ENCOUNTER (OUTPATIENT)
Dept: INFUSION CENTER | Facility: HOSPITAL | Age: 71
Discharge: HOME | End: 2021-01-27
Attending: INTERNAL MEDICINE
Payer: MEDICARE

## 2021-01-27 VITALS
HEIGHT: 65 IN | OXYGEN SATURATION: 93 % | DIASTOLIC BLOOD PRESSURE: 79 MMHG | WEIGHT: 100.53 LBS | BODY MASS INDEX: 16.75 KG/M2 | RESPIRATION RATE: 16 BRPM | TEMPERATURE: 97.8 F | HEART RATE: 116 BPM | SYSTOLIC BLOOD PRESSURE: 166 MMHG

## 2021-01-27 DIAGNOSIS — C34.90 SMALL CELL LUNG CANCER (HCC): Primary | ICD-10-CM

## 2021-01-27 DIAGNOSIS — D70.1 CHEMOTHERAPY INDUCED NEUTROPENIA (HCC): ICD-10-CM

## 2021-01-27 DIAGNOSIS — T45.1X5A CHEMOTHERAPY INDUCED NEUTROPENIA (HCC): ICD-10-CM

## 2021-01-27 PROCEDURE — 96413 CHEMO IV INFUSION 1 HR: CPT

## 2021-01-27 PROCEDURE — 96367 TX/PROPH/DG ADDL SEQ IV INF: CPT

## 2021-01-27 RX ORDER — SODIUM CHLORIDE 9 MG/ML
20 INJECTION, SOLUTION INTRAVENOUS ONCE
Status: COMPLETED | OUTPATIENT
Start: 2021-01-27 | End: 2021-01-27

## 2021-01-27 RX ADMIN — SODIUM CHLORIDE 20 ML/HR: 0.9 INJECTION, SOLUTION INTRAVENOUS at 10:33

## 2021-01-27 RX ADMIN — DEXAMETHASONE SODIUM PHOSPHATE: 10 INJECTION, SOLUTION INTRAMUSCULAR; INTRAVENOUS at 10:38

## 2021-01-27 RX ADMIN — ETOPOSIDE 115.2 MG: 20 INJECTION INTRAVENOUS at 11:25

## 2021-01-27 NOTE — PLAN OF CARE
Problem: Potential for Falls  Goal: Patient will remain free of falls  Description: INTERVENTIONS:  - Assess patient frequently for physical needs  -  Identify cognitive and physical deficits and behaviors that affect risk of falls  -  [place] fall precautions as indicated by assessment   - Educate patient/family on patient safety including physical limitations  - Instruct patient to call for assistance with activity based on assessment  - Modify environment to reduce risk of injury  - Consider OT/PT consult to assist with strengthening/mobility  Outcome: Progressing     Problem: Knowledge Deficit  Goal: Patient/family/caregiver demonstrates understanding of disease process, treatment plan, medications, and discharge instructions  Description: Complete learning assessment and assess knowledge base    Interventions:  - Provide teaching at level of understanding  - Provide teaching via preferred learning methods  Outcome: Progressing

## 2021-01-27 NOTE — PROGRESS NOTES
Pt arrived amb for chemo day 2  C/o pain around diaphragm front and back  Also pain Left shoulder blade and Lt side of ribs in back  Lungs wnl  Pulse 120  Pt states when she goes to bathroom her sats drop to the low 70s  Uses O2 at 4liters mostly all day and all night  Wearing a pulse ox, checking it constantly and recording it  Dr office notified  Ok to proceed per Cb Fleming after she asked Dr Luann Fields  Premjagruti infusing  Up to batrhroom with O2 and assist prn

## 2021-01-27 NOTE — PROGRESS NOTES
Pt lonny infusion w/o AR  Instructions reviewed  Port de-accessed, dsd applied  Disch amb to home, steady gait

## 2021-01-28 ENCOUNTER — TELEPHONE (OUTPATIENT)
Dept: HEMATOLOGY ONCOLOGY | Facility: CLINIC | Age: 71
End: 2021-01-28

## 2021-01-28 ENCOUNTER — HOSPITAL ENCOUNTER (OUTPATIENT)
Dept: INFUSION CENTER | Facility: HOSPITAL | Age: 71
Discharge: HOME | End: 2021-01-28
Attending: INTERNAL MEDICINE
Payer: MEDICARE

## 2021-01-28 ENCOUNTER — TELEPHONE (OUTPATIENT)
Dept: PULMONOLOGY | Facility: CLINIC | Age: 71
End: 2021-01-28

## 2021-01-28 VITALS
SYSTOLIC BLOOD PRESSURE: 156 MMHG | RESPIRATION RATE: 20 BRPM | OXYGEN SATURATION: 94 % | HEIGHT: 65 IN | DIASTOLIC BLOOD PRESSURE: 93 MMHG | WEIGHT: 100.53 LBS | TEMPERATURE: 97.3 F | HEART RATE: 117 BPM | BODY MASS INDEX: 16.75 KG/M2

## 2021-01-28 DIAGNOSIS — C34.90 SMALL CELL LUNG CANCER (HCC): Primary | ICD-10-CM

## 2021-01-28 DIAGNOSIS — T45.1X5A CHEMOTHERAPY INDUCED NEUTROPENIA (HCC): ICD-10-CM

## 2021-01-28 DIAGNOSIS — D70.1 CHEMOTHERAPY INDUCED NEUTROPENIA (HCC): ICD-10-CM

## 2021-01-28 PROCEDURE — 96367 TX/PROPH/DG ADDL SEQ IV INF: CPT

## 2021-01-28 PROCEDURE — 96413 CHEMO IV INFUSION 1 HR: CPT

## 2021-01-28 PROCEDURE — 96377 APPLICATON ON-BODY INJECTOR: CPT

## 2021-01-28 RX ORDER — SODIUM CHLORIDE 9 MG/ML
20 INJECTION, SOLUTION INTRAVENOUS ONCE
Status: COMPLETED | OUTPATIENT
Start: 2021-01-28 | End: 2021-01-28

## 2021-01-28 RX ADMIN — SODIUM CHLORIDE 20 ML/HR: 9 INJECTION, SOLUTION INTRAVENOUS at 09:28

## 2021-01-28 RX ADMIN — ETOPOSIDE 115.2 MG: 20 INJECTION INTRAVENOUS at 09:56

## 2021-01-28 RX ADMIN — DEXAMETHASONE SODIUM PHOSPHATE: 10 INJECTION, SOLUTION INTRAMUSCULAR; INTRAVENOUS at 09:28

## 2021-01-28 RX ADMIN — PEGFILGRASTIM 6 MG: KIT SUBCUTANEOUS at 11:01

## 2021-01-28 NOTE — TELEPHONE ENCOUNTER
The information needs t be fax to [facility] and not to the government patient states that she would like a copy     Patient was following up on message that was left on the mail

## 2021-01-28 NOTE — PROGRESS NOTES
Pt tolerated infusion with no reactions  HR noted to be elavated today, though this has been her baseline  Neulasta On pro applied to R arm  Green light flashing and res volume full  Pt received AVS  Pt ambulated off unit with steady gait for d/c to home

## 2021-01-28 NOTE — TELEPHONE ENCOUNTER
lvm for pt to call back to schedule an appointment at the [facility] office next week with Dr Dimple Wagoner to discuss oxygen and other issues

## 2021-01-28 NOTE — TELEPHONE ENCOUNTER
LVM advising patient that her [facility] paperwork was received and submiotted in 1/20/21 per the document scanned in her chart      Advised patient to expect a return phone  call from the [place] [phone] when he returns to the office on Monday 2/1/2021

## 2021-01-28 NOTE — PLAN OF CARE
Problem: PAIN - ADULT  Goal: Verbalizes/displays adequate comfort level or baseline comfort level  Description: Interventions:  - Encourage patient to monitor pain and request assistance  - Assess pain using appropriate pain scale  - Administer analgesics based on type and severity of pain and evaluate response  - Implement non-pharmacological measures as appropriate and evaluate response  - Consider cultural and social influences on pain and pain management  - Notify physician/advanced practitioner if interventions unsuccessful or patient reports new pain  Outcome: Progressing     Problem: INFECTION - ADULT  Goal: Absence or prevention of progression during hospitalization  Description: INTERVENTIONS:  - Assess and monitor for signs and symptoms of infection  - Monitor lab/diagnostic results  - Monitor all insertion sites, i e  indwelling lines, tubes, and drains  - Monitor endotracheal if appropriate and nasal secretions for changes in amount and color  - [place] appropriate cooling/warming therapies per order  - Administer medications as ordered  - Instruct and encourage patient and family to use good hand hygiene technique  - Identify and instruct in appropriate isolation precautions for identified infection/condition  Outcome: Progressing  Goal: Absence of fever/infection during neutropenic period  Description: INTERVENTIONS:  - Monitor WBC    Outcome: Progressing     Problem: SAFETY ADULT  Goal: Patient will remain free of falls  Description: INTERVENTIONS:  - Assess patient frequently for physical needs  -  Identify cognitive and physical deficits and behaviors that affect risk of falls    -  [place] fall precautions as indicated by assessment   - Educate patient/family on patient safety including physical limitations  - Instruct patient to call for assistance with activity based on assessment  - Modify environment to reduce risk of injury  - Consider OT/PT consult to assist with strengthening/mobility  Outcome: Progressing  Goal: Maintain or return to baseline ADL function  Description: INTERVENTIONS:  -  Assess patient's ability to carry out ADLs; assess patient's baseline for ADL function and identify physical deficits which impact ability to perform ADLs (bathing, care of mouth/teeth, toileting, grooming, dressing, etc )  - Assess/evaluate cause of self-care deficits   - Assess range of motion  - Assess patient's mobility; develop plan if impaired  - Assess patient's need for assistive devices and provide as appropriate  - Encourage maximum independence but intervene and supervise when necessary  - Involve family in performance of ADLs  - Assess for home care needs following discharge   - Consider OT consult to assist with ADL evaluation and planning for discharge  - Provide patient education as appropriate  Outcome: Progressing  Goal: Maintain or return mobility status to optimal level  Description: INTERVENTIONS:  - Assess patient's baseline mobility status (ambulation, transfers, stairs, etc )    - Identify cognitive and physical deficits and behaviors that affect mobility  - Identify mobility aids required to assist with transfers and/or ambulation (gait belt, sit-to-stand, lift, walker, cane, etc )  - [place] fall precautions as indicated by assessment  - Record patient progress and toleration of activity level on Mobility SBAR; progress patient to next Phase/Stage  - Instruct patient to call for assistance with activity based on assessment  - Consider rehabilitation consult to assist with strengthening/weightbearing, etc   Outcome: Progressing     Problem: DISCHARGE PLANNING  Goal: Discharge to home or other facility with appropriate resources  Description: INTERVENTIONS:  - Identify barriers to discharge w/patient and caregiver  - Arrange for needed discharge resources and transportation as appropriate  - Identify discharge learning needs (meds, wound care, etc )  - Arrange for interpretive services to assist at discharge as needed  - Refer to Case Management Department for coordinating discharge planning if the patient needs post-hospital services based on physician/advanced practitioner order or complex needs related to functional status, cognitive ability, or social support system  Outcome: Progressing     Problem: Knowledge Deficit  Goal: Patient/family/caregiver demonstrates understanding of disease process, treatment plan, medications, and discharge instructions  Description: Complete learning assessment and assess knowledge base    Interventions:  - Provide teaching at level of understanding  - Provide teaching via preferred learning methods  Outcome: Progressing

## 2021-01-28 NOTE — TELEPHONE ENCOUNTER
Patient called to follow up on the status of the [facility] paper work that was to be completed  Best call back [phone]

## 2021-01-29 NOTE — TELEPHONE ENCOUNTER
Patient called back  to schedule appointment in [place] for next week  Unable to find open time slot  Please advise

## 2021-02-03 ENCOUNTER — APPOINTMENT (OUTPATIENT)
Dept: RADIATION ONCOLOGY | Facility: HOSPITAL | Age: 71
End: 2021-02-03
Attending: RADIOLOGY
Payer: MEDICARE

## 2021-02-03 ENCOUNTER — HOSPITAL ENCOUNTER (OUTPATIENT)
Dept: RADIOLOGY | Facility: HOSPITAL | Age: 71
Setting detail: RADIATION/ONCOLOGY SERIES
Discharge: HOME | End: 2021-02-03
Attending: RADIOLOGY
Payer: MEDICARE

## 2021-02-03 ENCOUNTER — TRANSCRIBE ORDERS (OUTPATIENT)
Dept: RADIATION ONCOLOGY | Facility: HOSPITAL | Age: 71
End: 2021-02-03

## 2021-02-03 VITALS
SYSTOLIC BLOOD PRESSURE: 138 MMHG | TEMPERATURE: 97.9 F | OXYGEN SATURATION: 99 % | HEART RATE: 93 BPM | DIASTOLIC BLOOD PRESSURE: 74 MMHG | BODY MASS INDEX: 16.84 KG/M2 | RESPIRATION RATE: 18 BRPM | WEIGHT: 100 LBS

## 2021-02-03 DIAGNOSIS — C34.90 SMALL CELL LUNG CANCER (HCC): ICD-10-CM

## 2021-02-03 DIAGNOSIS — C34.90 SMALL CELL LUNG CANCER (HCC): Primary | ICD-10-CM

## 2021-02-03 DIAGNOSIS — C34.92 SQUAMOUS CARCINOMA OF LUNG, LEFT (HCC): ICD-10-CM

## 2021-02-03 DIAGNOSIS — C34.92 SQUAMOUS CARCINOMA OF LUNG, LEFT (HCC): Primary | ICD-10-CM

## 2021-02-03 PROCEDURE — 77290 THER RAD SIMULAJ FIELD CPLX: CPT | Performed by: RADIOLOGY

## 2021-02-03 PROCEDURE — 77370 RADIATION PHYSICS CONSULT: CPT | Performed by: RADIOLOGY

## 2021-02-03 PROCEDURE — 99211 OFF/OP EST MAY X REQ PHY/QHP: CPT | Performed by: RADIOLOGY

## 2021-02-03 PROCEDURE — 77470 SPECIAL RADIATION TREATMENT: CPT | Performed by: RADIOLOGY

## 2021-02-03 NOTE — PROGRESS NOTES
Tadeo Pena 1950 is a 78 y o  female    Oncology History Overview Note   Patient presents for radiation consult for extensive stage SCLC, referred by Dr Marty Cantu  78year old female with history of COPD on chronic supplemental oxygen at 1L and tobacco abuse  She presented to the ER at [address] in November with worsening shortness of breath and found to have a left sided pleural effusion, cytology positive for poorly differentiated malignant neoplasm with neuroendocrine differentiation  On 11/13/20 she had aseptic catheter placed on left side for pleural effusion  PET/CT revealed FDG avid LLL nodule suspicous for malignancy and FDG uptake in left perihilar region as well as left pleural metastases  She consulted with Med Onc, Dr Marty Cantu in November and was started on systemic therapy with Carboplatin, Etoposide and Imfinzi  Plan for 3 cycles then re-evaluate disease  If localized disease and pleural effusion resolves, may consider referring to Radiation Oncology  11/18/20 PET/CT  1  FDG avid left lower lobe nodule suspicious for malignancy  2  Focal FDG uptake in the left perihilar region for which glenn metastasis is not excluded  3  Extensive heterogeneous FDG uptake along the left pleural margin compatible with pleural metastasis  4   No findings for hypermetabolic metastasis in the neck, abdomen or pelvis  5  Decreased left pleural fluid posteriorly secondary to pleural catheter placement but loculated fluid is increasing anteriorly  11/23/20 Cycle 1 Carbo/Etop + Imfinzi    12/11//20 IR RCW port placement    12/21/20 Removal of left tunneled aseptic catheter     12/21/20 CT head wo contrast (acute headache, normal neuro exam)  No acute intracranial abnormality  1/4/21 Cycle 3 Carbo/Etop + Imfinzi     1/15/21 Dr Marty Cantu, Med Onc  She has received 3 cycles of chemotherapy so far  Her pleural effusions have resolved    Plan for CT scan and referral to Radiation Oncology for evaluate/discuss adding on radiation for her last 2 cycles  If not, plan to finish out 6 cycles of combination chemo/immunotherapy followed by immunotherapy maintenance  1/20/21 CT chest abdomen pelvis w contrast  IMPRESSION:   CT chest:   2 7 cm left lower lobe subpleural mass not significantly changed since December 18, 2020 when measured in the same fashion  Progressive left pleural carcinomatosis with multifocal chest wall infiltration  New mild left hilar adenopathy  Smaller left multiloculated malignant pleural effusion  CT abdomen and pelvis:   No evidence of abdominopelvic metastatic disease  1/25/21 Dr Joey Nobles follow-up  Imaging shows a question of local progression in the form of 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 and some areas of invasion of the chest wall  A small palpable 1 cm area is present in the left chest wall posteriorly  Recommend radiation to this area if possible to help even in the future in terms of comfort from the chest wall invasion and symptoms from local invasion  If they can radiate further to encompass most of the disease that would be optimal     Plan to continue with current therapy and f/u in 3 weeks  1/26/21 Infusion, Cycle 4     Upcoming appts:  2/5/21 Pulmonology, Dr Adwoa Hatfield  2/15/21 Infusion, SLQ Cycle 5  2/17/21 Med Onc  2/23/21 Palliative care  3/8/21 Infusion, Cycle 6     Small cell lung cancer ([address] )   11/2020 Initial Diagnosis    Small cell lung cancer ([address] )     11/3/2020 Biopsy    Pleural fluid, left:     - Poorly-differentiated malignant neoplasm with neuroendocrine differentiation  11/10/2020 Biopsy    Cytology:   Pleural Fluid, thoracentesis:     Fluid removed amount:  1750 cc  Positive for malignancy  Poorly differentiated carcinoma          11/23/2020 -  Chemotherapy    pegfilgrastim (NEULASTA ONPRO) subcutaneous injection kit 6 mg, 6 mg, Subcutaneous, Once, 4 of 6 cycles  Administration: 6 mg (11/25/2020), 6 mg (12/16/2020), 6 mg (1/6/2021), 6 mg (1/28/2021)  fosaprepitant (EMEND) 150 mg in sodium chloride 0 9 % 250 mL IVPB, 150 mg, Intravenous, Once, 4 of 6 cycles  Administration: 150 mg (11/23/2020), 150 mg (12/14/2020), 150 mg (1/4/2021), 150 mg (1/26/2021)  CARBOplatin (PARAPLATIN) 402 mg in sodium chloride 0 9 % 250 mL IVPB, 402 mg, Intravenous, Once, 4 of 6 cycles  Administration: 402 mg (11/23/2020), 371 5 mg (12/14/2020), 387 5 mg (1/4/2021), 310 mg (1/26/2021)  Durvalumab 1,500 mg in sodium chloride 0 9 % 100 mL IVPB, 1,500 mg (original dose 10 mg/kg), Intravenous, Once, 4 of 6 cycles  Dose modification: 1,500 mg (original dose 10 mg/kg, Cycle 1, Reason: Other (See Comments))  Administration: 1,500 mg (11/23/2020), 1,500 mg (12/14/2020), 1,500 mg (1/4/2021), 1,500 mg (1/26/2021)  etoposide (TOPOSAR) 152 mg in sodium chloride 0 9 % 500 mL chemo infusion, 100 mg/m2 = 152 mg, Intravenous, Once, 4 of 6 cycles  Dose modification: 80 mg/m2 (original dose 100 mg/m2, Cycle 4, Reason: Dose Not Tolerated)  Administration: 152 mg (11/23/2020), 152 mg (11/25/2020), 152 mg (11/24/2020), 152 mg (12/14/2020), 152 mg (12/15/2020), 152 mg (12/16/2020), 144 mg (1/4/2021), 144 mg (1/5/2021), 144 mg (1/6/2021), 115 2 mg (1/26/2021), 115 2 mg (1/27/2021), 115 2 mg (1/28/2021)         Clinical Trial: no      Health Maintenance   Topic Date Due    Medicare Annual Wellness Visit (AWV)  1950    Depression Screening PHQ  05/01/1964    BMI: Followup Plan  05/01/1970    DTaP,Tdap,and Td Vaccines (1 - Tdap) 05/01/1973    Fall Risk  05/01/2017    Influenza Vaccine (1) 09/01/2020    BMI: Adult  01/28/2022    Colorectal Cancer Screening  07/09/2030    Hepatitis C Screening  Completed    Pneumococcal Vaccine: 65+ Years  Completed    HIB Vaccine  Aged Out    Hepatitis B Vaccine  Aged Out    IPV Vaccine  Aged Out    Hepatitis A Vaccine  Aged Out    Meningococcal ACWY Vaccine  Aged Out    HPV Vaccine  Aged Out       Past Medical History: Diagnosis Date    Chronic fatigue syndrome with fibromyalgia     COPD (chronic obstructive pulmonary disease) (HCC)     DDD (degenerative disc disease), cervical     Em[facility] lung [facility]     Hx of migraine headaches     Hypothyroidism     Lung cancer ([facility] )        Past Surgical History:   Procedure Laterality Date    BARTHOLIN GLAND CYST EXCISION      CARPAL TUNNEL RELEASE Bilateral     COLONOSCOPY  07/09/2020     15 mm sessile polyp in the cecum removed by polypectomy  A 3 recall    IR PLEURAL EFFUSION LONG-TERM CATHETER PLACEMENT  11/13/2020    IR PLEURAL EFFUSION LONG-TERM CATHETER REMOVAL  12/21/2020    IR PORT PLACEMENT  12/11/2020    IR THORACENTESIS  11/3/2020    TONSILLECTOMY      TUBAL LIGATION      UPPER GASTROINTESTINAL ENDOSCOPY  07/09/2020     small hiatal hernia  Pathology negative         Family History   Problem Relation Age of Onset    Colon polyps Mother     Heart disease Mother     Colon cancer Mother     Heart disease Father     Lymphoma Father        Social History     Tobacco Use    Smoking status: Former Smoker     Packs/day: 0 50     Years: 53 00     Pack years: 26 50     Types: Cigarettes     Start date: 1967    Smokeless tobacco: Never Used    Tobacco comment: 2 cigarettes daily   Substance Use Topics    Alcohol use: Not Currently    Drug use: Not Currently          Current Outpatient Medications:     acetaminophen (TYLENOL) 500 mg tablet, Take 2 tablets (1,000 mg total) by mouth every 8 (eight) hours, Disp: , Rfl:     albuterol (ProAir HFA) 90 mcg/act inhaler, Inhale 2 puffs every 6 (six) hours as needed for wheezing or shortness of breath, Disp: 8 5 g, Rfl: 6    B Complex-Biotin-FA (TH VITAMIN B 50/B-COMPLEX) TABS, Take by mouth daily, Disp: , Rfl:     Capsaicin 0 025 % GEL, Apply topically 3 (three) times a day as needed , Disp: , Rfl:     cholecalciferol (VITAMIN D3) 1,000 units tablet, Take 7,000 Units by mouth daily, Disp: , Rfl:     COLCHICINE PO, Take 0 5 mg by mouth 3 (three) times a day as needed , Disp: , Rfl:     cyclobenzaprine (FLEXERIL) 10 mg tablet, Take 10 mg by mouth 3 (three) times a day as needed for muscle spasms, Disp: , Rfl:     Echinacea 400 MG CAPS, Take by mouth 3 (three) times a day as needed, Disp: , Rfl:     fexofenadine-pseudoephedrine (ALLEGRA-D)  MG per tablet, Take 1 tablet by mouth 2 (two) times a day as needed for allergies, Disp: , Rfl:     GINKGO BILOBA PLUS PO, Take by mouth as needed 2 capsules every 2 mornings , Disp: , Rfl:     Hyaluronic Acid 20-60 MG CAPS, Take by mouth 3 (three) times a day , Disp: , Rfl:     L-Lysine 500 MG CAPS, Take by mouth 3 (three) times a day as needed, Disp: , Rfl:     Lactobacillus (PROBIOTIC ACIDOPHILUS PO), Take 1 capsule by mouth daily, Disp: , Rfl:     lidocaine-prilocaine (EMLA) cream, Apply topically as needed for mild pain 30-60 min prior to port access  , Disp: 30 g, Rfl: 0    loperamide (IMODIUM) 2 mg capsule, Take 2 mg by mouth daily , Disp: , Rfl:     mirtazapine (REMERON) 15 mg tablet, , Disp: , Rfl:     morphine (MSIR) 15 mg tablet, Take 7 5mg or 15mg PO every 4 hours as needed for moderate to severe pain, Disp: 45 tablet, Rfl: 0    NON FORMULARY, Take 37 mg by mouth daily Eco Thyro 37 - reported by pt, Disp: , Rfl:     nystatin (MYCOSTATIN) 500,000 units/5 mL suspension, Apply 5 mL (500,000 Units total) to the mouth or throat 4 (four) times a day, Disp: 500 mL, Rfl: 0    omeprazole (PriLOSEC) 20 mg delayed release capsule, Take 1 capsule (20 mg total) by mouth daily, Disp: 90 capsule, Rfl: 3    ondansetron (ZOFRAN) 4 mg tablet, Take 1 tablet (4 mg total) by mouth every 8 (eight) hours as needed for nausea or vomiting, Disp: 60 tablet, Rfl: 1    other medication, see sig,, Take 3 capsules by mouth daily [id number], Disp: , Rfl:     prochlorperazine (COMPAZINE) 10 mg tablet, Take 10 mg by mouth every 6 (six) hours as needed for nausea or vomiting (migraines), Disp: , Rfl:     S-Adenosylmethionine (RADHA-E) 200 MG TABS, Take by mouth 3 (three) times a day, Disp: , Rfl:     senna-docusate sodium (SENOKOT-S) 8 6-50 mg per tablet, Take 1 tablet by mouth daily, Disp: 60 tablet, Rfl: 0    sodium chloride (OCEAN) 0 65 % nasal spray, 1 spray into each nostril as needed for congestion or rhinitis, Disp: 30 mL, Rfl: 0    tolterodine (DETROL LA) 2 mg 24 hr capsule, , Disp: , Rfl:     Tragacanth (ASTRAGALUS ROOT) POWD, 470 mg 3 (three) times a day as needed, Disp: , Rfl:     umeclidinium-vilanterol (ANORO ELLIPTA) 62 5-25 MCG/INH inhaler, Inhale 1 puff daily, Disp: 1 Inhaler, Rfl: 5    Allergies   Allergen Reactions    Clarithromycin     Codeine Other (See Comments)     lethergic    Other      PHOSPHATE    Oxycodone Other (See Comments)     lethergic    Trazodone Other (See Comments)     lethergic    Penicillins Rash        Review of Systems:  Review of Systems   Constitutional: Positive for fatigue  Weight stable since diagnosis  lbs  Appetite stable, decreased after chemo but improves   HENT: Positive for mouth sores (thrush after chemo, improving) and trouble swallowing (with thrush, this is improving)  Eyes: Positive for visual disturbance ("foggy" )  Respiratory: Positive for cough (nonproductive) and shortness of breath (with exertion and anxiety)  5L oxygen continuous (previously was using 1L for COPD)  Wearing nicotine patch, smoking 1-2 puffs per day   Cardiovascular: Positive for chest pain (left sided radiating to back)  Gastrointestinal: Positive for diarrhea (imodium prn)  Endocrine: Negative  Genitourinary: Negative  Musculoskeletal: Positive for arthralgias and gait problem (weakness and short of breath)  Skin: Negative  Allergic/Immunologic: Negative  Neurological: Positive for weakness and light-headedness  Hematological: Negative  Psychiatric/Behavioral: The patient is nervous/anxious          Vitals: 02/03/21 0932   BP: 138/74   BP Location: Right arm   Pulse: 93   Resp: 18   Temp: 97 9 °F (36 6 °C)   TempSrc: Temporal   SpO2: 99%   Weight: 45 4 kg (100 lb)        PFT     Imaging:No images are attached to the encounter       Teaching: NCI packet radiation, lung    MST completed    Implantable Devices (Port, Pacemaker, pain stimulator) RCW port    Hip Replacement: no

## 2021-02-03 NOTE — PROGRESS NOTES
Consultation - Radiation Oncology      [facility]:[id number] : 1950  Encounter: [id number]  Patient Information: Ramona Hodges      CHIEF COMPLAINT  Chief Complaint   Patient presents with    Consult     Radiation Oncology      Cancer Staging  Lung cancer [facility])  Staging form: Lung, AJCC 8th Edition  - Clinical: No stage assigned - Unsigned           History of Present Illness   Ramona Hodges is a 78y o  year old female who presents with extensive stage small cell lung cancer with extensive left pleural based disease  She has been receiving systemic therapy under the care of Medical Oncology and presents today for consideration of palliative radiation therapy to relieve left chest wall pain  Workup to date as below:    Patient presents for radiation consult for extensive stage SCLC, referred by Dr Doyle Humphrey  78year old female with history of COPD on chronic supplemental oxygen at 1L and tobacco abuse  She presented to the ER at [address] [place] [address] in November with worsening shortness of breath and found to have a left sided pleural effusion, cytology positive for poorly differentiated malignant neoplasm with neuroendocrine differentiation  On 20 she had aseptic catheter placed on left side for pleural effusion  PET/CT revealed FDG avid LLL nodule suspicous for malignancy and FDG uptake in left perihilar region as well as left pleural metastases  She consulted with Med Onc, Dr Doyle Humphrey in November and was started on systemic therapy with Carboplatin, Etoposide and Imfinzi  Plan for 3 cycles then re-evaluate disease  If localized disease and pleural effusion resolves, may consider referring to Radiation Oncology  20 PET/CT  1  FDG avid left lower lobe nodule suspicious for malignancy  2  Focal FDG uptake in the left perihilar region for which glenn metastasis is not excluded  3  Extensive heterogeneous FDG uptake along the left pleural margin compatible with pleural metastasis    4   No findings for hypermetabolic metastasis in the neck, abdomen or pelvis  5  Decreased left pleural fluid posteriorly secondary to pleural catheter placement but loculated fluid is increasing anteriorly  11/23/20 Cycle 1 Carbo/Etop + Imfinzi    12/11//20 IR RCW port placement    12/21/20 Removal of left tunneled aseptic catheter     12/21/20 CT head wo contrast (acute headache, normal neuro exam)  No acute intracranial abnormality  1/4/21 Cycle 3 Carbo/Etop + Imfinzi     1/15/21 Dr John Andre, Med Onc  She has received 3 cycles of chemotherapy so far  Her pleural effusions have resolved  Plan for CT scan and referral to Radiation Oncology for evaluate/discuss adding on radiation for her last 2 cycles  If not, plan to finish out 6 cycles of combination chemo/immunotherapy followed by immunotherapy maintenance  1/20/21 CT chest abdomen pelvis w contrast  IMPRESSION:   CT chest:   2 7 cm left lower lobe subpleural mass not significantly changed since December 18, 2020 when measured in the same fashion  Progressive left pleural carcinomatosis with multifocal chest wall infiltration  New mild left hilar adenopathy  Smaller left multiloculated malignant pleural effusion  CT abdomen and pelvis:   No evidence of abdominopelvic metastatic disease  1/25/21 Dr John Andre follow-up  Imaging shows a question of local progression in the form of 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 and some areas of invasion of the chest wall  A small palpable 1 cm area is present in the left chest wall posteriorly  Recommend radiation to this area if possible to help even in the future in terms of comfort from the chest wall invasion and symptoms from local invasion  If they can radiate further to encompass most of the disease that would be optimal     Plan to continue with current therapy and f/u in 3 weeks       1/26/21 Infusion, Cycle 4     Upcoming appts:  2/5/21 Pulmonology, Dr Patel Quant  2/15/21 Infusion, SLQ Cycle 5  2/17/21 Med Onc  2/23/21 Palliative care  3/8/21 Infusion, Cycle 6      Historical Information   Oncology History   Small cell lung cancer ([facility] [address] )   11/2020 Initial Diagnosis    Small cell lung cancer ([facility] [address] )     11/3/2020 Biopsy    Pleural fluid, left:     - Poorly-differentiated malignant neoplasm with neuroendocrine differentiation  11/10/2020 Biopsy    Cytology:   Pleural Fluid, thoracentesis:     Fluid removed amount:  1750 cc  Positive for malignancy  Poorly differentiated carcinoma          11/23/2020 -  Chemotherapy    pegfilgrastim (NEULASTA ONPRO) subcutaneous injection kit 6 mg, 6 mg, Subcutaneous, Once, 4 of 6 cycles  Administration: 6 mg (11/25/2020), 6 mg (12/16/2020), 6 mg (1/6/2021), 6 mg (1/28/2021)  fosaprepitant (EMEND) 150 mg in sodium chloride 0 9 % 250 mL IVPB, 150 mg, Intravenous, Once, 4 of 6 cycles  Administration: 150 mg (11/23/2020), 150 mg (12/14/2020), 150 mg (1/4/2021), 150 mg (1/26/2021)  CARBOplatin (PARAPLATIN) 402 mg in sodium chloride 0 9 % 250 mL IVPB, 402 mg, Intravenous, Once, 4 of 6 cycles  Administration: 402 mg (11/23/2020), 371 5 mg (12/14/2020), 387 5 mg (1/4/2021), 310 mg (1/26/2021)  Durvalumab 1,500 mg in sodium chloride 0 9 % 100 mL IVPB, 1,500 mg (original dose 10 mg/kg), Intravenous, Once, 4 of 6 cycles  Dose modification: 1,500 mg (original dose 10 mg/kg, Cycle 1, Reason: Other (See Comments))  Administration: 1,500 mg (11/23/2020), 1,500 mg (12/14/2020), 1,500 mg (1/4/2021), 1,500 mg (1/26/2021)  etoposide (TOPOSAR) 152 mg in sodium chloride 0 9 % 500 mL chemo infusion, 100 mg/m2 = 152 mg, Intravenous, Once, 4 of 6 cycles  Dose modification: 80 mg/m2 (original dose 100 mg/m2, Cycle 4, Reason: Dose Not Tolerated)  Administration: 152 mg (11/23/2020), 152 mg (11/25/2020), 152 mg (11/24/2020), 152 mg (12/14/2020), 152 mg (12/15/2020), 152 mg (12/16/2020), 144 mg (1/4/2021), 144 mg (1/5/2021), 144 mg (1/6/2021), 115 2 mg (1/26/2021), 115 2 mg (1/27/2021), 115 2 mg (1/28/2021)           Past Medical History:   Diagnosis Date    Chronic fatigue syndrome with fibromyalgia     [facility] pulmonary [facility])     DDD (degenerative disc disease), cervical     Emphysema [facility] lung ([facility] [address] )     Hx of migraine headaches     Hypothyroidism     [facility]      Past Surgical History:   Procedure Laterality Date    BARTHOLIN GLAND CYST EXCISION      CARPAL TUNNEL RELEASE Bilateral     COLONOSCOPY  07/09/2020     15 mm sessile polyp in the cecum removed by polypectomy  A 3 recall    IR PLEURAL EFFUSION LONG-TERM CATHETER PLACEMENT  11/13/2020    IR PLEURAL EFFUSION LONG-TERM CATHETER REMOVAL  12/21/2020    IR PORT PLACEMENT  12/11/2020    IR THORACENTESIS  11/3/2020    TONSILLECTOMY      TUBAL LIGATION      UPPER GASTROINTESTINAL ENDOSCOPY  07/09/2020     small hiatal hernia  Pathology negative         Family History   Problem Relation Age of Onset    Colon polyps Mother     Heart disease Mother     Colon cancer Mother     Heart disease Father     Lymphoma Father        Social History   Social History     Substance and Sexual Activity   Alcohol Use Not Currently     Social History     Substance and Sexual Activity   Drug Use Not Currently     Social History     Tobacco Use   Smoking Status Former Smoker    Packs/day: 0 50    Years: 53 00    Pack years: 26 50    Types: Cigarettes    Start date: 1967   Smokeless Tobacco Never Used   Tobacco Comment    2 cigarettes daily         Meds/Allergies     Current Outpatient Medications:     acetaminophen (TYLENOL) 500 mg tablet, Take 2 tablets (1,000 mg total) by mouth every 8 (eight) hours, Disp: , Rfl:     albuterol (ProAir HFA) 90 mcg/act inhaler, Inhale 2 puffs every 6 (six) hours as needed for wheezing or shortness of breath, Disp: 8 5 g, Rfl: 6    B Complex-Biotin-FA (TH VITAMIN B 50/B-COMPLEX) TABS, Take by mouth daily, Disp: , Rfl:     Capsaicin 0 025 % GEL, Apply topically 3 (three) times a day as needed , Disp: , Rfl:     cholecalciferol (VITAMIN D3) 1,000 units tablet, Take 7,000 Units by mouth daily, Disp: , Rfl:     COLCHICINE PO, Take 0 5 mg by mouth 3 (three) times a day as needed , Disp: , Rfl:     cyclobenzaprine (FLEXERIL) 10 mg tablet, Take 10 mg by mouth 3 (three) times a day as needed for muscle spasms, Disp: , Rfl:     Echinacea 400 MG CAPS, Take by mouth 3 (three) times a day as needed, Disp: , Rfl:     fexofenadine-pseudoephedrine (ALLEGRA-D)  MG per tablet, Take 1 tablet by mouth 2 (two) times a day as needed for allergies, Disp: , Rfl:     GINKGO BILOBA PLUS PO, Take by mouth as needed 2 capsules every 2 mornings , Disp: , Rfl:     Hyaluronic Acid 20-60 MG CAPS, Take by mouth 3 (three) times a day , Disp: , Rfl:     L-Lysine 500 MG CAPS, Take by mouth 3 (three) times a day as needed, Disp: , Rfl:     Lactobacillus (PROBIOTIC ACIDOPHILUS PO), Take 1 capsule by mouth daily, Disp: , Rfl:     lidocaine-prilocaine (EMLA) cream, Apply topically as needed for mild pain 30-60 min prior to port access  , Disp: 30 g, Rfl: 0    loperamide (IMODIUM) 2 mg capsule, Take 2 mg by mouth daily , Disp: , Rfl:     mirtazapine (REMERON) 15 mg tablet, , Disp: , Rfl:     morphine (MSIR) 15 mg tablet, Take 7 5mg or 15mg PO every 4 hours as needed for moderate to severe pain, Disp: 45 tablet, Rfl: 0    NON FORMULARY, Take 37 mg by mouth daily Eco Thyro 37 - reported by pt, Disp: , Rfl:     nystatin (MYCOSTATIN) 500,000 units/5 mL suspension, Apply 5 mL (500,000 Units total) to the mouth or throat 4 (four) times a day, Disp: 500 mL, Rfl: 0    omeprazole (PriLOSEC) 20 mg delayed release capsule, Take 1 capsule (20 mg total) by mouth daily, Disp: 90 capsule, Rfl: 3    ondansetron (ZOFRAN) 4 mg tablet, Take 1 tablet (4 mg total) by mouth every 8 (eight) hours as needed for nausea or vomiting, Disp: 60 tablet, Rfl: 1    other medication, see sig,, Take 3 capsules by mouth daily [id number], Disp: , Rfl:     prochlorperazine (COMPAZINE) 10 mg tablet, Take 10 mg by mouth every 6 (six) hours as needed for nausea or vomiting (migraines), Disp: , Rfl:     S-Adenosylmethionine (RADHA-E) 200 MG TABS, Take by mouth 3 (three) times a day, Disp: , Rfl:     senna-docusate sodium (SENOKOT-S) 8 6-50 mg per tablet, Take 1 tablet by mouth daily, Disp: 60 tablet, Rfl: 0    sodium chloride (OCEAN) 0 65 % nasal spray, 1 spray into each nostril as needed for congestion or rhinitis, Disp: 30 mL, Rfl: 0    tolterodine (DETROL LA) 2 mg 24 hr capsule, , Disp: , Rfl:     Tragacanth (ASTRAGALUS ROOT) POWD, 470 mg 3 (three) times a day as needed, Disp: , Rfl:     umeclidinium-vilanterol (ANORO ELLIPTA) 62 5-25 MCG/INH inhaler, Inhale 1 puff daily, Disp: 1 Inhaler, Rfl: 5  Allergies   Allergen Reactions    Clarithromycin     Codeine Other (See Comments)     lethergic    Other      PHOSPHATE    Oxycodone Other (See Comments)     lethergic    Trazodone Other (See Comments)     lethergic    Penicillins Rash         Review of Systems   Review of Systems   Constitutional: Positive for fatigue  Weight stable since diagnosis  lbs  Appetite stable, decreased after chemo but improves   HENT: Positive for mouth sores (thrush after chemo, improving) and trouble swallowing (with thrush, this is improving)  Eyes: Positive for visual disturbance ("foggy" )  Respiratory: Positive for cough (nonproductive) and shortness of breath (with exertion and anxiety)  5L oxygen continuous (previously was using 1L for COPD)  Wearing nicotine patch, smoking 1-2 puffs per day   Cardiovascular: Positive for chest pain (left sided radiating to back)  Gastrointestinal: Positive for diarrhea (imodium prn)  Endocrine: Negative  Genitourinary: Negative  Musculoskeletal: Positive for arthralgias and gait problem (weakness and short of breath)  Skin: Negative  Allergic/Immunologic: Negative      Neurological: Positive for weakness and light-headedness  Hematological: Negative  Psychiatric/Behavioral: The patient is nervous/anxious  OBJECTIVE:   /74 (BP Location: Right arm)   Pulse 93   Temp 97 9 °F (36 6 °C) (Temporal)   Resp 18   Wt 45 4 kg (100 lb)   SpO2 99%   BMI 16 84 kg/m²   Performance Status: Karnofsky: 79 - Cares for self; unable to carry on normal activity or do normal work     Physical Exam   The patient presents today no apparent distress  She is rather cachectic and on 5 L nasal cannula  Poor breath sounds throughout  There are 2 distinct nodular subcutaneous lesions along the left posterior chest wall  Overlying skin is intact and within normal limits  RESULTS  Lab Results    Chemistry        Component Value Date/Time    K 3 6 01/20/2021 0849     01/20/2021 0849    CO2 24 01/20/2021 0849    BUN 15 01/20/2021 0849    CREATININE 0 67 01/20/2021 0849        Component Value Date/Time    CALCIUM 8 1 (L) 01/20/2021 0849    ALKPHOS 130 (H) 01/20/2021 0849    AST 23 01/20/2021 0849    ALT 19 01/20/2021 0849            Lab Results   Component Value Date    WBC 10 24 (H) 01/20/2021    HGB 10 4 (L) 01/20/2021    HCT 32 8 (L) 01/20/2021    MCV 95 01/20/2021     01/20/2021         Imaging Studies  Xr Chest Pa & Lateral    Result Date: 1/11/2021  Narrative: CHEST INDICATION:   R04 2: Hemoptysis  COMPARISON:  One view chest 12/20/2020 EXAM PERFORMED/VIEWS:  XR CHEST PA & LATERAL  The frontal view was performed utilizing dual energy radiographic technique  FINDINGS:  Right chest wall anoop catheter in place tip at the superior cavoatrial junction  Normal cardiac silhouette  Aortic calcification is present  New left upper lobe infiltrates  Small to moderate left pleural effusion with adjacent subsegmental atelectasis slightly improved allowing for difference in technique  No pneumothorax or pulmonary edema  Hyperlucent changes suggestive of COPD   Mild degenerative changes in the spine  Impression: New left upper lobe infiltrates  Small to moderate left pleural effusion with adjacent subsegmental atelectasis slightly improved  This study demonstrates a significant  finding and was documented as such in [facility] for liaison and referring practitioner notification  Workstation performed: [id number]     Ct Chest Abdomen Pelvis W Contrast    Result Date: 1/20/2021  Narrative: CT CHEST, ABDOMEN AND PELVIS WITH IV CONTRAST INDICATION:   C34 90: Malignant neoplasm of unspecified part of unspecified bronchus or lung  COMPARISON:  CT chest abdomen and pelvis 12/18/2020 TECHNIQUE: CT examination of the chest, abdomen and pelvis was performed  Axial, sagittal, and coronal 2D reformatted images were created from the source data and submitted for interpretation  Radiation dose length product (DLP) for this visit:  394 69 mGy-cm   This examination, like all CT scans performed in the [facility], was performed utilizing techniques to minimize radiation dose exposure, including the use of iterative  reconstruction and automated exposure control  IV Contrast:  100 mL of iohexol (OMNIPAQUE)   350 Enteric Contrast: Enteric contrast was administered  FINDINGS: CHEST LUNGS:  1 8 x 1 5 x 2 7 cm mass left lower lobe image 29 series 2 and image 78 series 602 unchanged when measured in the same fashion  Progressive subtotal left basilar atelectasis  Progressive minimal subsegmental atelectasis inferior lingula  Minimal linear subsegmental atelectasis/scar right lung base  Severe pulmonary emphysema  Central airways are clear  PLEURA:  Interval removal of pleural drainage catheter  Small multiloculated left pleural effusion with minimal peripheral enhancement intervally smaller  Diffuse left pleural nodularity slightly increased  For example, medial pleural paracardiac pleural  mass measures 3 3 x 2 2 cm image 49 series 2 previously 2 4 x 1 3 cm  HEART/GREAT VESSELS:  Heart is not enlarged    No pericardial effusion  Aortic calcification  Tip of portacatheter in the right atrium  MEDIASTINUM AND MINA:  Progressive left hilar adenopathy  1 1 cm short axis anterior and posterior left hilar lymph nodes image 33 series 2 previously 0 8 cm  CHEST WALL AND LOWER NECK:   Minimal interval worsening of left chest wall soft tissue nodularity compatible with infiltrative disease  For example, left anterior paramedian deep chest wall soft tissue nodularity measuring 1 3 cm image 30 series 2 previously 1 cm  Left 10th posterolateral intercostal soft tissue lesion measures 1 6 x 0 9 cm image 52 series 2 previously 1 1 x 0 6 cm when measured in the same fashion  Right anterior chest wall anoop catheter  ABDOMEN LIVER/BILIARY TREE:  Unremarkable  GALLBLADDER:  No calcified gallstones  No pericholecystic inflammatory change  SPLEEN:  Unremarkable  PANCREAS:  Unremarkable  ADRENAL GLANDS:  Unremarkable  KIDNEYS/URETERS: One or more sharply circumscribed subcentimeter renal hypodensities are noted  These lesions are too small to accurately characterize, but are statistically most likely to represent benign cortical renal cyst(s)  According to the guidelines published in the [facility] Paper of the ACR Incidental Findings Committee (Radiology 2010), no further workup of these lesions is recommended    Kidneys otherwise unremarkable  No perinephric collection  STOMACH AND BOWEL:  Unremarkable  APPENDIX:  No findings to suggest appendicitis  ABDOMINOPELVIC CAVITY:  No ascites  No pneumoperitoneum  No lymphadenopathy  VESSELS:  Aortoiliac calcification  No aneurysm  PELVIS REPRODUCTIVE ORGANS:  Unremarkable for patient's age  URINARY BLADDER:  Collapsed bladder limits its evaluation  ABDOMINAL WALL/INGUINAL REGIONS:  Unremarkable  OSSEOUS STRUCTURES:  No acute fracture or osseous destructive lesion identified  Degenerative changes of the spine, pubic symphysis, and multiple joints   Stable minimal grade 1 degenerative retrolisthesis L4 on L5  Mild dextroconvex lumbar scoliosis  Impression: CT chest: 2 7 cm left lower lobe subpleural mass not significantly changed since December 18, 2020 when measured in the same fashion  Progressive left pleural carcinomatosis with multifocal chest wall infiltration  New mild left hilar adenopathy  Smaller left multiloculated malignant pleural effusion  CT abdomen and pelvis: No evidence of abdominopelvic metastatic disease  This study demonstrates a significant  finding and [facility] for [facility]ison and referring practitioner notification  Workstation performed: [id number]         ASSESSMENT  1  Small cell lung cancer [facility])  Ambulatory referral to Radiation Oncology     Cancer Staging  Lung cancer [facility])  Staging form: Lung, AJCC 8th Edition  - Clinical: No stage assigned - Unsigned        PLAN/DISCUSSION  No orders of the defined types were placed in this encounter  Pink Dance is a 78y o  year old female with extensive stage small cell lung cancer with significant pleural based disease throughout the left chest   There are a few obvious areas where the pleural based disease has grown into the chest wall and are in fact palpable  Her pain is essentially localized to the vicinity of these lesions  She also has a area of discomfort along the left parasternal region anteriorly which also corresponds to an area of chest wall invasion  We have offered the patient a course of palliative radiation therapy to these areas, 2000 cGy in 5 fractions  The patient seems to have a somewhat limited insight into her disease process and it was explained multiple times that the goal of the radiation is not to eradicate her cancer but rather to alleviate and prevent further pain  The associated risks and toxicities of treatment were discussed with the patient in detail, including, but not limited to, fatigue, erythema, parenchymal lung scarring, and pneumonitis    She will undergo CT planning today and will receive her treatment next week prior to her next cycle of systemic therapy          Samual Gilford, MD  2/3/2021,11:03 AM      Portions of the record may have been created with voice recognition software  Occasional wrong word or "sound a like" substitutions may have occurred due to the inherent limitations of voice recognition software  Read the chart carefully and recognize, using context, where substitutions have occurred

## 2021-02-05 ENCOUNTER — TELEPHONE (OUTPATIENT)
Dept: HEMATOLOGY ONCOLOGY | Facility: MEDICAL CENTER | Age: 71
End: 2021-02-05

## 2021-02-05 PROCEDURE — 77295 3-D RADIOTHERAPY PLAN: CPT | Performed by: RADIOLOGY

## 2021-02-05 PROCEDURE — 77334 RADIATION TREATMENT AID(S): CPT | Performed by: RADIOLOGY

## 2021-02-05 PROCEDURE — 77300 RADIATION THERAPY DOSE PLAN: CPT | Performed by: RADIOLOGY

## 2021-02-05 NOTE — TELEPHONE ENCOUNTER
LVM advising patient to expect a return call form Maya Rodriguez by Monday 2/8/2021 when he returns to the office

## 2021-02-08 ENCOUNTER — TELEPHONE (OUTPATIENT)
Dept: PALLIATIVE MEDICINE | Facility: CLINIC | Age: 71
End: 2021-02-08

## 2021-02-08 ENCOUNTER — TELEPHONE (OUTPATIENT)
Dept: HEMATOLOGY ONCOLOGY | Facility: CLINIC | Age: 71
End: 2021-02-08

## 2021-02-08 DIAGNOSIS — C34.90 SMALL CELL LUNG CANCER (HCC): ICD-10-CM

## 2021-02-08 DIAGNOSIS — C34.90 SMALL CELL LUNG CANCER (HCC): Primary | ICD-10-CM

## 2021-02-08 DIAGNOSIS — R63.4 WEIGHT LOSS: Primary | ICD-10-CM

## 2021-02-08 PROCEDURE — 77280 THER RAD SIMULAJ FIELD SMPL: CPT | Performed by: RADIOLOGY

## 2021-02-08 PROCEDURE — 77387 GUIDANCE FOR RADJ TX DLVR: CPT | Performed by: RADIOLOGY

## 2021-02-08 PROCEDURE — 77412 RADIATION TX DELIVERY LVL 3: CPT | Performed by: RADIOLOGY

## 2021-02-08 NOTE — TELEPHONE ENCOUNTER
Pt called office requesting an order for PT as she's started Radiation and would like to proactively build strength as she fears her muscles will begin to deteriorate  Please advise

## 2021-02-08 NOTE — TELEPHONE ENCOUNTER
Carin Mota from [facility] called to check on signed order for med orders  from 11-  Carin Mota will be refaxing to [phone]   For any questions Carin Mota can be reached at [phone]

## 2021-02-08 NOTE — TELEPHONE ENCOUNTER
Patient left  stating Dr Doc Gardiner has cancelled her appt about 3 times now and would like to speak with Deyvi Kimbrough  She would like know if she can be seen this week as she has radiation the whole week so it would be easier for her  She states this appt is to go over her oxygen issues  Please advise

## 2021-02-09 ENCOUNTER — APPOINTMENT (OUTPATIENT)
Dept: RADIATION ONCOLOGY | Facility: HOSPITAL | Age: 71
End: 2021-02-09
Attending: RADIOLOGY
Payer: MEDICARE

## 2021-02-09 ENCOUNTER — TELEPHONE (OUTPATIENT)
Dept: HEMATOLOGY ONCOLOGY | Facility: CLINIC | Age: 71
End: 2021-02-09

## 2021-02-09 PROCEDURE — 77412 RADIATION TX DELIVERY LVL 3: CPT | Performed by: RADIOLOGY

## 2021-02-09 RX ORDER — MIRTAZAPINE 30 MG/1
30 TABLET, FILM COATED ORAL
Qty: 30 TABLET | Refills: 1 | Status: SHIPPED | OUTPATIENT
Start: 2021-02-09

## 2021-02-09 NOTE — TELEPHONE ENCOUNTER
Please let patient know that we can trial an increase in remeron to 30mg ODHS, this will be the max dose for her  Will send new script now  Thank you       Ricardo Flowers MD  Palliative Medicine & Supportive Care  Internal Medicine  Available via [facility] Text  Office: [phone]  Fax: [phone]

## 2021-02-09 NOTE — TELEPHONE ENCOUNTER
Received call from [facility], spoke with patient  Patient upset that she gave [place] paperwork for her disability on 1/15/2021 and to date she has not gotten the paperwork back, nor has it been sent to [facility]  The fax number for [facility] is [phone]  She is being denied disability due to the fact that the paperwork has not been sent  The number to call for [facility] is [phone] from 8am - 6pm if you should have any questions  Patient is very upset and would like this done today

## 2021-02-09 NOTE — TELEPHONE ENCOUNTER
I called and left a voicemail for the patient  [facility] paperwork was sent over today, 2/9  It was originally faxed over on 1/20 and it was also scanned into the patient's chart on the same day  The fax must not have gone through on 1/20  I want to apologize to the patient for the miscommunication and asked her to call me back

## 2021-02-10 ENCOUNTER — APPOINTMENT (OUTPATIENT)
Dept: RADIATION ONCOLOGY | Facility: HOSPITAL | Age: 71
End: 2021-02-10
Attending: RADIOLOGY
Payer: MEDICARE

## 2021-02-10 PROCEDURE — 77331 SPECIAL RADIATION DOSIMETRY: CPT | Performed by: RADIOLOGY

## 2021-02-10 PROCEDURE — 77412 RADIATION TX DELIVERY LVL 3: CPT | Performed by: RADIOLOGY

## 2021-02-10 PROCEDURE — 77387 GUIDANCE FOR RADJ TX DLVR: CPT | Performed by: RADIOLOGY

## 2021-02-10 NOTE — TELEPHONE ENCOUNTER
Patient is returning Genaro's call, I have Waqas Squires who has informed me to please transfer patient over to him at the [facility], call transferred as per Genaro's request

## 2021-02-11 ENCOUNTER — TELEPHONE (OUTPATIENT)
Dept: PALLIATIVE MEDICINE | Facility: HOSPITAL | Age: 71
End: 2021-02-11

## 2021-02-11 ENCOUNTER — APPOINTMENT (OUTPATIENT)
Dept: RADIATION ONCOLOGY | Facility: HOSPITAL | Age: 71
End: 2021-02-11
Attending: RADIOLOGY
Payer: MEDICARE

## 2021-02-11 ENCOUNTER — TELEPHONE (OUTPATIENT)
Dept: HEMATOLOGY ONCOLOGY | Facility: CLINIC | Age: 71
End: 2021-02-11

## 2021-02-11 ENCOUNTER — LAB (OUTPATIENT)
Dept: LAB | Facility: HOSPITAL | Age: 71
End: 2021-02-11
Attending: INTERNAL MEDICINE
Payer: MEDICARE

## 2021-02-11 DIAGNOSIS — R19.7 DIARRHEA, UNSPECIFIED TYPE: ICD-10-CM

## 2021-02-11 DIAGNOSIS — D70.1 CHEMOTHERAPY INDUCED NEUTROPENIA (HCC): ICD-10-CM

## 2021-02-11 DIAGNOSIS — C34.90 SMALL CELL LUNG CANCER (HCC): ICD-10-CM

## 2021-02-11 DIAGNOSIS — T45.1X5A CHEMOTHERAPY INDUCED NEUTROPENIA (HCC): ICD-10-CM

## 2021-02-11 DIAGNOSIS — L29.9 PRURITUS OF SKIN: Primary | ICD-10-CM

## 2021-02-11 LAB
ALBUMIN SERPL BCP-MCNC: 2.7 G/DL (ref 3.5–5)
ALP SERPL-CCNC: 153 U/L (ref 46–116)
ALT SERPL W P-5'-P-CCNC: 15 U/L (ref 12–78)
ANION GAP SERPL CALCULATED.3IONS-SCNC: 9 MMOL/L (ref 4–13)
AST SERPL W P-5'-P-CCNC: 28 U/L (ref 5–45)
BASOPHILS # BLD AUTO: 0.09 THOUSANDS/ΜL (ref 0–0.1)
BASOPHILS NFR BLD AUTO: 1 % (ref 0–1)
BILIRUB SERPL-MCNC: 0.5 MG/DL (ref 0.2–1)
BUN SERPL-MCNC: 22 MG/DL (ref 5–25)
CALCIUM ALBUM COR SERPL-MCNC: 9.7 MG/DL (ref 8.3–10.1)
CALCIUM SERPL-MCNC: 8.7 MG/DL (ref 8.3–10.1)
CHLORIDE SERPL-SCNC: 99 MMOL/L (ref 100–108)
CO2 SERPL-SCNC: 29 MMOL/L (ref 21–32)
CREAT SERPL-MCNC: 0.99 MG/DL (ref 0.6–1.3)
EOSINOPHIL # BLD AUTO: 0.01 THOUSAND/ΜL (ref 0–0.61)
EOSINOPHIL NFR BLD AUTO: 0 % (ref 0–6)
ERYTHROCYTE [DISTWIDTH] IN BLOOD BY AUTOMATED COUNT: 16.7 % (ref 11.6–15.1)
GFR SERPL CREATININE-BSD FRML MDRD: 59 ML/MIN/1.73SQ M
GLUCOSE P FAST SERPL-MCNC: 139 MG/DL (ref 65–99)
HCT VFR BLD AUTO: 36.9 % (ref 34.8–46.1)
HGB BLD-MCNC: 11.2 G/DL (ref 11.5–15.4)
IMM GRANULOCYTES # BLD AUTO: 0.08 THOUSAND/UL (ref 0–0.2)
IMM GRANULOCYTES NFR BLD AUTO: 1 % (ref 0–2)
LYMPHOCYTES # BLD AUTO: 0.53 THOUSANDS/ΜL (ref 0.6–4.47)
LYMPHOCYTES NFR BLD AUTO: 4 % (ref 14–44)
MCH RBC QN AUTO: 29.9 PG (ref 26.8–34.3)
MCHC RBC AUTO-ENTMCNC: 30.4 G/DL (ref 31.4–37.4)
MCV RBC AUTO: 98 FL (ref 82–98)
MONOCYTES # BLD AUTO: 1.01 THOUSAND/ΜL (ref 0.17–1.22)
MONOCYTES NFR BLD AUTO: 7 % (ref 4–12)
NEUTROPHILS # BLD AUTO: 12.4 THOUSANDS/ΜL (ref 1.85–7.62)
NEUTS SEG NFR BLD AUTO: 87 % (ref 43–75)
NRBC BLD AUTO-RTO: 0 /100 WBCS
PLATELET # BLD AUTO: 477 THOUSANDS/UL (ref 149–390)
PMV BLD AUTO: 9.5 FL (ref 8.9–12.7)
POTASSIUM SERPL-SCNC: 3.7 MMOL/L (ref 3.5–5.3)
PROT SERPL-MCNC: 6.4 G/DL (ref 6.4–8.2)
RBC # BLD AUTO: 3.75 MILLION/UL (ref 3.81–5.12)
SODIUM SERPL-SCNC: 137 MMOL/L (ref 136–145)
TSH SERPL DL<=0.05 MIU/L-ACNC: 2.18 UIU/ML (ref 0.36–3.74)
WBC # BLD AUTO: 14.12 THOUSAND/UL (ref 4.31–10.16)

## 2021-02-11 PROCEDURE — 85025 COMPLETE CBC W/AUTO DIFF WBC: CPT

## 2021-02-11 PROCEDURE — 84443 ASSAY THYROID STIM HORMONE: CPT

## 2021-02-11 PROCEDURE — 80053 COMPREHEN METABOLIC PANEL: CPT

## 2021-02-11 PROCEDURE — 77412 RADIATION TX DELIVERY LVL 3: CPT | Performed by: RADIOLOGY

## 2021-02-11 PROCEDURE — 36415 COLL VENOUS BLD VENIPUNCTURE: CPT

## 2021-02-11 PROCEDURE — 77387 GUIDANCE FOR RADJ TX DLVR: CPT | Performed by: RADIOLOGY

## 2021-02-11 NOTE — TELEPHONE ENCOUNTER
Patient made aware  Patient questioning PT order, LSW found order and informed patient  Patient was provided with contact information for [facility] to schedule

## 2021-02-11 NOTE — TELEPHONE ENCOUNTER
She should report this symptom to her oncologist in case this is from her chemo so they can advise her on what to do about her treatments  In the meantime, i'd rather she uses a cream/lotion to apply on the areas that are itchy to lessen the systemic side effects as she is already on opioids  I will send the script to her pharmacy  Please let her know about above and please encourage her to let oncology know of this as well  Thank you!

## 2021-02-11 NOTE — TELEPHONE ENCOUNTER
Patient called in requesting advice  Patient reports she recent had treatment for her cancer  Reports yesterday sharp pain under her left breast which resolves  Patient reported shortly after when she attempted to turn, repeated pain under left breast     Patient had treatment today reports "itching like crazy on torso and arms"  Patient reported that she does have faint rash on torso and back  Denies any open areas  Patient reported that she forgot to mention to her oncology team when she was at treatment today  Patient asking if she could take benadryl?

## 2021-02-12 ENCOUNTER — APPOINTMENT (OUTPATIENT)
Dept: RADIATION ONCOLOGY | Facility: HOSPITAL | Age: 71
End: 2021-02-12
Attending: RADIOLOGY
Payer: MEDICARE

## 2021-02-12 DIAGNOSIS — D70.1 CHEMOTHERAPY INDUCED NEUTROPENIA (HCC): ICD-10-CM

## 2021-02-12 DIAGNOSIS — T45.1X5A CHEMOTHERAPY INDUCED NEUTROPENIA (HCC): ICD-10-CM

## 2021-02-12 DIAGNOSIS — C34.90 SMALL CELL LUNG CANCER (HCC): Primary | ICD-10-CM

## 2021-02-12 PROCEDURE — 77387 GUIDANCE FOR RADJ TX DLVR: CPT | Performed by: RADIOLOGY

## 2021-02-12 PROCEDURE — 77412 RADIATION TX DELIVERY LVL 3: CPT | Performed by: RADIOLOGY

## 2021-02-12 PROCEDURE — 77336 RADIATION PHYSICS CONSULT: CPT | Performed by: RADIOLOGY

## 2021-02-12 RX ORDER — SODIUM CHLORIDE 9 MG/ML
20 INJECTION, SOLUTION INTRAVENOUS ONCE
Status: CANCELLED | OUTPATIENT
Start: 2021-02-17

## 2021-02-12 RX ORDER — SODIUM CHLORIDE 9 MG/ML
20 INJECTION, SOLUTION INTRAVENOUS ONCE
Status: CANCELLED | OUTPATIENT
Start: 2021-02-16

## 2021-02-12 RX ORDER — SODIUM CHLORIDE 9 MG/ML
20 INJECTION, SOLUTION INTRAVENOUS ONCE
Status: CANCELLED | OUTPATIENT
Start: 2021-02-15

## 2021-02-15 ENCOUNTER — TELEPHONE (OUTPATIENT)
Dept: HEMATOLOGY ONCOLOGY | Facility: HOSPITAL | Age: 71
End: 2021-02-15

## 2021-02-15 ENCOUNTER — APPOINTMENT (OUTPATIENT)
Dept: RADIATION ONCOLOGY | Facility: HOSPITAL | Age: 71
End: 2021-02-15
Attending: RADIOLOGY
Payer: MEDICARE

## 2021-02-15 ENCOUNTER — HOSPITAL ENCOUNTER (OUTPATIENT)
Dept: INFUSION CENTER | Facility: HOSPITAL | Age: 71
Discharge: HOME | End: 2021-02-15
Attending: INTERNAL MEDICINE
Payer: MEDICARE

## 2021-02-15 VITALS
WEIGHT: 93.92 LBS | RESPIRATION RATE: 16 BRPM | OXYGEN SATURATION: 97 % | TEMPERATURE: 97.6 F | BODY MASS INDEX: 15.65 KG/M2 | HEART RATE: 119 BPM | DIASTOLIC BLOOD PRESSURE: 66 MMHG | SYSTOLIC BLOOD PRESSURE: 124 MMHG | HEIGHT: 65 IN

## 2021-02-15 DIAGNOSIS — C34.90 SMALL CELL LUNG CANCER (HCC): Primary | ICD-10-CM

## 2021-02-15 DIAGNOSIS — G89.3 CANCER ASSOCIATED PAIN: ICD-10-CM

## 2021-02-15 DIAGNOSIS — T45.1X5A CHEMOTHERAPY INDUCED NEUTROPENIA (HCC): ICD-10-CM

## 2021-02-15 DIAGNOSIS — D70.1 CHEMOTHERAPY INDUCED NEUTROPENIA (HCC): ICD-10-CM

## 2021-02-15 DIAGNOSIS — C34.90 SMALL CELL LUNG CANCER (HCC): ICD-10-CM

## 2021-02-15 LAB — T3FREE SERPL-MCNC: 3 PG/ML (ref 2.3–4.2)

## 2021-02-15 PROCEDURE — 96413 CHEMO IV INFUSION 1 HR: CPT

## 2021-02-15 PROCEDURE — 84481 FREE ASSAY (FT-3): CPT | Performed by: INTERNAL MEDICINE

## 2021-02-15 RX ORDER — MORPHINE SULFATE 30 MG/1
30 TABLET ORAL EVERY 4 HOURS PRN
Qty: 45 TABLET | Refills: 0 | Status: SHIPPED | OUTPATIENT
Start: 2021-02-15 | End: 2021-03-09 | Stop reason: SDUPTHER

## 2021-02-15 RX ORDER — SODIUM CHLORIDE 9 MG/ML
20 INJECTION, SOLUTION INTRAVENOUS ONCE
Status: COMPLETED | OUTPATIENT
Start: 2021-02-15 | End: 2021-02-15

## 2021-02-15 RX ADMIN — DURVALUMAB 1500 MG: 500 INJECTION, SOLUTION INTRAVENOUS at 11:28

## 2021-02-15 RX ADMIN — SODIUM CHLORIDE 20 ML/HR: 0.9 INJECTION, SOLUTION INTRAVENOUS at 10:30

## 2021-02-15 NOTE — PROGRESS NOTES
Rec'd pt on 5 L nc, significantly SOB, c/o back pain for which she has self medicated around 7 am and "beginning to feel some relief"  Pt also c/o worsening nausea over the last week, requiring a dose of Zofran prior to every meal  There is a 7 lb wt loss noted from her last visit  These issues were reported to Isabell Snell RN with Dr Sal Delcid office  New orders are to give Joen Sylvie only today and discontinue Chemo orders for now  Pt has an office visit in 2 days, careplan will be reviewed at that time

## 2021-02-15 NOTE — PROGRESS NOTES
Pt tolerated Imfinzi well, no complaints offered   Port deaccessed and pt given AVS showing that her 2/16 infusion has been cancelled per oncologist  Pt discharged to home with steady gait and concentrator set at 5 liters NC

## 2021-02-15 NOTE — TELEPHONE ENCOUNTER
Erika Colonfariha thank you! So in summary, she was taking a full 15mg tablet 4-5 times a day and this was not adequate in controlling pain  Pls inform patient of the following: We will increase her morphine IR to 30mg per tablet  She should only take 1 tablet orally every 4 hours as needed for pain  Controlled Substance Review    [place]MP or [place]  reviewed: No red flags were identified; safe to proceed with prescription        01/12/2021  1   01/12/2021  Morphine Sulfate Ir 15 MG Tab  45 00  8 Ti Flavio   [id number]   [facility] ([id number])   0  84 38 MME  [place]  01/04/2021  1   01/04/2021  Morphine Sulfate Ir 15 MG Tab  30 00  10 Pa Kristi   [id number]   Wal ([id number])   0  45 00 MME  [place]  11/25/2020  1   11/25/2020  Tramadol Hcl 50 MG Tablet  40 00  10 Ma Kri   [id number]   Wal ([id number])   0  20 00 MME  Private Pay       Annie Drummond MD  Palliative Medicine & Supportive Care  Internal Medicine  Available via [facility] Text  Office: [phone]  Fax: [phone]

## 2021-02-15 NOTE — TELEPHONE ENCOUNTER
Call received from patient at pharmacy and asking why her prescription is not filled yet  This nurse informed her that Dr Terry Troy tried to call her to determine how often she was taking her medication  This nurse informed her earlier that Dr Adonis Hicks may have questions for her if she is asking for a pain medication increase   She stated that she was at chemotherapy     Patient states she takes a pill 4 to 5 times a day and it takes an hour to start working

## 2021-02-15 NOTE — TELEPHONE ENCOUNTER
Called patients cell number and left message with medication dose and instructions  Also to call us with any questions

## 2021-02-15 NOTE — TELEPHONE ENCOUNTER
Tried to call patient, no   Please try to verify dosage used and frequency  I asked her to do 15mg or 30mg PO q4H prn of the morphine on her last visit   Ty!

## 2021-02-15 NOTE — TELEPHONE ENCOUNTER
Primary palliative medicine provider: Elva Cardona    Medication requested: Morphine 15 mg     If for pain, how has the patient been taking their pain medicine?  As directed     Last appointment:1 26  Next scheduled appointment: 2 23    PDMP review:  Filled 1 12  #45/8    Patient called  An hour later to state that she feels the 15 mg is not effective at all for her pain

## 2021-02-16 ENCOUNTER — APPOINTMENT (EMERGENCY)
Dept: CT IMAGING | Facility: HOSPITAL | Age: 71
End: 2021-02-16
Payer: MEDICARE

## 2021-02-16 ENCOUNTER — APPOINTMENT (EMERGENCY)
Dept: RADIOLOGY | Facility: HOSPITAL | Age: 71
End: 2021-02-16
Attending: EMERGENCY MEDICINE
Payer: MEDICARE

## 2021-02-16 ENCOUNTER — HOSPITAL ENCOUNTER (EMERGENCY)
Facility: HOSPITAL | Age: 71
Discharge: HOME | End: 2021-02-16
Attending: EMERGENCY MEDICINE | Admitting: EMERGENCY MEDICINE
Payer: MEDICARE

## 2021-02-16 ENCOUNTER — HOSPITAL ENCOUNTER (OUTPATIENT)
Dept: INFUSION CENTER | Facility: HOSPITAL | Age: 71
Discharge: HOME | End: 2021-02-16
Attending: INTERNAL MEDICINE

## 2021-02-16 VITALS
SYSTOLIC BLOOD PRESSURE: 171 MMHG | TEMPERATURE: 98.5 F | HEART RATE: 109 BPM | DIASTOLIC BLOOD PRESSURE: 95 MMHG | WEIGHT: 92.59 LBS | RESPIRATION RATE: 20 BRPM | BODY MASS INDEX: 15.6 KG/M2 | OXYGEN SATURATION: 99 %

## 2021-02-16 DIAGNOSIS — R06.00 DYSPNEA: ICD-10-CM

## 2021-02-16 DIAGNOSIS — C34.90 LUNG CANCER (HCC): Primary | ICD-10-CM

## 2021-02-16 LAB
ANION GAP SERPL CALCULATED.3IONS-SCNC: 7 MMOL/L (ref 4–13)
BASOPHILS # BLD AUTO: 0.06 THOUSANDS/ΜL (ref 0–0.1)
BASOPHILS NFR BLD AUTO: 1 % (ref 0–1)
BUN SERPL-MCNC: 18 MG/DL (ref 5–25)
CALCIUM SERPL-MCNC: 8.9 MG/DL (ref 8.3–10.1)
CHLORIDE SERPL-SCNC: 98 MMOL/L (ref 100–108)
CO2 SERPL-SCNC: 30 MMOL/L (ref 21–32)
CREAT SERPL-MCNC: 0.61 MG/DL (ref 0.6–1.3)
D DIMER PPP FEU-MCNC: 2.08 UG/ML FEU
EOSINOPHIL # BLD AUTO: 0.01 THOUSAND/ΜL (ref 0–0.61)
EOSINOPHIL NFR BLD AUTO: 0 % (ref 0–6)
ERYTHROCYTE [DISTWIDTH] IN BLOOD BY AUTOMATED COUNT: 16.2 % (ref 11.6–15.1)
GFR SERPL CREATININE-BSD FRML MDRD: 93 ML/MIN/1.73SQ M
GLUCOSE SERPL-MCNC: 124 MG/DL (ref 65–140)
HCT VFR BLD AUTO: 37 % (ref 34.8–46.1)
HGB BLD-MCNC: 11.6 G/DL (ref 11.5–15.4)
IMM GRANULOCYTES # BLD AUTO: 0.04 THOUSAND/UL (ref 0–0.2)
IMM GRANULOCYTES NFR BLD AUTO: 1 % (ref 0–2)
LYMPHOCYTES # BLD AUTO: 0.45 THOUSANDS/ΜL (ref 0.6–4.47)
LYMPHOCYTES NFR BLD AUTO: 7 % (ref 14–44)
MCH RBC QN AUTO: 30.1 PG (ref 26.8–34.3)
MCHC RBC AUTO-ENTMCNC: 31.4 G/DL (ref 31.4–37.4)
MCV RBC AUTO: 96 FL (ref 82–98)
MONOCYTES # BLD AUTO: 0.6 THOUSAND/ΜL (ref 0.17–1.22)
MONOCYTES NFR BLD AUTO: 9 % (ref 4–12)
NEUTROPHILS # BLD AUTO: 5.79 THOUSANDS/ΜL (ref 1.85–7.62)
NEUTS SEG NFR BLD AUTO: 82 % (ref 43–75)
NRBC BLD AUTO-RTO: 0 /100 WBCS
PLATELET # BLD AUTO: 407 THOUSANDS/UL (ref 149–390)
PMV BLD AUTO: 8.9 FL (ref 8.9–12.7)
POTASSIUM SERPL-SCNC: 3.3 MMOL/L (ref 3.5–5.3)
RBC # BLD AUTO: 3.85 MILLION/UL (ref 3.81–5.12)
SODIUM SERPL-SCNC: 135 MMOL/L (ref 136–145)
TROPONIN I SERPL-MCNC: 0.02 NG/ML
WBC # BLD AUTO: 6.95 THOUSAND/UL (ref 4.31–10.16)

## 2021-02-16 PROCEDURE — 84484 ASSAY OF TROPONIN QUANT: CPT | Performed by: EMERGENCY MEDICINE

## 2021-02-16 PROCEDURE — 71045 X-RAY EXAM CHEST 1 VIEW: CPT

## 2021-02-16 PROCEDURE — 99285 EMERGENCY DEPT VISIT HI MDM: CPT | Performed by: EMERGENCY MEDICINE

## 2021-02-16 PROCEDURE — 80048 BASIC METABOLIC PNL TOTAL CA: CPT | Performed by: EMERGENCY MEDICINE

## 2021-02-16 PROCEDURE — 85025 COMPLETE CBC W/AUTO DIFF WBC: CPT | Performed by: EMERGENCY MEDICINE

## 2021-02-16 PROCEDURE — 36415 COLL VENOUS BLD VENIPUNCTURE: CPT | Performed by: EMERGENCY MEDICINE

## 2021-02-16 PROCEDURE — 85379 FIBRIN DEGRADATION QUANT: CPT | Performed by: EMERGENCY MEDICINE

## 2021-02-16 PROCEDURE — 93005 ELECTROCARDIOGRAM TRACING: CPT

## 2021-02-16 PROCEDURE — 99285 EMERGENCY DEPT VISIT HI MDM: CPT

## 2021-02-16 PROCEDURE — 71275 CT ANGIOGRAPHY CHEST: CPT

## 2021-02-16 RX ORDER — MORPHINE SULFATE 15 MG/1
30 TABLET ORAL ONCE
Status: COMPLETED | OUTPATIENT
Start: 2021-02-16 | End: 2021-02-16

## 2021-02-16 RX ADMIN — IOHEXOL 100 ML: 350 INJECTION, SOLUTION INTRAVENOUS at 08:20

## 2021-02-16 RX ADMIN — MORPHINE SULFATE 30 MG: 15 TABLET ORAL at 09:14

## 2021-02-16 NOTE — ED CARE HANDOFF
Emergency Department Sign Out Note        Sign out and transfer of care from Dr Murtaza Bull  See Separate Emergency Department note  The patient, Arie Ricardo, was evaluated by the previous provider for shortness of breath  Workup Completed:  CT scan shows slight progression of known lung cancer no pulmonary embolism    ED Course / Workup Pending (followup): Patient saturation is 95 and 96% on 5 L which she has at home at this point will discharge and follow up with her hematologist oncologist                    Adonay Romano Criteria for PE      Most Recent Value   Wells' Criteria for PE   Clinical signs and symptoms of DVT  0 Filed at: 02/16/2021 0802   PE is primary diagnosis or equally likely  3 Filed at: 02/16/2021 0802   HR >100  1 5 Filed at: 02/16/2021 0802   Immobilization at least 3 days or Surgery in the previous 4 weeks  0 Filed at: 02/16/2021 0802   Previous, objectively diagnosed PE or DVT  0 Filed at: 02/16/2021 0802   Hemoptysis  0 Filed at: 02/16/2021 0802   Malignancy with treatment within 6 months or palliative  1 Filed at: 02/16/2021 6088   Wells' Criteria Total  5 5 Filed at: 02/16/2021 9194                       Procedures  MDM    Disposition  Final diagnoses:   Lung cancer ([address] )   Dyspnea     Time reflects when diagnosis was documented in both MDM as applicable and the Disposition within this note     Time User Action Codes Description Comment    2/16/2021  9:08 AM Velora Kraft Add [C34 90] Lung cancer ([address] )     2/16/2021  9:08 AM Velora Kraft Add [R06 00] Dyspnea       ED Disposition     ED Disposition Condition Date/Time Comment    Discharge Stable Tue Feb 16, 2021  9:08 AM Arie Ricardo discharge to home/self care              Follow-up Information     Follow up With Specialties Details Why Contact Info Additional Information    Ghada Don, DO Family Medicine  As needed [address] Emergency Department Emergency Medicine In 1 day  [address] [place],[address]D [address]  [address] [facility] Emergency Department, [address] [facility], [address]ovanna Walker [address]    Marisol Pa MD Oncology, Hematology and Oncology, Hematology   [facility]  3rd floor  [id number] [place] [id number]           Patient's Medications   Discharge Prescriptions    No medications on file     No discharge procedures on file         ED Provider  Electronically Signed by     Jaden Hightower DO  02/16/21 6943

## 2021-02-16 NOTE — PROGRESS NOTES
Hematology/Oncology Outpatient Follow- up Note  Mike Richards, 1950, [id number]  2/17/2021        Chief Complaint   Patient presents with    Follow-up       HPI:  Chery Kauffman a 57-year-old female with newly diagnosed stage IV small cell lung cancer   The patient had a pleural effusion which was drained which revealed poorly differentiated malignant neoplasm with neuroendocrine differentiation   She had a PET-CT scan done which showed FDG avid left lower lobe nodule suspicious for malignancy along with focal FDG uptake in the left perihilar region for which glenn metastases could not be excluded   There was extensive heterogeneous FDG uptake along the left pleural margin compatible with pleural metastases  Mg Rower was no other hypermetabolic metastases in the neck abdomen or pelvis   There was decreased left pleural fluid posteriorly secondary to pleural catheter placement but loculated fluid was increasing anteriorly  She has a PleurX catheter in place    Patient was seen for initial consultation by Dr Jerilyn Andino on 11/20/2020  Manolo Romp has been initiated on treatment with carboplatin, etoposide and Imfinzi    Previous Hematologic/ Oncologic History:    Oncology History   Small cell lung cancer ([facility] [address] )   11/2020 Initial Diagnosis    Small cell lung cancer ([facility] [address] )     11/3/2020 Biopsy    Pleural fluid, left:     - Poorly-differentiated malignant neoplasm with neuroendocrine differentiation  11/10/2020 Biopsy    Cytology:   Pleural Fluid, thoracentesis:     Fluid removed amount:  1750 cc  Positive for malignancy  Poorly differentiated carcinoma          11/23/2020 - 3/7/2021 Chemotherapy    pegfilgrastim (NEULASTA ONPRO) subcutaneous injection kit 6 mg, 6 mg, Subcutaneous, Once, 4 of 5 cycles  Administration: 6 mg (11/25/2020), 6 mg (12/16/2020), 6 mg (1/6/2021), 6 mg (1/28/2021)  fosaprepitant (EMEND) 150 mg in sodium chloride 0 9 % 250 mL IVPB, 150 mg, Intravenous, Once, 4 of 5 cycles  Administration: 150 mg (2020), 150 mg (2020), 150 mg (2021), 150 mg (2021)  CARBOplatin (PARAPLATIN) 402 mg in sodium chloride 0 9 % 250 mL IVPB, 402 mg, Intravenous, Once, 4 of 5 cycles  Administration: 402 mg (2020), 371 5 mg (2020), 387 5 mg (2021), 310 mg (2021)  Durvalumab 1,500 mg in sodium chloride 0 9 % 100 mL IVPB, 1,500 mg (original dose 10 mg/kg), Intravenous, Once, 5 of 6 cycles  Dose modification: 1,500 mg (original dose 10 mg/kg, Cycle 1, Reason: Other (See Comments))  Administration: 1,500 mg (2020), 1,500 mg (2020), 1,500 mg (2021), 1,500 mg (2021), 1,500 mg (2/15/2021)  etoposide (TOPOSAR) 152 mg in sodium chloride 0 9 % 500 mL chemo infusion, 100 mg/m2 = 152 mg, Intravenous, Once, 4 of 5 cycles  Dose modification: 80 mg/m2 (original dose 100 mg/m2, Cycle 4, Reason: Dose Not Tolerated)  Administration: 152 mg (2020), 152 mg (2020), 152 mg (2020), 152 mg (2020), 152 mg (12/15/2020), 152 mg (2020), 144 mg (2021), 144 mg (2021), 144 mg (2021), 115 2 mg (2021), 115 2 mg (2021), 115 2 mg (2021)     3/1/2021 -  Chemotherapy    [No matching medication found in this treatment plan]     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[address] )   2021 Initial Diagnosis    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[facility] [address] )     3/1/2021 -  Chemotherapy    [No matching medication found in this treatment plan]     Palliative radiation therapy to left chest wall     Current Hematologic/ Oncologic Treatment:    Discontinue Carboplatin and etoposide along with Darvalumab d/t disease progression    Will start single agent Onivyde 70mg/m2 every 2 weeks based off :  DOI: 10 1200/JCO  2019 37 15_suppl  [address] [place] [address] Clinical Oncology 37, no  15_suppl (May 20, 2019) [id number]    ECO - Symptomatic, <50% confined to bed    Interval History:   The patient presents for routine follow up  She was last seen by Dr Jerilyn Andino on 2021    At that visit he referred her to Radiation Oncology for consideration of palliative RT to help alleviate her left chest wall pain  She was treated with course of RT to  left parasternal region anteriorly for 5 days last week  She was scheduled for cycle 5 of chemo this week  Patient presented to the infusion center on Monday 2/15 for treatment  And was noted to be extremely short of breath with minimal exertion  She also reported symptoms of persistent nausea, decreased appetite and weight loss of 7 lb over the past few weeks  Decision was made to hold chemo, she was treated with immunotherapy alone  She presented to the ED yesterday with symptoms of worsening shortness of breath  CTA of the chest was done  No pulmonary embolism was seen, however there was noted to be a slight progression of extensive left pleural-based and left hilar tumor with increase in overlying atelectasis and increase in mass effect on the heart/mediastinal structures    Her blood work completed on 2/16 was reviewed  Today, patient continues to endorse left chest wall pain and discomfort  She does have palpable nodules in her left chest wall and flank region  She continues to follow closely with palliative care and states they recently increased her dose of morphine  This is helping to keep her pain manageable  She is using oxygen 5 L via nasal cannula continuously  Her appetite is poor  She appears to have lost more weight, looks cachectic  She denies any nausea / vomiting  No diarrhea or constipation  Pallavi Callahan conversation was held regarding findings seen on CT scan  I shared my concerns given patient's declining performance status and her question her ability to continue with disease directed therapies  Patient verbalized understanding and does wish to continue with treatment for her cancer  She understands we need to change her therapy based on disease progression        Cancer Staging:  Cancer Staging  Lung cancer [facility]  Staging form: Lung, AJCC 8th Edition  - Clinical: No stage assigned - Unsigned      Molecular Testing:       Test Results:    Imaging: Xr Chest Portable    Result Date: 2/16/2021  Narrative: CHEST INDICATION:   Acute Resp Failure  COMPARISON:  1/20/2021, 1/8/2021  EXAM PERFORMED/VIEWS:  XR CHEST PORTABLE FINDINGS:  Right internal jugular chest port in satisfactory position  Cardiomediastinal silhouette appears unremarkable  Unchanged left lung masses  Emphysematous changes are noted consistent with chronic obstructive pulmonary disease  Small left pleural effusion again seen  Osseous structures appear within normal limits for patient age  Impression: No acute cardiopulmonary disease  Left lung masses and small left pleural effusion  Workstation performed: [id number]     Ct Chest Abdomen Pelvis W Contrast    Result Date: 1/20/2021  Narrative: CT CHEST, ABDOMEN AND PELVIS WITH IV CONTRAST INDICATION:   C34 90: Malignant neoplasm of unspecified part of unspecified bronchus or lung  COMPARISON:  CT chest abdomen and pelvis 12/18/2020 TECHNIQUE: CT examination of the chest, abdomen and pelvis was performed  Axial, sagittal, and coronal 2D reformatted images were created from the source data and submitted for interpretation  Radiation dose length product (DLP) for this visit:  394 69 mGy-cm   This examination, like all CT scans performed in the [facility], was performed utilizing techniques to minimize radiation dose exposure, including the use of iterative  reconstruction and automated exposure control  IV Contrast:  100 mL of iohexol (OMNIPAQUE)   350 Enteric Contrast: Enteric contrast was administered  FINDINGS: CHEST LUNGS:  1 8 x 1 5 x 2 7 cm mass left lower lobe image 29 series 2 and image 78 series 602 unchanged when measured in the same fashion  Progressive subtotal left basilar atelectasis  Progressive minimal subsegmental atelectasis inferior lingula   Minimal linear subsegmental atelectasis/scar right lung base  Severe pulmonary emphysema  Central airways are clear  PLEURA:  Interval removal of pleural drainage catheter  Small multiloculated left pleural effusion with minimal peripheral enhancement intervally smaller  Diffuse left pleural nodularity slightly increased  For example, medial pleural paracardiac pleural  mass measures 3 3 x 2 2 cm image 49 series 2 previously 2 4 x 1 3 cm  HEART/GREAT VESSELS:  Heart is not enlarged  No pericardial effusion  Aortic calcification  Tip of portacatheter in the right atrium  MEDIASTINUM AND MINA:  Progressive left hilar adenopathy  1 1 cm short axis anterior and posterior left hilar lymph nodes image 33 series 2 previously 0 8 cm  CHEST WALL AND LOWER NECK:   Minimal interval worsening of left chest wall soft tissue nodularity compatible with infiltrative disease  For example, left anterior paramedian deep chest wall soft tissue nodularity measuring 1 3 cm image 30 series 2 previously 1 cm  Left 10th posterolateral intercostal soft tissue lesion measures 1 6 x 0 9 cm image 52 series 2 previously 1 1 x 0 6 cm when measured in the same fashion  Right anterior chest wall anoop catheter  ABDOMEN LIVER/BILIARY TREE:  Unremarkable  GALLBLADDER:  No calcified gallstones  No pericholecystic inflammatory change  SPLEEN:  Unremarkable  PANCREAS:  Unremarkable  ADRENAL GLANDS:  Unremarkable  KIDNEYS/URETERS: One or more sharply circumscribed subcentimeter renal hypodensities are noted  These lesions are too small to accurately characterize, but are statistically most likely to represent benign cortical renal cyst(s)  According to the guidelines published in the [facility] Paper of the ACR Incidental Findings Committee (Radiology 2010), no further workup of these lesions is recommended    Kidneys otherwise unremarkable  No perinephric collection  STOMACH AND BOWEL:  Unremarkable  APPENDIX:  No findings to suggest appendicitis   ABDOMINOPELVIC CAVITY:  No ascites  No pneumoperitoneum  No lymphadenopathy  VESSELS:  Aortoiliac calcification  No aneurysm  PELVIS REPRODUCTIVE ORGANS:  Unremarkable for patient's age  URINARY BLADDER:  Collapsed bladder limits its evaluation  ABDOMINAL WALL/INGUINAL REGIONS:  Unremarkable  OSSEOUS STRUCTURES:  No acute fracture or osseous destructive lesion identified  Degenerative changes of the spine, pubic symphysis, and multiple joints  Stable minimal grade 1 degenerative retrolisthesis L4 on L5  Mild dextroconvex lumbar scoliosis  Impression: CT chest: 2 7 cm left lower lobe subpleural mass not significantly changed since December 18, 2020 when measured in the same fashion  Progressive left pleural carcinomatosis with multifocal chest wall infiltration  New mild left hilar adenopathy  Smaller left multiloculated malignant pleural effusion  CT abdomen and pelvis: No evidence of abdominopelvic metastatic disease  This study demonstrates a significant  finding and was documented as such in [facility] for liaison and referring practitioner notification  Workstation performed: [id number]     Ct Radiation Therapy    Result Date: 2/5/2021  Narrative: CT RADIATION PLANNING (CT CHEST WITHOUT IV CONTRAST) INDICATION:   C34 92: Malignant neoplasm of unspecified part of left bronchus or lung  COMPARISON:  CT chest abdomen pelvis 1/20/2021 TECHNIQUE: A CT scan of the chest was performed without intravenous contrast  This examination, like all CT scans performed in the [facility], was performed utilizing techniques to minimize radiation dose exposure, including the use of iterative reconstruction and automated exposure control  Examination was performed according to a protocol specifically designed for the purposes of radiation therapy planning and is of limited diagnostic sensitivity  Radiation dose length product (DLP) for this visit:  228 44 mGy-cm   FINDINGS: Diffuse emphysema is again seen    Diffuse pleural nodular thickening and basilar consistent with carcinomatosis and basilar consolidation are again noted  Left hilar lymphadenopathy is present though better visualized on the prior contrast-enhanced CT  No new focal consolidation or pneumothorax identified  Limited evaluation of the upper abdomen is unremarkable  No destructive osseous lesions identified  Impression: CT performed for radiation planning purposes  No significant change from prior CT 1/20/2021  Workstation performed: Jacqulyne Gowers Chest Pe Study    Result Date: 2/16/2021  Narrative: CTA - CHEST WITH IV CONTRAST - PULMONARY ANGIOGRAM INDICATION:   Shortness of breath, active CA, elevated D-dimer  COMPARISON: None  TECHNIQUE: CTA examination of the chest was performed using angiographic technique according to a protocol specifically tailored to evaluate for pulmonary embolism  Axial, sagittal, and coronal 2D reformatted images were created from the source data and  submitted for interpretation  In addition, coronal 3D MIP postprocessing was performed on the acquisition scanner  Radiation dose length product (DLP) for this visit:  167 65 mGy-cm   This examination, like all CT scans performed in the [facility], was performed utilizing techniques to minimize radiation dose exposure, including the use of iterative  reconstruction and automated exposure control  IV Contrast:  100 mL of iohexol (OMNIPAQUE)  FINDINGS: PULMONARY ARTERIAL TREE:  No pulmonary embolus is seen  LUNGS:  Centrilobular and paraseptal emphysematous changes are reidentified  There is extensive atelectasis in the left lung base, similar from previous examination  There is likely invasion of pleural-based tumor into the lingula, though this is better characterized on January 20, 2021 CT performed on the more delayed phases after the administration of IV contrast   No tracheal or endobronchial mass  No new pulmonary mass in the aerated lung zones    No consolidated or groundglass airspace opacity in the aerated lung zones to suggest superimposed infectious process  PLEURA:  There is lobulated pleural-based tumor mass throughout the left hemithorax most notably in the base of the left hemithorax along left hemidiaphragm and throughout the left costophrenic angle  The overall volume of tumor appears somewhat increased when compared to January 20, 2021  A representative lesion deep to the left 7th rib just posterior to the major fissure measures approximately 2 9 x 2 7 cm on image 131 of series 2, compared with 2 2 x 1 9 cm when measured using similar technique on January 2021  Maximal thickness of representative pleural tumor mass deep to the lateral left 3rd rib measures up to 14 mm on image 79 of series 2, compared with no greater than 9 mm on January 2021  There is some loculated pleural fluid in the left chest, the fluid is difficult to differentiate from tumor and pulmonary angiogram images  In the medial left costophrenic angle on image 191 of series 2, some combination of tumor and/or fluid measures up to 2 7 cm in thickness compared with 2 1 cm when measured using similar technique on January 20, 2021  HEART/GREAT VESSELS:  Mass effect from tumor and pleural fluid in the left hemithorax results in increased mass effect when compared to previous examination showing the heart slightly more to the right than on the prior examination  There is narrowing of right upper and right lower pulmonary venous branches    There is encasement of pulmonary arteries and pulmonary dural branches with no greater than minimal narrowing MEDIASTINUM AND MINA:  Straightening of left hilar tumor mass appears slightly progressed in volume when compared to prior examination with soft tissue density nodules anterior and posterior to the lower pole left pulmonary artery measuring 12 and 11 mm on image 134 of series 2 compared with 10 and 8 mm when measured using similar technique on the previous examination  CHEST WALL AND LOWER NECK:   Patient is somewhat cachectic  Reflux of injected very high density contrast is seen throughout the left arm and lower neck due to probably due to narrowing of the left brachiocephalic vein at the level of the thoracic inlet  High right heart pressures could also contribute to this reflux of contrast  VISUALIZED STRUCTURES IN THE UPPER ABDOMEN:  Unremarkable  OSSEOUS STRUCTURES:  No acute fracture or destructive osseous lesion  Impression: No pulmonary embolus  Slight progression of extensive left pleural-based and left hilar tumor with increase in overlying atelectasis and increase in mass effect on the heart/mediastinal structures  Workstation performed: [id number]       Labs:   Lab Results   Component Value Date    WBC 6 95 02/16/2021    HGB 11 6 02/16/2021    HCT 37 0 02/16/2021    MCV 96 02/16/2021     (H) 02/16/2021     Lab Results   Component Value Date    K 3 3 (L) 02/16/2021    CL 98 (L) 02/16/2021    CO2 30 02/16/2021    BUN 18 02/16/2021    CREATININE 0 61 02/16/2021    GLUF 139 (H) 02/11/2021    CALCIUM 8 9 02/16/2021    CORRECTEDCA 9 7 02/11/2021    AST 28 02/11/2021    ALT 15 02/11/2021    ALKPHOS 153 (H) 02/11/2021    EGFR 93 02/16/2021           Review of Systems   Constitutional: Positive for activity change, appetite change and unexpected weight change  Respiratory: Positive for chest tightness and shortness of breath  Cardiovascular: Positive for chest pain  Gastrointestinal: Positive for nausea           Active Problems:   Patient Active Problem List   Diagnosis    Chronic fatigue syndrome with fibromyalgia    Diarrhea    Weight loss    Abnormal LFTs    History of colon polyps    Right lower quadrant abdominal pain    Shortness of breath    Pleural effusion    Abnormal CT of the chest    Tobacco abuse    Elevated troponin    Elevated blood pressure reading    Acute on chronic respiratory failure with hypoxia ([facility] [address] [facility]    Pulmonary emphysema ([facility] [address] [facility]    [facility]    Chemotherapy induced neutropenia (HCC)    Chest pain    Lower extremity edema    Leukocytosis    Elevated d-dimer    Oral thrush    Severe protein-calorie malnutrition (HCC)    Lung cancer (HCC)    Cancer associated pain    Hemoptysis    Chronic bilateral thoracic back pain    Advanced care planning/counseling discussion    Port-A-Cath in place    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HCC)    CINV (chemotherapy-induced nausea and vomiting)       Past Medical History:   Past Medical History:   Diagnosis Date    Chronic fatigue syndrome with fibromyalgia     COPD (chronic obstructive pulmonary disease) (HCC)     DDD (degenerative disc disease), cervical     Emphysema of lung (HCC)     Hx of migraine headaches     Hypothyroidism     Lung cancer ([facility]        Surgical History:   Past Surgical History:   Procedure Laterality Date    BARTHOLIN GLAND CYST EXCISION      CARPAL TUNNEL RELEASE Bilateral     COLONOSCOPY  07/09/2020     15 mm sessile polyp in the cecum removed by polypectomy  A 3 recall    IR PLEURAL EFFUSION LONG-TERM CATHETER PLACEMENT  11/13/2020    IR PLEURAL EFFUSION LONG-TERM CATHETER REMOVAL  12/21/2020    IR PORT PLACEMENT  12/11/2020    IR THORACENTESIS  11/3/2020    TONSILLECTOMY      TUBAL LIGATION      UPPER GASTROINTESTINAL ENDOSCOPY  07/09/2020     small hiatal hernia  Pathology negative  Family History:    Family History   Problem Relation Age of Onset    Colon polyps Mother     Heart disease Mother     Colon cancer Mother     Heart disease Father     Lymphoma Father        Cancer-related family history includes Colon cancer in her mother; Lymphoma in her father      Social History:   Social History     Socioeconomic History    Marital status:      Spouse name: Not on file    Number of children: Not on file    Years of education: Not on file    Highest education level: Not on file   Occupational History    Not on file   Social Needs    Financial resource strain: Not on file    Food insecurity     Worry: Not on file     Inability: Not on file    Transportation needs     Medical: Not on file     Non-medical: Not on file   Tobacco Use    Smoking status: Former Smoker     Packs/day: 0 50     Years: 53 00     Pack years: 26 50     Types: Cigarettes     Start date: 1967    Smokeless tobacco: Never Used    Tobacco comment: 2 cigarettes daily   Substance and Sexual Activity    Alcohol use: Not Currently    Drug use: Not Currently    Sexual activity: Not Currently   Lifestyle    Physical activity     Days per week: Not on file     Minutes per session: Not on file    Stress: Not on file   Relationships    Social connections     Talks on phone: Not on file     Gets together: Not on file     Attends Sikh service: Not on file     Active member of club or organization: Not on file     Attends meetings of clubs or organizations: Not on file     Relationship status: Not on file    Intimate partner violence     Fear of current or ex partner: Not on file     Emotionally abused: Not on file     Physically abused: Not on file     Forced sexual activity: Not on file   Other Topics Concern    Not on file   Social History Narrative    Not on file       Current Medications:   Current Outpatient Medications   Medication Sig Dispense Refill    acetaminophen (TYLENOL) 500 mg tablet Take 2 tablets (1,000 mg total) by mouth every 8 (eight) hours      albuterol (ProAir HFA) 90 mcg/act inhaler Inhale 2 puffs every 6 (six) hours as needed for wheezing or shortness of breath 8 5 g 6    B Complex-Biotin-FA (TH VITAMIN B 50/B-COMPLEX) TABS Take by mouth daily      Capsaicin 0 025 % GEL Apply topically 3 (three) times a day as needed       cholecalciferol (VITAMIN D3) 1,000 units tablet Take 7,000 Units by mouth daily      COLCHICINE PO Take 0 5 mg by mouth 3 (three) times a day as needed  cyclobenzaprine (FLEXERIL) 10 mg tablet Take 10 mg by mouth 3 (three) times a day as needed for muscle spasms      Echinacea 400 MG CAPS Take by mouth 3 (three) times a day as needed      fexofenadine-pseudoephedrine (ALLEGRA-D)  MG per tablet Take 1 tablet by mouth 2 (two) times a day as needed for allergies      GINKGO BILOBA PLUS PO Take by mouth as needed 2 capsules every 2 mornings       Hyaluronic Acid 20-60 MG CAPS Take by mouth 3 (three) times a day       hydrocortisone 2 5 % cream Apply topically 4 (four) times a day as needed for irritation or rash 30 g 0    L-Lysine 500 MG CAPS Take by mouth 3 (three) times a day as needed      Lactobacillus (PROBIOTIC ACIDOPHILUS PO) Take 1 capsule by mouth daily      lidocaine-prilocaine (EMLA) cream Apply topically as needed for mild pain 30-60 min prior to port access   30 g 0    loperamide (IMODIUM) 2 mg capsule Take 2 mg by mouth daily       mirtazapine (REMERON) 30 mg tablet Take 1 tablet (30 mg total) by mouth daily at bedtime 30 tablet 1    morphine (MSIR) 30 MG tablet Take 1 tablet (30 mg total) by mouth every 4 (four) hours as needed for severe painMax Daily Amount: 180 mg 45 tablet 0    NON FORMULARY Take 37 mg by mouth daily [facility] Thyro 37 - reported by pt      nystatin (MYCOSTATIN) 500,000 units/5 mL suspension Apply 5 mL (500,000 Units total) to the mouth or throat 4 (four) times a day 500 mL 0    omeprazole (PriLOSEC) 20 mg delayed release capsule Take 1 capsule (20 mg total) by mouth daily 90 capsule 3    ondansetron (ZOFRAN) 4 mg tablet Take 1 tablet (4 mg total) by mouth every 8 (eight) hours as needed for nausea or vomiting 60 tablet 1    other medication, see sig, Take 3 capsules by mouth daily [id number]      oxybutynin (DITROPAN-XL) 10 MG 24 hr tablet TAKE 1 TABLET BY MOUTH ONCE DAILY FOR INCONTINENCE      prochlorperazine (COMPAZINE) 10 mg tablet Take 10 mg by mouth every 6 (six) hours as needed for nausea or vomiting (migraines)      S-Adenosylmethionine (RADHA-E) 200 MG TABS Take by mouth 3 (three) times a day      senna-docusate sodium (SENOKOT-S) 8 6-50 mg per tablet Take 1 tablet by mouth daily 60 tablet 0    sodium chloride (OCEAN) 0 65 % nasal spray 1 spray into each nostril as needed for congestion or rhinitis 30 mL 0    tolterodine (DETROL LA) 2 mg 24 hr capsule       Tragacanth (ASTRAGALUS ROOT) POWD 470 mg 3 (three) times a day as needed      umeclidinium-vilanterol (ANORO ELLIPTA) 62 5-25 MCG/INH inhaler Inhale 1 puff daily 1 Inhaler 5     No current facility-administered medications for this visit  Allergies: Allergies   Allergen Reactions    Clarithromycin     Codeine Other (See Comments)     lethergic    Other      PHOSPHATE    Oxycodone Other (See Comments)     lethergic    Trazodone Other (See Comments)     lethergic    Penicillins Rash       Physical Exam:  /88 (BP Location: Right arm, Patient Position: Sitting, Cuff Size: Standard)   Pulse 104   Temp 98 7 °F (37 1 °C) (Axillary)   Resp 18   Ht 5' 4 6" (1 641 m)   Wt 42 2 kg (93 lb)   BMI 15 67 kg/m²   Body surface area is 1 42 meters squared  Wt Readings from Last 3 Encounters:   02/17/21 42 2 kg (93 lb)   02/16/21 42 kg (92 lb 9 5 oz)   02/15/21 42 6 kg (93 lb 14 7 oz)           Physical Exam  Constitutional:       General: She is not in acute distress  Appearance: She is ill-appearing  She is not toxic-appearing  Comments: Patient is alert, frail, chronically ill appearing  Cachectic  No acute distress   HENT:      Head: Normocephalic and atraumatic  Mouth/Throat:      Pharynx: No oropharyngeal exudate  Eyes:      General: No scleral icterus  Right eye: No discharge  Left eye: No discharge  Neck:      Musculoskeletal: Normal range of motion  Cardiovascular:      Rate and Rhythm: Regular rhythm  Tachycardia present     Pulmonary:      Effort: Pulmonary effort is normal  No respiratory distress  Breath sounds: Decreased breath sounds present  No wheezing  Comments: Oxygen 5 L via nasal cannula  Chest:      Chest wall: Tenderness (Left chest wall, nodularity palpated  Tender) present  Abdominal:      General: Abdomen is flat  Palpations: Abdomen is soft  Musculoskeletal: Normal range of motion  Right lower leg: No edema  Left lower leg: No edema  Neurological:      General: No focal deficit present  Mental Status: She is alert and oriented to person, place, and time  Psychiatric:         Mood and Affect: Mood normal          Behavior: Behavior normal          Assessment / Plan:    1  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[facility] [address] )    2  Small cell lung cancer ([facility] [address] )    3  Chemotherapy induced neutropenia ([facility])       The patient is a very unfortuante 45-year-old female with 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   Based on her PET-CT scan this seems to be a lung primary as a nodule is seen on the left side  She has been undergoing treatment with carboplatin along with etoposide in combination with immunotherapy with Imfinzi every 3 weeks  Imaging completed at the end of January showed a question of local progression in the form of 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 and some areas of invasion of the chest wall  She was also noticed to have palpable disease in her left chest wall where she has been experiencing significant pain  She was referred to Radiation Oncology and  And completed a course of palliative radiation to the left parasternal region anteriorly last week  She was scheduled to begin cycle 5 of chemotherapy on Monday 2/15  However due to symptoms of  Recent weight loss, worsening shortness of breath her chemotherapy was held and she was treated with immunotherapy alone  She presented to the ED yesterday for evaluation of worsening dyspnea with minimal exertion    CTA of the chest was done   This was negative for PE however was concerning for disease progression of extensive left pleural-based and left hilar tumor with increase in overlying atelectasis and increase in mass effect on the heart/mediastinal structures  I had a theresa conversation with patient today and explained that change in therapy is indicated  The patient seems to have a somewhat limited insight into her disease process and it was explained multiple times that chemotherapy will not cure her cancer  Unfortunately, she has in a very aggressive cancer and is symptomatic with cancer related pain and shortness of breath  She is following closely with palliative care for symptom management  I did discuss options for ongoing cares to include change in therapy verses transitioning to a comfort focused approach which would involve hospice  I explained hospice would continue to provide care for her and manage her symptoms however she would no longer be eligible for  Treatment for her cancer if she elected to go on hospice cares  Patient verbalized understanding and appreciated the information discussed today  She reports she has been considering hospice however there are things she still would like to do so she would appreciate any treatment option available to her at this time that would allow her to live longer and accomplish the things she wishes to get done  She does understand that if at any time her symptoms become unmanagable and/or she is no longer tolerating/or wishes to continue systemic chemotherapy she can opt to stop and transition to a comfort focused approach to her care  I reviewed possible treatment options to include Onivyde which is Liposomal CPT-11 vs topotecan     Dr Doyle Humphrey has recommended treatment with single agent Onivyde 70mg/m2 every 2 weeks based on recent data from an ongoing Phase III trial ([facility]; [id number]) that has shown promising results in patients with Small cell lung cancer who have progressed following 1st line platinum based regimen  journal article for dosing and frequency:  DOI: 10 1200/JCO  2019 37 15_suppl  [facility] Oncology 37, no  15_suppl (May 20, 2019) 0793-1348  We reviewed possible side effects of Onivyde to include  Diarrhea, fatigue, decreased blood counts, elevation of liver enzymes, hypocalcemia, hypomagnesia, nausea/vomiting, mouth sores  Patient is agreeable to proceed with Onivyde as recommended  Consent was obtained by myself and Dr Rosangela Meyer  We will work to get her started on this regimen as soon as possible and reach out to Oncology finance to make sure there are no hold ups  Again, dose will be Onivyde 70mg/m2 every 2 weeks    Patient will return for a follow-up visit in 2 weeks  Hopefully we will have her started on treatment at that time  She is instructed to call with any questions or concerns at any time prior to her next visit  She will also continue to follow very closely with palliative care for symptom management    Goals and Barriers:  Current Goal:  Prolong Survival from small cell lung cancer  Barriers: None  Patient's Capacity to Self Care:  Patient able to self care  Portions of the record may have been created with voice recognition software  Occasional wrong word or "sound a like" substitutions may have occurred due to the inherent limitations of voice recognition software  Read the chart carefully and recognize, using context, where substitutions have occurred

## 2021-02-16 NOTE — ED PROVIDER NOTES
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, next scheduled treatment tomorrow, oncologist Dr Venus Arredondo  Presents for evaluation of shortness of breath worsening over last 2 days  Patient states she was supposed to have her chemotherapy treatment yesterday though was canceled because of her increased weight loss recently, it appears that she has an appointment scheduled in the infusion center tomorrow  States that her shortness of breath is worse with any exertion, she is on 5 L O2 at baseline  Denies any cough  She does have bilateral thoracic back pain which is chronic for her cancer pain that has not been worsening  No sick contacts  No fevers at home  Her initial oxygen saturation was mid mid 80s on her O2 concentrator at 5 L, she was transferred to wall oxygen nasal cannula at 6 L currently saturating 95%  Of note patient was admitted for shortness of breath 2 months ago, at that time she did have a pleural catheterization for drainage of chronic pleural effusion that was removed doing that admissions secondary to no active drainage   She also had a CTA which was negative for PE  Prior to Admission Medications   Prescriptions Last Dose Informant Patient Reported? Taking?    B Complex-Biotin-FA (TH VITAMIN B 50/B-COMPLEX) TABS  Self Yes No   Sig: Take by mouth daily   COLCHICINE PO  Self Yes No   Sig: Take 0 5 mg by mouth 3 (three) times a day as needed    Capsaicin 0 025 % GEL  Self Yes No   Sig: Apply topically 3 (three) times a day as needed    Echinacea 400 MG CAPS  Self Yes No   Sig: Take by mouth 3 (three) times a day as needed   GINKGO BILOBA PLUS PO  Self Yes No   Sig: Take by mouth as needed 2 capsules every 2 mornings    Hyaluronic Acid 20-60 MG CAPS  Self Yes No   Sig: Take by mouth 3 (three) times a day    L-Lysine 500 MG CAPS  Self Yes No   Sig: Take by mouth 3 (three) times a day as needed   Lactobacillus (PROBIOTIC ACIDOPHILUS PO)  Self Yes No   Sig: Take 1 capsule by mouth daily   NON FORMULARY  Self Yes No   Sig: Take 37 mg by mouth daily Eco Thyro 37 - reported by pt   S-Adenosylmethionine (RADHA-E) 200 MG TABS  Self Yes No   Sig: Take by mouth 3 (three) times a day   Tragacanth (ASTRAGALUS ROOT) POWD  Self Yes No   Si mg 3 (three) times a day as needed   acetaminophen (TYLENOL) 500 mg tablet  Self No No   Sig: Take 2 tablets (1,000 mg total) by mouth every 8 (eight) hours   albuterol (ProAir HFA) 90 mcg/act inhaler  Self No No   Sig: Inhale 2 puffs every 6 (six) hours as needed for wheezing or shortness of breath   cholecalciferol (VITAMIN D3) 1,000 units tablet  Self Yes No   Sig: Take 7,000 Units by mouth daily   cyclobenzaprine (FLEXERIL) 10 mg tablet  Self Yes No   Sig: Take 10 mg by mouth 3 (three) times a day as needed for muscle spasms   fexofenadine-pseudoephedrine (ALLEGRA-D)  MG per tablet  Self Yes No   Sig: Take 1 tablet by mouth 2 (two) times a day as needed for allergies   hydrocortisone 2 5 % cream   No No   Sig: Apply topically 4 (four) times a day as needed for irritation or rash   lidocaine-prilocaine (EMLA) cream  Self No No   Sig: Apply topically as needed for mild pain 30-60 min prior to port access     loperamide (IMODIUM) 2 mg capsule  Self Yes No   Sig: Take 2 mg by mouth daily    mirtazapine (REMERON) 30 mg tablet   No No   Sig: Take 1 tablet (30 mg total) by mouth daily at bedtime   morphine (MSIR) 30 MG tablet   No No   Sig: Take 1 tablet (30 mg total) by mouth every 4 (four) hours as needed for severe painMax Daily Amount: 180 mg   nystatin (MYCOSTATIN) 500,000 units/5 mL suspension  Self No No   Sig: Apply 5 mL (500,000 Units total) to the mouth or throat 4 (four) times a day   omeprazole (PriLOSEC) 20 mg delayed release capsule   No No   Sig: Take 1 capsule (20 mg total) by mouth daily   ondansetron (ZOFRAN) 4 mg tablet   No No   Sig: Take 1 tablet (4 mg total) by mouth every 8 (eight) hours as needed for nausea or vomiting   other medication, see sig,  Self Yes No   Sig: Take 3 capsules by mouth daily [id number]   prochlorperazine (COMPAZINE) 10 mg tablet  Self Yes No   Sig: Take 10 mg by mouth every 6 (six) hours as needed for nausea or vomiting (migraines)   senna-docusate sodium (SENOKOT-S) 8 6-50 mg per tablet  Self No No   Sig: Take 1 tablet by mouth daily   sodium chloride (OCEAN) 0 65 % nasal spray  Self No No   Si spray into each nostril as needed for congestion or rhinitis   tolterodine (DETROL LA) 2 mg 24 hr capsule  Self Yes No   umeclidinium-vilanterol (ANORO ELLIPTA) 62 5-25 MCG/INH inhaler  Self No No   Sig: Inhale 1 puff daily      Facility-Administered Medications: None       Past Medical History:   Diagnosis Date    Chronic fatigue syndrome with fibromyalgia     [facility]     DDD (degenerative disc disease), cervical     [facility] lung [facility] [address] [facility]     Hx of migraine headaches     Hypothyroidism     Lung cancer ([facility] [address] )        Past Surgical History:   Procedure Laterality Date    BARTHOLIN GLAND CYST EXCISION      CARPAL TUNNEL RELEASE Bilateral     COLONOSCOPY  2020     15 mm sessile polyp in the cecum removed by polypectomy  A 3 recall    IR PLEURAL EFFUSION LONG-TERM CATHETER PLACEMENT  2020    IR PLEURAL EFFUSION LONG-TERM CATHETER REMOVAL  2020    IR PORT PLACEMENT  2020    IR THORACENTESIS  11/3/2020    TONSILLECTOMY      TUBAL LIGATION      UPPER GASTROINTESTINAL ENDOSCOPY  2020     small hiatal hernia  Pathology negative         Family History   Problem Relation Age of Onset    Colon polyps Mother     Heart disease Mother     Colon cancer Mother     Heart disease Father     Lymphoma Father      I have reviewed and agree with the history as documented  E-Cigarette/Vaping    E-Cigarette Use Never User      E-Cigarette/Vaping Substances    Nicotine No     THC No     CBD No     Flavoring No     Other No     Unknown No      Social History     Tobacco Use    Smoking status: Former Smoker     Packs/day: 0 50     Years: 53 00     Pack years: 26 50     Types: Cigarettes     Start date: 1967    Smokeless tobacco: Never Used    Tobacco comment: 2 cigarettes daily   Substance Use Topics    Alcohol use: Not Currently    Drug use: Not Currently       Review of Systems   Constitutional: Positive for unexpected weight change  Negative for appetite change and fever  HENT: Negative for rhinorrhea and sore throat  Eyes: Negative for photophobia and visual disturbance  Respiratory: Positive for shortness of breath  Negative for cough, chest tightness and wheezing  Cardiovascular: Negative for chest pain, palpitations and leg swelling  Gastrointestinal: Negative for abdominal distention, abdominal pain, blood in stool, constipation and diarrhea  Genitourinary: Negative for dysuria, flank pain, frequency, hematuria and urgency  Musculoskeletal: Negative for back pain  Skin: Negative for rash  Neurological: Negative for dizziness, weakness and headaches  All other systems reviewed and are negative  Physical Exam  Physical Exam  Vitals signs and nursing note reviewed  Constitutional:       Appearance: She is well-developed  She is ill-appearing  Comments: Ill-appearing cachectic female   HENT:      Head: Normocephalic and atraumatic  Eyes:      Pupils: Pupils are equal, round, and reactive to light  Neck:      Musculoskeletal: Normal range of motion and neck supple  Cardiovascular:      Rate and Rhythm: Normal rate and regular rhythm  Heart sounds: No murmur  No friction rub  No gallop      Pulmonary:      Effort: Pulmonary effort is normal       Breath sounds: Decreased breath sounds present  No wheezing or rales  Chest:      Chest wall: No tenderness  Abdominal:      General: There is no distension  Palpations: Abdomen is soft  There is no mass  Tenderness: There is no guarding or rebound  Musculoskeletal:      Right lower leg: No edema  Left lower leg: No edema  Comments:  right chest wall port no overlying skin changes   Skin:     General: Skin is warm and dry  Neurological:      Mental Status: She is alert and oriented to person, place, and time           Vital Signs  ED Triage Vitals   Temperature Pulse Respirations Blood Pressure SpO2   02/16/21 0619 02/16/21 0619 02/16/21 0619 02/16/21 0619 02/16/21 0619   98 5 °F (36 9 °C) (!) 119 20 (!) 194/93 (!) 89 %      Temp Source Heart Rate Source Patient Position - Orthostatic VS BP Location FiO2 (%)   02/16/21 0619 02/16/21 0619 02/16/21 0619 02/16/21 0619 --   Tympanic Monitor Lying Right arm       Pain Score       02/16/21 0914       Worst Possible Pain           Vitals:    02/16/21 0619 02/16/21 0730   BP: (!) 194/93 (!) 171/95   Pulse: (!) 119 (!) 109   Patient Position - Orthostatic VS: Lying Lying         Visual Acuity      ED Medications  Medications   iohexol (OMNIPAQUE) 350 MG/ML injection (SINGLE-DOSE) 100 mL (100 mL Intravenous Given 2/16/21 0820)   morphine (MSIR) IR tablet 30 mg (30 mg Oral Given 2/16/21 0914)       Diagnostic Studies  Results Reviewed     Procedure Component Value Units Date/Time    D-dimer, quantitative [[id number]]  (Abnormal) Collected: 02/16/21 0634    Lab Status: Final result Specimen: Blood from Arm, Left Updated: 02/16/21 0746     D-Dimer, Quant 2 08 ug/ml FEU     Troponin I [[id number]]  (Normal) Collected: 02/16/21 0634    Lab Status: Final result Specimen: Blood from Arm, Left Updated: 02/16/21 0702     Troponin I 0 02 ng/mL     Basic metabolic panel [[id number]]  (Abnormal) Collected: 02/16/21 0634    Lab Status: Final result Specimen: Blood from Arm, Left Updated: 02/16/21 0653     Sodium 135 mmol/L      Potassium 3 3 mmol/L      Chloride 98 mmol/L      CO2 30 mmol/L      ANION GAP 7 mmol/L      BUN 18 mg/dL      Creatinine 0 61 mg/dL      Glucose 124 mg/dL      Calcium 8 9 mg/dL      eGFR 93 ml/min/1 73sq m     Narrative:      [place] guidelines for Chronic Kidney Disease (CKD):     Stage 1 with normal or high GFR (GFR > 90 mL/min/1 73 square meters)    Stage 2 Mild CKD (GFR = 60-89 mL/min/1 73 square meters)    Stage 3A Moderate CKD (GFR = 45-59 mL/min/1 73 square meters)    Stage 3B Moderate CKD (GFR = 30-44 mL/min/1 73 square meters)    Stage 4 Severe CKD (GFR = 15-29 mL/min/1 73 square meters)    Stage 5 End Stage CKD (GFR <15 mL/min/1 73 square meters)  Note: GFR calculation is accurate only with a steady state creatinine    CBC and differential [[id number]]  (Abnormal) Collected: 02/16/21 0634    Lab Status: Final result Specimen: Blood from Arm, Left Updated: 02/16/21 0643     WBC 6 95 Thousand/uL      RBC 3 85 Million/uL      Hemoglobin 11 6 g/dL      Hematocrit 37 0 %      MCV 96 fL      MCH 30 1 pg      MCHC 31 4 g/dL      RDW 16 2 %      MPV 8 9 fL      Platelets 193 Thousands/uL      nRBC 0 /100 WBCs      Neutrophils Relative 82 %      Immat GRANS % 1 %      Lymphocytes Relative 7 %      Monocytes Relative 9 %      Eosinophils Relative 0 %      Basophils Relative 1 %      Neutrophils Absolute 5 79 Thousands/µL      Immature Grans Absolute 0 04 Thousand/uL      Lymphocytes Absolute 0 45 Thousands/µL      Monocytes Absolute 0 60 Thousand/µL      Eosinophils Absolute 0 01 Thousand/µL      Basophils Absolute 0 06 Thousands/µL                  CTA ED chest PE Study   Final Result by Edi Flores MD (02/16 5900)      No pulmonary embolus        Slight progression of extensive left pleural-based and left hilar tumor with increase in overlying atelectasis and increase in mass effect on the heart/mediastinal structures  Workstation performed: [id number]         XR chest portable   ED Interpretation by Sabrina Mendoza MD (88/73 1930)   L pleural effusion      Final Result by Garcia Longoria MD (02/16 0958)      No acute cardiopulmonary disease  Left lung masses and small left pleural effusion  Workstation performed: [id number]                    Procedures  Procedures         ED Course  ED Course as of Feb 16 2211   Tue Feb 16, 2021   0578 Procedure Note: EKG  Date/Time: 02/16/21 6:37 AM   Performed by: Mando Taylor  Authorized by: Mando Taylor  Indications / Diagnosis: SOB  ECG reviewed by me, the ED Provider: yes   The EKG demonstrates:  Rhythm:  sinus tachycardia  Intervals: normal intervals  Axis: normal axis  QRS/Blocks: normal QRS  ST Changes: No acute ST Changes, no STD/RAFI  SBIRT 22yo+      Most Recent Value   SBIRT (22 yo +)   In order to provide better care to our patients, we are screening all of our patients for alcohol and drug use  Would it be okay to ask you these screening questions? Yes Filed at: 02/16/2021 9425   Initial Alcohol Screen: US AUDIT-C    1  How often do you have a drink containing alcohol?  0 Filed at: 02/16/2021 0722   2  How many drinks containing alcohol do you have on a typical day you are drinking? 0 Filed at: 02/16/2021 0722   3a  Male UNDER 65: How often do you have five or more drinks on one occasion? 0 Filed at: 02/16/2021 0722   3b  FEMALE Any Age, or MALE 65+: How often do you have 4 or more drinks on one occassion? 0 Filed at: 02/16/2021 3773   Audit-C Score  0 Filed at: 02/16/2021 5631   REX: How many times in the past year have you    Used an illegal drug or used a prescription medication for non-medical reasons?   Never Filed at: 02/16/2021 6406          Wells' Criteria for PE      Most Recent Value   Wells' Criteria for PE   Clinical signs and symptoms of DVT  0 Filed at: 02/16/2021 6613   PE is primary diagnosis or equally likely  3 Filed at: 02/16/2021 0802   HR >100  1 5 Filed at: 02/16/2021 0802   Immobilization at least 3 days or Surgery in the previous 4 weeks  0 Filed at: 02/16/2021 0802   Previous, objectively diagnosed PE or DVT  0 Filed at: 02/16/2021 0802   Hemoptysis  0 Filed at: 02/16/2021 4211   Malignancy with treatment within 6 months or palliative  1 Filed at: 02/16/2021 7067   Wells' Criteria Total  5 5 Filed at: 02/16/2021 0685                [facility]  Number of Diagnoses or Management Options  Diagnosis management comments: 57-year-old female with advanced small cell carcinoma, currently undergoing chemotherapy treatment presents for evaluation of worsening dyspnea on exertion, recent weight loss, saturating mid 80s on home O2 requirements  When I discussed with the patient her current goals of care she states that she is hoping her current chemotherapy will cure the cancer  It is not appear that she is currently in hospice and would like full medical workup at this time  Will obtain lab work, chest x-ray, will observe and re-evaluate      Disposition  Final diagnoses:   Lung cancer ([facility] [address] )   Dyspnea     Time reflects when diagnosis was documented in both MDM as applicable and the Disposition within this note     Time User Action Codes Description Comment    2/16/2021  9:08 AM Lilly Kansas Add [C34 90] Lung cancer ([facility] [address] )     2/16/2021  9:08 AM Lilly [place] Add [R06 00] Dyspnea       ED Disposition     ED Disposition Condition Date/Time Comment    Discharge Stable Tue Feb 16, 2021  9:08 AM Chantale Osorio discharge to home/self care              Follow-up Information     Follow up With Specialties Details Why Contact Info Additional Information    Dominic Green, DO Family Medicine  As needed [address] [phone]        [address] Emergency Department Emergency Medicine In 1 day  [address] [place],[address] [facility] Emergency Department, [address] [place], Rhina Richards MD Oncology, Hematology and Oncology, Hematology   [facility]  3rd floor  [address] [address]             Discharge Medication List as of 2/16/2021  9:10 AM      CONTINUE these medications which have NOT CHANGED    Details   acetaminophen (TYLENOL) 500 mg tablet Take 2 tablets (1,000 mg total) by mouth every 8 (eight) hours, Starting Mon 1/4/2021, No Print      albuterol (ProAir HFA) 90 mcg/act inhaler Inhale 2 puffs every 6 (six) hours as needed for wheezing or shortness of breath, Starting Mon 1/11/2021, Normal      B Complex-Biotin-FA (TH VITAMIN B 50/B-COMPLEX) TABS Take by mouth daily, Historical Med      Capsaicin 0 025 % GEL Apply topically 3 (three) times a day as needed , Historical Med      cholecalciferol (VITAMIN D3) 1,000 units tablet Take 7,000 Units by mouth daily, Historical Med      COLCHICINE PO Take 0 5 mg by mouth 3 (three) times a day as needed , Historical Med      cyclobenzaprine (FLEXERIL) 10 mg tablet Take 10 mg by mouth 3 (three) times a day as needed for muscle spasms, Historical Med      Echinacea 400 MG CAPS Take by mouth 3 (three) times a day as needed, Historical Med      fexofenadine-pseudoephedrine (ALLEGRA-D)  MG per tablet Take 1 tablet by mouth 2 (two) times a day as needed for allergies, Historical Med      GINKGO BILOBA PLUS PO Take by mouth as needed 2 capsules every 2 mornings , Historical Med      Hyaluronic Acid 20-60 MG CAPS Take by mouth 3 (three) times a day , Historical Med      hydrocortisone 2 5 % cream Apply topically 4 (four) times a day as needed for irritation or rash, Starting Thu 2/11/2021, Normal      L-Lysine 500 MG CAPS Take by mouth 3 (three) times a day as needed, Historical Med      Lactobacillus (PROBIOTIC ACIDOPHILUS PO) Take 1 capsule by mouth daily, Historical Med      lidocaine-prilocaine (EMLA) cream Apply topically as needed for mild pain 30-60 min prior to port access  , Starting Tue 12/22/2020, Normal      loperamide (IMODIUM) 2 mg capsule Take 2 mg by mouth daily , Historical Med      mirtazapine (REMERON) 30 mg tablet Take 1 tablet (30 mg total) by mouth daily at bedtime, Starting Tue 2/9/2021, Normal      morphine (MSIR) 30 MG tablet Take 1 tablet (30 mg total) by mouth every 4 (four) hours as needed for severe painMax Daily Amount: 180 mg, Starting Mon 2/15/2021, Normal      NON FORMULARY Take 37 mg by mouth daily Eco Thyro 37 - reported by pt, Historical Med      nystatin (MYCOSTATIN) 500,000 units/5 mL suspension Apply 5 mL (500,000 Units total) to the mouth or throat 4 (four) times a day, Starting Wed 1/6/2021, Normal      omeprazole (PriLOSEC) 20 mg delayed release capsule Take 1 capsule (20 mg total) by mouth daily, Starting Tue 1/26/2021, Normal      ondansetron (ZOFRAN) 4 mg tablet Take 1 tablet (4 mg total) by mouth every 8 (eight) hours as needed for nausea or vomiting, Starting Tue 1/26/2021, Normal      other medication, see sig, Take 3 capsules by mouth daily [id number], Historical Med      prochlorperazine (COMPAZINE) 10 mg tablet Take 10 mg by mouth every 6 (six) hours as needed for nausea or vomiting (migraines), Historical Med      S-Adenosylmethionine (RADHA-E) 200 MG TABS Take by mouth 3 (three) times a day, Historical Med      senna-docusate sodium (SENOKOT-S) 8 6-50 mg per tablet Take 1 tablet by mouth daily, Starting Tue 1/12/2021, Normal      sodium chloride (OCEAN) 0 65 % nasal spray 1 spray into each nostril as needed for congestion or rhinitis, Starting Tue 1/12/2021, Normal      tolterodine (DETROL LA) 2 mg 24 hr capsule Starting Fri 1/22/2021, Historical Med      Tragacanth (ASTRAGALUS ROOT) POWD 470 mg 3 (three) times a day as needed, Historical Med      umeclidinium-vilanterol (ANORO ELLIPTA) 62 5-25 MCG/INH inhaler Inhale 1 puff daily, Starting Thu 11/12/2020, Until Sat 12/19/2020, Normal           No discharge procedures on file      PDMP Review       Value Time User    PDMP Reviewed  Yes 2/15/2021  2:10 Neville Romero MD          ED Provider  Electronically Signed by           Chavez Madison MD  02/16/21 0161

## 2021-02-17 ENCOUNTER — OFFICE VISIT (OUTPATIENT)
Dept: HEMATOLOGY ONCOLOGY | Facility: HOSPITAL | Age: 71
End: 2021-02-17
Payer: MEDICARE

## 2021-02-17 ENCOUNTER — TELEPHONE (OUTPATIENT)
Dept: HEMATOLOGY ONCOLOGY | Facility: CLINIC | Age: 71
End: 2021-02-17

## 2021-02-17 ENCOUNTER — DOCUMENTATION (OUTPATIENT)
Dept: HEMATOLOGY ONCOLOGY | Facility: HOSPITAL | Age: 71
End: 2021-02-17

## 2021-02-17 ENCOUNTER — HOSPITAL ENCOUNTER (OUTPATIENT)
Dept: INFUSION CENTER | Facility: HOSPITAL | Age: 71
End: 2021-02-17
Attending: INTERNAL MEDICINE

## 2021-02-17 VITALS
RESPIRATION RATE: 18 BRPM | HEIGHT: 65 IN | HEART RATE: 104 BPM | WEIGHT: 93 LBS | SYSTOLIC BLOOD PRESSURE: 162 MMHG | TEMPERATURE: 98.7 F | BODY MASS INDEX: 15.49 KG/M2 | DIASTOLIC BLOOD PRESSURE: 88 MMHG

## 2021-02-17 DIAGNOSIS — T45.1X5A CHEMOTHERAPY INDUCED NEUTROPENIA (HCC): ICD-10-CM

## 2021-02-17 DIAGNOSIS — C80.0 SMALL CELL CARCINOMA CARCINOMATOSIS (HCC): Primary | ICD-10-CM

## 2021-02-17 DIAGNOSIS — D70.1 CHEMOTHERAPY INDUCED NEUTROPENIA (HCC): ICD-10-CM

## 2021-02-17 DIAGNOSIS — C34.90 SMALL CELL LUNG CANCER (HCC): ICD-10-CM

## 2021-02-17 LAB
ATRIAL RATE: 109 BPM
P AXIS: 78 DEGREES
PR INTERVAL: 114 MS
QRS AXIS: 78 DEGREES
QRSD INTERVAL: 82 MS
QT INTERVAL: 352 MS
QTC INTERVAL: 474 MS
T WAVE AXIS: 84 DEGREES
VENTRICULAR RATE: 109 BPM

## 2021-02-17 PROCEDURE — 93010 ELECTROCARDIOGRAM REPORT: CPT | Performed by: INTERNAL MEDICINE

## 2021-02-17 PROCEDURE — 99215 OFFICE O/P EST HI 40 MIN: CPT | Performed by: NURSE PRACTITIONER

## 2021-02-17 RX ORDER — OXYBUTYNIN CHLORIDE 10 MG/1
TABLET, EXTENDED RELEASE ORAL
COMMUNITY
Start: 2021-02-06

## 2021-02-17 NOTE — TELEPHONE ENCOUNTER
Call from Daphne Larson   [facility] orders require signature  Orders have been sent numerous times per Jennifer Rice will refax to [phone] attn Raghu Cameron   Will email to MA

## 2021-02-17 NOTE — PROGRESS NOTES
Okay to schedule  [address] [facility], [facility]  [address] [place] [address], [address] [place] [address]  [phone]  [phone]      From: Vik Mensah <[email or web address]  Sent: 2021 9:21 AM  To: Sonia Vicente <[email or web address]; Annie Vleasquez <[email or web address]  [email or web address]; [email or web address]  Subject: RE: Eduardo hall      She has Medicare primary so no auth would be required     From: Sonia Vicente   Sent: 2021 9:14 AM  To: Annie Velasquez; [employer]  Subject: RE: Eduardo hall     Can we please look into this from a compassionate care use standpoint or see if her insurance company will approve this  It is not an FDA approved drug for SCLC  Please see below trial information and here is a journal article for dosing and frequency  I dont know what this process is any longer since Diana riddle  Patient needs to start on this ASAP  Patient is Santiago Ambriz  54  DOI: 10 1200/JCO  2019 37 15_suppl  [facility] On[facility] 37, no  15_suppl (May 20, 2019) 3844-4570  From: Annie Velasquez <[email or web address]   Sent: 2021 8:57 AM  To: Sonia Vicente <[email or web address]  Subject: FW: [EXTERNAL] onivyde     FDA has recently granted the company Fast Track designation for irinotecan liposome injection (ONIVYDE®) in study patients with small cell lung cancer (SCLC) who progressed following a first-line platinum-based regimen [facility] ([id number])     An ongoing Phase III randomized study (RESILIENT; [id number]) trial is being conducted to assess the efficacy and safety of investigational irinotecan liposome injection (ONIVYDE®) as a monotherapy for SCLC study patients who have progressed on or after a first-line platinum-based regimen

## 2021-02-17 NOTE — TELEPHONE ENCOUNTER
Called patient and left a detailed message to please call our office back with a different pharmacy that we can send the morphine script to  Awaiting call back

## 2021-02-18 ENCOUNTER — TELEPHONE (OUTPATIENT)
Dept: HEMATOLOGY ONCOLOGY | Facility: CLINIC | Age: 71
End: 2021-02-18

## 2021-02-18 ENCOUNTER — TELEPHONE (OUTPATIENT)
Dept: HEMATOLOGY ONCOLOGY | Facility: MEDICAL CENTER | Age: 71
End: 2021-02-18

## 2021-02-18 NOTE — TELEPHONE ENCOUNTER
This nurse called patient back and she reported her other symptoms have dissipated but she still feels numb in her breast area  She wonders if this could be the cancer  She will await to hear from Dr Montes Files as well  Patient will be picking up her Morphine today at the [facility] [address]  They have ordered it in for her

## 2021-02-18 NOTE — TELEPHONE ENCOUNTER
Call from patient    0730 morphine dose was taken  2230 tylenol 650 mg was taken and patient applied capsacin and bengay to left side of shoulder blade and under breast  Patient has used this combination in the past with no adverse effects  Patient states left arm started to "tingle" lasting one hour, patient denies applying cream to arm  Patient then had blurry vision for one hour which then resolved, no other neuro deficits reported    Patient states chest felt "heavy" lasting for 10 minutes, denies SOB at that time or pain radiation  Left breast became numb and has stayed numb   Lotions have not been applied since 2230    Will pass on to palliative care RNs and Dr Kevin Oden RN  No need to call patient back unless interventions recommended  Patient instructed to go to ED with chest pain, SOB or any acute neurologic symptoms    Patient verbalizes understanding

## 2021-02-18 NOTE — TELEPHONE ENCOUNTER
Called and spoke to patient  I verified to her that we did receive the message and that unfortunately, since we are working remote, I would not be able to have Dr Luann Fields sign off on the paperwork today  I confirmed with her that I would have Dr Carlitos Mack team reach out to her tomorrow in regards to this, when we return to the office

## 2021-02-18 NOTE — TELEPHONE ENCOUNTER
Patient called in, the paperwork that was filled out, it needs Dr Socorro Hernandez initials and the date  Can you please have Dr George Rolling add this information and fax back to [employer]  She would like you to also call them directly to find out what is the error  Advised we are working remotely and it may not be done until tomorrow  Please call [phone] and the fax is [phone]  Patient voiced understanding  Patient would also like copies of the forms sent to her

## 2021-02-18 NOTE — TELEPHONE ENCOUNTER
I did call patients cell phone as I am unable to connect to her home number and restated the instructions from [place] Avilez to go to the ED with increased neurological issues, increased pulmonary or cardiac issues  Advised it may be best to also touch base with her PCP just for evaluation  Also sent a message to palliative care in regards to her visit yesterday and the need for continued pain management

## 2021-02-18 NOTE — TELEPHONE ENCOUNTER
Called patient in regards to a task I received about her FMLA paperwork  Please see documentation in regards to that in separate note  Patient then inquired if there was any answer regarding her pain that she had called in earlier about  I reviewed the documentation from staff in this message and relayed the information to the patient, particularly to follow up with PCP and going to the ER for evaluation with any worsening chest pains, SOB, neurologic symptoms  Patient voiced understanding and appreciation for the call

## 2021-02-18 NOTE — TELEPHONE ENCOUNTER
Patient called in and ask to speak with [place] I advised that she is on the phone - and ask for [place] to return her call and didn't disclose the reason for the call to me  - [phone]

## 2021-02-18 NOTE — TELEPHONE ENCOUNTER
I just increased her morphine 4 days ago  She has yet to  this new script  Hopefully she can get the meds today

## 2021-02-22 ENCOUNTER — TELEPHONE (OUTPATIENT)
Dept: HEMATOLOGY ONCOLOGY | Facility: CLINIC | Age: 71
End: 2021-02-22

## 2021-02-22 RX ORDER — SODIUM CHLORIDE 9 MG/ML
20 INJECTION, SOLUTION INTRAVENOUS ONCE
Status: CANCELLED | OUTPATIENT
Start: 2021-03-09

## 2021-02-22 RX ORDER — ATROPINE SULFATE 1 MG/ML
0.25 INJECTION, SOLUTION INTRAMUSCULAR; INTRAVENOUS; SUBCUTANEOUS ONCE
Status: CANCELLED | OUTPATIENT
Start: 2021-03-09

## 2021-02-22 RX ORDER — ATROPINE SULFATE 1 MG/ML
0.25 INJECTION, SOLUTION INTRAMUSCULAR; INTRAVENOUS; SUBCUTANEOUS ONCE AS NEEDED
Status: CANCELLED | OUTPATIENT
Start: 2021-03-09

## 2021-02-22 NOTE — TELEPHONE ENCOUNTER
Call from patient to f/u on treatment suggested by Perry Sainz on 2/17/2021    We reviewed possible side effects of Onivyde to include  Diarrhea, fatigue, decreased blood counts, elevation of liver enzymes, hypocalcemia, hypomagnesia, nausea/vomiting, mouth sores  Patient is agreeable to proceed with Onivyde as recommended  Consent was obtained by myself and Dr Cait Tran  We will work to get her started on this regimen as soon as possible and reach out to Oncology finance to make sure there are no hold ups  Again, dose will be Onivyde 70mg/m2 every 2 weeks       Patient  inquiring about treatment plan and start date   Patient does not remember details as stated above  Will send to Dr Mazin Rick RN  Patient aware of plan

## 2021-02-22 NOTE — PATIENT INSTRUCTIONS
PRESCRIPTION REFILL REMINDER:  All medication refills should be requested prior to [facility] on Friday  Any refill requests after noon on Friday would be addressed the following Monday  Please protect yourself from COVID-19! Even though we do not good antiviral drugs for this infection, the following strategies can help you stay healthy:    = Wash your hands! Soap and water, or hand  with at least 60% alcohol, are both effective at killing the virus  = Wear a mask! This will help protect others from any virus particles you might spread  Your mouth and nose BOTH need to be covered  = Keep the distance! Keep 6 feet of distance from others people, even if they seem healthy  Keeping distance protects you from the other person's virus spread     = Get a vaccine! The [employer] and [employer] are approved for emergency use in the [place]  These vaccines have been shown to be 90+% effective at preventing severe infections when combined with masking, hand-washing, and distancing  As of 1/26/2021, the following priority groups may get either vaccine:  + nursing home residents and staff  + front-line health workers  + ALL persons over age 72 (ages 76 and up can be seen at [facility])  + ANY person over age 12 that has a qualifying risk factor, such as diabetes, lung disease, or obesity  ==> Please use  the following website  to check your eligibility in [place]:  [employer]   ==> If you are under age 72, but still qualify, you may get a shot from many other locations outside [facility]: [facility], [facility], [facility], [facility]      We are recommending that ALL our patients get two shots of either vaccine, as early as it is available to them  Please keep an eye on the [employer], Christina Garsia, or call [phone] to see when you will become eligible    If you are eligible by the criteria above, please ask our team to get you a shot! Numbers of Coronavirus cases are spiking in many [place] States  This is not a more dangerous virus, but a sign that more people in a community are spreading the virus  Please check the local disease reports near you if you consider travelling  As of 1/25/21, we do NOT advise travel outside the [facility] ([place] or [place])  Check out [employer] for [address] data that are updated daily:    http://www [employer]/     Global Epidemics  Org, from [facility] will give you [employer] information on virus cases:    Https://globalepidemics  org/

## 2021-02-23 ENCOUNTER — OFFICE VISIT (OUTPATIENT)
Dept: PALLIATIVE MEDICINE | Age: 71
End: 2021-02-23
Payer: MEDICARE

## 2021-02-23 ENCOUNTER — SOCIAL WORK (OUTPATIENT)
Dept: PALLIATIVE MEDICINE | Age: 71
End: 2021-02-23
Payer: MEDICARE

## 2021-02-23 VITALS
BODY MASS INDEX: 15.57 KG/M2 | RESPIRATION RATE: 14 BRPM | DIASTOLIC BLOOD PRESSURE: 70 MMHG | WEIGHT: 92.4 LBS | SYSTOLIC BLOOD PRESSURE: 130 MMHG | TEMPERATURE: 97.8 F | HEART RATE: 120 BPM | OXYGEN SATURATION: 94 %

## 2021-02-23 DIAGNOSIS — Z71.89 COUNSELING AND COORDINATION OF CARE: Primary | ICD-10-CM

## 2021-02-23 DIAGNOSIS — C80.0 SMALL CELL CARCINOMA CARCINOMATOSIS (HCC): ICD-10-CM

## 2021-02-23 DIAGNOSIS — E43 SEVERE PROTEIN-CALORIE MALNUTRITION (HCC): ICD-10-CM

## 2021-02-23 DIAGNOSIS — C34.90 SMALL CELL LUNG CANCER (HCC): Primary | ICD-10-CM

## 2021-02-23 DIAGNOSIS — J90 PLEURAL EFFUSION: ICD-10-CM

## 2021-02-23 DIAGNOSIS — R11.2 CINV (CHEMOTHERAPY-INDUCED NAUSEA AND VOMITING): ICD-10-CM

## 2021-02-23 DIAGNOSIS — G89.3 CANCER ASSOCIATED PAIN: ICD-10-CM

## 2021-02-23 DIAGNOSIS — T45.1X5A CINV (CHEMOTHERAPY-INDUCED NAUSEA AND VOMITING): ICD-10-CM

## 2021-02-23 DIAGNOSIS — R07.89 ANTERIOR CHEST WALL PAIN: ICD-10-CM

## 2021-02-23 DIAGNOSIS — Z71.89 ADVANCED CARE PLANNING/COUNSELING DISCUSSION: ICD-10-CM

## 2021-02-23 PROCEDURE — 99215 OFFICE O/P EST HI 40 MIN: CPT | Performed by: INTERNAL MEDICINE

## 2021-02-23 PROCEDURE — NC001 PR NO CHARGE

## 2021-02-23 RX ORDER — DEXAMETHASONE 1 MG
1 TABLET ORAL
Qty: 30 TABLET | Refills: 0 | Status: SHIPPED | OUTPATIENT
Start: 2021-02-23 | End: 2021-02-26

## 2021-02-23 NOTE — PROGRESS NOTES
Palliative and Supportive Care   Bruce Garcia 78 y o  female [id number]    Assessment/Plan:  1  Small cell lung cancer ([facility] [address] )    2  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HCC)    3  Cancer associated pain    4  Severe protein-calorie malnutrition ([facility] [address] )    5  Pleural effusion    6  Advanced care planning/counseling discussion    7  CINV (chemotherapy-induced nausea and vomiting)    8  Anterior chest wall pain      · Continue tylenol 500mg q8H PO ATC  · Continue morphine 30mg PO q4H prn - she averages 6 a day  · Capsaicin cream not working  · May consider diazepam in the future if appropriate  · Continue mirtazapine 30mg ODHS  QTc in 2/21 is 474ms  · Start trial of low dose decadron 1 mg PO BID (am and afternoon)  Explained potential benefits and side effects  This was approved by oncology  · QTc in 2/21 is 474ms  · Reviewed antiemetics - zofran 4mg PO q8H prn, alternate with compazine 10mg PO q6H prn  · Discussed hospice in much detail and encouraged to consider it in the event that her QOL and/or survivability are no longer improved with treatments  · She is awaiting to start her new cancer treatment  · RTO in 4 weeks, call sooner if needed     Controlled Substance Review    [place]MP or [place]  reviewed: No red flags were identified; safe to proceed with prescription       02/17/2021  1   02/15/2021  Morphine Sulfate Ir 30 MG Tab  45 00  8 Ti Flavio   [id number]   Wal ([id number])   0  168 75 MME  Med[place]are [place]   01/12/2021  1   01/12/2021  Morphine Sulfate Ir 15 MG Tab  45 00  8 Ti Flavio   [id number]   Wal ([id number])   0  84 38 MME  Medicare [place]   01/04/2021  1   01/04/2021  Morphine Sulfate Ir 15 MG Tab  30 00  10 Pa Kristi   [id number]   Wal ([id number])   0  45 00 MME          Requested Prescriptions     Pending Prescriptions Disp Refills    morphine (MSIR) 30 MG tablet 45 tablet 0     Sig: Take 1 tablet (30 mg total) by mouth every 4 (four) hours as needed for severe painMax Daily Amount: 180 mg     Signed Prescriptions Disp Refills    dexamethasone (DECADRON) 1 mg tablet 30 tablet 0     Sig: Take 1 tablet (1 mg total) by mouth 2 (two) times a day     There are no discontinued medications  Representatives have queried the patient's controlled substance dispensing history in the Prescription Drug Monitoring Program in compliance with regulations before I have prescribed any controlled substances  The prescription history is consistent with prescribed therapy and our practice policies  45 minutes were spent face to face with Elisa Ac with greater than 50% of the time spent in counseling or coordination of care including discussions of etiology of diagnosis, pathogenesis of diagnosis, prognosis of diagnosis, diagnostic results, impression, and recommendations, risks and benefits of treatment, instructions for disease self management, treatment instructions, follow up requirements, risk factors and risk reduction of disease, patient and family counseling/involvement in care and compliance with treatment regimen   All of the patient's questions were answered during this discussion  No follow-ups on file  Subjective:   Chief Complaint  Follow up visit for:  symptom management, goal of care assessment and decisional support, disease process education and discussion of prognosis, advance care planning  HPI     Elisa Ac is a 78 y o  female with stage IV small cell lung cancer (Dx in 11/2020) with pleural effusion s/p Pleurex cath  PET-CT shows upate in the L perihilar region as well as P pleural margin/pleural mets  She is currently on carboplatin, etoposide and Imfinzi c/o Dr Melva Sever  However, CT scan in 1/20 showed Slight progression of extensive left pleural-based and left hilar tumor with increase in overlying atelectasis and increase in mass effect on the heart/mediastinal structures  Reviewed scanned living will/dPOA in the system signed in 10/1993  She said she is in the process of drafting a new one with a   She has paperworks already in her possession for her review and signature  She will bring us a copy once this is formalized  Also provided her a copy of the 5 Wishes for her perusal  This may help guide further ACP that are not traditionally included in the standard living delatorre  We recently increased her morphine to 30mg PO q4H prn  She visited the ED on 2/16 for increasing L sided chest wall pain and SOB  CTA done showed no PE but slight interval progression of extensive L pleural-based and L hilar tumor  She followed up with [facility] on 2/17 and discussed progressive nature of disease with increasing symptoms and declining functional status  Hospice was offered but she declined stating that she still has things she needs to get done  She is switched to [facility] due to disease progression  She comes in today by herself for a routine follow up  She was initially upset about us arranging for [facility] when she asked about it on her first visit  She said she was only inquiring  She complains about continued L anterior chest wall pain and persistent numbness on her L breast area  Also now with some intermittent sharp pain in her R anterior and lateral chest wall pain  We again reviewed her CT scan and showed her the images of her cancer that is most likely causing her symptoms  She said that her current morphine 30mg PO is helpful in controlling her symptoms (she takes 6 a day)  We also reviewed her antiemetic use as she initially told the MA that she was taking zofran every 2 hours for the time being  We emphasized how she should properly take it to ensure safety (her QTc is prolonged) - zofran 4mg PO q8H prn with compazine 10mg PO q6H prn, alternate with zofran  She is worried about her weight loss and poor appetite and we spent some time discussing the nature of her symptoms and acknowledged how challenging it is to manage, especially at this time in her disease process   Regardless, we will trial a low dose decadron (this was discussed with SUSY Mccarthy/Oncology) to hopefully significantly stimulate her appetite and her energy  The risks and benefits were discussed in much detail  We then spent some time talking about hospice at home  We spent time discussing hospice philosophy, benefits and hospice team, emphasizing how hospice is for cares intended for patients who decide to forego any more cancer-directed management and focus instead on alleviating suffering at the end of life  She is encouraged to consider this in the event that her treatments are either/or worsening her QOL or no longer benefiting her survivability  The following portions of the medical history were reviewed: past medical history, problem list, medication list, and social history  Current Outpatient Medications:     acetaminophen (TYLENOL) 500 mg tablet, Take 2 tablets (1,000 mg total) by mouth every 8 (eight) hours, Disp: , Rfl:     albuterol (ProAir HFA) 90 mcg/act inhaler, Inhale 2 puffs every 6 (six) hours as needed for wheezing or shortness of breath, Disp: 8 5 g, Rfl: 6    cholecalciferol (VITAMIN D3) 1,000 units tablet, Take 7,000 Units by mouth daily, Disp: , Rfl:     cyclobenzaprine (FLEXERIL) 10 mg tablet, Take 10 mg by mouth 3 (three) times a day as needed for muscle spasms, Disp: , Rfl:     fexofenadine-pseudoephedrine (ALLEGRA-D)  MG per tablet, Take 1 tablet by mouth 2 (two) times a day as needed for allergies, Disp: , Rfl:     hydrocortisone 2 5 % cream, Apply topically 4 (four) times a day as needed for irritation or rash, Disp: 30 g, Rfl: 0    L-Lysine 500 MG CAPS, Take by mouth 3 (three) times a day as needed, Disp: , Rfl:     lidocaine-prilocaine (EMLA) cream, Apply topically as needed for mild pain 30-60 min prior to port access  , Disp: 30 g, Rfl: 0    loperamide (IMODIUM) 2 mg capsule, Take 2 mg by mouth daily , Disp: , Rfl:     mirtazapine (REMERON) 30 mg tablet, Take 1 tablet (30 mg total) by mouth daily at bedtime, Disp: 30 tablet, Rfl: 1    morphine (MSIR) 30 MG tablet, Take 1 tablet (30 mg total) by mouth every 4 (four) hours as needed for severe painMax Daily Amount: 180 mg, Disp: 45 tablet, Rfl: 0    nystatin (MYCOSTATIN) 500,000 units/5 mL suspension, Apply 5 mL (500,000 Units total) to the mouth or throat 4 (four) times a day, Disp: 500 mL, Rfl: 0    omeprazole (PriLOSEC) 20 mg delayed release capsule, Take 1 capsule (20 mg total) by mouth daily, Disp: 90 capsule, Rfl: 3    ondansetron (ZOFRAN) 4 mg tablet, Take 1 tablet (4 mg total) by mouth every 8 (eight) hours as needed for nausea or vomiting, Disp: 60 tablet, Rfl: 1    B Complex-Biotin-FA (TH VITAMIN B 50/B-COMPLEX) TABS, Take by mouth daily, Disp: , Rfl:     Capsaicin 0 025 % GEL, Apply topically 3 (three) times a day as needed , Disp: , Rfl:     COLCHICINE PO, Take 0 5 mg by mouth 3 (three) times a day as needed , Disp: , Rfl:     dexamethasone (DECADRON) 1 mg tablet, Take 1 tablet (1 mg total) by mouth 2 (two) times a day, Disp: 30 tablet, Rfl: 0    Echinacea 400 MG CAPS, Take by mouth 3 (three) times a day as needed, Disp: , Rfl:     GINKGO BILOBA PLUS PO, Take by mouth as needed 2 capsules every 2 mornings , Disp: , Rfl:     Hyaluronic Acid 20-60 MG CAPS, Take by mouth 3 (three) times a day , Disp: , Rfl:     Lactobacillus (PROBIOTIC ACIDOPHILUS PO), Take 1 capsule by mouth daily, Disp: , Rfl:     NON FORMULARY, Take 37 mg by mouth daily Eco Thyro 37 - reported by pt, Disp: , Rfl:     other medication, see sig,, Take 3 capsules by mouth daily [id number], Disp: , Rfl:     oxybutynin (DITROPAN-XL) 10 MG 24 hr tablet, TAKE 1 TABLET BY MOUTH ONCE DAILY FOR INCONTINENCE, Disp: , Rfl:     prochlorperazine (COMPAZINE) 10 mg tablet, Take 10 mg by mouth every 6 (six) hours as needed for nausea or vomiting (migraines), Disp: , Rfl:     S-Adenosylmethionine (RADHA-E) 200 MG TABS, Take by mouth 3 (three) times a day, Disp: , Rfl:     senna-docusate sodium (SENOKOT-S) 8 6-50 mg per tablet, Take 1 tablet by mouth daily, Disp: 60 tablet, Rfl: 0    sodium chloride (OCEAN) 0 65 % nasal spray, 1 spray into each nostril as needed for congestion or rhinitis, Disp: 30 mL, Rfl: 0    tolterodine (DETROL LA) 2 mg 24 hr capsule, , Disp: , Rfl:     Tragacanth (ASTRAGALUS ROOT) POWD, 470 mg 3 (three) times a day as needed, Disp: , Rfl:     umeclidinium-vilanterol (ANORO ELLIPTA) 62 5-25 MCG/INH inhaler, Inhale 1 puff daily, Disp: 1 Inhaler, Rfl: 5  Review of Systems   Constitutional: Positive for activity change, appetite change and fatigue  Negative for chills, fever and unexpected weight change  HENT: Negative for trouble swallowing  Respiratory: Negative for cough and shortness of breath  Cardiovascular: Negative for chest pain  Gastrointestinal: Positive for nausea  Negative for abdominal pain, constipation, diarrhea and vomiting  Musculoskeletal:        Anterior L and R sided chest wall pain   Neurological: Negative for weakness  Psychiatric/Behavioral: Positive for sleep disturbance  The patient is not nervous/anxious  All other systems reviewed and are negative  All other systems negative    Objective:  Vital Signs  /70 (BP Location: Left arm, Cuff Size: Standard)   Pulse (!) 120   Temp 97 8 °F (36 6 °C) (Skin)   Resp 14   Wt 41 9 kg (92 lb 6 4 oz)   SpO2 94%   BMI 15 57 kg/m²    Physical Exam    Constitutional: Appears thinner than the last time we saw her, cachectic, chronically ill but not toxic-looking  Well-kempt and with make up on  In no acute physical or emotional distress  Head: Normocephalic and atraumatic  Wrapped in a maroon bandana  Eyes: EOM are normal  No ocular discharge  No scleral icterus  Neck: No visible adenopathy or masses  Respiratory: Effort normal  No stridor  No respiratory distress  Wearing oxygen via 4L NC  Gastrointestinal: No abdominal distension     Musculoskeletal: No edema  R sided anterior chest wall port in place  Neurological: Alert, oriented and appropriately conversant  Skin: No diaphoresis, no rashes seen on exposed areas of skin  Pale/Ashen  Psychiatric: Displays a normal mood and affect   Behavior, judgement and thought content appear normal      Lexis Preston MD  Palliative Medicine & Supportive Care  Internal Medicine  Available via [facility] Text  Office: [phone]  Fax: [phone]

## 2021-02-23 NOTE — PROGRESS NOTES
Dr Krystal Wharton and Palliative Care MSW met with pt this morning for her follow up visit  PT is a 78year old woman with a dx of lung cancer  PT presents today as looking very unwell  She brings up the fact that she was very angry that our office called her regarding [facility] transport, however at her last appointment she completed the application with Kiersten Elena  She adamantly expresses that [facility]  We reassured her that transportation will not be set up again for her  She reports that she has lost more weight, as her appetite has been very poor  PT states that she is unable to force herself to eat, she just will eat whatever she is hungry for  At this time that is not much at all  PT reports that she feels very weak, and light headed  She states that she thought she would pass out a few times  Dr Krystal Wharton educated on staying hydrated and eating small frequent meals  She also feels nauseous frequently  Dr Krystal Wharton reviewed her medications and encouraged her to take the Zofran and compazine as needed, and not to overtake  PT verbalized understanding  PT also complained of left breast numbness and a shooting pain on her right side  Dr Krystal Wharton reviewed her last CT scan and expressed that all of her symptoms are related to the cancer  PT is waiting to hear about a new treatment being offered  She is agreeable to try this, however we spoke about the possible side effects and her quality of life at this time  Dr Krystal Wharton and this writer discussed hospice services in great detail  Dr Krystal Wharton stated that if she chooses this to call the office and a referral will be placed  PT asked about rules regarding the condition of someone's home  She was concerned about someone coming in and doing an inspection  PT states that her home is a mess and that she has clutter  Dr Krystal Wharton discussed starting a low dose steroid to help with her fatigue and appetite   PT was agreeable   PT will follow up in 4 weeks and was encouraged to call with any concerns  MSW provided active listening and support

## 2021-02-25 ENCOUNTER — OFFICE VISIT (OUTPATIENT)
Dept: PULMONOLOGY | Facility: CLINIC | Age: 71
End: 2021-02-25
Payer: MEDICARE

## 2021-02-25 ENCOUNTER — TELEPHONE (OUTPATIENT)
Dept: HEMATOLOGY ONCOLOGY | Facility: MEDICAL CENTER | Age: 71
End: 2021-02-25

## 2021-02-25 ENCOUNTER — TELEPHONE (OUTPATIENT)
Dept: HEMATOLOGY ONCOLOGY | Facility: CLINIC | Age: 71
End: 2021-02-25

## 2021-02-25 VITALS
SYSTOLIC BLOOD PRESSURE: 120 MMHG | WEIGHT: 95 LBS | DIASTOLIC BLOOD PRESSURE: 70 MMHG | TEMPERATURE: 96.4 F | RESPIRATION RATE: 16 BRPM | OXYGEN SATURATION: 100 % | HEART RATE: 117 BPM | BODY MASS INDEX: 16.01 KG/M2

## 2021-02-25 DIAGNOSIS — J96.11 CHRONIC HYPOXEMIC RESPIRATORY FAILURE (HCC): ICD-10-CM

## 2021-02-25 DIAGNOSIS — C34.32 MALIGNANT NEOPLASM OF LOWER LOBE OF LEFT LUNG (HCC): Primary | ICD-10-CM

## 2021-02-25 PROCEDURE — 94618 PULMONARY STRESS TESTING: CPT | Performed by: INTERNAL MEDICINE

## 2021-02-25 PROCEDURE — 99214 OFFICE O/P EST MOD 30 MIN: CPT | Performed by: INTERNAL MEDICINE

## 2021-02-25 RX ORDER — ALBUTEROL SULFATE 2.5 MG/3ML
2.5 SOLUTION RESPIRATORY (INHALATION) EVERY 6 HOURS PRN
Qty: 180 VIAL | Refills: 5 | Status: SHIPPED | OUTPATIENT
Start: 2021-02-25 | End: 2021-03-04 | Stop reason: SDUPTHER

## 2021-02-25 NOTE — PROGRESS NOTES
Assessment/Plan:    Acute on chronic respiratory failure with hypoxia (HCC)    I repeated Mala's  6 minutes walk on today's visit and she does desaturate without oxygen  She requires 3 L of oxygen to keep her sats greater than 88  She is requesting a concentrator that goes up higher stating that she desaturates in the morning so will order an overnight pulse ox on her current level of oxygen  Pulmonary emphysema ([facility] [address] )    I have given her samples of Anoro to use 1 puff daily  She is unable to afford any maintenance inhaled therapies  I also gave her a nebulizer to start albuterol twice a day and provided teaching  Small cell lung cancer ([facility] [address] )    I reviewed her most recent CT scan which showed progression of her small cell lung cancer despite chemotherapy  I reviewed all the notes in her chart including the 1 that states she will be starting on Onivyde  For her small cell lung cancer  I tried to help  Clarify that this is a terminal illness which is unresponsive to therapy  She voices understanding that moving to try this next chemo to see if she set a better response  If not she understands that her trouble disease is progressing and we may need to consider more comfort focused care  Diagnoses and all orders for this visit:    Malignant neoplasm of lower lobe of left lung (HCC)  -     POCT 6 minute walk  -     Nebulizer Supplies  -     Nebulizer  -     albuterol (2 5 mg/3 mL) 0 083 % nebulizer solution; Take 1 vial (2 5 mg total) by nebulization every 6 (six) hours as needed for wheezing or shortness of breath    Chronic hypoxemic respiratory failure (HCC)  -     Pulse oximetry overnight          Subjective:      Patient ID: Ramona Hodges is a 78 y o  female  Rebecca Vallejo  Is here for follow-up of her breathing  Unfortunately she complains of shortness of breath with any minimal exertion  She wakes in the morning with sats in the 70s and does desaturate at home walking without oxygen    She ran out of [facility] and has been trying to use her rescue inhaler as needed  The following portions of the patient's history were reviewed and updated as appropriate: allergies, current medications, past family history, past medical history, past social history, past surgical history and problem list     Review of Systems   Constitutional: Positive for fatigue and unexpected weight change  HENT: Negative  Negative for postnasal drip  Eyes: Negative  Respiratory: Positive for cough and shortness of breath  Negative for wheezing  Cardiovascular: Negative  Negative for chest pain and leg swelling  Gastrointestinal: Positive for nausea  Endocrine: Negative  Genitourinary: Negative  Musculoskeletal: Negative  Allergic/Immunologic: Negative  Neurological: Negative  Hematological: Negative  Objective:      /70   Pulse (!) 117   Temp (!) 96 4 °F (35 8 °C)   Resp 16   Wt 43 1 kg (95 lb)   SpO2 100%   BMI 16 01 kg/m²          Physical Exam  Constitutional:       Appearance: She is well-developed  She is ill-appearing  HENT:      Head: Normocephalic  Eyes:      Pupils: Pupils are equal, round, and reactive to light  Neck:      Musculoskeletal: Normal range of motion and neck supple  Cardiovascular:      Rate and Rhythm: Normal rate  Heart sounds: No murmur  Pulmonary:      Effort: Pulmonary effort is normal  No respiratory distress  Breath sounds: Normal breath sounds  No wheezing or rales  Abdominal:      Palpations: Abdomen is soft  Musculoskeletal: Normal range of motion  Skin:     General: Skin is warm and dry  Neurological:      Mental Status: She is alert and oriented to person, place, and time

## 2021-02-25 NOTE — TELEPHONE ENCOUNTER
I called and left a voicemail for the patient informing her that it does appear that we are giving her the new medication starting on 3/1  She has an appointment on 3/1 with Dr Reinier Blood prior to her treatment  Any questions or concerns will be handled at the appointment

## 2021-02-25 NOTE — ASSESSMENT & PLAN NOTE
I reviewed her most recent CT scan which showed progression of her small cell lung cancer despite chemotherapy  I reviewed all the notes in her chart including the 1 that states she will be starting on Onivyde  For her small cell lung cancer  I tried to help  Clarify that this is a terminal illness which is unresponsive to therapy  She voices understanding that moving to try this next chemo to see if she set a better response  If not she understands that her trouble disease is progressing and we may need to consider more comfort focused care

## 2021-02-25 NOTE — PATIENT INSTRUCTIONS
Continue using oxygen 24/7 to keep saturations >90%  Use Anoro one puff daily  Use nebulizer twice a day with albuterol

## 2021-02-25 NOTE — ASSESSMENT & PLAN NOTE
I have given her samples of Anoro to use 1 puff daily  She is unable to afford any maintenance inhaled therapies  I also gave her a nebulizer to start albuterol twice a day and provided teaching

## 2021-02-25 NOTE — ASSESSMENT & PLAN NOTE
I repeated Mala's  6 minutes walk on today's visit and she does desaturate without oxygen  She requires 3 L of oxygen to keep her sats greater than 88  She is requesting a concentrator that goes up higher stating that she desaturates in the morning so will order an overnight pulse ox on her current level of oxygen

## 2021-02-25 NOTE — TELEPHONE ENCOUNTER
Patient states that she was un aware she was scheduled for treatment  3/1 through out may, and states she would like to confirm if that will be with the new medication   Best call back [phone]

## 2021-02-26 ENCOUNTER — TELEPHONE (OUTPATIENT)
Dept: HEMATOLOGY ONCOLOGY | Facility: CLINIC | Age: 71
End: 2021-02-26

## 2021-02-26 ENCOUNTER — APPOINTMENT (OUTPATIENT)
Dept: LAB | Facility: HOSPITAL | Age: 71
DRG: 180 | End: 2021-02-26
Attending: INTERNAL MEDICINE
Payer: MEDICARE

## 2021-02-26 ENCOUNTER — TELEPHONE (OUTPATIENT)
Dept: HEMATOLOGY ONCOLOGY | Facility: MEDICAL CENTER | Age: 71
End: 2021-02-26

## 2021-02-26 ENCOUNTER — TELEPHONE (OUTPATIENT)
Dept: PALLIATIVE MEDICINE | Facility: CLINIC | Age: 71
End: 2021-02-26

## 2021-02-26 ENCOUNTER — NURSE TRIAGE (OUTPATIENT)
Dept: HEMATOLOGY ONCOLOGY | Facility: CLINIC | Age: 71
End: 2021-02-26

## 2021-02-26 DIAGNOSIS — R63.0 LOSS OF APPETITE: Primary | ICD-10-CM

## 2021-02-26 DIAGNOSIS — D70.1 CHEMOTHERAPY INDUCED NEUTROPENIA (HCC): ICD-10-CM

## 2021-02-26 DIAGNOSIS — C80.0 SMALL CELL CARCINOMA CARCINOMATOSIS (HCC): ICD-10-CM

## 2021-02-26 DIAGNOSIS — T45.1X5A CHEMOTHERAPY INDUCED NEUTROPENIA (HCC): ICD-10-CM

## 2021-02-26 DIAGNOSIS — C34.90 SMALL CELL LUNG CANCER (HCC): ICD-10-CM

## 2021-02-26 LAB
ALBUMIN SERPL BCP-MCNC: 2.8 G/DL (ref 3.5–5)
ALP SERPL-CCNC: 121 U/L (ref 46–116)
ALT SERPL W P-5'-P-CCNC: 18 U/L (ref 12–78)
ANION GAP SERPL CALCULATED.3IONS-SCNC: 8 MMOL/L (ref 4–13)
AST SERPL W P-5'-P-CCNC: 37 U/L (ref 5–45)
BASOPHILS # BLD AUTO: 0.03 THOUSANDS/ΜL (ref 0–0.1)
BASOPHILS NFR BLD AUTO: 0 % (ref 0–1)
BILIRUB SERPL-MCNC: 0.4 MG/DL (ref 0.2–1)
BUN SERPL-MCNC: 21 MG/DL (ref 5–25)
CALCIUM ALBUM COR SERPL-MCNC: 9.8 MG/DL (ref 8.3–10.1)
CALCIUM SERPL-MCNC: 8.8 MG/DL (ref 8.3–10.1)
CHLORIDE SERPL-SCNC: 100 MMOL/L (ref 100–108)
CO2 SERPL-SCNC: 30 MMOL/L (ref 21–32)
CREAT SERPL-MCNC: 0.74 MG/DL (ref 0.6–1.3)
EOSINOPHIL # BLD AUTO: 0 THOUSAND/ΜL (ref 0–0.61)
EOSINOPHIL NFR BLD AUTO: 0 % (ref 0–6)
ERYTHROCYTE [DISTWIDTH] IN BLOOD BY AUTOMATED COUNT: 17.7 % (ref 11.6–15.1)
GFR SERPL CREATININE-BSD FRML MDRD: 84 ML/MIN/1.73SQ M
GLUCOSE P FAST SERPL-MCNC: 101 MG/DL (ref 65–99)
HCT VFR BLD AUTO: 36.6 % (ref 34.8–46.1)
HGB BLD-MCNC: 11.4 G/DL (ref 11.5–15.4)
IMM GRANULOCYTES # BLD AUTO: 0.04 THOUSAND/UL (ref 0–0.2)
IMM GRANULOCYTES NFR BLD AUTO: 0 % (ref 0–2)
LYMPHOCYTES # BLD AUTO: 0.46 THOUSANDS/ΜL (ref 0.6–4.47)
LYMPHOCYTES NFR BLD AUTO: 4 % (ref 14–44)
MCH RBC QN AUTO: 30.8 PG (ref 26.8–34.3)
MCHC RBC AUTO-ENTMCNC: 31.1 G/DL (ref 31.4–37.4)
MCV RBC AUTO: 99 FL (ref 82–98)
MONOCYTES # BLD AUTO: 0.73 THOUSAND/ΜL (ref 0.17–1.22)
MONOCYTES NFR BLD AUTO: 7 % (ref 4–12)
NEUTROPHILS # BLD AUTO: 10.01 THOUSANDS/ΜL (ref 1.85–7.62)
NEUTS SEG NFR BLD AUTO: 89 % (ref 43–75)
NRBC BLD AUTO-RTO: 0 /100 WBCS
PLATELET # BLD AUTO: 422 THOUSANDS/UL (ref 149–390)
PMV BLD AUTO: 9.2 FL (ref 8.9–12.7)
POTASSIUM SERPL-SCNC: 4.2 MMOL/L (ref 3.5–5.3)
PROT SERPL-MCNC: 6.4 G/DL (ref 6.4–8.2)
RBC # BLD AUTO: 3.7 MILLION/UL (ref 3.81–5.12)
SODIUM SERPL-SCNC: 138 MMOL/L (ref 136–145)
WBC # BLD AUTO: 11.27 THOUSAND/UL (ref 4.31–10.16)

## 2021-02-26 PROCEDURE — 36415 COLL VENOUS BLD VENIPUNCTURE: CPT

## 2021-02-26 PROCEDURE — 85025 COMPLETE CBC W/AUTO DIFF WBC: CPT

## 2021-02-26 PROCEDURE — 80053 COMPREHEN METABOLIC PANEL: CPT

## 2021-02-26 RX ORDER — DEXAMETHASONE 2 MG/1
2 TABLET ORAL 2 TIMES DAILY WITH MEALS
Qty: 60 TABLET | Refills: 0 | Status: SHIPPED | OUTPATIENT
Start: 2021-02-26

## 2021-02-26 NOTE — TELEPHONE ENCOUNTER
Patient states that the [facility] paperwork to be completed page 3 question # 8 should have a end date of 4/21 /21 and initialed by Dr Marty Cantu

## 2021-02-26 NOTE — TELEPHONE ENCOUNTER
Patient called the office this am asking for something to help with her appetite  "It is gone again"  She does not feel that the remeron is helping with appetite stimulation  Please advise

## 2021-02-26 NOTE — TELEPHONE ENCOUNTER
pls verify with patient if she started decadron 1mg BID  If she is taking this already, please advise to increase to 2mg BID  I will send script for the 2mg decadron       Thank you    Jane Potter MD  Palliative Medicine & Supportive Care  Internal Medicine  Available via [facility] Text  Office: [phone]  Fax: [phone]

## 2021-02-26 NOTE — TELEPHONE ENCOUNTER
Patient is aware to take the 2mg of dexamethasone  She had been taking the 1mg  She patricia reports some leg swelling ruperto  Today  She does sometimes get leg swelling  Denies pain or redness  Encouraged low sodium diet and elevating legs  She will check with Hem/onc on Monday during her visit about the swelling as well  She does report eating some salty foods lately

## 2021-02-26 NOTE — TELEPHONE ENCOUNTER
Patient called in stated that she wanted to drop off paper at the office today- reached out to the office and was advised that she can stop by

## 2021-02-26 NOTE — TELEPHONE ENCOUNTER
Patient called to report that last night she started with moderate ankle edema  Patient has had a history of intermittent ankle edema  Patient gets slight chest pressure with her anxiety  Patient stated that she does not take anything for anxiety  Denies cardiac chest pain  Denies SOB  She wanted Dr Tierra Ma to be aware  She wanted to make sure she could still get her treatment on Monday  Patient has an office appointment with Dr Tierra Ma prior to her treatment on Monday  Patient then requested to speak with Palliative Care  Call transferred  Will forward to Dr Dmitry Berry RN  Patient's call back # [phone] (H)

## 2021-02-28 ENCOUNTER — HOSPITAL ENCOUNTER (INPATIENT)
Facility: HOSPITAL | Age: 71
LOS: 3 days | Discharge: HOME | DRG: 180 | End: 2021-03-03
Attending: EMERGENCY MEDICINE | Admitting: INTERNAL MEDICINE
Payer: MEDICARE

## 2021-02-28 ENCOUNTER — APPOINTMENT (EMERGENCY)
Dept: RADIOLOGY | Facility: HOSPITAL | Age: 71
DRG: 180 | End: 2021-02-28
Payer: MEDICARE

## 2021-02-28 DIAGNOSIS — C34.90 SMALL CELL LUNG CANCER (HCC): ICD-10-CM

## 2021-02-28 DIAGNOSIS — E43 SEVERE PROTEIN-CALORIE MALNUTRITION (HCC): ICD-10-CM

## 2021-02-28 DIAGNOSIS — I50.9 CHF (CONGESTIVE HEART FAILURE) (HCC): ICD-10-CM

## 2021-02-28 DIAGNOSIS — C34.90 LUNG CANCER (HCC): ICD-10-CM

## 2021-02-28 DIAGNOSIS — J43.9 PULMONARY EMPHYSEMA, UNSPECIFIED EMPHYSEMA TYPE (HCC): ICD-10-CM

## 2021-02-28 DIAGNOSIS — J96.21 ACUTE ON CHRONIC RESPIRATORY FAILURE WITH HYPOXIA (HCC): ICD-10-CM

## 2021-02-28 DIAGNOSIS — I21.4 NSTEMI (NON-ST ELEVATED MYOCARDIAL INFARCTION) (HCC): Primary | ICD-10-CM

## 2021-02-28 DIAGNOSIS — J96.21 ACUTE ON CHRONIC RESPIRATORY FAILURE WITH HYPOXEMIA (HCC): ICD-10-CM

## 2021-02-28 PROBLEM — F11.20 NARCOTIC DEPENDENCY, CONTINUOUS (HCC): Status: ACTIVE | Noted: 2021-02-28

## 2021-02-28 PROBLEM — J96.11 CHRONIC RESPIRATORY FAILURE WITH HYPOXIA (HCC): Status: ACTIVE | Noted: 2021-02-28

## 2021-02-28 PROBLEM — I21.A1 TYPE 2 MYOCARDIAL INFARCTION (HCC): Status: ACTIVE | Noted: 2021-02-28

## 2021-02-28 LAB
ALBUMIN SERPL BCP-MCNC: 2.5 G/DL (ref 3.5–5)
ALP SERPL-CCNC: 122 U/L (ref 46–116)
ALT SERPL W P-5'-P-CCNC: 21 U/L (ref 12–78)
ANION GAP SERPL CALCULATED.3IONS-SCNC: 5 MMOL/L (ref 4–13)
APTT PPP: 30 SECONDS (ref 23–37)
APTT PPP: 39 SECONDS (ref 23–37)
AST SERPL W P-5'-P-CCNC: 37 U/L (ref 5–45)
ATRIAL RATE: 107 BPM
ATRIAL RATE: 93 BPM
ATRIAL RATE: 94 BPM
BASOPHILS # BLD AUTO: 0.03 THOUSANDS/ΜL (ref 0–0.1)
BASOPHILS NFR BLD AUTO: 0 % (ref 0–1)
BILIRUB SERPL-MCNC: 0.3 MG/DL (ref 0.2–1)
BUN SERPL-MCNC: 25 MG/DL (ref 5–25)
CALCIUM ALBUM COR SERPL-MCNC: 9.8 MG/DL (ref 8.3–10.1)
CALCIUM SERPL-MCNC: 8.6 MG/DL (ref 8.3–10.1)
CHLORIDE SERPL-SCNC: 103 MMOL/L (ref 100–108)
CO2 SERPL-SCNC: 28 MMOL/L (ref 21–32)
CREAT SERPL-MCNC: 0.74 MG/DL (ref 0.6–1.3)
EOSINOPHIL # BLD AUTO: 0.02 THOUSAND/ΜL (ref 0–0.61)
EOSINOPHIL NFR BLD AUTO: 0 % (ref 0–6)
ERYTHROCYTE [DISTWIDTH] IN BLOOD BY AUTOMATED COUNT: 17.9 % (ref 11.6–15.1)
GFR SERPL CREATININE-BSD FRML MDRD: 84 ML/MIN/1.73SQ M
GLUCOSE SERPL-MCNC: 108 MG/DL (ref 65–140)
HCT VFR BLD AUTO: 35.3 % (ref 34.8–46.1)
HGB BLD-MCNC: 11.2 G/DL (ref 11.5–15.4)
IMM GRANULOCYTES # BLD AUTO: 0.04 THOUSAND/UL (ref 0–0.2)
IMM GRANULOCYTES NFR BLD AUTO: 0 % (ref 0–2)
INR PPP: 1.12 (ref 0.84–1.19)
LYMPHOCYTES # BLD AUTO: 0.52 THOUSANDS/ΜL (ref 0.6–4.47)
LYMPHOCYTES NFR BLD AUTO: 4 % (ref 14–44)
MCH RBC QN AUTO: 31.2 PG (ref 26.8–34.3)
MCHC RBC AUTO-ENTMCNC: 31.7 G/DL (ref 31.4–37.4)
MCV RBC AUTO: 98 FL (ref 82–98)
MONOCYTES # BLD AUTO: 1.03 THOUSAND/ΜL (ref 0.17–1.22)
MONOCYTES NFR BLD AUTO: 9 % (ref 4–12)
NEUTROPHILS # BLD AUTO: 10.39 THOUSANDS/ΜL (ref 1.85–7.62)
NEUTS SEG NFR BLD AUTO: 87 % (ref 43–75)
NRBC BLD AUTO-RTO: 0 /100 WBCS
NT-PROBNP SERPL-MCNC: 3047 PG/ML
P AXIS: 67 DEGREES
P AXIS: 69 DEGREES
P AXIS: 72 DEGREES
PLATELET # BLD AUTO: 329 THOUSANDS/UL (ref 149–390)
PMV BLD AUTO: 8.6 FL (ref 8.9–12.7)
POTASSIUM SERPL-SCNC: 4 MMOL/L (ref 3.5–5.3)
PR INTERVAL: 114 MS
PR INTERVAL: 116 MS
PR INTERVAL: 128 MS
PROT SERPL-MCNC: 5.9 G/DL (ref 6.4–8.2)
PROTHROMBIN TIME: 14.4 SECONDS (ref 11.6–14.5)
QRS AXIS: 60 DEGREES
QRS AXIS: 61 DEGREES
QRS AXIS: 64 DEGREES
QRSD INTERVAL: 114 MS
QRSD INTERVAL: 132 MS
QRSD INTERVAL: 84 MS
QT INTERVAL: 372 MS
QT INTERVAL: 382 MS
QT INTERVAL: 416 MS
QTC INTERVAL: 474 MS
QTC INTERVAL: 496 MS
QTC INTERVAL: 520 MS
RBC # BLD AUTO: 3.59 MILLION/UL (ref 3.81–5.12)
SODIUM SERPL-SCNC: 136 MMOL/L (ref 136–145)
T WAVE AXIS: 116 DEGREES
T WAVE AXIS: 124 DEGREES
T WAVE AXIS: 95 DEGREES
TROPONIN I SERPL-MCNC: 0.12 NG/ML
TROPONIN I SERPL-MCNC: 0.13 NG/ML
TROPONIN I SERPL-MCNC: 0.14 NG/ML
TROPONIN I SERPL-MCNC: 0.18 NG/ML
VENTRICULAR RATE: 107 BPM
VENTRICULAR RATE: 93 BPM
VENTRICULAR RATE: 94 BPM
WBC # BLD AUTO: 12.03 THOUSAND/UL (ref 4.31–10.16)

## 2021-02-28 PROCEDURE — 84484 ASSAY OF TROPONIN QUANT: CPT | Performed by: EMERGENCY MEDICINE

## 2021-02-28 PROCEDURE — 85730 THROMBOPLASTIN TIME PARTIAL: CPT | Performed by: EMERGENCY MEDICINE

## 2021-02-28 PROCEDURE — 93010 ELECTROCARDIOGRAM REPORT: CPT | Performed by: INTERNAL MEDICINE

## 2021-02-28 PROCEDURE — 85730 THROMBOPLASTIN TIME PARTIAL: CPT | Performed by: INTERNAL MEDICINE

## 2021-02-28 PROCEDURE — 93005 ELECTROCARDIOGRAM TRACING: CPT

## 2021-02-28 PROCEDURE — 99285 EMERGENCY DEPT VISIT HI MDM: CPT

## 2021-02-28 PROCEDURE — 85610 PROTHROMBIN TIME: CPT | Performed by: EMERGENCY MEDICINE

## 2021-02-28 PROCEDURE — 71045 X-RAY EXAM CHEST 1 VIEW: CPT

## 2021-02-28 PROCEDURE — 99222 1ST HOSP IP/OBS MODERATE 55: CPT | Performed by: INTERNAL MEDICINE

## 2021-02-28 PROCEDURE — 80053 COMPREHEN METABOLIC PANEL: CPT | Performed by: EMERGENCY MEDICINE

## 2021-02-28 PROCEDURE — 36415 COLL VENOUS BLD VENIPUNCTURE: CPT | Performed by: EMERGENCY MEDICINE

## 2021-02-28 PROCEDURE — 99285 EMERGENCY DEPT VISIT HI MDM: CPT | Performed by: EMERGENCY MEDICINE

## 2021-02-28 PROCEDURE — 83880 ASSAY OF NATRIURETIC PEPTIDE: CPT | Performed by: EMERGENCY MEDICINE

## 2021-02-28 PROCEDURE — 85025 COMPLETE CBC W/AUTO DIFF WBC: CPT | Performed by: EMERGENCY MEDICINE

## 2021-02-28 PROCEDURE — 84484 ASSAY OF TROPONIN QUANT: CPT | Performed by: INTERNAL MEDICINE

## 2021-02-28 PROCEDURE — 99223 1ST HOSP IP/OBS HIGH 75: CPT | Performed by: INTERNAL MEDICINE

## 2021-02-28 RX ORDER — HEPARIN SODIUM 1000 [USP'U]/ML
1200 INJECTION, SOLUTION INTRAVENOUS; SUBCUTANEOUS
Status: DISCONTINUED | OUTPATIENT
Start: 2021-02-28 | End: 2021-02-28

## 2021-02-28 RX ORDER — ASPIRIN 325 MG
325 TABLET ORAL ONCE
Status: COMPLETED | OUTPATIENT
Start: 2021-02-28 | End: 2021-02-28

## 2021-02-28 RX ORDER — ALBUTEROL SULFATE 90 UG/1
2 AEROSOL, METERED RESPIRATORY (INHALATION) EVERY 6 HOURS PRN
Status: DISCONTINUED | OUTPATIENT
Start: 2021-02-28 | End: 2021-03-03 | Stop reason: HOSPADM

## 2021-02-28 RX ORDER — ASPIRIN 81 MG/1
81 TABLET, CHEWABLE ORAL DAILY
Status: DISCONTINUED | OUTPATIENT
Start: 2021-03-01 | End: 2021-03-03 | Stop reason: HOSPADM

## 2021-02-28 RX ORDER — HEPARIN SODIUM 1000 [USP'U]/ML
2400 INJECTION, SOLUTION INTRAVENOUS; SUBCUTANEOUS
Status: DISCONTINUED | OUTPATIENT
Start: 2021-02-28 | End: 2021-02-28

## 2021-02-28 RX ORDER — HEPARIN SODIUM 1000 [USP'U]/ML
2400 INJECTION, SOLUTION INTRAVENOUS; SUBCUTANEOUS ONCE
Status: COMPLETED | OUTPATIENT
Start: 2021-02-28 | End: 2021-02-28

## 2021-02-28 RX ORDER — ACETAMINOPHEN 325 MG/1
650 TABLET ORAL EVERY 6 HOURS PRN
Status: DISCONTINUED | OUTPATIENT
Start: 2021-02-28 | End: 2021-03-03 | Stop reason: HOSPADM

## 2021-02-28 RX ORDER — PANTOPRAZOLE SODIUM 40 MG/1
40 TABLET, DELAYED RELEASE ORAL
Status: DISCONTINUED | OUTPATIENT
Start: 2021-02-28 | End: 2021-03-03 | Stop reason: HOSPADM

## 2021-02-28 RX ORDER — HEPARIN SODIUM 10000 [USP'U]/100ML
3-20 INJECTION, SOLUTION INTRAVENOUS
Status: DISCONTINUED | OUTPATIENT
Start: 2021-02-28 | End: 2021-02-28

## 2021-02-28 RX ORDER — OXYBUTYNIN CHLORIDE 5 MG/1
5 TABLET, EXTENDED RELEASE ORAL
Status: DISCONTINUED | OUTPATIENT
Start: 2021-02-28 | End: 2021-03-03 | Stop reason: HOSPADM

## 2021-02-28 RX ORDER — ONDANSETRON 2 MG/ML
4 INJECTION INTRAMUSCULAR; INTRAVENOUS EVERY 6 HOURS PRN
Status: DISCONTINUED | OUTPATIENT
Start: 2021-02-28 | End: 2021-03-01 | Stop reason: SDUPTHER

## 2021-02-28 RX ORDER — DEXAMETHASONE 0.5 MG/1
2 TABLET ORAL 2 TIMES DAILY WITH MEALS
Status: DISCONTINUED | OUTPATIENT
Start: 2021-02-28 | End: 2021-03-03 | Stop reason: HOSPADM

## 2021-02-28 RX ORDER — FUROSEMIDE 10 MG/ML
40 INJECTION INTRAMUSCULAR; INTRAVENOUS ONCE
Status: COMPLETED | OUTPATIENT
Start: 2021-02-28 | End: 2021-02-28

## 2021-02-28 RX ORDER — ONDANSETRON 4 MG/1
4 TABLET, ORALLY DISINTEGRATING ORAL ONCE
Status: COMPLETED | OUTPATIENT
Start: 2021-02-28 | End: 2021-02-28

## 2021-02-28 RX ORDER — MORPHINE SULFATE 15 MG/1
30 TABLET ORAL EVERY 4 HOURS PRN
Status: DISCONTINUED | OUTPATIENT
Start: 2021-02-28 | End: 2021-03-03 | Stop reason: HOSPADM

## 2021-02-28 RX ORDER — MIRTAZAPINE 15 MG/1
30 TABLET, FILM COATED ORAL
Status: DISCONTINUED | OUTPATIENT
Start: 2021-02-28 | End: 2021-03-03 | Stop reason: HOSPADM

## 2021-02-28 RX ORDER — CLOPIDOGREL BISULFATE 75 MG/1
75 TABLET ORAL DAILY
Status: DISCONTINUED | OUTPATIENT
Start: 2021-03-01 | End: 2021-02-28

## 2021-02-28 RX ORDER — ATORVASTATIN CALCIUM 40 MG/1
40 TABLET, FILM COATED ORAL EVERY EVENING
Status: DISCONTINUED | OUTPATIENT
Start: 2021-02-28 | End: 2021-03-03 | Stop reason: HOSPADM

## 2021-02-28 RX ORDER — CLOPIDOGREL BISULFATE 75 MG/1
600 TABLET ORAL ONCE
Status: COMPLETED | OUTPATIENT
Start: 2021-02-28 | End: 2021-02-28

## 2021-02-28 RX ORDER — COLCHICINE 0.6 MG/1
0.6 TABLET ORAL 2 TIMES DAILY PRN
Status: DISCONTINUED | OUTPATIENT
Start: 2021-02-28 | End: 2021-03-03 | Stop reason: HOSPADM

## 2021-02-28 RX ORDER — ALBUTEROL SULFATE 2.5 MG/3ML
2.5 SOLUTION RESPIRATORY (INHALATION) EVERY 6 HOURS PRN
Status: DISCONTINUED | OUTPATIENT
Start: 2021-02-28 | End: 2021-03-03 | Stop reason: HOSPADM

## 2021-02-28 RX ORDER — MORPHINE SULFATE 15 MG/1
30 TABLET ORAL ONCE
Status: COMPLETED | OUTPATIENT
Start: 2021-02-28 | End: 2021-02-28

## 2021-02-28 RX ORDER — ACETAMINOPHEN 325 MG/1
650 TABLET ORAL ONCE
Status: COMPLETED | OUTPATIENT
Start: 2021-02-28 | End: 2021-02-28

## 2021-02-28 RX ORDER — ONDANSETRON 2 MG/ML
4 INJECTION INTRAMUSCULAR; INTRAVENOUS EVERY 6 HOURS PRN
Status: DISCONTINUED | OUTPATIENT
Start: 2021-02-28 | End: 2021-03-03 | Stop reason: HOSPADM

## 2021-02-28 RX ORDER — NICOTINE 21 MG/24HR
14 PATCH, TRANSDERMAL 24 HOURS TRANSDERMAL DAILY
Status: DISCONTINUED | OUTPATIENT
Start: 2021-02-28 | End: 2021-03-03 | Stop reason: HOSPADM

## 2021-02-28 RX ORDER — POTASSIUM CHLORIDE 20 MEQ/1
40 TABLET, EXTENDED RELEASE ORAL ONCE
Status: DISCONTINUED | OUTPATIENT
Start: 2021-02-28 | End: 2021-02-28

## 2021-02-28 RX ORDER — CYCLOBENZAPRINE HCL 10 MG
10 TABLET ORAL 3 TIMES DAILY PRN
Status: DISCONTINUED | OUTPATIENT
Start: 2021-02-28 | End: 2021-03-03 | Stop reason: HOSPADM

## 2021-02-28 RX ADMIN — MIRTAZAPINE 30 MG: 15 TABLET, FILM COATED ORAL at 21:19

## 2021-02-28 RX ADMIN — ENOXAPARIN SODIUM 40 MG: 40 INJECTION SUBCUTANEOUS at 17:30

## 2021-02-28 RX ADMIN — OXYBUTYNIN CHLORIDE 5 MG: 5 TABLET, EXTENDED RELEASE ORAL at 21:20

## 2021-02-28 RX ADMIN — MORPHINE SULFATE 30 MG: 15 TABLET ORAL at 08:42

## 2021-02-28 RX ADMIN — DEXAMETHASONE 2 MG: 0.5 TABLET ORAL at 15:50

## 2021-02-28 RX ADMIN — HEPARIN SODIUM 2400 UNITS: 1000 INJECTION INTRAVENOUS; SUBCUTANEOUS at 10:00

## 2021-02-28 RX ADMIN — ACETAMINOPHEN 650 MG: 325 TABLET, FILM COATED ORAL at 09:52

## 2021-02-28 RX ADMIN — PANTOPRAZOLE SODIUM 40 MG: 40 TABLET, DELAYED RELEASE ORAL at 12:01

## 2021-02-28 RX ADMIN — METOPROLOL TARTRATE 25 MG: 25 TABLET, FILM COATED ORAL at 21:19

## 2021-02-28 RX ADMIN — METOPROLOL TARTRATE 25 MG: 25 TABLET, FILM COATED ORAL at 12:01

## 2021-02-28 RX ADMIN — CLOPIDOGREL BISULFATE 600 MG: 75 TABLET ORAL at 12:01

## 2021-02-28 RX ADMIN — ATORVASTATIN CALCIUM 40 MG: 40 TABLET, FILM COATED ORAL at 17:29

## 2021-02-28 RX ADMIN — ONDANSETRON 4 MG: 4 TABLET, ORALLY DISINTEGRATING ORAL at 08:42

## 2021-02-28 RX ADMIN — Medication 14 MG: at 13:54

## 2021-02-28 RX ADMIN — FUROSEMIDE 40 MG: 10 INJECTION, SOLUTION INTRAMUSCULAR; INTRAVENOUS at 09:53

## 2021-02-28 RX ADMIN — MORPHINE SULFATE 30 MG: 15 TABLET ORAL at 13:07

## 2021-02-28 RX ADMIN — TIOTROPIUM BROMIDE 18 MCG: 18 CAPSULE ORAL; RESPIRATORY (INHALATION) at 12:13

## 2021-02-28 RX ADMIN — ACETAMINOPHEN 650 MG: 325 TABLET, FILM COATED ORAL at 15:56

## 2021-02-28 RX ADMIN — ASPIRIN 325 MG ORAL TABLET 325 MG: 325 PILL ORAL at 09:52

## 2021-02-28 RX ADMIN — HEPARIN SODIUM 12 UNITS/KG/HR: 10000 INJECTION, SOLUTION INTRAVENOUS at 10:00

## 2021-02-28 NOTE — PLAN OF CARE
Problem: Potential for Falls  Goal: Patient will remain free of falls  Description: INTERVENTIONS:  - Assess patient frequently for physical needs  -  Identify cognitive and physical deficits and behaviors that affect risk of falls    -  [place] fall precautions as indicated by assessment   - Educate patient/family on patient safety including physical limitations  - Instruct patient to call for assistance with activity based on assessment  - Modify environment to reduce risk of injury  - Consider OT/PT consult to assist with strengthening/mobility  Outcome: Progressing     Problem: PAIN - ADULT  Goal: Verbalizes/displays adequate comfort level or baseline comfort level  Description: Interventions:  - Encourage patient to monitor pain and request assistance  - Assess pain using appropriate pain scale  - Administer analgesics based on type and severity of pain and evaluate response  - Implement non-pharmacological measures as appropriate and evaluate response  - Consider cultural and social influences on pain and pain management  - Notify physician/advanced practitioner if interventions unsuccessful or patient reports new pain  Outcome: Progressing     Problem: INFECTION - ADULT  Goal: Absence or prevention of progression during hospitalization  Description: INTERVENTIONS:  - Assess and monitor for signs and symptoms of infection  - Monitor lab/diagnostic results  - Monitor all insertion sites, i e  indwelling lines, tubes, and drains  - Monitor endotracheal if appropriate and nasal secretions for changes in amount and color  - [place] appropriate cooling/warming therapies per order  - Administer medications as ordered  - Instruct and encourage patient and family to use good hand hygiene technique  - Identify and instruct in appropriate isolation precautions for identified infection/condition  Outcome: Progressing     Problem: DISCHARGE PLANNING  Goal: Discharge to home or other facility with appropriate resources  Description: INTERVENTIONS:  - Identify barriers to discharge w/patient and caregiver  - Arrange for needed discharge resources and transportation as appropriate  - Identify discharge learning needs (meds, wound care, etc )  - Arrange for interpretive services to assist at discharge as needed  - Refer to Case Management Department for coordinating discharge planning if the patient needs post-hospital services based on physician/advanced practitioner order or complex needs related to functional status, cognitive ability, or social support system  Outcome: Progressing     Problem: Knowledge Deficit  Goal: Patient/family/caregiver demonstrates understanding of disease process, treatment plan, medications, and discharge instructions  Description: Complete learning assessment and assess knowledge base    Interventions:  - Provide teaching at level of understanding  - Provide teaching via preferred learning methods  Outcome: Progressing     Problem: CARDIOVASCULAR - ADULT  Goal: Maintains optimal cardiac output and hemodynamic stability  Description: INTERVENTIONS:  - Monitor I/O, vital signs and rhythm  - Monitor for S/S and trends of decreased cardiac output  - Administer and titrate ordered vasoactive medications to optimize hemodynamic stability  - Assess quality of pulses, skin color and temperature  - Assess for signs of decreased coronary artery perfusion  - Instruct patient to report change in severity of symptoms  Outcome: Progressing     Problem: RESPIRATORY - ADULT  Goal: Achieves optimal ventilation and oxygenation  Description: INTERVENTIONS:  - Assess for changes in respiratory status  - Assess for changes in mentation and behavior  - Position to facilitate oxygenation and minimize respiratory effort  - Oxygen administered by appropriate delivery if ordered  - Initiate smoking cessation education as indicated  - Encourage broncho-pulmonary hygiene including cough, deep breathe, Incentive Spirometry  - Assess the need for suctioning and aspirate as needed  - Assess and instruct to report SOB or any respiratory difficulty  - Respiratory Therapy support as indicated  Outcome: Progressing

## 2021-02-28 NOTE — ASSESSMENT & PLAN NOTE
Patient has advanced stage small cell lung cancer, she was on chemotherapy  Hospice was offered her last office visit the patient declined  Chest x-ray findings are probably reflective of small cell lung cancer  Her chest pain was more suggestive of that due to small cell lung cancer to me    Patient denies fevers, chills, purulent sputum production      Will check procalcitonin level

## 2021-02-28 NOTE — ED PROVIDER NOTES
History  Chief Complaint   Patient presents with    Shortness of Breath     pt has had increased sob over the last two days  pt normally wears 3L of oxygen and has recently increased it to 5L over night  pt currently being treated for lung cancer  pt has back pain and bilateral leg swelling     59-year-old female with a history of small-cell lung cancer presents for evaluation of progressive shortness of breath  The patient normally wears 3 L of oxygen has needed to increase her concentrator recently  The patient denies any fevers or chills  The patient denies any chest pain  The patient has had some bilateral foot swelling that has been ongoing for some time  Prior to Admission Medications   Prescriptions Last Dose Informant Patient Reported? Taking?    B Complex-Biotin-FA ( VITAMIN B 50/B-COMPLEX) TABS  Self Yes No   Sig: Take by mouth daily   COLCHICINE PO  Self Yes No   Sig: Take 0 5 mg by mouth 3 (three) times a day as needed    Capsaicin 0 025 % GEL  Self Yes No   Sig: Apply topically 3 (three) times a day as needed    Echinacea 400 MG CAPS  Self Yes No   Sig: Take by mouth 3 (three) times a day as needed   GINKGO BILOBA PLUS PO  Self Yes No   Sig: Take by mouth as needed 2 capsules every 2 mornings    Hyaluronic Acid 20-60 MG CAPS  Self Yes No   Sig: Take by mouth 3 (three) times a day    L-Lysine 500 MG CAPS  Self Yes No   Sig: Take by mouth 3 (three) times a day as needed   Lactobacillus (PROBIOTIC ACIDOPHILUS PO)  Self Yes No   Sig: Take 1 capsule by mouth daily   NON FORMULARY  Self Yes No   Sig: Take 37 mg by mouth daily Eco Thyro 37 - reported by pt   S-Adenosylmethionine (RADHA-E) 200 MG TABS  Self Yes No   Sig: Take by mouth 3 (three) times a day   Tragacanth (ASTRAGALUS ROOT) POWD  Self Yes No   Si mg 3 (three) times a day as needed   acetaminophen (TYLENOL) 500 mg tablet  Self No No   Sig: Take 2 tablets (1,000 mg total) by mouth every 8 (eight) hours   albuterol (2 5 mg/3 mL) 0  083 % nebulizer solution   No No   Sig: Take 1 vial (2 5 mg total) by nebulization every 6 (six) hours as needed for wheezing or shortness of breath   albuterol (ProAir HFA) 90 mcg/act inhaler  Self No No   Sig: Inhale 2 puffs every 6 (six) hours as needed for wheezing or shortness of breath   cholecalciferol (VITAMIN D3) 1,000 units tablet  Self Yes No   Sig: Take 7,000 Units by mouth daily   cyclobenzaprine (FLEXERIL) 10 mg tablet  Self Yes No   Sig: Take 10 mg by mouth 3 (three) times a day as needed for muscle spasms   dexamethasone (DECADRON) 2 mg tablet   No No   Sig: Take 1 tablet (2 mg total) by mouth 2 (two) times a day with meals   fexofenadine-pseudoephedrine (ALLEGRA-D)  MG per tablet  Self Yes No   Sig: Take 1 tablet by mouth 2 (two) times a day as needed for allergies   hydrocortisone 2 5 % cream  Self No No   Sig: Apply topically 4 (four) times a day as needed for irritation or rash   lidocaine-prilocaine (EMLA) cream  Self No No   Sig: Apply topically as needed for mild pain 30-60 min prior to port access     loperamide (IMODIUM) 2 mg capsule  Self Yes No   Sig: Take 2 mg by mouth daily    mirtazapine (REMERON) 30 mg tablet  Self No No   Sig: Take 1 tablet (30 mg total) by mouth daily at bedtime   morphine (MSIR) 30 MG tablet  Self No No   Sig: Take 1 tablet (30 mg total) by mouth every 4 (four) hours as needed for severe painMax Daily Amount: 180 mg   nystatin (MYCOSTATIN) 500,000 units/5 mL suspension  Self No No   Sig: Apply 5 mL (500,000 Units total) to the mouth or throat 4 (four) times a day   omeprazole (PriLOSEC) 20 mg delayed release capsule  Self No No   Sig: Take 1 capsule (20 mg total) by mouth daily   ondansetron (ZOFRAN) 4 mg tablet  Self No No   Sig: Take 1 tablet (4 mg total) by mouth every 8 (eight) hours as needed for nausea or vomiting   other medication, see sig,  Self Yes No   Sig: Take 3 capsules by mouth daily [id number]   oxybutynin (DITROPAN-XL) 10 MG 24 hr tablet Self Yes No   Sig: TAKE 1 TABLET BY MOUTH ONCE DAILY FOR INCONTINENCE   prochlorperazine (COMPAZINE) 10 mg tablet  Self Yes No   Sig: Take 10 mg by mouth every 6 (six) hours as needed for nausea or vomiting (migraines)   senna-docusate sodium (SENOKOT-S) 8 6-50 mg per tablet  Self No No   Sig: Take 1 tablet by mouth daily   sodium chloride (OCEAN) 0 65 % nasal spray  Self No No   Si spray into each nostril as needed for congestion or rhinitis   tolterodine (DETROL LA) 2 mg 24 hr capsule  Self Yes No   umeclidinium-vilanterol (ANORO ELLIPTA) 62 5-25 MCG/INH inhaler  Self No No   Sig: Inhale 1 puff daily      Facility-Administered Medications: None       Past Medical History:   Diagnosis Date    Chronic fatigue syndrome with fibromyalgia     COPD (chronic obstructive pulmonary disease) (HCC)     DDD (degenerative disc disease), cervical     Emphysema of lung (HCC)     Hx of migraine headaches     Hypothyroidism     Lung cancer ([facility] [address] )        Past Surgical History:   Procedure Laterality Date    BARTHOLIN GLAND CYST EXCISION      CARPAL TUNNEL RELEASE Bilateral     COLONOSCOPY  2020     15 mm sessile polyp in the cecum removed by polypectomy  A 3 recall    IR PLEURAL EFFUSION LONG-TERM CATHETER PLACEMENT  2020    IR PLEURAL EFFUSION LONG-TERM CATHETER REMOVAL  2020    IR PORT PLACEMENT  2020    IR THORACENTESIS  11/3/2020    TONSILLECTOMY      TUBAL LIGATION      UPPER GASTROINTESTINAL ENDOSCOPY  2020     small hiatal hernia  Pathology negative  Family History   Problem Relation Age of Onset    Colon polyps Mother     Heart disease Mother     Colon cancer Mother     Heart disease Father     Lymphoma Father      I have reviewed and agree with the history as documented      E-Cigarette/Vaping    E-Cigarette Use Never User      E-Cigarette/Vaping Substances    Nicotine No     THC No     CBD No     Flavoring No     Other No     Unknown No      Social History     Tobacco Use    Smoking status: Former Smoker     Packs/day: 0 50     Years: 53 00     Pack years: 26 50     Types: Cigarettes     Start date: 1967    Smokeless tobacco: Never Used    Tobacco comment: 2 cigarettes daily   Substance Use Topics    Alcohol use: Not Currently    Drug use: Not Currently       Review of Systems   Constitutional: Positive for fatigue  Negative for chills and fever  HENT: Negative for sore throat  Eyes: Negative for visual disturbance  Respiratory: Positive for shortness of breath  Cardiovascular: Negative for chest pain  Gastrointestinal: Negative for abdominal pain, diarrhea, nausea and vomiting  Genitourinary: Negative for difficulty urinating, dysuria and pelvic pain  Musculoskeletal: Negative for back pain  Skin: Negative for rash  Neurological: Negative for syncope, weakness and headaches  All other systems reviewed and are negative  Physical Exam  Physical Exam  Vitals signs and nursing note reviewed  Constitutional:       General: She is not in acute distress  Appearance: She is well-developed  HENT:      Head: Normocephalic and atraumatic  Right Ear: External ear normal       Left Ear: External ear normal    Eyes:      General: No scleral icterus  Conjunctiva/sclera: Conjunctivae normal       Pupils: Pupils are equal, round, and reactive to light  Neck:      Musculoskeletal: Normal range of motion  Cardiovascular:      Rate and Rhythm: Normal rate and regular rhythm  Heart sounds: Normal heart sounds  Pulmonary:      Effort: Pulmonary effort is normal  Tachypnea present  No respiratory distress  Breath sounds: Examination of the left-lower field reveals decreased breath sounds  Decreased breath sounds present  Abdominal:      General: Bowel sounds are normal       Palpations: Abdomen is soft  Tenderness: There is no abdominal tenderness  There is no guarding or rebound     Musculoskeletal: Normal range of motion  Skin:     General: Skin is warm and dry  Findings: No rash  Neurological:      Mental Status: She is alert and oriented to person, place, and time           Vital Signs  ED Triage Vitals   Temperature Pulse Respirations Blood Pressure SpO2   02/28/21 0824 02/28/21 0824 02/28/21 0824 02/28/21 0824 02/28/21 0824   (!) 97 1 °F (36 2 °C) (!) 113 (!) 27 148/80 (!) 85 %      Temp Source Heart Rate Source Patient Position - Orthostatic VS BP Location FiO2 (%)   02/28/21 0824 02/28/21 0830 02/28/21 0830 02/28/21 0830 --   Temporal Monitor Sitting Left arm       Pain Score       02/28/21 0830       Worst Possible Pain           Vitals:    02/28/21 0915 02/28/21 0930 02/28/21 0945 02/28/21 1029   BP:  (!) 177/101  153/81   Pulse: 105 (!) 106 100    Patient Position - Orthostatic VS:  Sitting           Visual Acuity      ED Medications  Medications   heparin (porcine) 25,000 units in 0 45% NaCl 250 mL infusion (premix) (12 Units/kg/hr × 40 kg (Order-Specific) Intravenous New Bag 2/28/21 1000)   heparin (porcine) injection 2,400 Units (has no administration in time range)   heparin (porcine) injection 1,200 Units (has no administration in time range)   albuterol inhalation solution 2 5 mg (has no administration in time range)   albuterol (PROVENTIL HFA,VENTOLIN HFA) inhaler 2 puff (has no administration in time range)   colchicine (COLCRYS) tablet 0 6 mg (has no administration in time range)   cyclobenzaprine (FLEXERIL) tablet 10 mg (has no administration in time range)   dexamethasone (DECADRON) tablet 2 mg (has no administration in time range)   mirtazapine (REMERON) tablet 30 mg (has no administration in time range)   morphine (MSIR) IR tablet 30 mg (has no administration in time range)   oxybutynin (DITROPAN-XL) 24 hr tablet 5 mg (has no administration in time range)   tiotropium (SPIRIVA) capsule for inhaler 18 mcg (has no administration in time range)   ondansetron (ZOFRAN) injection 4 mg (has no administration in time range)   ondansetron (ZOFRAN) injection 4 mg (has no administration in time range)   atorvastatin (LIPITOR) tablet 40 mg (has no administration in time range)   aspirin chewable tablet 81 mg (has no administration in time range)   metoprolol tartrate (LOPRESSOR) tablet 25 mg (has no administration in time range)   clopidogrel (PLAVIX) tablet 600 mg (has no administration in time range)     And   clopidogrel (PLAVIX) tablet 75 mg (has no administration in time range)   pantoprazole (PROTONIX) EC tablet 40 mg (has no administration in time range)   morphine (MSIR) IR tablet 30 mg (30 mg Oral Given 2/28/21 0842)   ondansetron (ZOFRAN-ODT) dispersible tablet 4 mg (4 mg Oral Given 2/28/21 0842)   acetaminophen (TYLENOL) tablet 650 mg (650 mg Oral Given 2/28/21 0952)   aspirin tablet 325 mg (325 mg Oral Given 2/28/21 0952)   furosemide (LASIX) injection 40 mg (40 mg Intravenous Given 2/28/21 0953)   heparin (porcine) injection 2,400 Units (2,400 Units Intravenous Given 2/28/21 1000)       Diagnostic Studies  Results Reviewed     Procedure Component Value Units Date/Time    APTT six (6) hours after Heparin bolus/drip initiation or dosing change [[id number]]  (Normal) Collected: 02/28/21 0829    Lab Status: Final result Specimen: Blood Updated: 02/28/21 1000     PTT 30 seconds     Protime-INR [[id number]]  (Normal) Collected: 02/28/21 0829    Lab Status: Final result Specimen: Blood Updated: 02/28/21 1000     Protime 14 4 seconds      INR 1 12    NT-BNP PRO [[id number]]  (Abnormal) Collected: 02/28/21 0827    Lab Status: Final result Specimen: Blood from Arm, Left Updated: 02/28/21 0858     NT-proBNP 3,047 pg/mL     Troponin I [[id number]]  (Abnormal) Collected: 02/28/21 0827    Lab Status: Final result Specimen: Blood from Arm, Left Updated: 02/28/21 0855     Troponin I 0 12 ng/mL     Comprehensive metabolic panel [[id number]]  (Abnormal) Collected: 02/28/21 0827    Lab Status: Final result Specimen: Blood from Arm, Left Updated: 02/28/21 0851     Sodium 136 mmol/L      Potassium 4 0 mmol/L      Chloride 103 mmol/L      CO2 28 mmol/L      ANION GAP 5 mmol/L      BUN 25 mg/dL      Creatinine 0 74 mg/dL      Glucose 108 mg/dL      Calcium 8 6 mg/dL      Corrected Calcium 9 8 mg/dL      AST 37 U/L      ALT 21 U/L      Alkaline Phosphatase 122 U/L      Total Protein 5 9 g/dL      Albumin 2 5 g/dL      Total Bilirubin 0 30 mg/dL      eGFR 84 ml/min/1 73sq m     Narrative:      [place] guidelines for Chronic Kidney Disease (CKD):     Stage 1 with normal or high GFR (GFR > 90 mL/min/1 73 square meters)    Stage 2 Mild CKD (GFR = 60-89 mL/min/1 73 square meters)    Stage 3A Moderate CKD (GFR = 45-59 mL/min/1 73 square meters)    Stage 3B Moderate CKD (GFR = 30-44 mL/min/1 73 square meters)    Stage 4 Severe CKD (GFR = 15-29 mL/min/1 73 square meters)    Stage 5 End Stage CKD (GFR <15 mL/min/1 73 square meters)  Note: GFR calculation is accurate only with a steady state creatinine    CBC and differential [[id number]]  (Abnormal) Collected: 02/28/21 0827    Lab Status: Final result Specimen: Blood from Arm, Left Updated: 02/28/21 0833     WBC 12 03 Thousand/uL      RBC 3 59 Million/uL      Hemoglobin 11 2 g/dL      Hematocrit 35 3 %      MCV 98 fL      MCH 31 2 pg      MCHC 31 7 g/dL      RDW 17 9 %      MPV 8 6 fL      Platelets 784 Thousands/uL      nRBC 0 /100 WBCs      Neutrophils Relative 87 %      Immat GRANS % 0 %      Lymphocytes Relative 4 %      Monocytes Relative 9 %      Eosinophils Relative 0 %      Basophils Relative 0 %      Neutrophils Absolute 10 39 Thousands/µL      Immature Grans Absolute 0 04 Thousand/uL      Lymphocytes Absolute 0 52 Thousands/µL      Monocytes Absolute 1 03 Thousand/µL      Eosinophils Absolute 0 02 Thousand/µL      Basophils Absolute 0 03 Thousands/µL                  XR chest 1 view portable   Final Result by Monique Bush MD (02/28 6931) Stable opacities throughout the left hemithorax with underlying diffuse pleural thickening  Increased interstitial lung markings concerning for superimposed edema versus infection  Chronic hyperinflation right lung  Right IJ catheter  Workstation performed: [id number]                    Procedures  ECG 12 Lead Documentation Only    Date/Time: 2/28/2021 8:29 AM  Performed by: Lacho Torre DO  Authorized by: Lacho Torre DO     Indications / Diagnosis:  Shortness of breath  ECG reviewed by me, the ED Provider: yes    Patient location:  ED  Previous ECG:     Previous ECG:  Compared to current    Comparison ECG info:  New nonspecific anterior T-wave abnormalities when compared to previous EKG from 2/16/2021  Interpretation:     Interpretation: normal    Rate:     ECG rate:  108    ECG rate assessment: tachycardic    Rhythm:     Rhythm: sinus tachycardia    Ectopy:     Ectopy: none    QRS:     QRS axis:  Normal    QRS intervals:  Normal  Conduction:     Conduction: normal    ST segments:     ST segments:  Non-specific    Elevation:  V2 and V3  T waves:     T waves: normal               ED Course  ED Course as of Feb 28 1142   Sun Feb 28, 2021   0902 Troponin noted to be elevated when compared to previous  I also note no elevation in the BNP  I will discuss with Cardiology   Troponin I(!): 0 12   0916 D/W Dr Deric Carrasco from Cards who recommends diuresis for heart failure and discussion with patient regarding NSTEMI and level of treatment that she would want given her advanced lung cancer  3520 I had a discussion with the patient regarding the diagnosis of NSTEMI and treatment options including cardiac catheterization verses medical therapy  Given the patient's advanced lung cancer; the patient opted for conservative medical management rather than cardiac catheterization and transferred Sudhir      Heparin drip, Lasix, aspirin,    ordered                                SBIRT 20yo+      Most Recent Value   SBIRT (22 yo +)   In order to provide better care to our patients, we are screening all of our patients for alcohol and drug use  Would it be okay to ask you these screening questions? No Filed at: 02/28/2021 1313                    MDM  Number of Diagnoses or Management Options  CHF (congestive heart failure) ([place] [address] ):   Lung cancer [facility]):   NSTEMI (non-ST elevated myocardial infarction) [facility]):   Diagnosis management comments: Differential diagnosis:  Worsening lung cancer, pleural effusion, congestive heart failure, myocardial infarction, anemia, electrolyte disturbance, dehydration, other  Plan is for EKG, chest x-ray, laboratories and continued oxygen support  Diagnostics reviewed, discussed with Cardiology and Medicine  The plan is for admission       Amount and/or Complexity of Data Reviewed  Clinical lab tests: reviewed  Tests in the radiology section of CPT®: reviewed  Decide to obtain previous medical records or to obtain history from someone other than the patient: yes  Review and summarize past medical records: yes ([facility]-On[facility] and pulmonary notes reviewed    I note the most recent note from Dr Bryan Russell regarding prognosis of lung cancer)  Discuss the patient with other providers: yes  Independent visualization of images, tracings, or specimens: yes        Disposition  Final diagnoses:   NSTEMI (non-ST elevated myocardial infarction) ([place] [address] )   Lung cancer ([place] [address] )   CHF (congestive heart failure) ([place] [address] )     Time reflects when diagnosis was documented in both MDM as applicable and the Disposition within this note     Time User Action Codes Description Comment    2/28/2021  9:26 AM Isabel Hawley Add [I21 4] NSTEMI (non-ST elevated myocardial infarction) ([place] [address] )     2/28/2021  9:26 AM Isabel Hawley Add [C34 90] Lung cancer ([place] [address] )     2/28/2021  9:26 AM Gala Imam B Add [I50 9] CHF (congestive heart failure) ([facility] )     2/28/2021  9:27 AM Gala Imam B Add [E87 6] Hypokalemia 2/28/2021  9:44 AM Quan Roberts Remove [E87 6] Hypokalemia       ED Disposition     ED Disposition Condition Date/Time Comment    Admit Stable Sun Feb 28, 2021  9:40 AM Case was discussed with tiara and the patient's admission status was agreed to be Admission Status: inpatient status to the service of Dr Angela Nunez   Follow-up Information    None         Current Discharge Medication List      CONTINUE these medications which have NOT CHANGED    Details   acetaminophen (TYLENOL) 500 mg tablet Take 2 tablets (1,000 mg total) by mouth every 8 (eight) hours    Associated Diagnoses: Cancer associated pain      albuterol (2 5 mg/3 mL) 0 083 % nebulizer solution Take 1 vial (2 5 mg total) by nebulization every 6 (six) hours as needed for wheezing or shortness of breath  Qty: 180 vial, Refills: 5    Associated Diagnoses: Malignant neoplasm of lower lobe of left lung (HCC)      albuterol (ProAir HFA) 90 mcg/act inhaler Inhale 2 puffs every 6 (six) hours as needed for wheezing or shortness of breath  Qty: 8 5 g, Refills: 6    Comments: Substitution to a formulary equivalent within the same pharmaceutical class is authorized    Associated Diagnoses: Shortness of breath      B Complex-Biotin-FA (TH VITAMIN B 50/B-COMPLEX) TABS Take by mouth daily      Capsaicin 0 025 % GEL Apply topically 3 (three) times a day as needed       cholecalciferol (VITAMIN D3) 1,000 units tablet Take 7,000 Units by mouth daily      COLCHICINE PO Take 0 5 mg by mouth 3 (three) times a day as needed       cyclobenzaprine (FLEXERIL) 10 mg tablet Take 10 mg by mouth 3 (three) times a day as needed for muscle spasms      dexamethasone (DECADRON) 2 mg tablet Take 1 tablet (2 mg total) by mouth 2 (two) times a day with meals  Qty: 60 tablet, Refills: 0    Associated Diagnoses: Loss of appetite      Echinacea 400 MG CAPS Take by mouth 3 (three) times a day as needed      fexofenadine-pseudoephedrine (ALLEGRA-D)  MG per tablet Take 1 tablet by mouth 2 (two) times a day as needed for allergies      GINKGO BILOBA PLUS PO Take by mouth as needed 2 capsules every 2 mornings       Hyaluronic Acid 20-60 MG CAPS Take by mouth 3 (three) times a day       hydrocortisone 2 5 % cream Apply topically 4 (four) times a day as needed for irritation or rash  Qty: 30 g, Refills: 0    Associated Diagnoses: Pruritus of skin      L-Lysine 500 MG CAPS Take by mouth 3 (three) times a day as needed      Lactobacillus (PROBIOTIC ACIDOPHILUS PO) Take 1 capsule by mouth daily      lidocaine-prilocaine (EMLA) cream Apply topically as needed for mild pain 30-60 min prior to port access  Qty: 30 g, Refills: 0    Associated Diagnoses: Port-A-Cath in place      loperamide (IMODIUM) 2 mg capsule Take 2 mg by mouth daily       mirtazapine (REMERON) 30 mg tablet Take 1 tablet (30 mg total) by mouth daily at bedtime  Qty: 30 tablet, Refills: 1    Associated Diagnoses: Weight loss; Small cell lung cancer (HCC)      morphine (MSIR) 30 MG tablet Take 1 tablet (30 mg total) by mouth every 4 (four) hours as needed for severe painMax Daily Amount: 180 mg  Qty: 45 tablet, Refills: 0    Associated Diagnoses: Small cell lung cancer ([address] ); Cancer associated pain      NON FORMULARY Take 37 mg by mouth daily Eco Thyro 37 - reported by pt      nystatin (MYCOSTATIN) 500,000 units/5 mL suspension Apply 5 mL (500,000 Units total) to the mouth or throat 4 (four) times a day  Qty: 500 mL, Refills: 0    Associated Diagnoses:  Thrush      omeprazole (PriLOSEC) 20 mg delayed release capsule Take 1 capsule (20 mg total) by mouth daily  Qty: 90 capsule, Refills: 3    Associated Diagnoses: Cancer associated pain      ondansetron (ZOFRAN) 4 mg tablet Take 1 tablet (4 mg total) by mouth every 8 (eight) hours as needed for nausea or vomiting  Qty: 60 tablet, Refills: 1    Associated Diagnoses: Non-intractable vomiting with nausea, unspecified vomiting type      other medication, see sig, Take 3 capsules by mouth daily [id number]      oxybutynin (DITROPAN-XL) 10 MG 24 hr tablet TAKE 1 TABLET BY MOUTH ONCE DAILY FOR INCONTINENCE      prochlorperazine (COMPAZINE) 10 mg tablet Take 10 mg by mouth every 6 (six) hours as needed for nausea or vomiting (migraines)      S-Adenosylmethionine (RADHA-E) 200 MG TABS Take by mouth 3 (three) times a day      senna-docusate sodium (SENOKOT-S) 8 6-50 mg per tablet Take 1 tablet by mouth daily  Qty: 60 tablet, Refills: 0    Associated Diagnoses: Drug induced constipation      sodium chloride (OCEAN) 0 65 % nasal spray 1 spray into each nostril as needed for congestion or rhinitis  Qty: 30 mL, Refills: 0    Associated Diagnoses: Nasal congestion      tolterodine (DETROL LA) 2 mg 24 hr capsule       Tragacanth (ASTRAGALUS ROOT) POWD 470 mg 3 (three) times a day as needed      umeclidinium-vilanterol (ANORO ELLIPTA) 62 5-25 MCG/INH inhaler Inhale 1 puff daily  Qty: 1 Inhaler, Refills: 5    Associated Diagnoses: Pulmonary emphysema, unspecified emphysema type ([facility] [address] )           No discharge procedures on file      PDMP Review       Value Time User    PDMP Reviewed  Yes 2/28/2021 11:25 AM Jenna Solano MD          ED Provider  Electronically Signed by           Klarissa Dawn DO  02/28/21 9459

## 2021-02-28 NOTE — PROGRESS NOTES
I discussed the case with Dr Dann Stewart:  Patient likely had type 2 myocardial infarction due to compressing small cell lung cancer on the coronary arteries

## 2021-02-28 NOTE — ASSESSMENT & PLAN NOTE
Patient has chronic hypoxic respiratory failure due to COPD and left-sided advanced stage small cell lung cancer  She is on oxygen via nasal cannula at 6 L/ minute for last 2 weeks    Oxygen saturation currently 96%, will continue the same rate

## 2021-02-28 NOTE — ASSESSMENT & PLAN NOTE
Malnutrition Findings:        patient has temporal muscle wasting and atrophy of muscles of the extremities    BMI Findings:      15 37  Body mass index is 15 37 kg/m²     Will consult dietary

## 2021-02-28 NOTE — ASSESSMENT & PLAN NOTE
Patient has continuous narcotic dependency due to left-sided small cell lung cancer  I reviewed [place] PDMP    Will continue morphine sulfate

## 2021-02-28 NOTE — ASSESSMENT & PLAN NOTE
Patient has chronic COPD, I hear no signs of expiratory wheezing    She does not have COPD exacerbation    Continue albuterol and Spiriva

## 2021-02-28 NOTE — CONSULTS
Consultation - Cardiology   Liset Frye 78 y o  female MRN: [id number]  Unit/Bed#: -01 Encounter: [id number]    Inpatient consult to Cardiology  Consult performed by: Edilberto Norton MD  Consult ordered by: Kami Calderon MD          History of Present Illness   Physician Requesting Consult: Kami Calderon MD  Reason for Consult / Principal Problem: Chest pain    HPI: Liset Frye is a 78y o  year old female with COPD, small cell lung CA unresponsive to therapy, who presented to [facility] with chest pain  Yesterday, while at rest, she had an episode of sharp left anterior chest pain that lasted for 10 minutes  She has also had worsening of her dyspnea over the last 2 weeks  In ED, ECG demonstrated anterior TW inversion, and had minimal troponin elevation  She states that she has worse chest pain when hypoxemic, as her SAO2 was in the 70s on 5L O2 NC at home  Review of Systems   Constitution: Positive for malaise/fatigue  Negative for chills, diaphoresis and fever  HENT: Negative  Eyes: Negative  Cardiovascular: Positive for chest pain  Negative for dyspnea on exertion, leg swelling and palpitations  Respiratory: Positive for shortness of breath  Negative for cough, snoring and wheezing  Endocrine: Negative  Hematologic/Lymphatic: Negative  Skin: Negative for rash  Musculoskeletal: Negative  Gastrointestinal: Negative for abdominal pain, constipation and diarrhea  Genitourinary: Negative for bladder incontinence, dysuria and frequency  Neurological: Negative for dizziness and light-headedness  Psychiatric/Behavioral: Negative  All other systems reviewed and are negative      Historical Information   Past Medical History:   Diagnosis Date    Chronic fatigue syndrome with fibromyalgia     COPD [facility]chronic obstructive pulmonary disease) ([facility] [address] )     DDD (degenerative disc disease), cervical     Emphysema of lung (HCC)     Hx of migraine headaches     Hypothyroidism     Lung cancer [facility])      Past Surgical History:   Procedure Laterality Date    BARTHOLIN GLAND CYST EXCISION      CARPAL TUNNEL RELEASE Bilateral     COLONOSCOPY  07/09/2020     15 mm sessile polyp in the cecum removed by polypectomy  A 3 recall    IR PLEURAL EFFUSION LONG-TERM CATHETER PLACEMENT  11/13/2020    IR PLEURAL EFFUSION LONG-TERM CATHETER REMOVAL  12/21/2020    IR PORT PLACEMENT  12/11/2020    IR THORACENTESIS  11/3/2020    TONSILLECTOMY      TUBAL LIGATION      UPPER GASTROINTESTINAL ENDOSCOPY  07/09/2020     small hiatal hernia  Pathology negative       Social History     Substance and Sexual Activity   Alcohol Use Not Currently     Social History     Substance and Sexual Activity   Drug Use Not Currently     Social History     Tobacco Use   Smoking Status Former Smoker    Packs/day: 0 50    Years: 53 00    Pack years: 26 50    Types: Cigarettes    Start date: 1967   Smokeless Tobacco Never Used   Tobacco Comment    2 cigarettes daily     Family History: non-contributory    Meds/Allergies   Current Facility-Administered Medications   Medication Dose Route Frequency Provider Last Rate    acetaminophen  650 mg Oral Q6H PRN Alyson Goodson MD      albuterol  2 puff Inhalation Q6H PRN Alyson Goodson MD      albuterol  2 5 mg Nebulization Q6H PRN Alyson Goodson MD      [START ON 3/1/2021] aspirin  81 mg Oral Daily Alyson Goodson MD      atorvastatin  40 mg Oral QPM Alyson Goodson MD      [START ON 3/1/2021] clopidogrel  75 mg Oral Daily Alyson Goodson MD      colchicine  0 6 mg Oral BID PRN Alyson Goodson MD      cyclobenzaprine  10 mg Oral TID PRN Alyson Goodson MD      dexamethasone  2 mg Oral BID With Meals Alyson Goodson MD      heparin (porcine)  3-20 Units/kg/hr (Order-Specific) Intravenous Titrated Aylson Goodson MD 12 Units/kg/hr (02/28/21 1000)    heparin (porcine)  1,200 Units Intravenous Q1H PRN Alyson Goodson MD      heparin (porcine)  2,400 Units Intravenous Q1H PRN Rupali Byrd MD      metoprolol tartrate  25 mg Oral Q12H 1000 [facility] 12, MD      mirtazapine  30 mg Oral HS Rupali Byrd MD      morphine  30 mg Oral Q4H PRN Rupali Byrd MD      nicotine  14 mg Transdermal Daily Rupali Byrd MD      ondansetron  4 mg Intravenous Q6H PRN Rupali Byrd MD      ondansetron  4 mg Intravenous Q6H PRN Rupali Byrd MD      oxybutynin  5 mg Oral HS Rupali Byrd MD      pantoprazole  40 mg Oral Early Morning Rupali Byrd MD      tiotropium  18 mcg Inhalation Daily Rupali Byrd MD       heparin (porcine), 3-20 Units/kg/hr (Order-Specific), Last Rate: 12 Units/kg/hr (02/28/21 1000)      Medications Prior to Admission   Medication    acetaminophen (TYLENOL) 500 mg tablet    albuterol (2 5 mg/3 mL) 0 083 % nebulizer solution    albuterol (ProAir HFA) 90 mcg/act inhaler    B Complex-Biotin-FA (TH VITAMIN B 50/B-COMPLEX) TABS    Capsaicin 0 025 % GEL    cholecalciferol (VITAMIN D3) 1,000 units tablet    COLCHICINE PO    cyclobenzaprine (FLEXERIL) 10 mg tablet    dexamethasone (DECADRON) 2 mg tablet    Echinacea 400 MG CAPS    fexofenadine-pseudoephedrine (ALLEGRA-D)  MG per tablet    GINKGO BILOBA PLUS PO    Hyaluronic Acid 20-60 MG CAPS    hydrocortisone 2 5 % cream    L-Lysine 500 MG CAPS    Lactobacillus (PROBIOTIC ACIDOPHILUS PO)    lidocaine-prilocaine (EMLA) cream    loperamide (IMODIUM) 2 mg capsule    mirtazapine (REMERON) 30 mg tablet    morphine (MSIR) 30 MG tablet    NON FORMULARY    nystatin (MYCOSTATIN) 500,000 units/5 mL suspension    omeprazole (PriLOSEC) 20 mg delayed release capsule    ondansetron (ZOFRAN) 4 mg tablet    other medication, see sig,    oxybutynin (DITROPAN-XL) 10 MG 24 hr tablet    prochlorperazine (COMPAZINE) 10 mg tablet    S-Adenosylmethionine (RADHA-E) 200 MG TABS    senna-docusate sodium (SENOKOT-S) 8 6-50 mg per tablet    sodium chloride (OCEAN) 0 65 % nasal spray    tolterodine (DETROL LA) 2 mg 24 hr capsule    Tragacanth (ASTRAGALUS ROOT) POWD    umeclidinium-vilanterol (ANORO ELLIPTA) 62 5-25 MCG/INH inhaler       Allergies   Allergen Reactions    Clarithromycin     Codeine Other (See Comments)     lethergic    Other      PHOSPHATE    Oxycodone Other (See Comments)     lethergic    Trazodone Other (See Comments)     lethergic    Penicillins Rash       Objective   Vitals: Blood pressure 153/81, pulse 100, temperature (!) 96 6 °F (35 9 °C), resp  rate (!) 23, height 5' 6" (1 676 m), weight 43 2 kg (95 lb 3 2 oz), SpO2 96 %  , Body mass index is 15 37 kg/m² ,   Orthostatic Blood Pressures      Most Recent Value   Blood Pressure  153/81 filed at 02/28/2021 1029   Patient Position - Orthostatic VS  Sitting filed at 02/28/2021 4123        Systolic (84UZE), NGS:874 , Min:148 , SOT:875     Diastolic (31LGD), ILB:78, Min:80, Max:101    No intake or output data in the 24 hours ending 02/28/21 1451    Weight (last 2 days)     Date/Time   Weight    02/28/21 1056   43 2 (95 2)              Invasive Devices     Central Venous Catheter Line            Port A Cath 12/11/20 Right Chest 79 days          Peripheral Intravenous Line            Peripheral IV 02/28/21 Left Antecubital less than 1 day                  Physical Exam  Constitutional:       Appearance: She is well-developed  She is ill-appearing  HENT:      Head: Normocephalic and atraumatic  Neck:      Vascular: No JVD  Cardiovascular:      Rate and Rhythm: Normal rate and regular rhythm  Heart sounds: Normal heart sounds  No murmur  No friction rub  No gallop  Pulmonary:      Effort: Pulmonary effort is normal       Breath sounds: Decreased breath sounds present  No wheezing or rales  Abdominal:      General: Bowel sounds are normal  There is no distension  Palpations: Abdomen is soft  Tenderness: There is no abdominal tenderness  Skin:     General: Skin is warm and dry  Findings: No erythema or rash     Neurological: Mental Status: She is alert and oriented to person, place, and time  Deep Tendon Reflexes: Reflexes are normal and symmetric  Laboratory Results:  Results from last 7 days   Lab Units 21  1147 21  0827   TROPONIN I ng/mL 0 18* 0 12*       CBC with diff:   Results from last 7 days   Lab Units 21  0827 21  1305   WBC Thousand/uL 12 03* 11 27*   HEMOGLOBIN g/dL 11 2* 11 4*   HEMATOCRIT % 35 3 36 6   MCV fL 98 99*   PLATELETS Thousands/uL 329 422*   MCH pg 31 2 30 8   MCHC g/dL 31 7 31 1*   RDW % 17 9* 17 7*   MPV fL 8 6* 9 2   NRBC AUTO /100 WBCs 0 0         CMP:  Results from last 7 days   Lab Units 21  0827 21  1305   POTASSIUM mmol/L 4 0 4 2   CHLORIDE mmol/L 103 100   CO2 mmol/L 28 30   BUN mg/dL 25 21   CREATININE mg/dL 0 74 0 74   CALCIUM mg/dL 8 6 8 8   AST U/L 37 37   ALT U/L 21 18   ALK PHOS U/L 122* 121*   EGFR ml/min/1 73sq m 84 84         BMP:  Results from last 7 days   Lab Units 21  0827 21  1305   POTASSIUM mmol/L 4 0 4 2   CHLORIDE mmol/L 103 100   CO2 mmol/L 28 30   BUN mg/dL 25 21   CREATININE mg/dL 0 74 0 74   CALCIUM mg/dL 8 6 8 8       BNP:     Magnesium:       Coags:   Results from last 7 days   Lab Units 21  0829   PTT seconds 30   INR  1 12       TSH:       Lipid Profile:         Cardiac testing:   Results for orders placed during the hospital encounter of 20   Echo complete with contrast if indicated    Narrative [address] [place] [address]    Transthoracic Echocardiogram  2D, M-mode, Doppler, and Color Doppler    Study date:  21-Dec-2020    Patient: Lady Berger  MR number: [id number]  Account number: [de-identified]  : 1950  Age: 78 years  Gender: Female  Status: Inpatient  Location: [address] [place] [address]  Height: 65 in  Weight: 93 7 lb  BP: 174/ 79 mmHg    Indications: Dyspnea; SOB; Wheezing;  Tachypnea    Diagnoses: R06 00 - Dyspnea, unspecified, R06 02 - Shortness of breath, R06 2 - Wheezing    Sonographer:  VICENTE Alexander Ala  Primary Physician:  Gunner Zapien DO  Referring Physician:  Maryellen Billingsley MD  Group:  [facility]  Interpreting Physician:  Kamran Mcrae MD    SUMMARY    LEFT VENTRICLE:  Size was normal   Systolic function was normal  Ejection fraction was estimated to be 65 %  Wall thickness was normal   Doppler parameters were consistent with abnormal left ventricular relaxation (grade 1 diastolic dysfunction)  RIGHT VENTRICLE:  The ventricle was mildly dilated  Systolic function was normal     MITRAL VALVE:  There was trace regurgitation  TRICUSPID VALVE:  There was mild regurgitation  Pulmonary artery systolic pressure was moderately to markedly increased  The findings suggest moderate to severe pulmonary hypertension  IVC, HEPATIC VEINS:  The inferior vena cava was mildly dilated  Respirophasic changes were blunted (less than 50% variation)  PERICARDIUM:  A possible, trace pericardial effusion was identified posterior to the heart  There was a small left pleural effusion  COMPARISONS:  Comparison was made with the previous study of 03-Nov-2020  A left pleural effusion has decreased in size  HISTORY: PRIOR HISTORY: SOB; Smoker; Resp  failure with hypoxia; Emphysema; Lung cancer; Malnutrition; Fatigue; Weightloss; Pleural eff ; Elevated troponin; HTN; Neutropenia; CP; Edema; COPD; Migranes; Hypothyroid    PROCEDURE: The study was performed in the [address]  This was a routine study  The transthoracic approach was used  The study included complete 2D imaging, M-mode, complete spectral Doppler, and color Doppler  Echocardiographic  views were limited due to decreased penetration and lung interference  This was a technically difficult study  LEFT VENTRICLE: Size was normal  Systolic function was normal  Ejection fraction was estimated to be 65 %   There were no regional wall motion abnormalities  Wall thickness was normal  DOPPLER: Doppler parameters were consistent with  abnormal left ventricular relaxation (grade 1 diastolic dysfunction)  RIGHT VENTRICLE: The ventricle was mildly dilated  Systolic function was normal  Wall thickness was normal     LEFT ATRIUM: Size was normal     RIGHT ATRIUM: Size was normal     MITRAL VALVE: Valve structure was normal  There was normal leaflet separation  DOPPLER: The transmitral velocity was within the normal range  There was no evidence for stenosis  There was trace regurgitation  AORTIC VALVE: The valve was trileaflet  Leaflets exhibited normal thickness and normal cuspal separation  DOPPLER: Transaortic velocity was within the normal range  There was no evidence for stenosis  There was no regurgitation  TRICUSPID VALVE: The valve structure was normal  There was normal leaflet separation  DOPPLER: The transtricuspid velocity was within the normal range  There was no evidence for stenosis  There was mild regurgitation  Pulmonary artery  systolic pressure was moderately to markedly increased  Estimated peak PA pressure was 60 mmHg  The findings suggest moderate to severe pulmonary hypertension  PULMONIC VALVE: Leaflets exhibited normal thickness, no calcification, and normal cuspal separation  DOPPLER: The transpulmonic velocity was within the normal range  There was no regurgitation  PERICARDIUM: A possible, trace pericardial effusion was identified posterior to the heart  There was a small left pleural effusion  The pericardium was normal in appearance  AORTA: The root exhibited normal size  SYSTEMIC VEINS: IVC: The inferior vena cava was mildly dilated  Respirophasic changes were blunted (less than 50% variation)      SYSTEM MEASUREMENT TABLES    2D  %FS: 28 3 %  Ao Diam: 2 22 cm  EDV(Teich): 51 62 ml  EF(Teich): 55 68 %  ESV(Teich): 22 88 ml  IVSd: 0 85 cm  LA Area: 9 51 cm2  LA Diam: 2 24 cm  LVEDV MOD A4C: 72 43 ml  LVEF MOD A4C: 59 18 %  LVESV MOD A4C: 29 56 ml  LVIDd: 3 52 cm  LVIDs: 2 52 cm  LVLd A4C: 6 28 cm  LVLs A4C: 5 37 cm  LVPWd: 0 88 cm  RA Area: 8 11 cm2  RVIDd: 3 09 cm  SV MOD A4C: 42 86 ml  SV(Teich): 28 74 ml    CW  TR Vmax: 3 56 m/s  TR maxP 72 mmHg    PW  E' Sept: 0 06 m/s  E/E' Sept: 13 21  MV A Tez: 1 06 m/s  MV Dec [place]: 5 98 m/s2  MV DecT: 130 89 ms  MV E Tez: 0 78 m/s  MV E/A Ratio: 0 74  MV PHT: 37 96 ms  MVA By PHT: 5 8 cm2    [employer] Accredited Echocardiography Laboratory    Prepared and electronically signed by    Kojo Kirk MD  Signed 21-Dec-2020 14:30:25             Imaging:   Xr Chest 1 View Portable  Result Date: 2021  Impression: Stable opacities throughout the left hemithorax with underlying diffuse pleural thickening  Increased interstitial lung markings concerning for superimposed edema versus infection  Chronic hyperinflation right lung  Right IJ catheter  EKG reviewed personally: Lionel Quintero ant-lat TWI      Assessment:  Principal Problem:    Non-ST elevation myocardial infarction (NSTEMI) ([facility] [address] )  Active Problems:    Pulmonary emphysema (HCC)    Small cell lung cancer (HCC)    Severe protein-calorie malnutrition (HCC)    Narcotic dependency, continuous ([facility] [address] )    Chronic respiratory failure with hypoxia (HCC)      Impression:  1  Chest pain/ECG abnormalities/slightly elevated troponin - Type 2 MI from hypoxemia  No suggestion of acute coronary syndrome  When hypoxemic, develops ischemia resulting in chest pain and anterior ischemia on ECG  2  Stage IV SCLCA - unresponsive to treatment  Evidence of mass compression on heart  Plan:  1  Continue ASA and metoprolol  2  Continue treatment for maximization of oxygen saturation  3  No indication for further cardiac testing or treatment  4  Can D/C heparin gtt/clopidogrel  Will defer to primary service for transition to DVT prophylaxis  5  Will sign off for now  Please call with questions

## 2021-02-28 NOTE — ASSESSMENT & PLAN NOTE
Patient had episode of chest pain yesterday that was described as sharp feeling on the left side of her chest associated with sweating, shortness of breath, nausea lasted for several minutes then resolved on its  It occurred while she was at rest     She came to the hospital for evaluation of progressively worsening dyspnea which has been chronic over months  EKG in the ER shows sinus rhythm with new symmetric T-wave inversions in leads V1 through V4  Troponin was 0 12  Although patient's chest pain was atypical for angina but her new T-wave inversions and positive troponin suggests non ST elevation myocardial infarction  Patient has advanced stage small cell lung cancer, hospice was offered at last office visit with Oncology that patient declined  Patient declines cardiac catheterization but wishes to get medical treatment for probable NSTEMI  Continue aspirin, will start Plavix, continue IV heparin, aside patient on atorvastatin and the Toprol  I discussed code status with patient in detail:  She wants to be resuscitated up to 3 times and after that wants to die if not better!     Left a message on daughters voicemail to call me back

## 2021-02-28 NOTE — H&P
H&P- Karin Kwon 1950, 78 y o  female MRN: [id number]    Unit/Bed#: -01 Encounter: [id number]    Primary Care Provider: Anna Lomeli DO   Date and time admitted to hospital: 2/28/2021  8:20 AM        * Non-ST elevation myocardial infarction (NSTEMI) [facility])  Assessment & Plan  Patient had episode of chest pain yesterday that was described as sharp feeling on the left side of her chest associated with sweating, shortness of breath, nausea lasted for several minutes then resolved on its  It occurred while she was at rest     She came to the hospital for evaluation of progressively worsening dyspnea which has been chronic over months  EKG in the ER shows sinus rhythm with new symmetric T-wave inversions in leads V1 through V4  Troponin was 0 12  Although patient's chest pain was atypical for angina but her new T-wave inversions and positive troponin suggests non ST elevation myocardial infarction  Patient has advanced stage small cell lung cancer, hospice was offered at last office visit with Oncology that patient declined  Patient declines cardiac catheterization but wishes to get medical treatment for probable NSTEMI  Continue aspirin, will start Plavix, continue IV heparin, aside patient on atorvastatin and the Toprol  I discussed code status with patient in detail:  She wants to be resuscitated up to 3 times and after that wants to die if not better! Left a message on daughters voicemail to call me back    Small cell lung cancer [facility])  Assessment & Plan  Patient has advanced stage small cell lung cancer, she was on chemotherapy  Hospice was offered her last office visit the patient declined  Chest x-ray findings are probably reflective of small cell lung cancer  Her chest pain was more suggestive of that due to small cell lung cancer to me    Patient denies fevers, chills, purulent sputum production      Will check procalcitonin level    Pulmonary emphysema [facility])  Assessment & Plan  Patient has chronic COPD, I hear no signs of expiratory wheezing  She does not have COPD exacerbation    Continue albuterol and Spiriva    Chronic respiratory failure with hypoxia [facility])  Assessment & Plan  Patient has chronic hypoxic respiratory failure due to COPD and left-sided advanced stage small cell lung cancer  She is on oxygen via nasal cannula at 6 L/ minute for last 2 weeks  Oxygen saturation currently 96%, will continue the same rate      Narcotic dependency, continuous [facility])  Assessment & Plan  Patient has continuous narcotic dependency due to left-sided small cell lung cancer  I reviewed [place] PDMP  Will continue morphine sulfate    Severe protein-calorie malnutrition (HCC)  Assessment & Plan  Malnutrition Findings:        patient has temporal muscle wasting and atrophy of muscles of the extremities    BMI Findings:      15 37  Body mass index is 15 37 kg/m²  Will consult dietary      VTE Prophylaxis: ordered    Code Status: as above    Anticipated Length of Stay: as above    Justification for Hospital Stay: see assessment and plan        Chief Complaint:   Worsening shortness of breath    History of Present Illness:    Chantale Osorio is a 78 y o  female who presents with above chief compaint  Patient has been having worsening shortness of breath over last months  The last 2 weeks her oxygen that she is chronically on had to be increased to 6 liters/minute  She gets short of breath with the slightest exertion such as packing boxes at home and needs to rest for up to 60 minutes to recover to continue her activities  This was the main reason why patient came to the ER today  She has chronic COPD and small cell lung cancer  She was on chemotherapy for small-cell lung cancer and she was told that she has terminal illness which is unresponsive to therapy  She does have chronic numbness on the left anterior chest wall that she attributes to the cancer      She denies fevers, chills, increasing sputum production, feeling sputum or hemoptysis  She denies any pleurisy  Yesterday while at rest she had an episode in the afternoon that lasted for about 10 minutes when she had a sharp left anterior chest pain without radiation that abated by itself  It was not accompanied by sweating, palpitations, nausea abdominal pain, vomiting  She has anorexia and chronic lower extremity edema  Denies signs of bleeding  She has intermittent dysphagia to solids and liquids and wants to be on a regular diet and does not wish GI evaluation  Review of Systems:    Review of Systems   Constitutional: Positive for appetite change ( anorexia) and unexpected weight change ( weight loss)  Negative for fever  HENT: Negative for congestion  Eyes: Negative for visual disturbance  Respiratory: Positive for shortness of breath ( with slight exertion, chronic, progressive)  Negative for cough  Cardiovascular: Positive for chest pain and leg swelling  Negative for palpitations  Gastrointestinal: Negative for abdominal pain, blood in stool and diarrhea  Infrequent dysphagia to solids  Patient does not wish any further evaluation by GI! Endocrine: Negative for polydipsia  Genitourinary: Negative for dysuria, hematuria and vaginal bleeding  Musculoskeletal: Positive for neck pain ( chronic neck pain for which patient takes colchicine and Flexeril p r n )  Negative for myalgias  Skin: Negative for rash  Neurological: Positive for numbness ( left chest wall numbness, chronic, attributed to small cell lung cancer)  Negative for weakness and headaches  Hematological: Negative for adenopathy  Psychiatric/Behavioral: Negative for dysphoric mood  All other systems reviewed and are negative        Past Medical and Surgical History:     Past Medical History:   Diagnosis Date    Chronic fatigue syndrome with fibromyalgia     [facility] [facility]  DDD (degenerative disc disease), cervical     [facility] lung [facility] [facility]     Hx of migraine headaches     Hypothyroidism     Lung cancer ([facility] )        Past Surgical History:   Procedure Laterality Date    BARTHOLIN GLAND CYST EXCISION      CARPAL TUNNEL RELEASE Bilateral     COLONOSCOPY  2020     15 mm sessile polyp in the cecum removed by polypectomy  A 3 recall    IR PLEURAL EFFUSION LONG-TERM CATHETER PLACEMENT  2020    IR PLEURAL EFFUSION LONG-TERM CATHETER REMOVAL  2020    IR PORT PLACEMENT  2020    IR THORACENTESIS  11/3/2020    TONSILLECTOMY      TUBAL LIGATION      UPPER GASTROINTESTINAL ENDOSCOPY  2020     small hiatal hernia  Pathology negative  Meds/Allergies:    Prior to Admission Medications   Prescriptions Last Dose Informant Patient Reported? Taking?    B Complex-Biotin-FA ( VITAMIN B 50/B-COMPLEX) TABS  Self Yes No   Sig: Take by mouth daily   COLCHICINE PO  Self Yes No   Sig: Take 0 5 mg by mouth 3 (three) times a day as needed    Capsaicin 0 025 % GEL  Self Yes No   Sig: Apply topically 3 (three) times a day as needed    Echinacea 400 MG CAPS  Self Yes No   Sig: Take by mouth 3 (three) times a day as needed   GINKGO BILOBA PLUS PO  Self Yes No   Sig: Take by mouth as needed 2 capsules every 2 mornings    Hyaluronic Acid 20-60 MG CAPS  Self Yes No   Sig: Take by mouth 3 (three) times a day    L-Lysine 500 MG CAPS  Self Yes No   Sig: Take by mouth 3 (three) times a day as needed   Lactobacillus (PROBIOTIC ACIDOPHILUS PO)  Self Yes No   Sig: Take 1 capsule by mouth daily   NON FORMULARY  Self Yes No   Sig: Take 37 mg by mouth daily Eco Thyro 37 - reported by pt   S-Adenosylmethionine (RADHA-E) 200 MG TABS  Self Yes No   Sig: Take by mouth 3 (three) times a day   Tragacanth (ASTRAGALUS ROOT) POWD  Self Yes No   Si mg 3 (three) times a day as needed   acetaminophen (TYLENOL) 500 mg tablet  Self No No   Sig: Take 2 tablets (1,000 mg total) by mouth every 8 (eight) hours   albuterol (2 5 mg/3 mL) 0 083 % nebulizer solution   No No   Sig: Take 1 vial (2 5 mg total) by nebulization every 6 (six) hours as needed for wheezing or shortness of breath   albuterol (ProAir HFA) 90 mcg/act inhaler  Self No No   Sig: Inhale 2 puffs every 6 (six) hours as needed for wheezing or shortness of breath   cholecalciferol (VITAMIN D3) 1,000 units tablet  Self Yes No   Sig: Take 7,000 Units by mouth daily   cyclobenzaprine (FLEXERIL) 10 mg tablet  Self Yes No   Sig: Take 10 mg by mouth 3 (three) times a day as needed for muscle spasms   dexamethasone (DECADRON) 2 mg tablet   No No   Sig: Take 1 tablet (2 mg total) by mouth 2 (two) times a day with meals   fexofenadine-pseudoephedrine (ALLEGRA-D)  MG per tablet  Self Yes No   Sig: Take 1 tablet by mouth 2 (two) times a day as needed for allergies   hydrocortisone 2 5 % cream  Self No No   Sig: Apply topically 4 (four) times a day as needed for irritation or rash   lidocaine-prilocaine (EMLA) cream  Self No No   Sig: Apply topically as needed for mild pain 30-60 min prior to port access     loperamide (IMODIUM) 2 mg capsule  Self Yes No   Sig: Take 2 mg by mouth daily    mirtazapine (REMERON) 30 mg tablet  Self No No   Sig: Take 1 tablet (30 mg total) by mouth daily at bedtime   morphine (MSIR) 30 MG tablet  Self No No   Sig: Take 1 tablet (30 mg total) by mouth every 4 (four) hours as needed for severe painMax Daily Amount: 180 mg   nystatin (MYCOSTATIN) 500,000 units/5 mL suspension  Self No No   Sig: Apply 5 mL (500,000 Units total) to the mouth or throat 4 (four) times a day   omeprazole (PriLOSEC) 20 mg delayed release capsule  Self No No   Sig: Take 1 capsule (20 mg total) by mouth daily   ondansetron (ZOFRAN) 4 mg tablet  Self No No   Sig: Take 1 tablet (4 mg total) by mouth every 8 (eight) hours as needed for nausea or vomiting   other medication, see sig,  Self Yes No   Sig: Take 3 capsules by mouth daily [id number] oxybutynin (DITROPAN-XL) 10 MG 24 hr tablet  Self Yes No   Sig: TAKE 1 TABLET BY MOUTH ONCE DAILY FOR INCONTINENCE   prochlorperazine (COMPAZINE) 10 mg tablet  Self Yes No   Sig: Take 10 mg by mouth every 6 (six) hours as needed for nausea or vomiting (migraines)   senna-docusate sodium (SENOKOT-S) 8 6-50 mg per tablet  Self No No   Sig: Take 1 tablet by mouth daily   sodium chloride (OCEAN) 0 65 % nasal spray  Self No No   Si spray into each nostril as needed for congestion or rhinitis   tolterodine (DETROL LA) 2 mg 24 hr capsule  Self Yes No   umeclidinium-vilanterol (ANORO ELLIPTA) 62 5-25 MCG/INH inhaler  Self No No   Sig: Inhale 1 puff daily      Facility-Administered Medications: None       Allergies:    Allergies   Allergen Reactions    Clarithromycin     Codeine Other (See Comments)     lethergic    Other      PHOSPHATE    Oxycodone Other (See Comments)     lethergic    Trazodone Other (See Comments)     lethergic    Penicillins Rash       Social History:     Social History     Substance and Sexual Activity   Alcohol Use Not Currently     Social History     Tobacco Use   Smoking Status Former Smoker    Packs/day: 0 50    Years: 53 00    Pack years: 26 50    Types: Cigarettes    Start date:    Smokeless Tobacco Never Used   Tobacco Comment    2 cigarettes daily     Social History     Substance and Sexual Activity   Drug Use Not Currently       Family History:    Family History   Problem Relation Age of Onset    Colon polyps Mother     Heart disease Mother     Colon cancer Mother     Heart disease Father     Lymphoma Father        Physical Exam:     Vitals:   Blood Pressure: 153/81 (21 1029)  Pulse: 100 (21 0945)  Temperature: (!) 96 6 °F (35 9 °C) (21 1030)  Temp Source: Temporal (21 0824)  Respirations: (!) 23 (21 1029)  Height: 5' 6" (167 6 cm) (21 1056)  Weight - Scale: 43 2 kg (95 lb 3 2 oz) (21 1056)  SpO2: 96 % (21 1030)    Physical Exam  Constitutional:       Comments: Cachectic   HENT:      Head: Normocephalic  Mouth/Throat:      Mouth: Mucous membranes are moist    Eyes:      Conjunctiva/sclera: Conjunctivae normal    Neck:      Musculoskeletal: Neck supple  Cardiovascular:      Rate and Rhythm: Normal rate and regular rhythm  Heart sounds: No murmur  Pulmonary:      Effort: No respiratory distress  Comments: Patient was in no respiratory distress on oxygen via nasal cannula at 6 L/min  She had decreased breath sounds on the right side  Scant inspiratory crackles were heard on the left side  I heard no expiratory wheezing  Abdominal:      General: Bowel sounds are normal       Palpations: Abdomen is soft  Tenderness: There is no abdominal tenderness  Musculoskeletal:         General: No tenderness  Skin:     General: Skin is warm and dry  Comments: Patient had trace edema bilaterally   Neurological:      Mental Status: She is oriented to person, place, and time  Cranial Nerves: No cranial nerve deficit  Comments: Temporal muscle atrophy, diffuse muscle atrophy is present    Patient was moving all extremities on command, speech was normal   Psychiatric:         Mood and Affect: Mood normal              Additional Data:     Lab Results: I personally reviewed them    Results from last 7 days   Lab Units 02/28/21  0827   WBC Thousand/uL 12 03*   HEMOGLOBIN g/dL 11 2*   HEMATOCRIT % 35 3   PLATELETS Thousands/uL 329   NEUTROS PCT % 87*   LYMPHS PCT % 4*   MONOS PCT % 9   EOS PCT % 0     Results from last 7 days   Lab Units 02/28/21  0827   POTASSIUM mmol/L 4 0   CHLORIDE mmol/L 103   CO2 mmol/L 28   BUN mg/dL 25   CREATININE mg/dL 0 74   CALCIUM mg/dL 8 6   ALK PHOS U/L 122*   ALT U/L 21   AST U/L 37     Results from last 7 days   Lab Units 02/28/21  0829   INR  1 12       Imaging: I personally reviewed them    XR chest 1 view portable   Final Result by Charles Griffiths MD (02/28 0376) Stable opacities throughout the left hemithorax with underlying diffuse pleural thickening  Increased interstitial lung markings concerning for superimposed edema versus infection  Chronic hyperinflation right lung  Right IJ catheter  Workstation performed: [id number]             EKG : I personally reviewed      Emma Avendano MD    ** Please Note: This note has been constructed using a voice recognition system   **

## 2021-03-01 ENCOUNTER — APPOINTMENT (INPATIENT)
Dept: CT IMAGING | Facility: HOSPITAL | Age: 71
DRG: 180 | End: 2021-03-01
Payer: MEDICARE

## 2021-03-01 ENCOUNTER — HOSPITAL ENCOUNTER (OUTPATIENT)
Dept: INFUSION CENTER | Facility: HOSPITAL | Age: 71
Discharge: HOME | End: 2021-03-01
Attending: INTERNAL MEDICINE

## 2021-03-01 PROBLEM — F41.9 ANXIETY: Status: ACTIVE | Noted: 2021-03-01

## 2021-03-01 PROBLEM — R65.10 SIRS (SYSTEMIC INFLAMMATORY RESPONSE SYNDROME) (HCC): Status: ACTIVE | Noted: 2021-03-01

## 2021-03-01 PROBLEM — J96.21 ACUTE ON CHRONIC RESPIRATORY FAILURE WITH HYPOXEMIA (HCC): Status: ACTIVE | Noted: 2021-02-28

## 2021-03-01 LAB
ANION GAP SERPL CALCULATED.3IONS-SCNC: 6 MMOL/L (ref 4–13)
ATRIAL RATE: 99 BPM
ATRIAL RATE: 99 BPM
BUN SERPL-MCNC: 25 MG/DL (ref 5–25)
CALCIUM SERPL-MCNC: 8.1 MG/DL (ref 8.3–10.1)
CHLORIDE SERPL-SCNC: 102 MMOL/L (ref 100–108)
CO2 SERPL-SCNC: 29 MMOL/L (ref 21–32)
CREAT SERPL-MCNC: 0.78 MG/DL (ref 0.6–1.3)
D DIMER PPP FEU-MCNC: 1.51 UG/ML FEU
ERYTHROCYTE [DISTWIDTH] IN BLOOD BY AUTOMATED COUNT: 17.8 % (ref 11.6–15.1)
GFR SERPL CREATININE-BSD FRML MDRD: 78 ML/MIN/1.73SQ M
GLUCOSE SERPL-MCNC: 92 MG/DL (ref 65–140)
HCT VFR BLD AUTO: 34.9 % (ref 34.8–46.1)
HGB BLD-MCNC: 10.8 G/DL (ref 11.5–15.4)
MCH RBC QN AUTO: 30.3 PG (ref 26.8–34.3)
MCHC RBC AUTO-ENTMCNC: 30.9 G/DL (ref 31.4–37.4)
MCV RBC AUTO: 98 FL (ref 82–98)
P AXIS: 73 DEGREES
P AXIS: 75 DEGREES
PLATELET # BLD AUTO: 310 THOUSANDS/UL (ref 149–390)
PMV BLD AUTO: 8.8 FL (ref 8.9–12.7)
POTASSIUM SERPL-SCNC: 3.8 MMOL/L (ref 3.5–5.3)
PR INTERVAL: 106 MS
PR INTERVAL: 110 MS
PROCALCITONIN SERPL-MCNC: 0.22 NG/ML
QRS AXIS: 63 DEGREES
QRS AXIS: 64 DEGREES
QRSD INTERVAL: 80 MS
QRSD INTERVAL: 82 MS
QT INTERVAL: 394 MS
QT INTERVAL: 396 MS
QTC INTERVAL: 505 MS
QTC INTERVAL: 508 MS
RBC # BLD AUTO: 3.57 MILLION/UL (ref 3.81–5.12)
SODIUM SERPL-SCNC: 137 MMOL/L (ref 136–145)
T WAVE AXIS: 124 DEGREES
T WAVE AXIS: 125 DEGREES
VENTRICULAR RATE: 99 BPM
VENTRICULAR RATE: 99 BPM
WBC # BLD AUTO: 7.42 THOUSAND/UL (ref 4.31–10.16)

## 2021-03-01 PROCEDURE — 71275 CT ANGIOGRAPHY CHEST: CPT

## 2021-03-01 PROCEDURE — 85027 COMPLETE CBC AUTOMATED: CPT | Performed by: INTERNAL MEDICINE

## 2021-03-01 PROCEDURE — 85379 FIBRIN DEGRADATION QUANT: CPT | Performed by: INTERNAL MEDICINE

## 2021-03-01 PROCEDURE — 84145 PROCALCITONIN (PCT): CPT | Performed by: INTERNAL MEDICINE

## 2021-03-01 PROCEDURE — 97163 PT EVAL HIGH COMPLEX 45 MIN: CPT

## 2021-03-01 PROCEDURE — 93005 ELECTROCARDIOGRAM TRACING: CPT

## 2021-03-01 PROCEDURE — 93010 ELECTROCARDIOGRAM REPORT: CPT | Performed by: INTERNAL MEDICINE

## 2021-03-01 PROCEDURE — G1004 CDSM NDSC: HCPCS

## 2021-03-01 PROCEDURE — 97167 OT EVAL HIGH COMPLEX 60 MIN: CPT

## 2021-03-01 PROCEDURE — 97116 GAIT TRAINING THERAPY: CPT

## 2021-03-01 PROCEDURE — 80048 BASIC METABOLIC PNL TOTAL CA: CPT | Performed by: INTERNAL MEDICINE

## 2021-03-01 PROCEDURE — 99232 SBSQ HOSP IP/OBS MODERATE 35: CPT | Performed by: INTERNAL MEDICINE

## 2021-03-01 RX ORDER — ECHINACEA PURPUREA EXTRACT 125 MG
2 TABLET ORAL 3 TIMES DAILY PRN
Status: DISCONTINUED | OUTPATIENT
Start: 2021-03-01 | End: 2021-03-03 | Stop reason: HOSPADM

## 2021-03-01 RX ORDER — LORAZEPAM 0.5 MG/1
0.25 TABLET ORAL EVERY 6 HOURS PRN
Status: DISCONTINUED | OUTPATIENT
Start: 2021-03-01 | End: 2021-03-03 | Stop reason: HOSPADM

## 2021-03-01 RX ADMIN — ATORVASTATIN CALCIUM 40 MG: 40 TABLET, FILM COATED ORAL at 18:21

## 2021-03-01 RX ADMIN — TIOTROPIUM BROMIDE 18 MCG: 18 CAPSULE ORAL; RESPIRATORY (INHALATION) at 10:12

## 2021-03-01 RX ADMIN — IOHEXOL 85 ML: 350 INJECTION, SOLUTION INTRAVENOUS at 16:08

## 2021-03-01 RX ADMIN — DEXAMETHASONE 2 MG: 0.5 TABLET ORAL at 18:20

## 2021-03-01 RX ADMIN — ASPIRIN 81 MG 81 MG: 81 TABLET ORAL at 10:09

## 2021-03-01 RX ADMIN — OXYBUTYNIN CHLORIDE 5 MG: 5 TABLET, EXTENDED RELEASE ORAL at 23:21

## 2021-03-01 RX ADMIN — DEXAMETHASONE 2 MG: 0.5 TABLET ORAL at 07:26

## 2021-03-01 RX ADMIN — Medication 14 MG: at 10:12

## 2021-03-01 RX ADMIN — ENOXAPARIN SODIUM 40 MG: 40 INJECTION SUBCUTANEOUS at 10:11

## 2021-03-01 RX ADMIN — MIRTAZAPINE 30 MG: 15 TABLET, FILM COATED ORAL at 23:20

## 2021-03-01 RX ADMIN — METOPROLOL TARTRATE 25 MG: 25 TABLET, FILM COATED ORAL at 10:10

## 2021-03-01 RX ADMIN — MORPHINE SULFATE 30 MG: 15 TABLET ORAL at 07:26

## 2021-03-01 RX ADMIN — PANTOPRAZOLE SODIUM 40 MG: 40 TABLET, DELAYED RELEASE ORAL at 10:13

## 2021-03-01 RX ADMIN — MORPHINE SULFATE 30 MG: 15 TABLET ORAL at 16:42

## 2021-03-01 RX ADMIN — METOPROLOL TARTRATE 25 MG: 25 TABLET, FILM COATED ORAL at 23:21

## 2021-03-01 NOTE — ASSESSMENT & PLAN NOTE
Malnutrition Findings:        patient has temporal muscle wasting and atrophy of muscles of the extremities    BMI Findings:      15 37  Body mass index is 15 24 kg/m²     Nutrition consult

## 2021-03-01 NOTE — OCCUPATIONAL THERAPY NOTE
Occupational Therapy Evaluation     Patient Name: Willard Haney  [id number] Date: 3/1/2021  Problem List  Principal Problem:    Type 2 myocardial infarction [facility])  Active Problems:    Pulmonary emphysema ([facility] [address] )    Small cell lung cancer ([facility] )    Severe protein-calorie malnutrition ([facility] [address] )    Narcotic dependency, continuous ([facility] [address] )    [facility]    Past Medical History  Past Medical History:   Diagnosis Date    Chronic fatigue syndrome with fibromyalgia     COPD (chronic obstructive pulmonary disease) ([facility] [address] )     DDD (degenerative disc disease), cervical     Emphysema of lung ([facility] )     Hx of migraine headaches     Hypothyroidism     [facility] )      Past Surgical History  Past Surgical History:   Procedure Laterality Date    BARTHOLIN GLAND CYST EXCISION      CARPAL TUNNEL RELEASE Bilateral     COLONOSCOPY  07/09/2020     15 mm sessile polyp in the cecum removed by polypectomy  A 3 recall    IR PLEURAL EFFUSION LONG-TERM CATHETER PLACEMENT  11/13/2020    IR PLEURAL EFFUSION LONG-TERM CATHETER REMOVAL  12/21/2020    IR PORT PLACEMENT  12/11/2020    IR THORACENTESIS  11/3/2020    TONSILLECTOMY      TUBAL LIGATION      UPPER GASTROINTESTINAL ENDOSCOPY  07/09/2020     small hiatal hernia  Pathology negative  03/01/21 0955   OT Last Visit   OT Visit Date 03/01/21   Note Type   Note type Evaluation   Restrictions/Precautions   Weight Bearing Precautions Per Order No   Other Precautions Pain; Fall Risk;O2   Pain Assessment   Pain Assessment Tool Pain Assessment not indicated - pt denies pain   Pain Score No Pain   Home Living   Type of Home House   Home Layout Two level  (1 step to enter, bedroom and bathroom upstairs, 1/2 on first)   Home Equipment   (Home O2)   Additional Comments Pt reports she has been staying on first floor as her O2 does not reach upstairs   Declines suggestion to have hospital bed on first floor as she reports her home is cluttered, declines suggestion to have assistance cleaning clutter as she reports she doesn't want anyone coming into the house until it is clean  Prior Function   Level of [place] Independent with ADLs and functional mobility; Needs assistance with IADLs   Lives With Alone   Receives Help From Neighbor   ADL Assistance Independent   IADLs Needs assistance   Falls in the last 6 months 1 to 4   Comments Neighbor assists with driving and meals  Psychosocial   Psychosocial (WDL) WDL   Subjective   Subjective "Off the record, I have fallen"    ADL   Where Assessed Edge of bed   Eating Assistance 7  Independent   Grooming Assistance 7  Independent   Grooming Deficit Applying makeup   UB Bathing Assistance 5  Supervision/Setup   LB Bathing Assistance 5  Supervision/Setup   UB Dressing Assistance 5  Supervision/Setup   LB Dressing Assistance 5  Supervision/Setup   Toileting Assistance  5  Supervision/Setup   Additional Comments Pt demonstrates functional AROM and strength to complete ADLs with supervision (SOB and impaired balance necessitating supervision)    Bed Mobility   Additional Comments Recieved EOB   Transfers   Sit to Stand 5  Supervision   Stand to Sit 5  Supervision   Additional Comments Ambulates around room and into bathroom with supervision, impulsive and with questionable balance  Balance   Static Sitting Good   Dynamic Sitting Fair +   Static Standing Fair +   Dynamic Standing Fair +   Ambulatory Fair +   Activity Tolerance   Activity Tolerance Treatment limited secondary to medical complications (Comment); Patient limited by fatigue   Medical Staff Made Aware Patricia, PT   Nurse Made Aware [id number] Kelly Nicole to see per RN   RUE Assessment   RUE Assessment WFL   LUE Assessment   LUE Assessment WFL   Hand Function   Gross Motor Coordination Functional   Fine Motor Coordination Functional   Cognition   Overall Cognitive Status WFL   Arousal/Participation Alert; Cooperative   Attention Within functional limits   Orientation Level Oriented X4   Memory Within functional limits   Following Commands Follows one step commands without difficulty   Comments Anxious, controlling of surroundings, demonstrates questionable insight at times  Assessment   Limitation Decreased UE strength;Decreased Safe judgement during ADL;Decreased endurance;Decreased high-level ADLs; Decreased self-care trans   Prognosis Fair   Assessment Pt is a 78 y o  female seen for OT evaluation s/p admit to [facility] on 2/28/2021 w/ Type 2 myocardial infarction [facility])  Comorbidities affecting pt's functional performance at time of assessment include: pulmonary emphysema, small cell lung CA, severe protein calorie malnutrition, narcotic dependency, chronic respiratory failure with hypoxia  Personal factors affecting pt at time of IE include:steps to enter environment, limited home support, difficulty performing ADLS, difficulty performing IADLS , limited insight into deficits, decreased initiation and engagement  and health management   Prior to admission, pt was living alone in a [facility] and was independent with functional mobility and ADLs with no AD, however requires significant O2 support for activity and was struggling with IADLs/balance  Upon evaluation: Pt requires supervision for ADLs and functional mobility 2* the following deficits impacting occupational performance: weakness, decreased strength, decreased balance, decreased tolerance, impaired problem solving, impulsivity, decreased safety awareness and decreased coping skills  Pt to benefit from continued skilled OT tx while in the hospital to address deficits as defined above and maximize level of functional independence w ADL's and functional mobility  Occupational Performance areas to address include: grooming, bathing/shower, toilet hygiene, dressing, health maintenance, functional mobility, community mobility and household maintenance  From OT standpoint, recommendation at time of d/c would be home therapy  Goals   Patient Goals Better O2 concentrator at home  Plan   Treatment Interventions ADL retraining;Functional transfer training; Endurance training;Patient/family training;Equipment evaluation/education; Compensatory technique education; Energy conservation   Goal Expiration Date 03/11/21   OT Frequency 2-3x/wk   Recommendation   OT Discharge Recommendation Home with skilled therapy   Equipment Recommended Bedside commode  [facility] bed, better home O2)   Commode Type   (Pt will decline )   AM-PAC Daily Activity Inpatient   Lower Body Dressing 4   Bathing 4   Toileting 4   Upper Body Dressing 4   Grooming 4   Eating 4   Daily Activity Raw Score 24   Daily Activity Standardized Score (Calc for Raw Score >=11) 57 54   Barthel Index   Feeding 10   Bathing 5   Grooming Score 5   Dressing Score 10   Bladder Score 10   Bowels Score 10   Toilet Use Score 10   Transfers (Bed/Chair) Score 10   Mobility (Level Surface) Score 10   Stairs Score 5   Barthel Index Score 85     GOALS    1) Pt will improve activity tolerance to G for min 30 min txment sessions    2) Pt will complete UB/LB dressing/self care w/ mod I using adaptive device and DME as needed    3) Pt will complete bathing w/ Mod I w/ use of AE and DME as needed    4) Pt will complete toileting w/ mod I w/ G hygiene/thoroughness using DME as needed    5) Pt will improve functional transfers to Mod I on/off all surfaces using DME as needed w/ G balance/safety     6) Pt will improve functional mobility during ADL/IADL/leisure tasks to Mod I using DME as needed w/ G balance/safety     7) Pt will participate in simulated IADL management task to increase independence to Mod I w/ G safety and endurance    8) Pt will demonstrate G attention for 10 minutes during ongoing cognitive assessment to assist w/ safe d/c planning/recommendations    9) Pt will demonstrate G carryover of pt/caregiver education and training as appropriate w/ mod I w/o cues w/ good tolerance    10) Pt will demonstrate 100% carryover of energy conservation techniques w/ mod I t/o functional I/ADL/leisure tasks w/o cues s/p skilled education    Grady Jamison, DC, OTR/L

## 2021-03-01 NOTE — CASE MANAGEMENT
LOS: 1  Patient is not a 30 day readmission or a Medicare Bundled patient  Her risk for unplanned readmission is 54, RED  Met with patient and reviewed the discharge planning process including identifying help at home and patient preference for discharge  Patient reports residing alone in a 2 story home with 1 RAFI  She reports being independent of ADL's howover has difficulty getting up and out of bed in the AM due her increased need for O2  She has Home O2 at 5 liters thru [employer]  She also has a nebulizer at home  She reports sleeping on the couch as she feels she needs th concentrator next to where she sleeps  She uses [facility] for her meds and denies barriers in obtaining her medication  She has been followed by [facility] in the past and reports being discharged from their services a month ago  She denies SNF/MH/D&A rehab admission  Her PCP is Birdia Angelucci  Her neighbor supplies her meals and transports  She she pallitative care  Discussed her need for Home PT/OT and she declined stating" they can not be there at 6 in the morning when I need them"  She feels she is stable once she is up and around  Discussed making a referral to [facility] and she too declined [facility] stating "they will send something I don't like"  Her neighbor continues to supply some meals  Patient might need high O2 at home  She states her concentrator at home only supplies 5-6 liters  Placed call to Colleen GUO at [phone] josemanuel Bah would need a script stating the need for increased O2 at   patientt wants another concentrator for the second floor so she can sleep in her bed as she stated having the O2 concentrator on the first floor and tubing running to the second floor effects the amount of O2 she receives and she wants the concentrator next to where she sleeps  Uvaldo notified of that concern and he will investigate Medicare coverage and cost to patient  Her neighbor will transport here [email or web address]

## 2021-03-01 NOTE — PROGRESS NOTES
Progress Note - Pink Dance 1950, 78 y o  female MRN: [id number]    Unit/Bed#: -01 Encounter: [id number]    Primary Care Provider: Felicity Causey DO   Date and time admitted to hospital: 2/28/2021  8:20 AM        SIRS (systemic inflammatory response syndrome) ([facility] [address] )  Assessment & Plan  SIRS criteria met on admission as evidenced by tachycardia with heart rate of 113, tachypnea with respiratory rate of 27, leukocytosis 11-12 7, due to unknown source  She has no fever, leukocytosis has down trended without antibiotics  Procalcitonin checked within normal limits  Leukocytosis can be explained by dexamethasone use  Given recurrent tachycardia tachypnea, high suspicion for PE given hypercoagulable state  Will check a D-dimer, CT PE      Chronic respiratory failure with hypoxia [facility])  Assessment & Plan  Patient has chronic hypoxic respiratory failure due to COPD and left-sided advanced stage small cell lung cancer  She is on oxygen via nasal cannula at 6 L/ minute for last 2 weeks  Oxygen saturation currently 96%, will continue the same rate  Will ask Pulmonary to see if develops worsening oxygenation      * Type 2 myocardial infarction [facility])  Assessment & Plan  Chest pain with slightly elevated troponin due to Type 2 MI likely from hypoxemia, will rule out pulmonary embolism especially given hypercoagulable state with history of stage IV small cell lung cancer unresponsive to treatment  EKG: symmetric T-wave inversions in leads V1 through V4  Troponin peaked at 0 18 and down trended  Stop trending troponin  Continue aspirin, atorvastatin, metoprolol        Anxiety  Assessment & Plan  Associated with ongoing issues with lung cancer  Initiate 0 25 mg of Ativan q 6 hours p r n  Narcotic dependency, continuous [facility])  Assessment & Plan  Patient has continuous narcotic dependency due to left-sided small cell lung cancer  [place] reviewed      continue morphine sulfate    Severe protein-calorie malnutrition [facility])  Assessment & Plan  Malnutrition Findings:        patient has temporal muscle wasting and atrophy of muscles of the extremities    BMI Findings:      15 37  Body mass index is 15 24 kg/m²  Nutrition consult     Small cell lung cancer [facility])  Assessment & Plan  Patient has advanced stage small cell lung cancer, she was on chemotherapy  Hospice was offered her last office visit the patient declined  She follows a palliative care    Chest x-ray findings are probably reflective of small cell lung cancer  Procalcitonin within normal limits, does not appear to have superimposed pneumonia  Chart review patient was to start treatment with [facility] today, will discuss with Hematology  Needs goals of care discussion        Pulmonary emphysema [facility])  Assessment & Plan  Patient has chronic COPD, I hear no signs of expiratory wheezing  She does not have COPD exacerbation    Continue albuterol and Spiriva    VTE Pharmacologic Prophylaxis:   Pharmacologic: Enoxaparin (Lovenox)  Mechanical VTE Prophylaxis in Place: Yes    Patient Centered Rounds: I have performed bedside rounds with nursing staff today  Discussions with Specialists or Other Care Team Provider:     Education and Discussions with Family / Patient: patient, declined family communication     Time Spent for Care: 30 minutes  More than 50% of total time spent on counseling and coordination of care as described above  Current Length of Stay: 1 day(s)    Current Patient Status: Inpatient   Certification Statement: The patient will continue to require additional inpatient hospital stay due to   Discharge Plan:  As above    Code Status: Level 1 - Full Code      Subjective:   Seen this morning  Desaturates with exertion  No chest pain      Objective:     Vitals:   Temp (24hrs), Av 1 °F (36 7 °C), Min:97 6 °F (36 4 °C), Max:98 5 °F (36 9 °C)    Temp:  [97 6 °F (36 4 °C)-98 5 °F (36 9 °C)] 98 5 °F (36 9 °C)  HR:  [] 106  Resp: [18-20] 18  BP: (114-127)/(70-78) 124/78  SpO2:  [92 %] 92 %  Body mass index is 15 24 kg/m²  Input and Output Summary (last 24 hours):     No intake or output data in the 24 hours ending 03/01/21 1518    Physical Exam:     Physical Exam  Vitals signs reviewed  Constitutional:       General: She is not in acute distress  Appearance: She is not ill-appearing, toxic-appearing or diaphoretic  Comments: cachectic looking   HENT:      Head: Normocephalic  Eyes:      Extraocular Movements: Extraocular movements intact  Pupils: Pupils are equal, round, and reactive to light  Neck:      Musculoskeletal: Normal range of motion and neck supple  No neck rigidity or muscular tenderness  Vascular: No carotid bruit  Cardiovascular:      Rate and Rhythm: Regular rhythm  Tachycardia present  Pulses: Normal pulses  Heart sounds: Normal heart sounds  No murmur  No friction rub  Pulmonary:      Effort: Pulmonary effort is normal       Breath sounds: Normal breath sounds  No stridor  No wheezing, rhonchi or rales  Chest:      Chest wall: No tenderness  Abdominal:      General: Abdomen is flat  Bowel sounds are normal  There is no distension  Palpations: Abdomen is soft  Tenderness: There is no abdominal tenderness  There is no guarding or rebound  Musculoskeletal: Normal range of motion  General: No swelling or tenderness  Right lower leg: No edema  Left lower leg: No edema  Skin:     General: Skin is warm and dry  Coloration: Skin is not jaundiced  Neurological:      General: No focal deficit present  Mental Status: She is alert and oriented to person, place, and time  Cranial Nerves: No cranial nerve deficit  Sensory: No sensory deficit             Additional Data:     Labs:    Results from last 7 days   Lab Units 03/01/21  0558 02/28/21  0827   WBC Thousand/uL 7 42 12 03*   HEMOGLOBIN g/dL 10 8* 11 2*   HEMATOCRIT % 34 9 35 3 PLATELETS Thousands/uL 310 329   NEUTROS PCT %  --  87*   LYMPHS PCT %  --  4*   MONOS PCT %  --  9   EOS PCT %  --  0     Results from last 7 days   Lab Units 03/01/21  0558 02/28/21  0827   SODIUM mmol/L 137 136   POTASSIUM mmol/L 3 8 4 0   CHLORIDE mmol/L 102 103   CO2 mmol/L 29 28   BUN mg/dL 25 25   CREATININE mg/dL 0 78 0 74   ANION GAP mmol/L 6 5   CALCIUM mg/dL 8 1* 8 6   ALBUMIN g/dL  --  2 5*   TOTAL BILIRUBIN mg/dL  --  0 30   ALK PHOS U/L  --  122*   ALT U/L  --  21   AST U/L  --  37   GLUCOSE RANDOM mg/dL 92 108     Results from last 7 days   Lab Units 02/28/21  0829   INR  1 12             Results from last 7 days   Lab Units 03/01/21  1018   PROCALCITONIN ng/ml 0 22           * I Have Reviewed All Lab Data Listed Above  * Additional Pertinent Lab Tests Reviewed:  All Labs Within Last 24 Hours Reviewed    Imaging:    Imaging Reports Reviewed Today Include:   Imaging Personally Reviewed by Myself Includes:      Recent Cultures (last 7 days):           Last 24 Hours Medication List:   Current Facility-Administered Medications   Medication Dose Route Frequency Provider Last Rate    acetaminophen  650 mg Oral Q6H PRN Maggy Cantu MD      albuterol  2 puff Inhalation Q6H PRN Maggy Cantu MD      albuterol  2 5 mg Nebulization Q6H PRN Maggy Cantu MD      aspirin  81 mg Oral Daily Maggy Cantu MD      atorvastatin  40 mg Oral QPM Maggy Cantu MD      colchicine  0 6 mg Oral BID PRN Maggy Cantu MD      cyclobenzaprine  10 mg Oral TID PRN Maggy Cantu MD      dexamethasone  2 mg Oral BID With Meals Maggy Cantu MD      enoxaparin  40 mg Subcutaneous Q24H [facility] Maggy Cantu MD      LORazepam  0 25 mg Oral Q6H PRN Terese Duron MD      metoprolol tartrate  25 mg Oral Q12H [address], [place]      mirtazapine  30 mg Oral HS Maggy Cantu MD      morphine  30 mg Oral Q4H PRN Maggy Cantu MD      nicotine  14 mg Transdermal Daily Magyg Cantu MD      ondansetron  4 mg Intravenous Q6H PRN Twan Roger MD      oxybutynin  5 mg Oral HS Twan Roger MD      pantoprazole  40 mg Oral Early Morning Twan Roger MD      tiotropium  18 mcg Inhalation Daily Twan Roger MD          Today, Patient Was Seen By: Krishna Delgado MD    ** Please Note: Dictation voice to text software may have been used in the creation of this document   **

## 2021-03-01 NOTE — ASSESSMENT & PLAN NOTE
Patient has advanced stage small cell lung cancer, she was on chemotherapy  Hospice was offered her last office visit the patient declined  She follows a palliative care    Chest x-ray findings are probably reflective of small cell lung cancer    Procalcitonin within normal limits, does not appear to have superimposed pneumonia  Chart review patient was to start treatment with [facility] today, will discuss with Hematology  Needs goals of care discussion

## 2021-03-01 NOTE — PLAN OF CARE
Problem: PHYSICAL THERAPY ADULT  Goal: Performs mobility at highest level of function for planned discharge setting  See evaluation for individualized goals  Description: Treatment/Interventions: Functional transfer training, LE strengthening/ROM, Elevations, Therapeutic exercise, Endurance training, Equipment eval/education, Bed mobility, Gait training, Spoke to nursing, OT  Equipment Recommended: (To be determined)       See flowsheet documentation for full assessment, interventions and recommendations  Note: Prognosis: Fair  Problem List: Decreased strength, Decreased endurance, Impaired balance, Decreased mobility, Impaired judgement, Decreased safety awareness, Pain  Assessment: Patient is a a 69y/o F who presented with NSTEMI, small cell lung CA  PMH significant for pulmonary emphysema, narcotic dependency, COPD, DDD  Patient resides alone in a [facility] with 1st floor setup and 1 RAFI  Was ind prior to admission and was not using an AD  Assist with IADL's from neighbor  Patient reports that she has been having difficulty with mobility and ADL's due to SOB, fatigue  Currently her O2 concentrator only goes to 5L and she is on 7L now  Current medical status includes poor safety awareness, SOB, anxiety, fatigue, decreased strength, balance, endurance and mobility  Patient was at a supervision level for mobility but presents with unsteady gait with +LOB  Able to self correct  Discussed possible cane training next session  Patient prefers to return home and is reluctant to let anyone in the home as she states that it is cluttered and needs to be cleaned  Still recommending home P T  Need to assess AD next session and curb step  The patient's AM-PAC Basic Mobility Inpatient Short Form Raw Score is 22, Standardized Score is 47 4  A standardized score greater than 42 9 suggests the patient may benefit from discharge to home   Please also refer to the recommendation of the Physical Therapist for safe discharge planning  Barriers to Discharge: Inaccessible home environment, Decreased caregiver support  Barriers to Discharge Comments: Patient reports clutter in home  No room for hospital bed or commode  PT Discharge Recommendation: Home with skilled therapy(Home P T )     PT - OK to Discharge: No    See flowsheet documentation for full assessment

## 2021-03-01 NOTE — PHYSICAL THERAPY NOTE
PHYSICAL THERAPY EVAL       03/01/21 0957   PT Last Visit   PT Visit Date 03/01/21   Note Type   Note type Evaluation   Pain Assessment   Pain Assessment Tool Pain Assessment not indicated - pt denies pain   Pain Score No Pain   Home Living   Type of Home House   Home Layout Two level  (1STE  Has been staying on first floor sleeping on couch)   Home Equipment   (Home O2)   Prior Function   Level of [place] Independent with ADLs and functional mobility; Needs assistance with IADLs   Lives With Alone   Receives Help From Neighbor   ADL Assistance Independent   IADLs Needs assistance   Falls in the last 6 months 1 to 4   Comments Reports that neighbor helps with driving and meals  (Ind but reports difficulty due to SOB,fatigue, anxiety)   Restrictions/Precautions   Weight Bearing Precautions Per Order No   Other Precautions Pain; Fall Risk;O2  (anxiety )   General   Additional Pertinent History Lung CA  (Currently on palliative care)   Family/Caregiver Present No   Cognition   Overall Cognitive Status WFL   Arousal/Participation Alert   Orientation Level Oriented X4   Memory   (forgetful)   Following Commands Follows one step commands with increased time or repetition   Comments Distracted, anxious   RLE Assessment   RLE Assessment WFL   LLE Assessment   LLE Assessment WFL   Coordination   Sensation WFL   Light Touch   RLE Light Touch Grossly intact   LLE Light Touch Grossly intact   Bed Mobility   Additional Comments Received sitting edge of bed   Transfers   Sit to Stand 5  Supervision   Additional items Armrests   Stand to Sit 5  Supervision   Additional items Armrests   Ambulation/Elevation   Gait pattern Short stride; Forward Flexion;Narrow ARI   Gait Assistance 5  Supervision   Additional items Verbal cues   Assistive Device None   Distance 15ft   Stair Management Assistance Not tested   Balance   Static Sitting Good   Dynamic Sitting Fair +   Static Standing Fair +   Dynamic Standing Fair +   Ambulatory Fair +   Endurance Deficit   Endurance Deficit Yes   Activity Tolerance   Activity Tolerance Treatment limited secondary to medical complications (Comment); Patient limited by fatigue  (SOB)   Medical Staff Made Aware KARAN bowden   Nurse Made Aware RN   Assessment   Prognosis Fair   Problem List Decreased strength;Decreased endurance; Impaired balance;Decreased mobility; Impaired judgement;Decreased safety awareness;Pain   Assessment Patient is a a 69y/o F who presented with NSTEMI, small cell lung CA  PMH significant for pulmonary emphysema, narcotic dependency, COPD, DDD  Patient resides alone in a [facility] with 1st floor setup and 1 RAFI  Was ind prior to admission and was not using an AD  Assist with IADL's from neighbor  Patient reports that she has been having difficulty with mobility and ADL's due to SOB, fatigue  Currently her O2 concentrator only goes to 5L and she is on 7L now  Current medical status includes poor safety awareness, SOB, anxiety, fatigue, decreased strength, balance, endurance and mobility  Patient was at a supervision level for mobility but presents with unsteady gait with +LOB  Able to self correct  Discussed possible cane training next session  Patient prefers to return home and is reluctant to let anyone in the home as she states that it is cluttered and needs to be cleaned  Still recommending home P T  Need to assess AD next session and curb step  The patient's AM-PAC Basic Mobility Inpatient Short Form Raw Score is 22, Standardized Score is 47 4  A standardized score greater than 42 9 suggests the patient may benefit from discharge to home  Please also refer to the recommendation of the Physical Therapist for safe discharge planning  Barriers to Discharge Inaccessible home environment;Decreased caregiver support   Barriers to Discharge Comments Patient reports clutter in home   No room for hospital bed or commode Goals   Patient Goals To get a better oxygen setup at home   STG Expiration Date 03/15/21   Short Term Goal #1 1  perform supine<>sit with HOB flat without the use of bedrails ind 2  perform sit<>stand transfers with use of UE's mod I 3  Amb 50ft with least restrictive device at a mod I/I level 4  Ascend/descend 1 curb step with appropriate AD at a mod I level   PT Treatment Day 0   Plan   Treatment/Interventions Functional transfer training;LE strengthening/ROM; Elevations; Therapeutic exercise; Endurance training;Equipment eval/education; Bed mobility;Gait training;Spoke to nursing;OT   PT Frequency Other (Comment)  (3-5x/wk)   Recommendation   PT Discharge Recommendation Home with skilled therapy  (Home P T )   Equipment Recommended   (To be determined)   PT - OK to Discharge No   Additional Comments Needs AD assessment and stair trial   AM-PAC Basic Mobility Inpatient   Turning in Bed Without Bedrails 4   Lying on Back to Sitting on Edge of Flat Bed 4   Moving Bed to Chair 4   Standing Up From Chair 4   Walk in Room 3   Climb 3-5 Stairs 3   Basic Mobility Inpatient Raw Score 22   Basic Mobility Standardized Score 47 4   Mary Villasenor, PT            Patient Name: Kayley RinaldiChrista  XFYIQ'V Date: 3/1/2021     Physical therapy TX:    S: "I'm feeling SOB "    O: Patient amb an additional 15ft without an AD with close supervision  Narrow base of support with lateral sway  Unsteady  Stand to sit transfer with supervision  93% on 7L at rest  88% on 7L after mobility     A: Patient anxious, SOB and fatigued after a small amount of activity  Needed immediate rest to improve SOB  Patient is extremely deconditioned  P: Recommend home P T  Need to asess need for AD and for curb step training  Edin Villasenor, PT

## 2021-03-01 NOTE — ASSESSMENT & PLAN NOTE
Patient has chronic hypoxic respiratory failure due to COPD and left-sided advanced stage small cell lung cancer  She is on oxygen via nasal cannula at 6 L/ minute for last 2 weeks    Oxygen saturation currently 96%, will continue the same rate  Will ask Pulmonary to see if develops worsening oxygenation

## 2021-03-01 NOTE — ASSESSMENT & PLAN NOTE
SIRS criteria met on admission as evidenced by tachycardia with heart rate of 113, tachypnea with respiratory rate of 27, leukocytosis 11-12 7, due to unknown source  She has no fever, leukocytosis has down trended without antibiotics  Procalcitonin checked within normal limits  Leukocytosis can be explained by dexamethasone use  Given recurrent tachycardia tachypnea, high suspicion for PE given hypercoagulable state  Will check a D-dimer, CT PE

## 2021-03-01 NOTE — PLAN OF CARE
Problem: OCCUPATIONAL THERAPY ADULT  Goal: Performs self-care activities at highest level of function for planned discharge setting  See evaluation for individualized goals  Description: Treatment Interventions: ADL retraining, Functional transfer training, Endurance training, Patient/family training, Equipment evaluation/education, Compensatory technique education, Energy conservation  Equipment Recommended: Bedside commode(Hospital bed, better home O2)       See flowsheet documentation for full assessment, interventions and recommendations  Outcome: Progressing  Note: Limitation: Decreased UE strength, Decreased Safe judgement during ADL, Decreased endurance, Decreased high-level ADLs, Decreased self-care trans  Prognosis: Fair  Assessment: Pt is a 78 y o  female seen for OT evaluation s/p admit to [facility] on 2/28/2021 w/ Type 2 myocardial infarction ([facility] [address] )  Comorbidities affecting pt's functional performance at time of assessment include: pulmonary emphysema, small cell lung CA, severe protein calorie malnutrition, narcotic dependency, chronic respiratory failure with hypoxia  Personal factors affecting pt at time of IE include:steps to enter environment, limited home support, difficulty performing ADLS, difficulty performing IADLS , limited insight into deficits, decreased initiation and engagement  and health management   Prior to admission, pt was living alone in a [address] [place] [address] and was independent with functional mobility and ADLs with no AD, however requires significant O2 support for activity and was struggling with IADLs/balance  Upon evaluation: Pt requires supervision for ADLs and functional mobility 2* the following deficits impacting occupational performance: weakness, decreased strength, decreased balance, decreased tolerance, impaired problem solving, impulsivity, decreased safety awareness and decreased coping skills   Pt to benefit from continued skilled OT tx while in the hospital to address deficits as defined above and maximize level of functional independence w ADL's and functional mobility  Occupational Performance areas to address include: grooming, bathing/shower, toilet hygiene, dressing, health maintenance, functional mobility, community mobility and household maintenance  From OT standpoint, recommendation at time of d/c would be home therapy         OT Discharge Recommendation: Home with skilled therapy

## 2021-03-01 NOTE — ASSESSMENT & PLAN NOTE
Patient has continuous narcotic dependency due to left-sided small cell lung cancer  [place] PDMP reviewed      continue morphine sulfate

## 2021-03-01 NOTE — ASSESSMENT & PLAN NOTE
Chest pain with slightly elevated troponin due to Type 2 MI likely from hypoxemia, will rule out pulmonary embolism especially given hypercoagulable state with history of stage IV small cell lung cancer unresponsive to treatment  EKG: symmetric T-wave inversions in leads V1 through V4  Troponin peaked at 0 18 and down trended    Stop trending troponin  Continue aspirin, atorvastatin, metoprolol

## 2021-03-02 ENCOUNTER — TELEPHONE (OUTPATIENT)
Dept: HEMATOLOGY ONCOLOGY | Facility: CLINIC | Age: 71
End: 2021-03-02

## 2021-03-02 LAB
ANION GAP SERPL CALCULATED.3IONS-SCNC: 7 MMOL/L (ref 4–13)
BUN SERPL-MCNC: 20 MG/DL (ref 5–25)
CALCIUM SERPL-MCNC: 8.1 MG/DL (ref 8.3–10.1)
CHLORIDE SERPL-SCNC: 101 MMOL/L (ref 100–108)
CO2 SERPL-SCNC: 28 MMOL/L (ref 21–32)
CREAT SERPL-MCNC: 0.74 MG/DL (ref 0.6–1.3)
ERYTHROCYTE [DISTWIDTH] IN BLOOD BY AUTOMATED COUNT: 17.4 % (ref 11.6–15.1)
GFR SERPL CREATININE-BSD FRML MDRD: 84 ML/MIN/1.73SQ M
GLUCOSE SERPL-MCNC: 94 MG/DL (ref 65–140)
HCT VFR BLD AUTO: 35.9 % (ref 34.8–46.1)
HGB BLD-MCNC: 11.3 G/DL (ref 11.5–15.4)
MCH RBC QN AUTO: 30.6 PG (ref 26.8–34.3)
MCHC RBC AUTO-ENTMCNC: 31.5 G/DL (ref 31.4–37.4)
MCV RBC AUTO: 97 FL (ref 82–98)
PLATELET # BLD AUTO: 346 THOUSANDS/UL (ref 149–390)
PMV BLD AUTO: 9.2 FL (ref 8.9–12.7)
POTASSIUM SERPL-SCNC: 3.8 MMOL/L (ref 3.5–5.3)
PROCALCITONIN SERPL-MCNC: 0.22 NG/ML
RBC # BLD AUTO: 3.69 MILLION/UL (ref 3.81–5.12)
SODIUM SERPL-SCNC: 136 MMOL/L (ref 136–145)
WBC # BLD AUTO: 9.04 THOUSAND/UL (ref 4.31–10.16)

## 2021-03-02 PROCEDURE — 92610 EVALUATE SWALLOWING FUNCTION: CPT

## 2021-03-02 PROCEDURE — 85027 COMPLETE CBC AUTOMATED: CPT | Performed by: INTERNAL MEDICINE

## 2021-03-02 PROCEDURE — 80048 BASIC METABOLIC PNL TOTAL CA: CPT | Performed by: INTERNAL MEDICINE

## 2021-03-02 PROCEDURE — 99232 SBSQ HOSP IP/OBS MODERATE 35: CPT | Performed by: INTERNAL MEDICINE

## 2021-03-02 PROCEDURE — 84145 PROCALCITONIN (PCT): CPT | Performed by: INTERNAL MEDICINE

## 2021-03-02 RX ADMIN — MIRTAZAPINE 30 MG: 15 TABLET, FILM COATED ORAL at 23:14

## 2021-03-02 RX ADMIN — DEXAMETHASONE 2 MG: 0.5 TABLET ORAL at 17:08

## 2021-03-02 RX ADMIN — TIOTROPIUM BROMIDE 18 MCG: 18 CAPSULE ORAL; RESPIRATORY (INHALATION) at 08:43

## 2021-03-02 RX ADMIN — PANTOPRAZOLE SODIUM 40 MG: 40 TABLET, DELAYED RELEASE ORAL at 06:25

## 2021-03-02 RX ADMIN — MORPHINE SULFATE 30 MG: 15 TABLET ORAL at 08:42

## 2021-03-02 RX ADMIN — SALINE NASAL SPRAY 2 SPRAY: 1.5 SOLUTION NASAL at 08:42

## 2021-03-02 RX ADMIN — DEXAMETHASONE 2 MG: 0.5 TABLET ORAL at 08:41

## 2021-03-02 RX ADMIN — LORAZEPAM 0.25 MG: 0.5 TABLET ORAL at 22:28

## 2021-03-02 RX ADMIN — LORAZEPAM 0.25 MG: 0.5 TABLET ORAL at 06:25

## 2021-03-02 RX ADMIN — MORPHINE SULFATE 30 MG: 15 TABLET ORAL at 17:08

## 2021-03-02 RX ADMIN — Medication 14 MG: at 08:41

## 2021-03-02 RX ADMIN — OXYBUTYNIN CHLORIDE 5 MG: 5 TABLET, EXTENDED RELEASE ORAL at 23:14

## 2021-03-02 RX ADMIN — ENOXAPARIN SODIUM 40 MG: 40 INJECTION SUBCUTANEOUS at 08:42

## 2021-03-02 RX ADMIN — ASPIRIN 81 MG 81 MG: 81 TABLET ORAL at 08:42

## 2021-03-02 RX ADMIN — ACETAMINOPHEN 650 MG: 325 TABLET, FILM COATED ORAL at 02:13

## 2021-03-02 RX ADMIN — ACETAMINOPHEN 650 MG: 325 TABLET, FILM COATED ORAL at 14:56

## 2021-03-02 RX ADMIN — ATORVASTATIN CALCIUM 40 MG: 40 TABLET, FILM COATED ORAL at 17:08

## 2021-03-02 NOTE — ASSESSMENT & PLAN NOTE
Patient has advanced stage small cell lung cancer, she was on chemotherapy  Hospice was offered her last office visit the patient declined  She follows a palliative care    Chest x-ray findings are probably reflective of small cell lung cancer    Procalcitonin within normal limits, does not appear to have superimposed pneumonia  Chart review patient was to start treatment with [facility] today, will discuss with Hematology  Needs goals of care discussion

## 2021-03-02 NOTE — PLAN OF CARE
Problem: Potential for Falls  Goal: Patient will remain free of falls  Description: INTERVENTIONS:  - Assess patient frequently for physical needs  -  Identify cognitive and physical deficits and behaviors that affect risk of falls    -  [place] fall precautions as indicated by assessment   - Educate patient/family on patient safety including physical limitations  - Instruct patient to call for assistance with activity based on assessment  - Modify environment to reduce risk of injury  - Consider OT/PT consult to assist with strengthening/mobility  Outcome: Progressing     Problem: PAIN - ADULT  Goal: Verbalizes/displays adequate comfort level or baseline comfort level  Description: Interventions:  - Encourage patient to monitor pain and request assistance  - Assess pain using appropriate pain scale  - Administer analgesics based on type and severity of pain and evaluate response  - Implement non-pharmacological measures as appropriate and evaluate response  - Consider cultural and social influences on pain and pain management  - Notify physician/advanced practitioner if interventions unsuccessful or patient reports new pain  Outcome: Progressing     Problem: INFECTION - ADULT  Goal: Absence or prevention of progression during hospitalization  Description: INTERVENTIONS:  - Assess and monitor for signs and symptoms of infection  - Monitor lab/diagnostic results  - Monitor all insertion sites, i e  indwelling lines, tubes, and drains  - Monitor endotracheal if appropriate and nasal secretions for changes in amount and color  - [place] appropriate cooling/warming therapies per order  - Administer medications as ordered  - Instruct and encourage patient and family to use good hand hygiene technique  - Identify and instruct in appropriate isolation precautions for identified infection/condition  Outcome: Progressing     Problem: DISCHARGE PLANNING  Goal: Discharge to home or other facility with appropriate resources  Description: INTERVENTIONS:  - Identify barriers to discharge w/patient and caregiver  - Arrange for needed discharge resources and transportation as appropriate  - Identify discharge learning needs (meds, wound care, etc )  - Arrange for interpretive services to assist at discharge as needed  - Refer to Case Management Department for coordinating discharge planning if the patient needs post-hospital services based on physician/advanced practitioner order or complex needs related to functional status, cognitive ability, or social support system  Outcome: Progressing     Problem: Knowledge Deficit  Goal: Patient/family/caregiver demonstrates understanding of disease process, treatment plan, medications, and discharge instructions  Description: Complete learning assessment and assess knowledge base    Interventions:  - Provide teaching at level of understanding  - Provide teaching via preferred learning methods  Outcome: Progressing     Problem: CARDIOVASCULAR - ADULT  Goal: Maintains optimal cardiac output and hemodynamic stability  Description: INTERVENTIONS:  - Monitor I/O, vital signs and rhythm  - Monitor for S/S and trends of decreased cardiac output  - Administer and titrate ordered vasoactive medications to optimize hemodynamic stability  - Assess quality of pulses, skin color and temperature  - Assess for signs of decreased coronary artery perfusion  - Instruct patient to report change in severity of symptoms  Outcome: Progressing     Problem: RESPIRATORY - ADULT  Goal: Achieves optimal ventilation and oxygenation  Description: INTERVENTIONS:  - Assess for changes in respiratory status  - Assess for changes in mentation and behavior  - Position to facilitate oxygenation and minimize respiratory effort  - Oxygen administered by appropriate delivery if ordered  - Initiate smoking cessation education as indicated  - Encourage broncho-pulmonary hygiene including cough, deep breathe, Incentive Spirometry  - Assess the need for suctioning and aspirate as needed  - Assess and instruct to report SOB or any respiratory difficulty  - Respiratory Therapy support as indicated  Outcome: Progressing     Problem: Prexisting or High Potential for Compromised Skin Integrity  Goal: Skin integrity is maintained or improved  Description: INTERVENTIONS:  - Identify patients at risk for skin breakdown  - Assess and monitor skin integrity  - Assess and monitor nutrition and hydration status  - Monitor labs   - Assess for incontinence   - Turn and reposition patient  - Assist with mobility/ambulation  - Relieve pressure over bony prominences  - Avoid friction and shearing  - Provide appropriate hygiene as needed including keeping skin clean and dry  - Evaluate need for skin moisturizer/barrier cream  - Collaborate with interdisciplinary team   - Patient/family teaching  - Consider wound care consult   Outcome: Progressing     Problem: Nutrition/Hydration-ADULT  Goal: Nutrient/Hydration intake appropriate for improving, restoring or maintaining nutritional needs  Description: Monitor and assess patient's nutrition/hydration status for malnutrition  Collaborate with interdisciplinary team and initiate plan and interventions as ordered  Monitor patient's weight and dietary intake as ordered or per policy  Utilize nutrition screening tool and intervene as necessary  Determine patient's food preferences and provide high-protein, high-caloric foods as appropriate       INTERVENTIONS:  - Monitor oral intake, urinary output, labs, and treatment plans  - Assess nutrition and hydration status and recommend course of action  - Evaluate amount of meals eaten  - Assist patient with eating if necessary   - Allow adequate time for meals  - Recommend/ encourage appropriate diets, oral nutritional supplements, and vitamin/mineral supplements  - Order, calculate, and assess calorie counts as needed  - Recommend, monitor, and adjust tube feedings and TPN/PPN based on assessed needs  - Assess need for intravenous fluids  - Provide specific nutrition/hydration education as appropriate  - Include patient/family/caregiver in decisions related to nutrition  Outcome: Progressing

## 2021-03-02 NOTE — SPEECH THERAPY NOTE
Speech Language/Pathology  Speech/Language Pathology  Assessment    Patient Name: Christy Quintero  UWZWH'L Date: 3/2/2021     Problem List  Principal Problem:    Type 2 myocardial infarction [facility])  Active Problems:    Pulmonary emphysema ([facility] )    Small cell lung cancer ([facility] )    Severe protein-calorie malnutrition ([facility] )    Narcotic dependency, continuous ([facility] )    Acute on chronic respiratory failure with hypoxemia (HCC)    SIRS (systemic inflammatory response syndrome) ([facility] )    Anxiety    Past Medical History  Past Medical History:   Diagnosis Date    Chronic fatigue syndrome with fibromyalgia     COPD (chronic obstructive pulmonary disease) ([facility] )     DDD (degenerative disc disease), cervical     Emphysema of lung ([facility] )     Hx of migraine headaches     Hypothyroidism     [facility]      Past Surgical History  Past Surgical History:   Procedure Laterality Date    BARTHOLIN GLAND CYST EXCISION      CARPAL TUNNEL RELEASE Bilateral     COLONOSCOPY  07/09/2020     15 mm sessile polyp in the cecum removed by polypectomy  A 3 recall    IR PLEURAL EFFUSION LONG-TERM CATHETER PLACEMENT  11/13/2020    IR PLEURAL EFFUSION LONG-TERM CATHETER REMOVAL  12/21/2020    IR PORT PLACEMENT  12/11/2020    IR THORACENTESIS  11/3/2020    TONSILLECTOMY      TUBAL LIGATION      UPPER GASTROINTESTINAL ENDOSCOPY  07/09/2020     small hiatal hernia  Pathology negative  Bedside Swallow Evaluation:    Summary:  Pt presents w/ WFL oral, suspected WFL pharyngeal swallow skill  Bite strength, retrieval, and oral manipulation are wnl  Bolus formation is organized and complete  Swallow initiation appears timely, hyolaryngeal rise is palpated and judged to be wnl  No overt s/s aspiration across consistencies during evaluation today  Pt does report "sticking in her esophagus" though reports this is typical and she well-manages w/ her food selection  No ST f/u indicated, please re-consult if necessary  Recommendations:  Diet: Regular textures   Liquid: Thin   Meds: As tolerated  Supervision: -   Positioning:Upright  Strategies: Pt to take PO/Meds only when fully alert and upright  Oral care: 3-4x daily   Aspiration precautions  Reflux precautions    Eval only, No f/u tx indicated  Goal(s):  Pt will tolerate least restrictive diet w/out s/s aspiration or oral/pharyngeal difficulties  Patient's goal: "please change it to regular"     Consider consult w/:  -    HPI:  78 y o  female who presents with above chief complaint [worsening shortness of breath]      Patient has been having worsening shortness of breath over last months  The last 2 weeks her oxygen that she is chronically on had to be increased to 6 liters/minute  She gets short of breath with the slightest exertion such as packing boxes at home and needs to rest for up to 60 minutes to recover to continue her activities  This was the main reason why patient came to the ER today      She has chronic COPD and small cell lung cancer  She was on chemotherapy for small-cell lung cancer and she was told that she has terminal illness which is unresponsive to therapy  She does have chronic numbness on the left anterior chest wall that she attributes to the cancer      She denies fevers, chills, increasing sputum production, feeling sputum or hemoptysis  She denies any pleurisy      Yesterday while at rest she had an episode in the afternoon that lasted for about 10 minutes when she had a sharp left anterior chest pain without radiation that abated by itself  It was not accompanied by sweating, palpitations, nausea abdominal pain, vomiting      She has anorexia and chronic lower extremity edema  Denies signs of bleeding  She has intermittent dysphagia to solids and liquids and wants to be on a regular diet and does not wish GI evaluation      CTA Chest: Redemonstration of extensive left pleural tumor, encasing the left lung and narrowing the left bronchi      Emphysema      Septal thickening in the left lung due to lymphangitic spread of tumor versus lymphatic obstruction  Reason for consult:  R/o aspiration  Determine safest and least restrictive diet  Respiratory compromise    Precautions:  -    Current diet:  Puree w/ thin     Premorbid diet[de-identified]  Regular w/ thin     Previous VBS:  No    O2 requirement:  NC    Voice/Speech:  Strong, clear    Social:  Home    Follows commands: Yes                          Cognitive Status:  Awake, alert, oriented     Oral [facility] exam:  Dentition: upper and lower dentures   Labial strength and ROM: wfl   Lingual strength and ROM: wfl   Mandibular strength and ROM: wfl   Velum: symmetrical   Secretion management: wfl    Volitional cough: weak    Volitional swallow: +   Oral care: good     Items administered:  Puree, soft solid, hard solid, thin liquids  Liquids were taken by straw/cup       Oral stage:  Lip closure: wfl   Mastication: wfl   Bolus formation: wfl   Bolus control: wfl   Transfer: wfl   Oral residue: no   Pocketing: no     Pharyngeal stage:  Swallow promptness: prompt  Laryngeal rise: wfl to palpation   Wet voice: -   Throat clear: -   Cough: -   Secondary swallows: -   Audible swallows: -   No overt s/s aspiration    Esophageal stage:  Globus sensation    Results d/w:  Pt, nursing

## 2021-03-02 NOTE — PROGRESS NOTES
Progress Note - Christy Quintero 1950, 78 y o  female MRN: [id number]    Unit/Bed#: -01 Encounter: [id number]    Primary Care Provider: Loida Moser DO   Date and time admitted to hospital: 2/28/2021  8:20 AM        SIRS (systemic inflammatory response syndrome) ([facility] )  Assessment & Plan  SIRS criteria met on admission as evidenced by tachycardia with heart rate of 113, tachypnea with respiratory rate of 27, leukocytosis 11-12 7, due to unknown source, resolved without treatment  She has no fever, leukocytosis has down trended without antibiotics  Procalcitonin checked within normal limits  Leukocytosis can be explained by dexamethasone use  Given recurrent tachycardia tachypnea, high suspicion for PE given hypercoagulable state, D-dimer elevated at 1 51, CT PE checked without PE  Redemonstrated extensive left pleural tumor, encasing the left lung and narrowing the left bronchi with emphysema, septal thickening in the left lung due to the finding ticks spread  Acute on chronic respiratory failure with hypoxemia [facility])  Assessment & Plan  Patient has chronic hypoxic respiratory failure due to COPD with emphysema and left-sided advanced stage small cell lung cancer with worsening tumor burden  She is on oxygen via nasal cannula at 6 L/ minute for last 2 weeks  CT PE checked without PE  Redemonstrated extensive left pleural tumor, encasing the left lung and narrowing the left bronchi with emphysema, septal thickening in the left lung due to the finding ticks spread      Wean oxygenation as able  Hold off on Pulmonary consult  Working on getting her an oxygen concentrator with the ability to provide oxygen at about 10 L with CM  Probable discharge tomorrow        * Type 2 myocardial infarction [facility])  Assessment & Plan  Chest pain with slightly elevated troponin due to Type 2 MI likely from hypoxemia, will rule out pulmonary embolism especially given hypercoagulable state with history of stage IV small cell lung cancer unresponsive to treatment  EKG: symmetric T-wave inversions in leads V1 through V4  Troponin peaked at 0 18 and down trended  Stop trending troponin  Continue aspirin, atorvastatin, metoprolol        Anxiety  Assessment & Plan  Associated with ongoing issues with lung cancer  Initiate 0 25 mg of Ativan q 6 hours p r n  Narcotic dependency, continuous [facility])  Assessment & Plan  Patient has continuous narcotic dependency due to left-sided small cell lung cancer  [place] PDMP reviewed  continue morphine sulfate    Severe protein-calorie malnutrition (HCC)  Assessment & Plan  Malnutrition Findings:        patient has temporal muscle wasting and atrophy of muscles of the extremities    BMI Findings:      15 37  Body mass index is 15 66 kg/m²  Nutrition consult     Small cell lung cancer [facility])  Assessment & Plan  Patient has advanced stage small cell lung cancer, she was on chemotherapy  Hospice was offered her last office visit the patient declined  She follows a palliative care    Chest x-ray findings are probably reflective of small cell lung cancer  Procalcitonin within normal limits, does not appear to have superimposed pneumonia  Chart review patient was to start treatment with [facility] today, will discuss with Hematology  Needs goals of care discussion        Pulmonary emphysema [facility])  Assessment & Plan  Patient has chronic COPD, I hear no signs of expiratory wheezing  She does not have COPD exacerbation    Continue albuterol and Spiriva      VTE Pharmacologic Prophylaxis:   Pharmacologic: Enoxaparin (Lovenox)  Mechanical VTE Prophylaxis in Place: Yes    Patient Centered Rounds: I have performed bedside rounds with nursing staff today  Discussions with Specialists or Other Care Team Provider:     Education and Discussions with Family / Patient:  Patient, declined family communication    Time Spent for Care: 30 minutes    More than 50% of total time spent on counseling and coordination of care as described above  Current Length of Stay: 2 day(s)    Current Patient Status: Inpatient   Certification Statement: The patient will continue to require additional inpatient hospital stay due to   Discharge Plan:  Home Tomorrow    Code Status: Level 1 - Full Code      Subjective:   Seen this morning  No overnight reports reported  Still dyspneic on exertion, had an episode this morning while trying to wash herself in the bathroom  No chest pain  Objective:     Vitals:   Temp (24hrs), Av 8 °F (36 6 °C), Min:97 °F (36 1 °C), Max:98 4 °F (36 9 °C)    Temp:  [97 °F (36 1 °C)-98 4 °F (36 9 °C)] 97 9 °F (36 6 °C)  HR:  [102-107] 107  Resp:  [17-22] 17  BP: (123-142)/(72-77) 142/77  SpO2:  [90 %-95 %] 90 %  Body mass index is 15 66 kg/m²  Input and Output Summary (last 24 hours):     No intake or output data in the 24 hours ending 21 1249    Physical Exam:     Physical Exam  Vitals signs reviewed  Constitutional:       General: She is not in acute distress  Appearance: She is not ill-appearing, toxic-appearing or diaphoretic  Comments: Cachectic   HENT:      Head: Normocephalic  Eyes:      Extraocular Movements: Extraocular movements intact  Pupils: Pupils are equal, round, and reactive to light  Neck:      Musculoskeletal: Normal range of motion and neck supple  No neck rigidity or muscular tenderness  Vascular: No carotid bruit  Cardiovascular:      Rate and Rhythm: Normal rate and regular rhythm  Pulses: Normal pulses  Heart sounds: Normal heart sounds  No murmur  No friction rub  Pulmonary:      Effort: Pulmonary effort is normal       Breath sounds: No stridor  No wheezing, rhonchi or rales  Comments: Reduced breath sounds in the left lung,  Chest:      Chest wall: No tenderness  Abdominal:      General: Abdomen is flat  Bowel sounds are normal  There is no distension  Palpations: Abdomen is soft  Tenderness:  There is no abdominal tenderness  There is no guarding or rebound  Musculoskeletal: Normal range of motion  General: No swelling or tenderness  Right lower leg: No edema  Left lower leg: No edema  Skin:     General: Skin is warm and dry  Coloration: Skin is not jaundiced  Neurological:      General: No focal deficit present  Mental Status: She is alert and oriented to person, place, and time  Cranial Nerves: No cranial nerve deficit  Sensory: No sensory deficit  Additional Data:     Labs:    Results from last 7 days   Lab Units 03/02/21  0456  02/28/21  0827   WBC Thousand/uL 9 04   < > 12 03*   HEMOGLOBIN g/dL 11 3*   < > 11 2*   HEMATOCRIT % 35 9   < > 35 3   PLATELETS Thousands/uL 346   < > 329   NEUTROS PCT %  --   --  87*   LYMPHS PCT %  --   --  4*   MONOS PCT %  --   --  9   EOS PCT %  --   --  0    < > = values in this interval not displayed  Results from last 7 days   Lab Units 03/02/21  0456  02/28/21  0827   SODIUM mmol/L 136   < > 136   POTASSIUM mmol/L 3 8   < > 4 0   CHLORIDE mmol/L 101   < > 103   CO2 mmol/L 28   < > 28   BUN mg/dL 20   < > 25   CREATININE mg/dL 0 74   < > 0 74   ANION GAP mmol/L 7   < > 5   CALCIUM mg/dL 8 1*   < > 8 6   ALBUMIN g/dL  --   --  2 5*   TOTAL BILIRUBIN mg/dL  --   --  0 30   ALK PHOS U/L  --   --  122*   ALT U/L  --   --  21   AST U/L  --   --  37   GLUCOSE RANDOM mg/dL 94   < > 108    < > = values in this interval not displayed  Results from last 7 days   Lab Units 02/28/21  0829   INR  1 12             Results from last 7 days   Lab Units 03/02/21  0456 03/01/21  1018   PROCALCITONIN ng/ml 0 22 0 22           * I Have Reviewed All Lab Data Listed Above  * Additional Pertinent Lab Tests Reviewed:  All Labs Within Last 24 Hours Reviewed    Imaging:    Imaging Reports Reviewed Today Include:  CT PE  Imaging Personally Reviewed by Myself Includes:  CT PE    Recent Cultures (last 7 days):           Last 24 Hours Medication List:   Current Facility-Administered Medications   Medication Dose Route Frequency Provider Last Rate    acetaminophen  650 mg Oral Q6H PRN Shreya Alvarado, MD      albuterol  2 puff Inhalation Q6H PRN Shreya Alvarado, MD      albuterol  2 5 mg Nebulization Q6H PRN Shreya Alvarado, MD      aspirin  81 mg Oral Daily Shreya Jenny, MD      atorvastatin  40 mg Oral QPM Shreya Jenny, MD      colchicine  0 6 mg Oral BID PRN Shreya Alvarado, MD      cyclobenzaprine  10 mg Oral TID PRN Shreya Jenny, MD      dexamethasone  2 mg Oral BID With Meals Shreyasanjuana Alvarado, MD      enoxaparin  40 mg Subcutaneous Q24H [address] Shreya Jenny, MD      LORazepam  0 25 mg Oral Q6H PRN Mira Sears MD      mirtazapine  30 mg Oral HS Shreya Jenny, MD      morphine  30 mg Oral Q4H PRN Shreya Jenny, MD      nicotine  14 mg Transdermal Daily Shreya MD Jenny      ondansetron  4 mg Intravenous Q6H PRN Shreya MD Jenny      oxybutynin  5 mg Oral HS Shreya Jenny, MD      pantoprazole  40 mg Oral Early Morning Shreya MD Jenny      sodium chloride  2 spray Each Nare TID PRN Nishant Dietrich MD      tiotropium  18 mcg Inhalation Daily Shreya Alvarado MD          Today, Patient Was Seen By: Mira Sears MD    ** Please Note: Dictation voice to text software may have been used in the creation of this document   **

## 2021-03-02 NOTE — TELEPHONE ENCOUNTER
Patient is calling in to inform that she is currently admitted into the hospital and will need her appointment and infusions to be rescheduled, she can be reached back at [phone]

## 2021-03-02 NOTE — ASSESSMENT & PLAN NOTE
Malnutrition Findings:        patient has temporal muscle wasting and atrophy of muscles of the extremities    BMI Findings:      15 37  Body mass index is 15 66 kg/m²     Nutrition consult

## 2021-03-02 NOTE — ASSESSMENT & PLAN NOTE
Patient has chronic hypoxic respiratory failure due to COPD with emphysema and left-sided advanced stage small cell lung cancer with worsening tumor burden  She is on oxygen via nasal cannula at 6 L/ minute for last 2 weeks  CT PE checked without PE  Redemonstrated extensive left pleural tumor, encasing the left lung and narrowing the left bronchi with emphysema, septal thickening in the left lung due to the finding ticks spread      Wean oxygenation as able  Hold off on Pulmonary consult  Working on getting her an oxygen concentrator with the ability to provide oxygen at about 10 L with CM  Probable discharge tomorrow

## 2021-03-02 NOTE — ASSESSMENT & PLAN NOTE
SIRS criteria met on admission as evidenced by tachycardia with heart rate of 113, tachypnea with respiratory rate of 27, leukocytosis 11-12 7, due to unknown source, resolved without treatment  She has no fever, leukocytosis has down trended without antibiotics  Procalcitonin checked within normal limits  Leukocytosis can be explained by dexamethasone use  Given recurrent tachycardia tachypnea, high suspicion for PE given hypercoagulable state, D-dimer elevated at 1 51, CT PE checked without PE  Redemonstrated extensive left pleural tumor, encasing the left lung and narrowing the left bronchi with emphysema, septal thickening in the left lung due to the finding ticks spread

## 2021-03-03 VITALS
HEART RATE: 102 BPM | OXYGEN SATURATION: 90 % | HEIGHT: 66 IN | DIASTOLIC BLOOD PRESSURE: 98 MMHG | BODY MASS INDEX: 15.69 KG/M2 | TEMPERATURE: 97.4 F | RESPIRATION RATE: 18 BRPM | WEIGHT: 97.6 LBS | SYSTOLIC BLOOD PRESSURE: 169 MMHG

## 2021-03-03 LAB
ANION GAP SERPL CALCULATED.3IONS-SCNC: 8 MMOL/L (ref 4–13)
ATRIAL RATE: 108 BPM
BASOPHILS # BLD AUTO: 0.01 THOUSANDS/ΜL (ref 0–0.1)
BASOPHILS NFR BLD AUTO: 0 % (ref 0–1)
BUN SERPL-MCNC: 16 MG/DL (ref 5–25)
CALCIUM SERPL-MCNC: 8.5 MG/DL (ref 8.3–10.1)
CHLORIDE SERPL-SCNC: 102 MMOL/L (ref 100–108)
CO2 SERPL-SCNC: 27 MMOL/L (ref 21–32)
CREAT SERPL-MCNC: 0.63 MG/DL (ref 0.6–1.3)
EOSINOPHIL # BLD AUTO: 0.02 THOUSAND/ΜL (ref 0–0.61)
EOSINOPHIL NFR BLD AUTO: 0 % (ref 0–6)
ERYTHROCYTE [DISTWIDTH] IN BLOOD BY AUTOMATED COUNT: 17.3 % (ref 11.6–15.1)
GFR SERPL CREATININE-BSD FRML MDRD: 92 ML/MIN/1.73SQ M
GLUCOSE SERPL-MCNC: 89 MG/DL (ref 65–140)
HCT VFR BLD AUTO: 38.1 % (ref 34.8–46.1)
HGB BLD-MCNC: 12 G/DL (ref 11.5–15.4)
IMM GRANULOCYTES # BLD AUTO: 0.03 THOUSAND/UL (ref 0–0.2)
IMM GRANULOCYTES NFR BLD AUTO: 0 % (ref 0–2)
LYMPHOCYTES # BLD AUTO: 1 THOUSANDS/ΜL (ref 0.6–4.47)
LYMPHOCYTES NFR BLD AUTO: 11 % (ref 14–44)
MCH RBC QN AUTO: 30.6 PG (ref 26.8–34.3)
MCHC RBC AUTO-ENTMCNC: 31.5 G/DL (ref 31.4–37.4)
MCV RBC AUTO: 97 FL (ref 82–98)
MONOCYTES # BLD AUTO: 1.08 THOUSAND/ΜL (ref 0.17–1.22)
MONOCYTES NFR BLD AUTO: 12 % (ref 4–12)
NEUTROPHILS # BLD AUTO: 7.28 THOUSANDS/ΜL (ref 1.85–7.62)
NEUTS SEG NFR BLD AUTO: 77 % (ref 43–75)
NRBC BLD AUTO-RTO: 0 /100 WBCS
P AXIS: 73 DEGREES
PLATELET # BLD AUTO: 348 THOUSANDS/UL (ref 149–390)
PMV BLD AUTO: 9.2 FL (ref 8.9–12.7)
POTASSIUM SERPL-SCNC: 4.6 MMOL/L (ref 3.5–5.3)
PR INTERVAL: 116 MS
QRS AXIS: 72 DEGREES
QRSD INTERVAL: 80 MS
QT INTERVAL: 338 MS
QTC INTERVAL: 452 MS
RBC # BLD AUTO: 3.92 MILLION/UL (ref 3.81–5.12)
SODIUM SERPL-SCNC: 137 MMOL/L (ref 136–145)
T WAVE AXIS: 140 DEGREES
VENTRICULAR RATE: 108 BPM
WBC # BLD AUTO: 9.42 THOUSAND/UL (ref 4.31–10.16)

## 2021-03-03 PROCEDURE — 94760 N-INVAS EAR/PLS OXIMETRY 1: CPT

## 2021-03-03 PROCEDURE — 85025 COMPLETE CBC W/AUTO DIFF WBC: CPT | Performed by: INTERNAL MEDICINE

## 2021-03-03 PROCEDURE — 94761 N-INVAS EAR/PLS OXIMETRY MLT: CPT

## 2021-03-03 PROCEDURE — RECHECK: Performed by: INTERNAL MEDICINE

## 2021-03-03 PROCEDURE — 80048 BASIC METABOLIC PNL TOTAL CA: CPT | Performed by: INTERNAL MEDICINE

## 2021-03-03 PROCEDURE — 93010 ELECTROCARDIOGRAM REPORT: CPT | Performed by: INTERNAL MEDICINE

## 2021-03-03 PROCEDURE — 99239 HOSP IP/OBS DSCHRG MGMT >30: CPT | Performed by: INTERNAL MEDICINE

## 2021-03-03 RX ADMIN — ASPIRIN 81 MG 81 MG: 81 TABLET ORAL at 08:35

## 2021-03-03 RX ADMIN — LORAZEPAM 0.25 MG: 0.5 TABLET ORAL at 07:12

## 2021-03-03 RX ADMIN — MORPHINE SULFATE 30 MG: 15 TABLET ORAL at 07:27

## 2021-03-03 RX ADMIN — SALINE NASAL SPRAY 2 SPRAY: 1.5 SOLUTION NASAL at 08:52

## 2021-03-03 RX ADMIN — LORAZEPAM 0.25 MG: 0.5 TABLET ORAL at 15:02

## 2021-03-03 RX ADMIN — ENOXAPARIN SODIUM 40 MG: 40 INJECTION SUBCUTANEOUS at 08:35

## 2021-03-03 RX ADMIN — DEXAMETHASONE 2 MG: 0.5 TABLET ORAL at 07:27

## 2021-03-03 RX ADMIN — MORPHINE SULFATE 30 MG: 15 TABLET ORAL at 13:38

## 2021-03-03 RX ADMIN — PANTOPRAZOLE SODIUM 40 MG: 40 TABLET, DELAYED RELEASE ORAL at 05:20

## 2021-03-03 RX ADMIN — Medication 14 MG: at 08:35

## 2021-03-03 RX ADMIN — TIOTROPIUM BROMIDE 18 MCG: 18 CAPSULE ORAL; RESPIRATORY (INHALATION) at 08:34

## 2021-03-03 RX ADMIN — ALBUTEROL SULFATE 2 PUFF: 90 AEROSOL, METERED RESPIRATORY (INHALATION) at 07:09

## 2021-03-03 RX ADMIN — ACETAMINOPHEN 650 MG: 325 TABLET, FILM COATED ORAL at 16:13

## 2021-03-03 RX ADMIN — DEXAMETHASONE 2 MG: 0.5 TABLET ORAL at 16:13

## 2021-03-03 NOTE — PLAN OF CARE
Problem: Potential for Falls  Goal: Patient will remain free of falls  Description: INTERVENTIONS:  - Assess patient frequently for physical needs  -  Identify cognitive and physical deficits and behaviors that affect risk of falls    -  [place] fall precautions as indicated by assessment   - Educate patient/family on patient safety including physical limitations  - Instruct patient to call for assistance with activity based on assessment  - Modify environment to reduce risk of injury  - Consider OT/PT consult to assist with strengthening/mobility  Outcome: Progressing     Problem: PAIN - ADULT  Goal: Verbalizes/displays adequate comfort level or baseline comfort level  Description: Interventions:  - Encourage patient to monitor pain and request assistance  - Assess pain using appropriate pain scale  - Administer analgesics based on type and severity of pain and evaluate response  - Implement non-pharmacological measures as appropriate and evaluate response  - Consider cultural and social influences on pain and pain management  - Notify physician/advanced practitioner if interventions unsuccessful or patient reports new pain  Outcome: Progressing     Problem: INFECTION - ADULT  Goal: Absence or prevention of progression during hospitalization  Description: INTERVENTIONS:  - Assess and monitor for signs and symptoms of infection  - Monitor lab/diagnostic results  - Monitor all insertion sites, i e  indwelling lines, tubes, and drains  - Monitor endotracheal if appropriate and nasal secretions for changes in amount and color  - [place] appropriate cooling/warming therapies per order  - Administer medications as ordered  - Instruct and encourage patient and family to use good hand hygiene technique  - Identify and instruct in appropriate isolation precautions for identified infection/condition  Outcome: Progressing     Problem: DISCHARGE PLANNING  Goal: Discharge to home or other facility with appropriate resources  Description: INTERVENTIONS:  - Identify barriers to discharge w/patient and caregiver  - Arrange for needed discharge resources and transportation as appropriate  - Identify discharge learning needs (meds, wound care, etc )  - Arrange for interpretive services to assist at discharge as needed  - Refer to Case Management Department for coordinating discharge planning if the patient needs post-hospital services based on physician/advanced practitioner order or complex needs related to functional status, cognitive ability, or social support system  Outcome: Progressing     Problem: Knowledge Deficit  Goal: Patient/family/caregiver demonstrates understanding of disease process, treatment plan, medications, and discharge instructions  Description: Complete learning assessment and assess knowledge base    Interventions:  - Provide teaching at level of understanding  - Provide teaching via preferred learning methods  Outcome: Progressing     Problem: CARDIOVASCULAR - ADULT  Goal: Maintains optimal cardiac output and hemodynamic stability  Description: INTERVENTIONS:  - Monitor I/O, vital signs and rhythm  - Monitor for S/S and trends of decreased cardiac output  - Administer and titrate ordered vasoactive medications to optimize hemodynamic stability  - Assess quality of pulses, skin color and temperature  - Assess for signs of decreased coronary artery perfusion  - Instruct patient to report change in severity of symptoms  Outcome: Progressing     Problem: RESPIRATORY - ADULT  Goal: Achieves optimal ventilation and oxygenation  Description: INTERVENTIONS:  - Assess for changes in respiratory status  - Assess for changes in mentation and behavior  - Position to facilitate oxygenation and minimize respiratory effort  - Oxygen administered by appropriate delivery if ordered  - Initiate smoking cessation education as indicated  - Encourage broncho-pulmonary hygiene including cough, deep breathe, Incentive Spirometry  - Assess the need for suctioning and aspirate as needed  - Assess and instruct to report SOB or any respiratory difficulty  - Respiratory Therapy support as indicated  Outcome: Progressing     Problem: Prexisting or High Potential for Compromised Skin Integrity  Goal: Skin integrity is maintained or improved  Description: INTERVENTIONS:  - Identify patients at risk for skin breakdown  - Assess and monitor skin integrity  - Assess and monitor nutrition and hydration status  - Monitor labs   - Assess for incontinence   - Turn and reposition patient  - Assist with mobility/ambulation  - Relieve pressure over bony prominences  - Avoid friction and shearing  - Provide appropriate hygiene as needed including keeping skin clean and dry  - Evaluate need for skin moisturizer/barrier cream  - Collaborate with interdisciplinary team   - Patient/family teaching  - Consider wound care consult   Outcome: Progressing     Problem: Nutrition/Hydration-ADULT  Goal: Nutrient/Hydration intake appropriate for improving, restoring or maintaining nutritional needs  Description: Monitor and assess patient's nutrition/hydration status for malnutrition  Collaborate with interdisciplinary team and initiate plan and interventions as ordered  Monitor patient's weight and dietary intake as ordered or per policy  Utilize nutrition screening tool and intervene as necessary  Determine patient's food preferences and provide high-protein, high-caloric foods as appropriate       INTERVENTIONS:  - Monitor oral intake, urinary output, labs, and treatment plans  - Assess nutrition and hydration status and recommend course of action  - Evaluate amount of meals eaten  - Assist patient with eating if necessary   - Allow adequate time for meals  - Recommend/ encourage appropriate diets, oral nutritional supplements, and vitamin/mineral supplements  - Order, calculate, and assess calorie counts as needed  - Recommend, monitor, and adjust tube feedings and TPN/PPN based on assessed needs  - Assess need for intravenous fluids  - Provide specific nutrition/hydration education as appropriate  - Include patient/family/caregiver in decisions related to nutrition  Outcome: Progressing

## 2021-03-03 NOTE — PLAN OF CARE
Problem: Potential for Falls  Goal: Patient will remain free of falls  Description: INTERVENTIONS:  - Assess patient frequently for physical needs  -  Identify cognitive and physical deficits and behaviors that affect risk of falls    -  [place] fall precautions as indicated by assessment   - Educate patient/family on patient safety including physical limitations  - Instruct patient to call for assistance with activity based on assessment  - Modify environment to reduce risk of injury  - Consider OT/PT consult to assist with strengthening/mobility  3/3/2021 1458 by Ashley Rose RN  Outcome: Adequate for Discharge  3/3/2021 0748 by Ashley Rose RN  Outcome: Progressing     Problem: PAIN - ADULT  Goal: Verbalizes/displays adequate comfort level or baseline comfort level  Description: Interventions:  - Encourage patient to monitor pain and request assistance  - Assess pain using appropriate pain scale  - Administer analgesics based on type and severity of pain and evaluate response  - Implement non-pharmacological measures as appropriate and evaluate response  - Consider cultural and social influences on pain and pain management  - Notify physician/advanced practitioner if interventions unsuccessful or patient reports new pain  3/3/2021 1458 by Ashley Rose RN  Outcome: Adequate for Discharge  3/3/2021 0748 by Ashley Rose RN  Outcome: Progressing     Problem: INFECTION - ADULT  Goal: Absence or prevention of progression during hospitalization  Description: INTERVENTIONS:  - Assess and monitor for signs and symptoms of infection  - Monitor lab/diagnostic results  - Monitor all insertion sites, i e  indwelling lines, tubes, and drains  - Monitor endotracheal if appropriate and nasal secretions for changes in amount and color  - [place] appropriate cooling/warming therapies per order  - Administer medications as ordered  - Instruct and encourage patient and family to use good hand hygiene technique  - Identify and instruct in appropriate isolation precautions for identified infection/condition  3/3/2021 1458 by Cash Crespo RN  Outcome: Adequate for Discharge  3/3/2021 0748 by Cash Crespo RN  Outcome: Progressing     Problem: DISCHARGE PLANNING  Goal: Discharge to home or other facility with appropriate resources  Description: INTERVENTIONS:  - Identify barriers to discharge w/patient and caregiver  - Arrange for needed discharge resources and transportation as appropriate  - Identify discharge learning needs (meds, wound care, etc )  - Arrange for interpretive services to assist at discharge as needed  - Refer to Case Management Department for coordinating discharge planning if the patient needs post-hospital services based on physician/advanced practitioner order or complex needs related to functional status, cognitive ability, or social support system  3/3/2021 1458 by Cash Crespo RN  Outcome: Adequate for Discharge  3/3/2021 0748 by Cash Crespo RN  Outcome: Progressing     Problem: Knowledge Deficit  Goal: Patient/family/caregiver demonstrates understanding of disease process, treatment plan, medications, and discharge instructions  Description: Complete learning assessment and assess knowledge base    Interventions:  - Provide teaching at level of understanding  - Provide teaching via preferred learning methods  3/3/2021 1458 by Csah Crespo RN  Outcome: Adequate for Discharge  3/3/2021 0748 by Cash Crespo RN  Outcome: Progressing     Problem: CARDIOVASCULAR - ADULT  Goal: Maintains optimal cardiac output and hemodynamic stability  Description: INTERVENTIONS:  - Monitor I/O, vital signs and rhythm  - Monitor for S/S and trends of decreased cardiac output  - Administer and titrate ordered vasoactive medications to optimize hemodynamic stability  - Assess quality of pulses, skin color and temperature  - Assess for signs of decreased coronary artery perfusion  - Instruct patient to report change in severity of symptoms  3/3/2021 1458 by Sravani Davis RN  Outcome: Adequate for Discharge  3/3/2021 0748 by Sravani Davis RN  Outcome: Progressing     Problem: RESPIRATORY - ADULT  Goal: Achieves optimal ventilation and oxygenation  Description: INTERVENTIONS:  - Assess for changes in respiratory status  - Assess for changes in mentation and behavior  - Position to facilitate oxygenation and minimize respiratory effort  - Oxygen administered by appropriate delivery if ordered  - Initiate smoking cessation education as indicated  - Encourage broncho-pulmonary hygiene including cough, deep breathe, Incentive Spirometry  - Assess the need for suctioning and aspirate as needed  - Assess and instruct to report SOB or any respiratory difficulty  - Respiratory Therapy support as indicated  3/3/2021 1458 by Sravani Davis RN  Outcome: Adequate for Discharge  3/3/2021 0748 by Sravani Davis RN  Outcome: Progressing     Problem: Prexisting or High Potential for Compromised Skin Integrity  Goal: Skin integrity is maintained or improved  Description: INTERVENTIONS:  - Identify patients at risk for skin breakdown  - Assess and monitor skin integrity  - Assess and monitor nutrition and hydration status  - Monitor labs   - Assess for incontinence   - Turn and reposition patient  - Assist with mobility/ambulation  - Relieve pressure over bony prominences  - Avoid friction and shearing  - Provide appropriate hygiene as needed including keeping skin clean and dry  - Evaluate need for skin moisturizer/barrier cream  - Collaborate with interdisciplinary team   - Patient/family teaching  - Consider wound care consult   3/3/2021 1458 by Sravani Davis RN  Outcome: Adequate for Discharge  3/3/2021 0748 by Sravani Davis RN  Outcome: Progressing     Problem: Nutrition/Hydration-ADULT  Goal: Nutrient/Hydration intake appropriate for improving, restoring or maintaining nutritional needs  Description: Monitor and assess patient's nutrition/hydration status for malnutrition  Collaborate with interdisciplinary team and initiate plan and interventions as ordered  Monitor patient's weight and dietary intake as ordered or per policy  Utilize nutrition screening tool and intervene as necessary  Determine patient's food preferences and provide high-protein, high-caloric foods as appropriate       INTERVENTIONS:  - Monitor oral intake, urinary output, labs, and treatment plans  - Assess nutrition and hydration status and recommend course of action  - Evaluate amount of meals eaten  - Assist patient with eating if necessary   - Allow adequate time for meals  - Recommend/ encourage appropriate diets, oral nutritional supplements, and vitamin/mineral supplements  - Order, calculate, and assess calorie counts as needed  - Recommend, monitor, and adjust tube feedings and TPN/PPN based on assessed needs  - Assess need for intravenous fluids  - Provide specific nutrition/hydration education as appropriate  - Include patient/family/caregiver in decisions related to nutrition  3/3/2021 1458 by Zenaida Wilson RN  Outcome: Adequate for Discharge  3/3/2021 0748 by Zenaida Wilson RN  Outcome: Progressing

## 2021-03-03 NOTE — PROGRESS NOTES
Progress Note - Bruce Garcia 1950, 78 y o  female MRN: [id number]    Unit/Bed#: -01 Encounter: [id number]    Primary Care Provider: Sung Chavez DO   Date and time admitted to hospital: 2/28/2021  8:20 AM        SIRS (systemic inflammatory response syndrome) ([facility] )  Assessment & Plan  SIRS criteria met on admission as evidenced by tachycardia with heart rate of 113, tachypnea with respiratory rate of 27, leukocytosis 11-12 7, due to unknown source, resolved without treatment  She has no fever, leukocytosis has down trended without antibiotics  Procalcitonin checked within normal limits  Leukocytosis can be explained by dexamethasone use  Given recurrent tachycardia tachypnea, high suspicion for PE given hypercoagulable state, D-dimer elevated at 1 51, CT PE checked without PE  Redemonstrated extensive left pleural tumor, encasing the left lung and narrowing the left bronchi with emphysema, septal thickening in the left lung due to the finding ticks spread  Acute on chronic respiratory failure with hypoxemia [facility])  Assessment & Plan  Patient has chronic hypoxic respiratory failure due to COPD with emphysema and left-sided advanced stage small cell lung cancer with worsening tumor burden  She is on oxygen via nasal cannula at 6 L/ minute for last the 2 weeks  CT PE checked without PE  Redemonstrated extensive left pleural tumor, encasing the left lung and narrowing the left bronchi with emphysema, septal thickening in the left lung due to the finding ticks spread  Wean oxygenation as able  Home oxygen eval results:  Baseline SPO2 on Room Air at rest 89-86 %   SPO2 on Oxygen at rest 90 % 6 lpm  SPO2 during exercise on Oxygen 92-90% liter flow of 8 lpm     Working on getting her an oxygen concentrator with the ability to provide oxygen at about 10 L with CM  PT/OT recommend home with skilled therapy, patient has declined  Medically stable for discharge home          * Type 2 myocardial infarction [facility])  Assessment & Plan  Chest pain with slightly elevated troponin due to Type 2 MI likely from hypoxemia, will rule out pulmonary embolism especially given hypercoagulable state with history of stage IV small cell lung cancer unresponsive to treatment  EKG: symmetric T-wave inversions in leads V1 through V4  Troponin peaked at 0 18 and down trended  Stop trending troponin  Continue aspirin, atorvastatin, metoprolol        Anxiety  Assessment & Plan  Associated with ongoing issues with lung cancer  Initiate 0 25 mg of Ativan q 6 hours p r n  Narcotic dependency, continuous [facility])  Assessment & Plan  Patient has continuous narcotic dependency due to left-sided small cell lung cancer  [place]MP reviewed  continue morphine sulfate    Severe protein-calorie malnutrition (HCC)  Assessment & Plan  Malnutrition Findings:   Adult Malnutrition type: Chronic illness  Adult Degree of Malnutrition: Other severe protein calorie malnutrition Fat loss, Muscle loss, Inadequate energy, Weight loss(Pt presents with severe protein calorie malnutrition as evidneced by prominent clavicle bone protrusion, oribtail hollowing, temporal wasting, 7% wt loss in)    BMI Findings:  Adult BMI Classifications: Underweight < 18 5   15 37  Body mass index is 15 75 kg/m²  Nutrition consult     Small cell lung cancer [facility])  Assessment & Plan  Patient has advanced stage small cell lung cancer, she was on chemotherapy  Hospice was offered her last office visit the patient declined  She follows a palliative care    Chest x-ray findings are probably reflective of small cell lung cancer  CT PE: No pulmonary embolus  Redemonstration of extensive left pleural tumor, encasing the left lung and narrowing the left bronchi  Emphysema   Septal thickening in the left lung due to lymphangitic spread of tumor versus lymphatic obstruction    Chart review patient was to start treatment with Onivyde today, Hematology informed of hospitalization  Will need to follow-up outpatient  Needs goals of care discussion        Pulmonary emphysema [facility])  Assessment & Plan  Patient has chronic COPD, I hear no signs of expiratory wheezing  She does not have COPD exacerbation    Continue albuterol and Spiriva    VTE Pharmacologic Prophylaxis:   Pharmacologic: Enoxaparin (Lovenox)  Mechanical VTE Prophylaxis in Place: Yes    Patient Centered Rounds: I have performed bedside rounds with nursing staff today  Discussions with Specialists or Other Care Team Provider:     Education and Discussions with Family / Patient:  Patient, declined family communication    Time Spent for Care: 30 minutes  More than 50% of total time spent on counseling and coordination of care as described above  Current Length of Stay: 3 day(s)    Current Patient Status: Inpatient   Certification Statement: The patient will continue to require additional inpatient hospital stay due to Ongoing procurement off oxygen concentrator    Discharge Plan:  Home within the next 24 hours    Code Status: Level 1 - Full Code      Subjective:   Seen this morning  Continues to desaturate with exertion  Now on 8 L of oxygen given recent desaturation  Plan to wean down to 6 L which is her baseline  Gets anxious occasionally  Denies worsening shortness of breath or chest pain  Objective:     Vitals:   Temp (24hrs), Av 6 °F (36 4 °C), Min:97 4 °F (36 3 °C), Max:97 9 °F (36 6 °C)    Temp:  [97 4 °F (36 3 °C)-97 9 °F (36 6 °C)] 97 4 °F (36 3 °C)  HR:  [102] 102  Resp:  [18] 18  BP: (132-169)/(74-98) 169/98  SpO2:  [90 %-91 %] 90 %  Body mass index is 15 75 kg/m²  Input and Output Summary (last 24 hours):     No intake or output data in the 24 hours ending 21 1342    Physical Exam:     Physical Exam  Vitals signs reviewed  Constitutional:       General: She is not in acute distress  Appearance: She is not ill-appearing, toxic-appearing or diaphoretic        Comments: Cachectic   HENT:      Head: Normocephalic  Eyes:      Extraocular Movements: Extraocular movements intact  Pupils: Pupils are equal, round, and reactive to light  Neck:      Musculoskeletal: Normal range of motion and neck supple  No neck rigidity or muscular tenderness  Vascular: No carotid bruit  Cardiovascular:      Rate and Rhythm: Normal rate and regular rhythm  Pulses: Normal pulses  Heart sounds: Normal heart sounds  No murmur  No friction rub  Pulmonary:      Effort: Pulmonary effort is normal       Breath sounds: No stridor  No wheezing, rhonchi or rales  Comments: Decreased breath sounds in the left lung  Chest:      Chest wall: No tenderness  Abdominal:      General: Abdomen is flat  Bowel sounds are normal  There is no distension  Palpations: Abdomen is soft  Tenderness: There is no abdominal tenderness  There is no guarding or rebound  Musculoskeletal: Normal range of motion  General: No swelling or tenderness  Right lower leg: No edema  Left lower leg: No edema  Skin:     General: Skin is warm and dry  Coloration: Skin is not jaundiced  Neurological:      General: No focal deficit present  Mental Status: She is alert and oriented to person, place, and time  Cranial Nerves: No cranial nerve deficit  Sensory: No sensory deficit             Additional Data:     Labs:    Results from last 7 days   Lab Units 03/03/21  0527   WBC Thousand/uL 9 42   HEMOGLOBIN g/dL 12 0   HEMATOCRIT % 38 1   PLATELETS Thousands/uL 348   NEUTROS PCT % 77*   LYMPHS PCT % 11*   MONOS PCT % 12   EOS PCT % 0     Results from last 7 days   Lab Units 03/03/21  0527  02/28/21  0827   SODIUM mmol/L 137   < > 136   POTASSIUM mmol/L 4 6   < > 4 0   CHLORIDE mmol/L 102   < > 103   CO2 mmol/L 27   < > 28   BUN mg/dL 16   < > 25   CREATININE mg/dL 0 63   < > 0 74   ANION GAP mmol/L 8   < > 5   CALCIUM mg/dL 8 5   < > 8 6   ALBUMIN g/dL  --   --  2 5* TOTAL BILIRUBIN mg/dL  --   --  0 30   ALK PHOS U/L  --   --  122*   ALT U/L  --   --  21   AST U/L  --   --  37   GLUCOSE RANDOM mg/dL 89   < > 108    < > = values in this interval not displayed  Results from last 7 days   Lab Units 02/28/21  0829   INR  1 12             Results from last 7 days   Lab Units 03/02/21  0456 03/01/21  1018   PROCALCITONIN ng/ml 0 22 0 22           * I Have Reviewed All Lab Data Listed Above  * Additional Pertinent Lab Tests Reviewed:  All Labs Within Last 24 Hours Reviewed    Imaging:    Imaging Reports Reviewed Today Include:   Imaging Personally Reviewed by Myself Includes:      Recent Cultures (last 7 days):           Last 24 Hours Medication List:   Current Facility-Administered Medications   Medication Dose Route Frequency Provider Last Rate    acetaminophen  650 mg Oral Q6H PRN Tae Pan MD      albuterol  2 puff Inhalation Q6H PRN Tae Pan MD      albuterol  2 5 mg Nebulization Q6H PRN Tae Pan MD      aspirin  81 mg Oral Daily Tae Pan MD      atorvastatin  40 mg Oral QPM Tae Pan MD      colchicine  0 6 mg Oral BID PRN Tae Pan MD      cyclobenzaprine  10 mg Oral TID PRN Tae Pan MD      dexamethasone  2 mg Oral BID With Meals Tae Pan MD      enoxaparin  40 mg Subcutaneous Q24H [address] Tae Pan MD      LORazepam  0 25 mg Oral Q6H PRN Kalyan Liu MD      mirtazapine  30 mg Oral HS Tae Pan MD      morphine  30 mg Oral Q4H PRN Tae Pan MD      nicotine  14 mg Transdermal Daily Tae Pan MD      ondansetron  4 mg Intravenous Q6H PRN Tae Pan MD      oxybutynin  5 mg Oral HS Tae Pan MD      pantoprazole  40 mg Oral Early Morning Tae Pan MD      sodium chloride  2 spray Each Nare TID PRN Malcolm Salazar MD      tiotropium  18 mcg Inhalation Daily Tae Pan MD          Today, Patient Was Seen By: Kalyan Liu MD    ** Please Note: Dictation voice to text software may have been used in the creation of this document   **

## 2021-03-03 NOTE — ASSESSMENT & PLAN NOTE
Patient has advanced stage small cell lung cancer, she was on chemotherapy  Hospice was offered her last office visit the patient declined  She follows a palliative care    Chest x-ray findings are probably reflective of small cell lung cancer  CT PE: No pulmonary embolus  Redemonstration of extensive left pleural tumor, encasing the left lung and narrowing the left bronchi  Emphysema  Septal thickening in the left lung due to lymphangitic spread of tumor versus lymphatic obstruction    Chart review patient was to start treatment with Onivyde today, Hematology informed of hospitalization    Will need to follow-up outpatient  Needs goals of care discussion

## 2021-03-03 NOTE — DISCHARGE SUMMARY
Discharge- Sumaya Austin 1950, 78 y o  female MRN: [id number]    Unit/Bed#: -01 Encounter: [id number]    Primary Care Provider: Nathalie Smalls DO   Date and time admitted to hospital: 2/28/2021  8:20 AM        SIRS (systemic inflammatory response syndrome) ([facility] )  Assessment & Plan  SIRS criteria met on admission as evidenced by tachycardia with heart rate of 113, tachypnea with respiratory rate of 27, leukocytosis 11-12 7, due to unknown source, resolved without treatment  She has no fever, leukocytosis has down trended without antibiotics  Procalcitonin checked within normal limits  Leukocytosis can be explained by dexamethasone use  Given recurrent tachycardia tachypnea, high suspicion for PE given hypercoagulable state, D-dimer elevated at 1 51, CT PE checked without PE  Redemonstrated extensive left pleural tumor, encasing the left lung and narrowing the left bronchi with emphysema, septal thickening in the left lung due to the finding ticks spread  Acute on chronic respiratory failure with hypoxemia [facility])  Assessment & Plan  Patient has chronic hypoxic respiratory failure due to COPD with emphysema and left-sided advanced stage small cell lung cancer with worsening tumor burden  She is on oxygen via nasal cannula at 6 L/ minute for last the 2 weeks  CT PE checked without PE  Redemonstrated extensive left pleural tumor, encasing the left lung and narrowing the left bronchi with emphysema, septal thickening in the left lung due to the finding ticks spread  Wean oxygenation as able  Home oxygen eval results:  Baseline SPO2 on Room Air at rest 89-86 %   SPO2 on Oxygen at rest 90 % 6 lpm  SPO2 during exercise on Oxygen 92-90% liter flow of 8 lpm     Working on getting her an oxygen concentrator with the ability to provide oxygen at about 10 L with CM  PT/OT recommend home with skilled therapy, patient has declined  Medically stable for discharge home          * Type 2 myocardial infarction [facility])  Assessment & Plan  Chest pain with slightly elevated troponin due to Type 2 MI likely from hypoxemia, will rule out pulmonary embolism especially given hypercoagulable state with history of stage IV small cell lung cancer unresponsive to treatment  EKG: symmetric T-wave inversions in leads V1 through V4  Troponin peaked at 0 18 and down trended  Stop trending troponin  Continue aspirin, atorvastatin, metoprolol        Anxiety  Assessment & Plan  Associated with ongoing issues with lung cancer  Initiate 0 25 mg of Ativan q 6 hours p r n  Narcotic dependency, continuous [facility])  Assessment & Plan  Patient has continuous narcotic dependency due to left-sided small cell lung cancer  [place]MP reviewed  continue morphine sulfate    Severe protein-calorie malnutrition (HCC)  Assessment & Plan  Malnutrition Findings:   Adult Malnutrition type: Chronic illness  Adult Degree of Malnutrition: Other severe protein calorie malnutrition Fat loss, Muscle loss, Inadequate energy, Weight loss(Pt presents with severe protein calorie malnutrition as evidneced by prominent clavicle bone protrusion, oribtail hollowing, temporal wasting, 7% wt loss in)    BMI Findings:  Adult BMI Classifications: Underweight < 18 5   15 37  Body mass index is 15 75 kg/m²  Nutrition consult     Small cell lung cancer [facility])  Assessment & Plan  Patient has advanced stage small cell lung cancer, she was on chemotherapy  Hospice was offered her last office visit the patient declined  She follows a palliative care    Chest x-ray findings are probably reflective of small cell lung cancer  CT PE: No pulmonary embolus  Redemonstration of extensive left pleural tumor, encasing the left lung and narrowing the left bronchi  Emphysema   Septal thickening in the left lung due to lymphangitic spread of tumor versus lymphatic obstruction    Chart review patient was to start treatment with Onivyde on 03/01, Hematology informed of hospitalization  Will need to follow-up outpatient  Needs goals of care discussion        Pulmonary emphysema [facility])  Assessment & Plan  Patient has chronic COPD, I hear no signs of expiratory wheezing  She does not have COPD exacerbation    Continue albuterol and Spiriva      Discharging Physician / Practitioner: Key Mendez MD  PCP: Vinita Dasilva DO  Admission Date:   Admission Orders (From admission, onward)     Ordered        02/28/21 0941  Inpatient Admission  Once                   Discharge Date: 03/03/21    Resolved Problems  Date Reviewed: 2/17/2021    None          Consultations During Hospital Stay:  ·     Procedures Performed:   ·     Significant Findings / Test Results:   · CTA chest PE study:  FINDINGS:     PULMONARY ARTERIES:  No pulmonary embolus       LUNGS:  Compromised by motion  Moderate emphysema  Mild atelectasis in the left lower lobe  Persistent septal thickening in the left lung      AIRWAYS: No significant filling defects in the airways      PLEURA:  Redemonstration of extensive pleural tumor encasing the left lung      HEART/GREAT VESSELS:  Unremarkable for patient's age      MEDIASTINUM AND MINA:  Redemonstration of left pleural tumor narrowing the left bronchi    Right port at cavoatrial junction      CHEST WALL AND LOWER NECK:   Unremarkable      UPPER ABDOMEN:  Unremarkable      OSSEOUS STRUCTURES:  Mild degenerative disease in the spine      IMPRESSION:     No pulmonary embolus      Redemonstration of extensive left pleural tumor, encasing the left lung and narrowing the left bronchi      Emphysema      Septal thickening in the left lung due to lymphangitic spread of tumor versus lymphatic obstruction    Incidental Findings:   ·      Test Results Pending at Discharge (will require follow up):   ·      Outpatient Tests Requested:  ·     Complications:      Reason for Admission:  Worsening shortness of breath    Hospital Course:     Willard Haney is a 78 y o  female with stage IV lung cancer patient who originally presented to the hospital on 2/28/2021 due to progressively worsening shortness of breath  Over the past 2 weeks patient has noted increase in oxygen requirement now up to 6 L  Blood work unrevealing, given tachycardia concerns for worsening shortness of breath a CT PE was done which was negative for PE however showed redemonstration of extensive pleural tumor encasing the left lung, mild atelectasis, moderate emphysema, redemonstration of left pleural tumor narrowing the left bronchi  The above findings were discussed with the patient  How oxygen requirement has remained stable at 6 L with occasional desaturation on exertion  She underwent a home oxygen evaluation and will receive oxygen concentrators deliver up to 10 L of oxygen  She was seen by physical and occupational therapy who offered home with skilled therapy to which she declined  Of note patient was to begin a new infusion (chemotherapy) on 01/30/2021 which she missed given inpatient status  I reached out to Hematology the let them know she was here  She will need to schedule an appointment to follow-up outpatient  No medication changes were made on this admission  Please see above list of diagnoses and related plan for additional information  Condition at Discharge: stable     Discharge Day Visit / Exam:     * Please refer to separate progress note for these details *    Discussion with Family:  Declined family communication    Discharge instructions/Information to patient and family:   See after visit summary for information provided to patient and family  Provisions for Follow-Up Care:  See after visit summary for information related to follow-up care and any pertinent home health orders        Disposition:     Home    For Discharges to [facility] SNF:   · Not Applicable to this Patient - Not Applicable to this Patient    Planned Readmission:  No Discharge Statement:  I spent 55 minutes discharging the patient  This time was spent on the day of discharge  I had direct contact with the patient on the day of discharge  Greater than 50% of the total time was spent examining patient, answering all patient questions, arranging and discussing plan of care with patient as well as directly providing post-discharge instructions  Additional time then spent on discharge activities  Discharge Medications:  See after visit summary for reconciled discharge medications provided to patient and family        ** Please Note: This note has been constructed using a voice recognition system **

## 2021-03-03 NOTE — DISCHARGE INSTR - AVS FIRST PAGE
Dear Sumaya Martinez,     It was our pleasure to care for you here at [facility], [address]  It is our hope that we were always able to exceed the expected standards for your care during your stay  You were hospitalized due to worsening shortness of breath  You evaluated and found not to have an infection  We also checked a CT scan of your lungs to evaluate and also found no infection of blood clots  You were evaluated for oxygen requirement  You will have new oxygen tanks delivered to your home  You were cared for on the medicine floor by Vanessa Go MD with the [facility] who covers for your primary care physician (PCP), Nathalie Smalls DO, while you were hospitalized  If you have any questions or concerns related to this hospitalization, you may contact us at [phone]  For follow up as well as any medication refills, we recommend that you follow up with your primary care physician  A registered nurse will reach out to you by phone within a few days after your discharge to answer any additional questions that you may have after going home  However, at this time we provide for you here, the most important instructions / recommendations at discharge:     · Notable Medication Adjustments -   · None  · Testing Required after Discharge -   ·   · Important follow up information -   · Primary care physician within 1 week  · Hematology and oncology, call and schedule an appointment  · Pulmonary as scheduled  · Other Instructions -   ·   · Please review this entire after visit summary as additional general instructions including medication list, appointments, activity, diet, any pertinent wound care, and other additional recommendations from your care team that may be provided for you        Sincerely,     Vanessa Go MD

## 2021-03-03 NOTE — ASSESSMENT & PLAN NOTE
Patient has chronic hypoxic respiratory failure due to COPD with emphysema and left-sided advanced stage small cell lung cancer with worsening tumor burden  She is on oxygen via nasal cannula at 6 L/ minute for last the 2 weeks  CT PE checked without PE  Redemonstrated extensive left pleural tumor, encasing the left lung and narrowing the left bronchi with emphysema, septal thickening in the left lung due to the finding ticks spread  Wean oxygenation as able  Home oxygen eval results:  Baseline SPO2 on Room Air at rest 89-86 %   SPO2 on Oxygen at rest 90 % 6 lpm  SPO2 during exercise on Oxygen 92-90% liter flow of 8 lpm     Working on getting her an oxygen concentrator with the ability to provide oxygen at about 10 L with CM  PT/OT recommend home with skilled therapy, patient has declined  Medically stable for discharge home

## 2021-03-03 NOTE — ASSESSMENT & PLAN NOTE
Malnutrition Findings:   Adult Malnutrition type: Chronic illness  Adult Degree of Malnutrition: Other severe protein calorie malnutrition Fat loss, Muscle loss, Inadequate energy, Weight loss(Pt presents with severe protein calorie malnutrition as evidneced by prominent clavicle bone protrusion, oribtail hollowing, temporal wasting, 7% wt loss in)    BMI Findings:  Adult BMI Classifications: Underweight < 18 5   15 37  Body mass index is 15 75 kg/m²     Nutrition consult

## 2021-03-03 NOTE — DISCHARGE INSTRUCTIONS
Acute Respiratory Failure   WHAT YOU NEED TO KNOW:   What is acute respiratory failure (ARF)? ARF is a condition that happens when your lungs cannot get enough oxygen into your blood  ARF can also happen when your lungs cannot get the carbon dioxide out of your blood  A buildup of carbon dioxide in your blood can cause damage to your organs  The decrease in oxygen and the buildup of carbon dioxide can happen at the same time  Acute respiratory failure may develop in minutes, hours, or days  What causes ARF?   · Pneumonia, fluid buildup in the lungs from heart failure, or acute respiratory distress syndrome (ARDS)    · A condition such as Guillain-Barré syndrome or myasthenia gravis that affects nerves or muscles used for breathing    · A blood clot in your lungs (pulmonary embolism, or PE) or brain (stroke)    · An exacerbation of COPD or asthma    · A lung condition such as cystic fibrosis or bronchiolitis    · A heart attack    · Medicines or drugs that slow your breathing, such as opioids or alcohol    · A head trauma or a collapsed lung    What are the symptoms of ARF? Symptoms depend on the kind of ARF you have, and if it is mild, moderate, or severe:  · Rapid or noisy breathing    · Flushed skin    · Heart rate that is faster or slower than usual    · Shortness of breath, dizziness, or headaches    · Muscle twitches, trouble walking, or tingling in your hands or feet    · Feeling drowsy or sleepy during the day, confusion, or mood changes    · Swelling of your hands and feet    · A bluish color on your skin, fingernails, and lips, or pale skin    · Vision problems, seizures, or loss of consciousness    How is ARF diagnosed? Your healthcare provider will ask about your symptoms and when they began  Tests do not always diagnose ARF  Your provider will also look for signs of medical conditions that may mean you have ARF  Examples include cor pulmonale and pulmonary hypertension   You may need any of the following:  · An arterial blood gas (ABG) test  measures the amount of oxygen and carbon dioxide in your blood  An ABG test also measures the pH of your blood  · Pulse oximetry  will show the decrease in blood oxygen without having to draw blood  How is ARF treated? Treatment depends on the cause and how severe your symptoms are  You may need any of the following:  · Medicines  may be given to open your airways or treat a lung or heart disease  Medicine may be used to relieve fluid buildup in your arms or legs  Antibiotics may be given for a bacterial infection, such as pneumonia  Medicine may be given to reverse the effect of opioid medicine if you took too much  Blood thinners may be needed if you have a PE     · Oxygen  may be given if your blood oxygen levels are low  · Ventilation  helps get oxygen into your lungs and carbon dioxide out  Ventilation also makes the work of breathing easier  Some systems, such as a CPAP or BiPAP, may only be needed while you sleep  A mechanical ventilator may be needed some or all of the time  It is attached to a mask or breathing tube  · A procedure  may be used to drain fluid if you have a pleural effusion (fluid between the lining of the lungs and the chest)  Air may be drained from around your lung if you have a collapsed lung  What can I do to manage or prevent shortness of breath? · Use your oxygen as directed  You may need extra oxygen if your blood oxygen level is lower than it should be  · Use pursed-lip breathing any time you feel short of breath  Take a deep breath in through your nose  Slowly breathe out through your mouth with your lips pursed for twice as long as you inhaled  You can also practice this breathing pattern while you bend, lift, climb stairs, or exercise  It slows down your breathing and helps move more air in and out of your lungs  What can I do to manage ARF?   · Do not smoke    Nicotine and other chemicals in cigarettes and cigars can cause lung damage and make your symptoms worse  Ask your healthcare provider for information if you currently smoke and need help to quit  E-cigarettes or smokeless tobacco still contain nicotine  Talk to your healthcare provider before you use these products  Secondhand smoke can also make your symptoms worse  Avoid others who are smoking  · Prevent the spread of germs  Wash your hands often with soap and water  Use gel hand cleanser when there is no soap and water available  Do not touch your eyes, nose, or mouth unless you have washed your hands first  Cover your mouth when you cough  Cough into a tissue or your shirtsleeve so you do not spread germs from your hands  If you are sick, stay away from others as much as possible  · Limit alcohol  A drink of alcohol is 12 ounces of beer, 5 ounces of wine, or 1½ ounces of liquor  · Ask about vaccines you may need  Vaccines can help prevent some lung infections  Examples include pneumonia, pertussis (whooping cough), tuberculosis (TB), and diphtheria  Get a flu vaccine every year as soon as it becomes available  · Prepare for emergencies  Keep phone numbers for your healthcare provider, hospital, and someone close to you with you at all times  Call your local emergency number (911 in the [address] [place] [address],[address]), or have someone close to you watch and call if:   · You are having more trouble catching your breath  · You have stopped breathing  When should I call my doctor? · You have new symptoms  · Your symptoms get worse  · You have questions or concerns about your condition or care  CARE AGREEMENT:   You have the right to help plan your care  Learn about your health condition and how it may be treated  Discuss treatment options with your healthcare providers to decide what care you want to receive  You always have the right to refuse treatment  The above information is an  only   It is not intended as medical advice for individual conditions or treatments  Talk to your doctor, nurse or pharmacist before following any medical regimen to see if it is safe and effective for you  © Copyright [address] Information is for End User's use only and may not be sold, redistributed or otherwise used for commercial purposes   All illustrations and images included in CareNotes® are the copyrighted property of A D A M , Inc  or [address] [place] [address]

## 2021-03-03 NOTE — ASSESSMENT & PLAN NOTE
Patient has advanced stage small cell lung cancer, she was on chemotherapy  Hospice was offered her last office visit the patient declined  She follows a palliative care    Chest x-ray findings are probably reflective of small cell lung cancer  CT PE: No pulmonary embolus  Redemonstration of extensive left pleural tumor, encasing the left lung and narrowing the left bronchi  Emphysema  Septal thickening in the left lung due to lymphangitic spread of tumor versus lymphatic obstruction    Chart review patient was to start treatment with Onivyde on 03/01, Hematology informed of hospitalization    Will need to follow-up outpatient  Needs goals of care discussion

## 2021-03-03 NOTE — RESPIRATORY THERAPY NOTE
Home Oxygen Qualifying Test       Patient name: Tamiko Morejon        : 1950   Date of Test:  March 3, 2021  Diagnosis:      Home Oxygen Test:    Medicare Guidelines require item(s) 1-5 on all ambulatory patients or 1 and 2 on non-ambulatory patients  1   Baseline SPO2 on Room Air at rest 89-86 %  2   SPO2 during exercise on Room Air  %  During exercise monitor SpO2  If SPO2 increases >=89% with ambulation do not add supplemental             oxygen  If <= 88% on room air add O2 via NC and titrate patient  Patient must be ambulated with O2 and titrated to > 88% with exertion  3   SPO2 on Oxygen at rest 90 % 6 lpm     4   SPO2 during exercise on [id number]  % a liter flow of 8 lpm     5   Exercise performed:                   [x]  Supplemental Home Oxygen is indicated  []  Client does not qualify for home oxygen        Respiratory Additional Notes-     Lem Saint, RT

## 2021-03-03 NOTE — CASE MANAGEMENT
LOS: 3  Patient for discharge today and needs higher flow of Home O2  She is current with [facility]  Referral made to them for increased  Home O2(  7 liters)  Spoke with Sherri Caceres of [facility] at [phone] and he will deliver an E tank to patient's room at 3:30 and will arranged [facility] to deliver a larger concentrator to patient's home after 4:30 once she is home since she reports she had no one who could be there to receive the concentrator  Offered VNA services and again patient refused  Her friend,pippa Cordoba and Edna Hagan will transport her home  In basket message sent to PCP for TCM appointment

## 2021-03-04 ENCOUNTER — TELEPHONE (OUTPATIENT)
Dept: HEMATOLOGY ONCOLOGY | Facility: CLINIC | Age: 71
End: 2021-03-04

## 2021-03-04 ENCOUNTER — DOCUMENTATION (OUTPATIENT)
Dept: PULMONOLOGY | Facility: CLINIC | Age: 71
End: 2021-03-04

## 2021-03-04 DIAGNOSIS — C34.32 MALIGNANT NEOPLASM OF LOWER LOBE OF LEFT LUNG (HCC): ICD-10-CM

## 2021-03-04 DIAGNOSIS — J44.9 CHRONIC OBSTRUCTIVE PULMONARY DISEASE, UNSPECIFIED COPD TYPE (HCC): Primary | ICD-10-CM

## 2021-03-04 RX ORDER — ALBUTEROL SULFATE 2.5 MG/3ML
2.5 SOLUTION RESPIRATORY (INHALATION) EVERY 6 HOURS PRN
Qty: 180 VIAL | Refills: 5 | Status: SHIPPED | OUTPATIENT
Start: 2021-03-04

## 2021-03-04 NOTE — PROGRESS NOTES
Hematology/Oncology Outpatient Follow- up Note  Kayley Goodwin, 1950, [id number]  3/5/2021        Chief Complaint   Patient presents with    Follow-up       HPI:  Narendra Gillespie a 51-year-old female with newly diagnosed stage IV small cell lung cancer   The patient had a pleural effusion which was drained which revealed poorly differentiated malignant neoplasm with neuroendocrine differentiation   She had a PET-CT scan done which showed FDG avid left lower lobe nodule suspicious for malignancy along with focal FDG uptake in the left perihilar region for which glenn metastases could not be excluded   There was extensive heterogeneous FDG uptake along the left pleural margin compatible with pleural metastases  Rosanna Gooden was no other hypermetabolic metastases in the neck abdomen or pelvis   There was decreased left pleural fluid posteriorly secondary to pleural catheter placement but loculated fluid was increasing anteriorly  She has a PleurX catheter in place    Patient was seen for initial consultation by Dr Joey Nobles on 11/20/2020  Yanely Lazaro has been on treatment with carboplatin, etoposide and Imfinzi  She was treated with course of palliative RT to left parasternal region in 2/2021    Imaging completed on 2/16/21 demonstrated a slight progression of extensive left pleural-based and left hilar tumor with increase in overlying atelectasis and increase in mass effect on the heart/mediastinal structures  Treatment plan changed to single agent Onivyde    Previous Hematologic/ Oncologic History:    Oncology History   Small cell lung cancer ([address] )   11/2020 Initial Diagnosis    Small cell lung cancer ([facility] [address] )     11/3/2020 Biopsy    Pleural fluid, left:     - Poorly-differentiated malignant neoplasm with neuroendocrine differentiation  11/10/2020 Biopsy    Cytology:   Pleural Fluid, thoracentesis:     Fluid removed amount:  1750 cc  Positive for malignancy  Poorly differentiated carcinoma          11/23/2020 - 3/7/2021 Chemotherapy    pegfilgrastim (NEULASTA ONPRO) subcutaneous injection kit 6 mg, 6 mg, Subcutaneous, Once, 4 of 5 cycles  Administration: 6 mg (11/25/2020), 6 mg (12/16/2020), 6 mg (1/6/2021), 6 mg (1/28/2021)  fosaprepitant (EMEND) 150 mg in sodium chloride 0 9 % 250 mL IVPB, 150 mg, Intravenous, Once, 4 of 5 cycles  Administration: 150 mg (11/23/2020), 150 mg (12/14/2020), 150 mg (1/4/2021), 150 mg (1/26/2021)  CARBOplatin (PARAPLATIN) 402 mg in sodium chloride 0 9 % 250 mL IVPB, 402 mg, Intravenous, Once, 4 of 5 cycles  Administration: 402 mg (11/23/2020), 371 5 mg (12/14/2020), 387 5 mg (1/4/2021), 310 mg (1/26/2021)  Durvalumab 1,500 mg in sodium chloride 0 9 % 100 mL IVPB, 1,500 mg (original dose 10 mg/kg), Intravenous, Once, 5 of 6 cycles  Dose modification: 1,500 mg (original dose 10 mg/kg, Cycle 1, Reason: Other (See Comments))  Administration: 1,500 mg (11/23/2020), 1,500 mg (12/14/2020), 1,500 mg (1/4/2021), 1,500 mg (1/26/2021), 1,500 mg (2/15/2021)  etoposide (TOPOSAR) 152 mg in sodium chloride 0 9 % 500 mL chemo infusion, 100 mg/m2 = 152 mg, Intravenous, Once, 4 of 5 cycles  Dose modification: 80 mg/m2 (original dose 100 mg/m2, Cycle 4, Reason: Dose Not Tolerated)  Administration: 152 mg (11/23/2020), 152 mg (11/25/2020), 152 mg (11/24/2020), 152 mg (12/14/2020), 152 mg (12/15/2020), 152 mg (12/16/2020), 144 mg (1/4/2021), 144 mg (1/5/2021), 144 mg (1/6/2021), 115 2 mg (1/26/2021), 115 2 mg (1/27/2021), 115 2 mg (1/28/2021)     3/1/2021 -  Chemotherapy    [No matching medication found in this treatment plan]     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[facility] [address] )   1/26/2021 Initial Diagnosis    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[facility] [address] )     3/1/2021 -  Chemotherapy    [No matching medication found in this treatment plan]     Palliative radiation therapy to left chest wall   Carboplatin and etoposide along with Darvalumab     Current Hematologic/ Oncologic Treatment:    single agent Onivyde 70mg/m2 every 2 weeks based off :  DOI: [facility]  2019 37 15_suppl  [address], no  15_suppl (May 20, 2019) [id number]  First treatment scheduled for 3/9    ECO - Symptomatic, <50% confined to bed    Interval History:   The patient presents for hospital follow up  She was hospitalized at [facility] for worsening dyspnea and chest pain  She was found to have a type 2 MI from hypoxemia  Her supplemental oxygen was titrated up prior to discharge on 3/3  She is using supplemental O2, now 7L at baseline  At her last visit we discussed change in therapy to single agent Onivyde  This was due to start on 3/1 but was held secondary to hospitalization  She does endorse anxiety, especially when she feels short of breath  She was given lorazepam while hospitalized, this helped her anxiety  Currently denies any chest pain  She does have intermittent sharp pain in her right anterior and lateral chest wall, she is using her morphine as prescribed by palliative care approximately 5x/day  She may benefit from long acting opioid  I did discuss with her that her worsening pain and dyspnea is due to progressive disease  Her mass is compressing on her heart  She remains treatment focused  Cancer Staging:  Cancer Staging  Lung cancer [facility])  Staging form: Lung, AJCC 8th Edition  - Clinical: No stage assigned - Unsigned      Molecular Testing:       Test Results:    Imaging: Xr Chest Portable    Result Date: 2021  Narrative: CHEST INDICATION:   Acute Resp Failure  COMPARISON:  2021, 2021  EXAM PERFORMED/VIEWS:  XR CHEST PORTABLE FINDINGS:  Right internal jugular chest port in satisfactory position  Cardiomediastinal silhouette appears unremarkable  Unchanged left lung masses  Emphysematous changes are noted consistent with chronic obstructive pulmonary disease  Small left pleural effusion again seen  Osseous structures appear within normal limits for patient age       Impression: No acute cardiopulmonary disease  Left lung masses and small left pleural effusion  Workstation performed: [id number]     Ct Chest Abdomen Pelvis W Contrast    Result Date: 1/20/2021  Narrative: CT CHEST, ABDOMEN AND PELVIS WITH IV CONTRAST INDICATION:   C34 90: Malignant neoplasm of unspecified part of unspecified bronchus or lung  COMPARISON:  CT chest abdomen and pelvis 12/18/2020 TECHNIQUE: CT examination of the chest, abdomen and pelvis was performed  Axial, sagittal, and coronal 2D reformatted images were created from the source data and submitted for interpretation  Radiation dose length product (DLP) for this visit:  394 69 mGy-cm   This examination, like all CT scans performed in the [facility], was performed utilizing techniques to minimize radiation dose exposure, including the use of iterative  reconstruction and automated exposure control  IV Contrast:  100 mL of iohexol (OMNIPAQUE)   350 Enteric Contrast: Enteric contrast was administered  FINDINGS: CHEST LUNGS:  1 8 x 1 5 x 2 7 cm mass left lower lobe image 29 series 2 and image 78 series 602 unchanged when measured in the same fashion  Progressive subtotal left basilar atelectasis  Progressive minimal subsegmental atelectasis inferior lingula  Minimal linear subsegmental atelectasis/scar right lung base  Severe pulmonary emphysema  Central airways are clear  PLEURA:  Interval removal of pleural drainage catheter  Small multiloculated left pleural effusion with minimal peripheral enhancement intervally smaller  Diffuse left pleural nodularity slightly increased  For example, medial pleural paracardiac pleural  mass measures 3 3 x 2 2 cm image 49 series 2 previously 2 4 x 1 3 cm  HEART/GREAT VESSELS:  Heart is not enlarged  No pericardial effusion  Aortic calcification  Tip of portacatheter in the right atrium  MEDIASTINUM AND MINA:  Progressive left hilar adenopathy   1 1 cm short axis anterior and posterior left hilar lymph nodes image 33 series 2 previously 0 8 cm  CHEST WALL AND LOWER NECK:   Minimal interval worsening of left chest wall soft tissue nodularity compatible with infiltrative disease  For example, left anterior paramedian deep chest wall soft tissue nodularity measuring 1 3 cm image 30 series 2 previously 1 cm  Left 10th posterolateral intercostal soft tissue lesion measures 1 6 x 0 9 cm image 52 series 2 previously 1 1 x 0 6 cm when measured in the same fashion  Right anterior chest wall anoop catheter  ABDOMEN LIVER/BILIARY TREE:  Unremarkable  GALLBLADDER:  No calcified gallstones  No pericholecystic inflammatory change  SPLEEN:  Unremarkable  PANCREAS:  Unremarkable  ADRENAL GLANDS:  Unremarkable  KIDNEYS/URETERS: One or more sharply circumscribed subcentimeter renal hypodensities are noted  These lesions are too small to accurately characterize, but are statistically most likely to represent benign cortical renal cyst(s)  According to the guidelines published in the [place] Paper of the ACR Incidental Findings Committee (Radiology 2010), no further workup of these lesions is recommended    Kidneys otherwise unremarkable  No perinephric collection  STOMACH AND BOWEL:  Unremarkable  APPENDIX:  No findings to suggest appendicitis  ABDOMINOPELVIC CAVITY:  No ascites  No pneumoperitoneum  No lymphadenopathy  VESSELS:  Aortoiliac calcification  No aneurysm  PELVIS REPRODUCTIVE ORGANS:  Unremarkable for patient's age  URINARY BLADDER:  Collapsed bladder limits its evaluation  ABDOMINAL WALL/INGUINAL REGIONS:  Unremarkable  OSSEOUS STRUCTURES:  No acute fracture or osseous destructive lesion identified  Degenerative changes of the spine, pubic symphysis, and multiple joints  Stable minimal grade 1 degenerative retrolisthesis L4 on L5  Mild dextroconvex lumbar scoliosis  Impression: CT chest: 2 7 cm left lower lobe subpleural mass not significantly changed since December 18, 2020 when measured in the same fashion   Progressive left pleural carcinomatosis with multifocal chest wall infiltration  New mild left hilar adenopathy  Smaller left multiloculated malignant pleural effusion  CT abdomen and pelvis: No evidence of abdominopelvic metastatic disease  This study demonstrates a significant  finding and was documented as such in [facility] for liaison and referring practitioner notification  Workstation performed: [id number]     Ct Radiation Therapy    Result Date: 2/5/2021  Narrative: CT RADIATION PLANNING (CT CHEST WITHOUT IV CONTRAST) INDICATION:   C34 92: Malignant neoplasm of unspecified part of left bronchus or lung  COMPARISON:  CT chest abdomen pelvis 1/20/2021 TECHNIQUE: A CT scan of the chest was performed without intravenous contrast  This examination, like all CT scans performed in the [facility], was performed utilizing techniques to minimize radiation dose exposure, including the use of iterative reconstruction and automated exposure control  Examination was performed according to a protocol specifically designed for the purposes of radiation therapy planning and is of limited diagnostic sensitivity  Radiation dose length product (DLP) for this visit:  228 44 mGy-cm   FINDINGS: Diffuse emphysema is again seen  Diffuse pleural nodular thickening and basilar consistent with carcinomatosis and basilar consolidation are again noted  Left hilar lymphadenopathy is present though better visualized on the prior contrast-enhanced CT  No new focal consolidation or pneumothorax identified  Limited evaluation of the upper abdomen is unremarkable  No destructive osseous lesions identified  Impression: CT performed for radiation planning purposes  No significant change from prior CT 1/20/2021  Workstation performed: Lukas Fowler Chest Pe Study    Result Date: 2/16/2021  Narrative: CTA - CHEST WITH IV CONTRAST - PULMONARY ANGIOGRAM INDICATION:   Shortness of breath, active CA, elevated D-dimer  COMPARISON: None  TECHNIQUE: CTA examination of the chest was performed using angiographic technique according to a protocol specifically tailored to evaluate for pulmonary embolism  Axial, sagittal, and coronal 2D reformatted images were created from the source data and  submitted for interpretation  In addition, coronal 3D MIP postprocessing was performed on the acquisition scanner  Radiation dose length product (DLP) for this visit:  167 65 mGy-cm   This examination, like all CT scans performed in the [facility], was performed utilizing techniques to minimize radiation dose exposure, including the use of iterative  reconstruction and automated exposure control  IV Contrast:  100 mL of iohexol (OMNIPAQUE)  FINDINGS: PULMONARY ARTERIAL TREE:  No pulmonary embolus is seen  LUNGS:  Centrilobular and paraseptal emphysematous changes are reidentified  There is extensive atelectasis in the left lung base, similar from previous examination  There is likely invasion of pleural-based tumor into the lingula, though this is better characterized on January 20, 2021 CT performed on the more delayed phases after the administration of IV contrast   No tracheal or endobronchial mass  No new pulmonary mass in the aerated lung zones  No consolidated or groundglass airspace opacity in the aerated lung zones to suggest superimposed infectious process  PLEURA:  There is lobulated pleural-based tumor mass throughout the left hemithorax most notably in the base of the left hemithorax along left hemidiaphragm and throughout the left costophrenic angle  The overall volume of tumor appears somewhat increased when compared to January 20, 2021  A representative lesion deep to the left 7th rib just posterior to the major fissure measures approximately 2 9 x 2 7 cm on image 131 of series 2, compared with 2 2 x 1 9 cm when measured using similar technique on January 2021    Maximal thickness of representative pleural tumor mass deep to the lateral left 3rd rib measures up to 14 mm on image 79 of series 2, compared with no greater than 9 mm on January 2021  There is some loculated pleural fluid in the left chest, the fluid is difficult to differentiate from tumor and pulmonary angiogram images  In the medial left costophrenic angle on image 191 of series 2, some combination of tumor and/or fluid measures up to 2 7 cm in thickness compared with 2 1 cm when measured using similar technique on January 20, 2021  HEART/GREAT VESSELS:  Mass effect from tumor and pleural fluid in the left hemithorax results in increased mass effect when compared to previous examination showing the heart slightly more to the right than on the prior examination  There is narrowing of right upper and right lower pulmonary venous branches  There is encasement of pulmonary arteries and pulmonary dural branches with no greater than minimal narrowing MEDIASTINUM AND MINA:  Straightening of left hilar tumor mass appears slightly progressed in volume when compared to prior examination with soft tissue density nodules anterior and posterior to the lower pole left pulmonary artery measuring 12 and 11 mm on image 134 of series 2 compared with 10 and 8 mm when measured using similar technique on the previous examination  CHEST WALL AND LOWER NECK:   Patient is somewhat cachectic  Reflux of injected very high density contrast is seen throughout the left arm and lower neck due to probably due to narrowing of the left brachiocephalic vein at the level of the thoracic inlet  High right heart pressures could also contribute to this reflux of contrast  VISUALIZED STRUCTURES IN THE UPPER ABDOMEN:  Unremarkable  OSSEOUS STRUCTURES:  No acute fracture or destructive osseous lesion  Impression: No pulmonary embolus   Slight progression of extensive left pleural-based and left hilar tumor with increase in overlying atelectasis and increase in mass effect on the heart/mediastinal structures  Workstation performed: [id number]       Labs:   Lab Results   Component Value Date    WBC 9 42 03/03/2021    HGB 12 0 03/03/2021    HCT 38 1 03/03/2021    MCV 97 03/03/2021     03/03/2021     Lab Results   Component Value Date    K 4 6 03/03/2021     03/03/2021    CO2 27 03/03/2021    BUN 16 03/03/2021    CREATININE 0 63 03/03/2021    GLUF 101 (H) 02/26/2021    CALCIUM 8 5 03/03/2021    CORRECTEDCA 9 8 02/28/2021    AST 37 02/28/2021    ALT 21 02/28/2021    ALKPHOS 122 (H) 02/28/2021    EGFR 92 03/03/2021           Review of Systems   Constitutional: Positive for fatigue  Respiratory: Positive for cough, chest tightness and shortness of breath  Neurological: Positive for weakness  Psychiatric/Behavioral: The patient is nervous/anxious  All other systems reviewed and are negative          Active Problems:   Patient Active Problem List   Diagnosis    Chronic fatigue syndrome with fibromyalgia    Diarrhea    Weight loss    Abnormal LFTs    History of colon polyps    Right lower quadrant abdominal pain    Shortness of breath    Pleural effusion    Abnormal CT of the chest    Tobacco abuse    Elevated troponin    Elevated blood pressure reading    Acute on chronic respiratory failure with hypoxia (HCC)    Pulmonary emphysema (HCC)    Small cell lung cancer (HCC)    Chemotherapy induced neutropenia (HCC)    Chest pain    Lower extremity edema    Leukocytosis    Elevated d-dimer    Oral thrush    Severe protein-calorie malnutrition (HCC)    Lung cancer (HCC)    Cancer associated pain    Hemoptysis    Chronic bilateral thoracic back pain    Advanced care planning/counseling discussion    Port-A-Cath in place    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HCC)    CINV (chemotherapy-induced nausea and vomiting)    Anterior chest wall pain    Type 2 myocardial infarction (HCC)    Narcotic dependency, continuous (HCC)    Acute on chronic respiratory failure with hypoxemia (HCC)    SIRS (systemic inflammatory response syndrome) (HCC)    Anxiety       Past Medical History:   Past Medical History:   Diagnosis Date    Chronic fatigue syndrome with fibromyalgia     COPD (chronic obstructive pulmonary disease) (HCC)     DDD (degenerative disc disease), cervical     Emphysema of lung ([facility] [address] )     Hx of migraine headaches     Hypothyroidism     Lung cancer ([facility] )        Surgical History:   Past Surgical History:   Procedure Laterality Date    BARTHOLIN GLAND CYST EXCISION      CARPAL TUNNEL RELEASE Bilateral     COLONOSCOPY  07/09/2020     15 mm sessile polyp in the cecum removed by polypectomy  A 3 recall    IR PLEURAL EFFUSION LONG-TERM CATHETER PLACEMENT  11/13/2020    IR PLEURAL EFFUSION LONG-TERM CATHETER REMOVAL  12/21/2020    IR PORT PLACEMENT  12/11/2020    IR THORACENTESIS  11/3/2020    TONSILLECTOMY      TUBAL LIGATION      UPPER GASTROINTESTINAL ENDOSCOPY  07/09/2020     small hiatal hernia  Pathology negative  Family History:    Family History   Problem Relation Age of Onset    Colon polyps Mother     Heart disease Mother     Colon cancer Mother     Heart disease Father     Lymphoma Father        Cancer-related family history includes Colon cancer in her mother; Lymphoma in her father      Social History:   Social History     Socioeconomic History    Marital status:      Spouse name: Not on file    Number of children: Not on file    Years of education: Not on file    Highest education level: Not on file   Occupational History    Not on file   Social Needs    Financial resource strain: Not on file    Food insecurity     Worry: Not on file     Inability: Not on file   [employer] needs     Medical: Not on file     Non-medical: Not on file   Tobacco Use    Smoking status: Former Smoker     Packs/day: 0 50     Years: 53 00     Pack years: 26 50     Types: Cigarettes     Start date: 1967    Smokeless tobacco: Never Used    Tobacco comment: 2 cigarettes daily   Substance and Sexual Activity    Alcohol use: Not Currently    Drug use: Not Currently    Sexual activity: Not Currently   Lifestyle    Physical activity     Days per week: Not on file     Minutes per session: Not on file    Stress: Not on file   Relationships    Social connections     Talks on phone: Not on file     Gets together: Not on file     Attends Protestant service: Not on file     Active member of club or organization: Not on file     Attends meetings of clubs or organizations: Not on file     Relationship status: Not on file    Intimate partner violence     Fear of current or ex partner: Not on file     Emotionally abused: Not on file     Physically abused: Not on file     Forced sexual activity: Not on file   Other Topics Concern    Not on file   Social History Narrative    Not on file       Current Medications:   Current Outpatient Medications   Medication Sig Dispense Refill    acetaminophen (TYLENOL) 500 mg tablet Take 2 tablets (1,000 mg total) by mouth every 8 (eight) hours      albuterol (2 5 mg/3 mL) 0 083 % nebulizer solution Take 1 vial (2 5 mg total) by nebulization every 6 (six) hours as needed for wheezing or shortness of breath 180 vial 5    albuterol (ProAir HFA) 90 mcg/act inhaler Inhale 2 puffs every 6 (six) hours as needed for wheezing or shortness of breath 8 5 g 6    B Complex-Biotin-FA (TH VITAMIN B 50/B-COMPLEX) TABS Take by mouth daily      Capsaicin 0 025 % GEL Apply topically 3 (three) times a day as needed       cholecalciferol (VITAMIN D3) 1,000 units tablet Take 7,000 Units by mouth daily      COLCHICINE PO Take 0 5 mg by mouth 3 (three) times a day as needed       cyclobenzaprine (FLEXERIL) 10 mg tablet Take 10 mg by mouth 3 (three) times a day as needed for muscle spasms      dexamethasone (DECADRON) 2 mg tablet Take 1 tablet (2 mg total) by mouth 2 (two) times a day with meals 60 tablet 0    Echinacea 400 MG CAPS Take by mouth 3 (three) times a day as needed      fexofenadine-pseudoephedrine (ALLEGRA-D)  MG per tablet Take 1 tablet by mouth 2 (two) times a day as needed for allergies      GINKGO BILOBA PLUS PO Take by mouth as needed 2 capsules every 2 mornings       Hyaluronic Acid 20-60 MG CAPS Take by mouth 3 (three) times a day       hydrocortisone 2 5 % cream Apply topically 4 (four) times a day as needed for irritation or rash 30 g 0    L-Lysine 500 MG CAPS Take by mouth 3 (three) times a day as needed      Lactobacillus (PROBIOTIC ACIDOPHILUS PO) Take 1 capsule by mouth daily      lidocaine-prilocaine (EMLA) cream Apply topically as needed for mild pain 30-60 min prior to port access   30 g 0    loperamide (IMODIUM) 2 mg capsule Take 2 mg by mouth daily       mirtazapine (REMERON) 30 mg tablet Take 1 tablet (30 mg total) by mouth daily at bedtime 30 tablet 1    morphine (MSIR) 30 MG tablet Take 1 tablet (30 mg total) by mouth every 4 (four) hours as needed for severe painMax Daily Amount: 180 mg 45 tablet 0    NON FORMULARY Take 37 mg by mouth daily [facility] 37 - reported by pt      nystatin (MYCOSTATIN) 500,000 units/5 mL suspension Apply 5 mL (500,000 Units total) to the mouth or throat 4 (four) times a day 500 mL 0    omeprazole (PriLOSEC) 20 mg delayed release capsule Take 1 capsule (20 mg total) by mouth daily 90 capsule 3    ondansetron (ZOFRAN) 4 mg tablet Take 1 tablet (4 mg total) by mouth every 8 (eight) hours as needed for nausea or vomiting 60 tablet 1    other medication, see sig, Take 3 capsules by mouth daily [id number]      oxybutynin (DITROPAN-XL) 10 MG 24 hr tablet TAKE 1 TABLET BY MOUTH ONCE DAILY FOR INCONTINENCE      prochlorperazine (COMPAZINE) 10 mg tablet Take 10 mg by mouth every 6 (six) hours as needed for nausea or vomiting (migraines)      S-Adenosylmethionine (RADHA-E) 200 MG TABS Take by mouth 3 (three) times a day      senna-docusate sodium (SENOKOT-S) 8 6-50 mg per tablet Take 1 tablet by mouth daily 60 tablet 0    sodium chloride (OCEAN) 0 65 % nasal spray 1 spray into each nostril as needed for congestion or rhinitis 30 mL 0    tolterodine (DETROL LA) 2 mg 24 hr capsule       Tragacanth (ASTRAGALUS ROOT) POWD 470 mg 3 (three) times a day as needed      LORazepam (ATIVAN) 0 5 mg tablet Take 1 tablet (0 5 mg total) by mouth every 8 (eight) hours as needed for anxiety 20 tablet 0    umeclidinium-vilanterol (ANORO ELLIPTA) 62 5-25 MCG/INH inhaler Inhale 1 puff daily 1 Inhaler 5     No current facility-administered medications for this visit  Allergies: Allergies   Allergen Reactions    Clarithromycin     Codeine Other (See Comments)     lethergic    Other      PHOSPHATE    Oxycodone Other (See Comments)     lethergic    Trazodone Other (See Comments)     lethergic    Penicillins Rash       Physical Exam:  /82 (BP Location: Right arm)   Pulse (!) 119   Temp (!) 97 3 °F (36 3 °C) (Temporal)   Resp 16   Ht 5' 4 6" (1 641 m)   Wt 44 5 kg (98 lb)   SpO2 (!) 89%   BMI 16 51 kg/m²   Body surface area is 1 45 meters squared  Wt Readings from Last 3 Encounters:   03/05/21 44 5 kg (98 lb)   03/03/21 44 3 kg (97 lb 9 6 oz)   02/25/21 43 1 kg (95 lb)           Physical Exam  Constitutional:       General: She is not in acute distress  Appearance: She is ill-appearing  HENT:      Head: Normocephalic and atraumatic  Mouth/Throat:      Pharynx: No oropharyngeal exudate  Eyes:      General: No scleral icterus  Right eye: No discharge  Left eye: No discharge  Conjunctiva/sclera: Conjunctivae normal    Neck:      Musculoskeletal: Normal range of motion  Cardiovascular:      Rate and Rhythm: Regular rhythm  Tachycardia present  Pulmonary:      Effort: Pulmonary effort is normal  No respiratory distress  Breath sounds: Normal breath sounds  No wheezing     Abdominal:      General: Bowel sounds are normal       Palpations: Abdomen is soft  Musculoskeletal: Normal range of motion  Right lower leg: Edema (pedal) present  Left lower leg: Edema (pedal) present  Lymphadenopathy:      Cervical: No cervical adenopathy  Skin:     General: Skin is warm and dry  Neurological:      General: No focal deficit present  Mental Status: She is alert and oriented to person, place, and time  Psychiatric:         Mood and Affect: Mood normal          Behavior: Behavior normal          Assessment / Plan:    1  Small cell lung cancer ([address] )    2  Anxiety       The patient is a very unfortuante 73-year-old female with 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   Based on her PET-CT scan this seems to be a lung primary as a nodule is seen on the left side  She has been undergoing treatment with carboplatin along with etoposide in combination with immunotherapy with Imfinzi every 3 weeks  Imaging completed at the end of January showed a question of local progression in the form of 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 and some areas of invasion of the chest wall  She was also noticed to have palpable disease in her left chest wall where she has been experiencing significant pain  She was referred to Radiation Oncology and  And completed a course of palliative radiation to the left parasternal region anteriorly in February  Most recent imaging demonstrated disease progression indicating need for change in therapy  At her last office visit, she was consented to [facility] which is Liposomal CPT-11 as a single agent Onivyde 70mg/m2 every 2 weeks  She was due to start treatment on 3/1; however this was deferred to recent hospital stay  Goals of care have been extensively discussed on previous visits and briefly readdressed again today  I did explain that her worsening pain and dyspnea is due to progressive disease  Her mass is compressing on her heart   She remains treatment focused  Hopefully, she will have some response to next course of therapy  I was clear with her that she is receiving chemotherapy with palliative intent with the goal to reduce tumor burden to help improve QOL while prolong life  She does not have curable disease  I will arrange for her to get started on treatment next week with Onivyde 70mg/m2 every 2 weeks  In regards to her anxiety, I have prescribed lorazepam 0 5 mg Q 8 PRN #20  PDMP queried, no concerns  This will hopefully help with her anxiety that results in increased dyspnea  She was instructed on how to safely take this while also taking her morphine  She will follow up with palliative care for further management      She will return for a follow up visit in 2 weeks  She understands that if she has any worsening dyspnea or unrelieved chest pain she needs to go to ED for evaluation  She knows to call at any time with questions or concerns       Goals and Barriers:  Current Goal:  Prolong Survival from small cell lung cancer  Barriers: None  Patient's Capacity to Self Care:  Patient able to self care  Portions of the record may have been created with voice recognition software  Occasional wrong word or "sound a like" substitutions may have occurred due to the inherent limitations of voice recognition software  Read the chart carefully and recognize, using context, where substitutions have occurred

## 2021-03-04 NOTE — TELEPHONE ENCOUNTER
Patient is calling in requesting a hospital follow up appointment, she can be reached back at [phone]

## 2021-03-05 ENCOUNTER — OFFICE VISIT (OUTPATIENT)
Dept: HEMATOLOGY ONCOLOGY | Facility: HOSPITAL | Age: 71
End: 2021-03-05
Payer: MEDICARE

## 2021-03-05 ENCOUNTER — TELEPHONE (OUTPATIENT)
Dept: HEMATOLOGY ONCOLOGY | Facility: CLINIC | Age: 71
End: 2021-03-05

## 2021-03-05 VITALS
TEMPERATURE: 97.3 F | DIASTOLIC BLOOD PRESSURE: 82 MMHG | WEIGHT: 98 LBS | RESPIRATION RATE: 16 BRPM | HEART RATE: 119 BPM | BODY MASS INDEX: 16.33 KG/M2 | HEIGHT: 65 IN | OXYGEN SATURATION: 89 % | SYSTOLIC BLOOD PRESSURE: 152 MMHG

## 2021-03-05 DIAGNOSIS — F41.9 ANXIETY: ICD-10-CM

## 2021-03-05 DIAGNOSIS — C34.90 SMALL CELL LUNG CANCER (HCC): Primary | ICD-10-CM

## 2021-03-05 PROCEDURE — 99214 OFFICE O/P EST MOD 30 MIN: CPT | Performed by: NURSE PRACTITIONER

## 2021-03-05 RX ORDER — LORAZEPAM 0.5 MG/1
0.5 TABLET ORAL EVERY 8 HOURS PRN
Qty: 20 TABLET | Refills: 0 | Status: SHIPPED | OUTPATIENT
Start: 2021-03-05 | End: 2021-03-09 | Stop reason: SDUPTHER

## 2021-03-05 NOTE — TELEPHONE ENCOUNTER
[address] calling with drug interaction for Lorazepam sent today  They wanted to make sure Lilly Pierre was aware that patient is getting Morphine from another provider  I reviewed that Morphine is on patients medication list and is being prescribed by palliative care    [facility] [employer] does not need a call back  Will send as FYI to update [facility] that I spoke with the pharmacy

## 2021-03-08 ENCOUNTER — TELEPHONE (OUTPATIENT)
Dept: PALLIATIVE MEDICINE | Facility: CLINIC | Age: 71
End: 2021-03-08

## 2021-03-08 NOTE — TELEPHONE ENCOUNTER
Pt left message stating she is experiencing excruciating back pain  Would like a call from provider to discuss pain relief options

## 2021-03-09 ENCOUNTER — TELEPHONE (OUTPATIENT)
Dept: PALLIATIVE MEDICINE | Facility: HOSPITAL | Age: 71
End: 2021-03-09

## 2021-03-09 ENCOUNTER — HOSPITAL ENCOUNTER (OUTPATIENT)
Dept: INFUSION CENTER | Facility: HOSPITAL | Age: 71
Discharge: HOME | End: 2021-03-09
Attending: INTERNAL MEDICINE
Payer: MEDICARE

## 2021-03-09 VITALS
RESPIRATION RATE: 16 BRPM | HEART RATE: 113 BPM | BODY MASS INDEX: 15.98 KG/M2 | DIASTOLIC BLOOD PRESSURE: 82 MMHG | OXYGEN SATURATION: 94 % | HEIGHT: 65 IN | TEMPERATURE: 98 F | SYSTOLIC BLOOD PRESSURE: 156 MMHG | WEIGHT: 95.9 LBS

## 2021-03-09 DIAGNOSIS — D70.1 CHEMOTHERAPY INDUCED NEUTROPENIA (HCC): ICD-10-CM

## 2021-03-09 DIAGNOSIS — G89.3 CANCER ASSOCIATED PAIN: ICD-10-CM

## 2021-03-09 DIAGNOSIS — C80.0 SMALL CELL CARCINOMA CARCINOMATOSIS (HCC): ICD-10-CM

## 2021-03-09 DIAGNOSIS — F41.9 ANXIETY: ICD-10-CM

## 2021-03-09 DIAGNOSIS — C34.90 SMALL CELL LUNG CANCER (HCC): Primary | ICD-10-CM

## 2021-03-09 DIAGNOSIS — C34.90 SMALL CELL LUNG CANCER (HCC): ICD-10-CM

## 2021-03-09 DIAGNOSIS — T45.1X5A CHEMOTHERAPY INDUCED NEUTROPENIA (HCC): ICD-10-CM

## 2021-03-09 DIAGNOSIS — C80.0 SMALL CELL CARCINOMA CARCINOMATOSIS (HCC): Primary | ICD-10-CM

## 2021-03-09 LAB
ALBUMIN SERPL BCP-MCNC: 2.5 G/DL (ref 3.5–5)
ALP SERPL-CCNC: 100 U/L (ref 46–116)
ALT SERPL W P-5'-P-CCNC: 23 U/L (ref 12–78)
ANION GAP SERPL CALCULATED.3IONS-SCNC: 7 MMOL/L (ref 4–13)
AST SERPL W P-5'-P-CCNC: 38 U/L (ref 5–45)
BILIRUB SERPL-MCNC: 0.3 MG/DL (ref 0.2–1)
BUN SERPL-MCNC: 29 MG/DL (ref 5–25)
CALCIUM ALBUM COR SERPL-MCNC: 9.7 MG/DL (ref 8.3–10.1)
CALCIUM SERPL-MCNC: 8.5 MG/DL (ref 8.3–10.1)
CHLORIDE SERPL-SCNC: 101 MMOL/L (ref 100–108)
CO2 SERPL-SCNC: 30 MMOL/L (ref 21–32)
CREAT SERPL-MCNC: 0.78 MG/DL (ref 0.6–1.3)
GFR SERPL CREATININE-BSD FRML MDRD: 78 ML/MIN/1.73SQ M
GLUCOSE SERPL-MCNC: 108 MG/DL (ref 65–140)
POTASSIUM SERPL-SCNC: 4.1 MMOL/L (ref 3.5–5.3)
PROT SERPL-MCNC: 5.9 G/DL (ref 6.4–8.2)
SODIUM SERPL-SCNC: 138 MMOL/L (ref 136–145)

## 2021-03-09 PROCEDURE — 96413 CHEMO IV INFUSION 1 HR: CPT

## 2021-03-09 PROCEDURE — 96375 TX/PRO/DX INJ NEW DRUG ADDON: CPT

## 2021-03-09 PROCEDURE — 80053 COMPREHEN METABOLIC PANEL: CPT | Performed by: INTERNAL MEDICINE

## 2021-03-09 PROCEDURE — 96367 TX/PROPH/DG ADDL SEQ IV INF: CPT

## 2021-03-09 RX ORDER — MORPHINE SULFATE 30 MG/1
30 TABLET ORAL EVERY 4 HOURS PRN
Qty: 90 TABLET | Refills: 0 | Status: SHIPPED | OUTPATIENT
Start: 2021-03-09 | End: 2021-03-09 | Stop reason: SDUPTHER

## 2021-03-09 RX ORDER — SODIUM CHLORIDE 9 MG/ML
20 INJECTION, SOLUTION INTRAVENOUS ONCE
Status: COMPLETED | OUTPATIENT
Start: 2021-03-09 | End: 2021-03-09

## 2021-03-09 RX ORDER — LORAZEPAM 0.5 MG/1
0.5 TABLET ORAL EVERY 8 HOURS PRN
Qty: 30 TABLET | Refills: 0 | Status: SHIPPED | OUTPATIENT
Start: 2021-03-09

## 2021-03-09 RX ORDER — MORPHINE SULFATE 30 MG/1
30 TABLET ORAL EVERY 4 HOURS PRN
Qty: 90 TABLET | Refills: 0 | Status: SHIPPED | OUTPATIENT
Start: 2021-03-09

## 2021-03-09 RX ORDER — ATROPINE SULFATE 1 MG/ML
0.25 INJECTION, SOLUTION INTRAMUSCULAR; INTRAVENOUS; SUBCUTANEOUS ONCE AS NEEDED
Status: DISCONTINUED | OUTPATIENT
Start: 2021-03-09 | End: 2021-03-12 | Stop reason: HOSPADM

## 2021-03-09 RX ORDER — ATROPINE SULFATE 1 MG/ML
0.25 INJECTION, SOLUTION INTRAMUSCULAR; INTRAVENOUS; SUBCUTANEOUS ONCE
Status: COMPLETED | OUTPATIENT
Start: 2021-03-09 | End: 2021-03-09

## 2021-03-09 RX ADMIN — DEXAMETHASONE SODIUM PHOSPHATE: 10 INJECTION, SOLUTION INTRAMUSCULAR; INTRAVENOUS at 11:29

## 2021-03-09 RX ADMIN — IRINOTECAN HYDROCHLORIDE 99.33 MG: 4.3 INJECTION, POWDER, FOR SOLUTION INTRAVENOUS at 12:06

## 2021-03-09 RX ADMIN — ATROPINE SULFATE 0.25 MG: 1 INJECTION, SOLUTION INTRAMUSCULAR; INTRAVENOUS; SUBCUTANEOUS at 12:01

## 2021-03-09 RX ADMIN — SODIUM CHLORIDE 20 ML/HR: 9 INJECTION, SOLUTION INTRAVENOUS at 11:28

## 2021-03-09 NOTE — TELEPHONE ENCOUNTER
Patient called the refill line at 11:12 am to leave a message that she will be completely out of her medication by today and would like the refills sent ASAP to [facility]  I did attempt a call to her number and she is at chemo now  Try [phone] to call her to clarify how the pain medication is working for her

## 2021-03-09 NOTE — TELEPHONE ENCOUNTER
Please verify with patient if above pain regimen is still enough to control symptoms   I tried to call many times yesterday and she didn't

## 2021-03-09 NOTE — PROGRESS NOTES
Pt arrived on unit for day 1 cycle 1 on onivyde tx  Pt reports she is now on 7L of O2  Pt reports weakness, dizziness at times, anxiety, and shakiness  Pt was recently hospitalized for her breathing  Labs from hospital encounter do not have LFTs  Lorene Etienne Rn at Dr Sheree Champagne office made aware  Orders to draw stat cmp and wait for results before proceeding  Pt made aware  RCW port accessed, labs drawn, will cont to talia

## 2021-03-09 NOTE — TELEPHONE ENCOUNTER
Patient reported that she just received a call from [facility] that they do not have Morphine and it will take 1-2 days for their order to arrive  Patient reported that she does not have enough of the medication to wait that long  Patient requesting medication be sent to [facility] on [address] in [place] by [address]       Patient requesting call back to cell when script is called in

## 2021-03-09 NOTE — TELEPHONE ENCOUNTER
Finally picked up phone stating that she would like to stay with current regimen as this is still helpful despite her call yesterday saying she's in excruciating pain   Refills sent

## 2021-03-09 NOTE — TELEPHONE ENCOUNTER
Pls call [facility] to cancel previous script  Sent new one to [facility] [address]  in [place]  According to google maps, this is the one near [address]   Thank you

## 2021-03-09 NOTE — TELEPHONE ENCOUNTER
Primary palliative medicine provider: Veronia Primrose    Medication requested: Morphine and Lorazepam    If for pain, how has the patient been taking their pain medicine?      Last appointment:  2 23    Next scheduled appointment:  3 16    PDMP review:    Morphine  30 mg filled 2 17 #45/8    Lorazepam 0 5 mg filled 3 5 #20/7

## 2021-03-09 NOTE — PATIENT INSTRUCTIONS
Treatment Details       3/9/2021 - Cycle 1, Day 1      Chemotherapy: ONCBCN PROVIDER COMMUNICATION6, IRINOTECAN HCL LIPOSOME (ONIVYDE) IVPB    3/23/2021 - Cycle 2, Day 1      Chemotherapy: ONCBCN PROVIDER COMMUNICATION6, IRINOTECAN HCL LIPOSOME (ONIVYDE) IVPB    3/7/2021 Chemotherapy     pegfilgrastim (NEULASTA ONPRO) subcutaneous injection kit 6 mg, 6 mg, Subcutaneous, Once, 4 of 5 cycles  Administration: 6 mg (11/25/2020), 6 mg (12/16/2020), 6 mg (1/6/2021), 6 mg (1/28/2021)  fosaprepitant (EMEND) 150 mg in sodium chloride 0 9 % 250 mL IVPB, 150 mg, Intravenous, Once, 4 of 5 cycles  Administration: 150 mg (11/23/2020), 150 mg (12/14/2020), 150 mg (1/4/2021), 150 mg (1/26/2021)  CARBOplatin (PARAPLATIN) 402 mg in sodium chloride 0 9 % 250 mL IVPB, 402 mg, Intravenous, Once, 4 of 5 cycles  Administration: 402 mg (11/23/2020), 371 5 mg (12/14/2020), 387 5 mg (1/4/2021), 310 mg (1/26/2021)  Durvalumab 1,500 mg in sodium chloride 0 9 % 100 mL IVPB, 1,500 mg (original dose 10 mg/kg), Intravenous, Once, 5 of 6 cycles  Dose modification: 1,500 mg (original dose 10 mg/kg, Cycle 1, Reason: Other (See Comments))  Administration: 1,500 mg (11/23/2020), 1,500 mg (12/14/2020), 1,500 mg (1/4/2021), 1,500 mg (1/26/2021), 1,500 mg (2/15/2021)  etoposide (TOPOSAR) 152 mg in sodium chloride 0 9 % 500 mL chemo infusion, 100 mg/m2 = 152 mg, Intravenous, Once, 4 of 5 cycles  Dose modification: 80 mg/m2 (original dose 100 mg/m2, Cycle 4, Reason: Dose Not Tolerated)  Administration: 152 mg (11/23/2020), 152 mg (11/25/2020), 152 mg (11/24/2020), 152 mg (12/14/2020), 152 mg (12/15/2020), 152 mg (12/16/2020), 144 mg (1/4/2021), 144 mg (1/5/2021), 144 mg (1/6/2021), 115 2 mg (1/26/2021), 115 2 mg (1/27/2021), 115 2 mg (1/28/2021)

## 2021-03-09 NOTE — TELEPHONE ENCOUNTER
[facility] placed call to [facility], prescription cancelled  Call placed to patient to make aware that script was sent to desired [facility], message left

## 2021-03-09 NOTE — PLAN OF CARE
Problem: Potential for Falls  Goal: Patient will remain free of falls  Description: INTERVENTIONS:  - Assess patient frequently for physical needs  -  Identify cognitive and physical deficits and behaviors that affect risk of falls  -  [place] fall precautions as indicated by assessment   - Educate patient/family on patient safety including physical limitations  - Instruct patient to call for assistance with activity based on assessment  - Modify environment to reduce risk of injury  - Consider OT/PT consult to assist with strengthening/mobility  Outcome: Progressing     Problem: Knowledge Deficit  Goal: Patient/family/caregiver demonstrates understanding of disease process, treatment plan, medications, and discharge instructions  Description: Complete learning assessment and assess knowledge base    Interventions:  - Provide teaching at level of understanding  - Provide teaching via preferred learning methods  Outcome: Progressing

## 2021-03-11 ENCOUNTER — APPOINTMENT (EMERGENCY)
Dept: RADIOLOGY | Facility: HOSPITAL | Age: 71
End: 2021-03-11
Payer: MEDICARE

## 2021-03-11 ENCOUNTER — HOSPITAL ENCOUNTER (EMERGENCY)
Facility: HOSPITAL | Age: 71
Discharge: HOME | End: 2021-03-11
Attending: EMERGENCY MEDICINE | Admitting: EMERGENCY MEDICINE
Payer: MEDICARE

## 2021-03-11 VITALS
WEIGHT: 95 LBS | BODY MASS INDEX: 15.83 KG/M2 | RESPIRATION RATE: 16 BRPM | HEIGHT: 65 IN | TEMPERATURE: 98 F | OXYGEN SATURATION: 94 % | DIASTOLIC BLOOD PRESSURE: 96 MMHG | SYSTOLIC BLOOD PRESSURE: 174 MMHG | HEART RATE: 104 BPM

## 2021-03-11 DIAGNOSIS — R03.0 ELEVATED BLOOD PRESSURE READING: ICD-10-CM

## 2021-03-11 DIAGNOSIS — R53.1 GENERALIZED WEAKNESS: Primary | ICD-10-CM

## 2021-03-11 DIAGNOSIS — E46 MALNUTRITION (HCC): ICD-10-CM

## 2021-03-11 DIAGNOSIS — G89.29 CHRONIC PAIN: ICD-10-CM

## 2021-03-11 DIAGNOSIS — R10.9 LEFT FLANK PAIN: ICD-10-CM

## 2021-03-11 LAB
ALBUMIN SERPL BCP-MCNC: 2.6 G/DL (ref 3.5–5)
ALP SERPL-CCNC: 86 U/L (ref 46–116)
ALT SERPL W P-5'-P-CCNC: 23 U/L (ref 12–78)
ANION GAP SERPL CALCULATED.3IONS-SCNC: 10 MMOL/L (ref 4–13)
AST SERPL W P-5'-P-CCNC: 33 U/L (ref 5–45)
BASOPHILS # BLD AUTO: 0 THOUSANDS/ΜL (ref 0–0.1)
BASOPHILS NFR BLD AUTO: 0 % (ref 0–1)
BILIRUB SERPL-MCNC: 0.6 MG/DL (ref 0.2–1)
BUN SERPL-MCNC: 19 MG/DL (ref 5–25)
CALCIUM ALBUM COR SERPL-MCNC: 10.1 MG/DL (ref 8.3–10.1)
CALCIUM SERPL-MCNC: 9 MG/DL (ref 8.3–10.1)
CHLORIDE SERPL-SCNC: 97 MMOL/L (ref 100–108)
CO2 SERPL-SCNC: 27 MMOL/L (ref 21–32)
CREAT SERPL-MCNC: 0.56 MG/DL (ref 0.6–1.3)
EOSINOPHIL # BLD AUTO: 0.04 THOUSAND/ΜL (ref 0–0.61)
EOSINOPHIL NFR BLD AUTO: 1 % (ref 0–6)
ERYTHROCYTE [DISTWIDTH] IN BLOOD BY AUTOMATED COUNT: 16.2 % (ref 11.6–15.1)
GFR SERPL CREATININE-BSD FRML MDRD: 96 ML/MIN/1.73SQ M
GLUCOSE SERPL-MCNC: 102 MG/DL (ref 65–140)
HCT VFR BLD AUTO: 37.9 % (ref 34.8–46.1)
HGB BLD-MCNC: 12.1 G/DL (ref 11.5–15.4)
IMM GRANULOCYTES # BLD AUTO: 0.05 THOUSAND/UL (ref 0–0.2)
IMM GRANULOCYTES NFR BLD AUTO: 1 % (ref 0–2)
LYMPHOCYTES # BLD AUTO: 0.26 THOUSANDS/ΜL (ref 0.6–4.47)
LYMPHOCYTES NFR BLD AUTO: 4 % (ref 14–44)
MAGNESIUM SERPL-MCNC: 1.9 MG/DL (ref 1.6–2.6)
MCH RBC QN AUTO: 30.7 PG (ref 26.8–34.3)
MCHC RBC AUTO-ENTMCNC: 31.9 G/DL (ref 31.4–37.4)
MCV RBC AUTO: 96 FL (ref 82–98)
MONOCYTES # BLD AUTO: 0.22 THOUSAND/ΜL (ref 0.17–1.22)
MONOCYTES NFR BLD AUTO: 3 % (ref 4–12)
NEUTROPHILS # BLD AUTO: 6.12 THOUSANDS/ΜL (ref 1.85–7.62)
NEUTS SEG NFR BLD AUTO: 91 % (ref 43–75)
NRBC BLD AUTO-RTO: 0 /100 WBCS
NT-PROBNP SERPL-MCNC: 1068 PG/ML
PHOSPHATE SERPL-MCNC: 3.6 MG/DL (ref 2.3–4.1)
PLATELET # BLD AUTO: 282 THOUSANDS/UL (ref 149–390)
PMV BLD AUTO: 8.8 FL (ref 8.9–12.7)
POTASSIUM SERPL-SCNC: 3.7 MMOL/L (ref 3.5–5.3)
PROT SERPL-MCNC: 6.1 G/DL (ref 6.4–8.2)
RBC # BLD AUTO: 3.94 MILLION/UL (ref 3.81–5.12)
SODIUM SERPL-SCNC: 134 MMOL/L (ref 136–145)
TROPONIN I SERPL-MCNC: 0.02 NG/ML
WBC # BLD AUTO: 6.69 THOUSAND/UL (ref 4.31–10.16)

## 2021-03-11 PROCEDURE — 99285 EMERGENCY DEPT VISIT HI MDM: CPT | Performed by: EMERGENCY MEDICINE

## 2021-03-11 PROCEDURE — 36415 COLL VENOUS BLD VENIPUNCTURE: CPT | Performed by: EMERGENCY MEDICINE

## 2021-03-11 PROCEDURE — 99285 EMERGENCY DEPT VISIT HI MDM: CPT

## 2021-03-11 PROCEDURE — 80053 COMPREHEN METABOLIC PANEL: CPT | Performed by: EMERGENCY MEDICINE

## 2021-03-11 PROCEDURE — 84100 ASSAY OF PHOSPHORUS: CPT | Performed by: EMERGENCY MEDICINE

## 2021-03-11 PROCEDURE — 96360 HYDRATION IV INFUSION INIT: CPT

## 2021-03-11 PROCEDURE — 93005 ELECTROCARDIOGRAM TRACING: CPT

## 2021-03-11 PROCEDURE — 84484 ASSAY OF TROPONIN QUANT: CPT | Performed by: EMERGENCY MEDICINE

## 2021-03-11 PROCEDURE — 83880 ASSAY OF NATRIURETIC PEPTIDE: CPT | Performed by: EMERGENCY MEDICINE

## 2021-03-11 PROCEDURE — 85025 COMPLETE CBC W/AUTO DIFF WBC: CPT | Performed by: EMERGENCY MEDICINE

## 2021-03-11 PROCEDURE — 71045 X-RAY EXAM CHEST 1 VIEW: CPT

## 2021-03-11 PROCEDURE — 83735 ASSAY OF MAGNESIUM: CPT | Performed by: EMERGENCY MEDICINE

## 2021-03-11 RX ORDER — MORPHINE SULFATE 15 MG/1
30 TABLET ORAL ONCE
Status: COMPLETED | OUTPATIENT
Start: 2021-03-11 | End: 2021-03-11

## 2021-03-11 RX ADMIN — MORPHINE SULFATE 30 MG: 15 TABLET ORAL at 15:22

## 2021-03-11 RX ADMIN — SODIUM CHLORIDE 1000 ML: 0.9 INJECTION, SOLUTION INTRAVENOUS at 14:16

## 2021-03-11 NOTE — DISCHARGE INSTRUCTIONS
Malnutrition   AMBULATORY CARE:   Malnutrition  occurs when you do not get enough calories or nutrients to keep you healthy  Nutrients include protein, fat, carbohydrates, vitamins, and minerals  Common signs and symptoms of malnutrition:   · Irritable (bad mood) and tired    · Slower growth than normal, or no growth (in children)    · Weight loss or loss of appetite    · Slow wound healing and an increase in infections    · Bone or joint pain, weak muscles, or sunken temples    · Brittle and spooned nails    · Dry, scaly skin or change in skin color    · Change of hair color, or hair loss    · Bloated abdomen and swelling in other parts of the body    Call your local emergency number (911 in the [address]) for any of the following:   · You have pain in your chest, back, neck, jaw, stomach, or down one or both arms  · You have shortness of breath  Call your doctor if:   · You lose a large amount of weight within a short amount of time  · You feel depressed, confused, tired, irritable, and you do not feel like eating  · You have questions or concerns about your condition or care  Treatment  depends on what caused your malnutrition  You may need medicine to treat a health problem that is causing your malnutrition  · Increased calories and nutrients  will be needed  A dietitian may help you plan larger, healthy meals  You may need to eat or drink a nutrition supplement if you have trouble eating the right kinds and amounts of food  · Vitamins and minerals  may be needed to replace vitamins and minerals your body needs  They may be given in your IV, as a shot, or as a pill  · Appetite stimulants  are medicines that help improve your appetite so you will want to eat more  Self-care:   · Try eating more often  If you have trouble eating larger meals, eat small meals throughout the day  You may need to include snacks between meals  · Find support    If you cannot buy or prepare the right kinds of foods, talk to your healthcare provider  Ask for information about community programs that can help you  Follow up with your doctor as directed:  Write down your questions so you remember to ask them during your visits  © Copyright [address] Information is for End User's use only and may not be sold, redistributed or otherwise used for commercial purposes  All illustrations and images included in CareNotes® are the copyrighted property of A D A M , Inc  or [facility] Rhonda Irizarry   The above information is an  only  It is not intended as medical advice for individual conditions or treatments  Talk to your doctor, nurse or pharmacist before following any medical regimen to see if it is safe and effective for you  Chronic Pain   WHAT YOU NEED TO KNOW:   Chronic pain is pain that does not get better for 3 months or longer  Chronic pain may hurt all the time, or come and go  DISCHARGE INSTRUCTIONS:   Call your local emergency number or have someone else call (911 in the [address] [facility]) if:   · You are breathing slower than normal, or you have trouble breathing  · You cannot be awakened  · You have a seizure  Call your doctor if:   · Your heart feels like it is jumping or fluttering  · You cannot think clearly  · You have side effects from prescription pain medicine, such as itching, nausea, or vomiting  · You have trouble sleeping  · Your pain gets worse, even after you take medicine  · You don't think the medicine is working  · You have questions or concerns about your condition or care  Medicines: You may need any of the following:  · Acetaminophen  decreases pain and fever  It is available without a doctor's order  Ask how much to take and how often to take it  Follow directions  Read the labels of all other medicines you are using to see if they also contain acetaminophen, or ask your doctor or pharmacist  Acetaminophen can cause liver damage if not taken correctly  Do not use more than 4 grams (4,000 milligrams) total of acetaminophen in one day  · NSAIDs , such as ibuprofen, help decrease swelling, pain, and fever  This medicine is available with or without a doctor's order  NSAIDs can cause stomach bleeding or kidney problems in certain people  If you take blood thinner medicine, always ask your healthcare provider if NSAIDs are safe for you  Always read the medicine label and follow directions  · Prescription pain medicine  called narcotics or opioids may be given for certain types of chronic pain  Ask your healthcare provider how to take this medicine safely  · Anesthetics  can be rubbed on your skin or injected into a nerve or muscle to numb an area  · Other medicines  may reduce pain, anxiety, muscle tension, or swelling  · Take your medicine as directed  Contact your healthcare provider if you think your medicine is not helping or if you have side effects  Tell him of her if you are allergic to any medicine  Keep a list of the medicines, vitamins, and herbs you take  Include the amounts, and when and why you take them  Bring the list or the pill bottles to follow-up visits  Carry your medicine list with you in case of an emergency  Manage your chronic pain:   · Apply heat  on the area in pain for 20 to 30 minutes every 2 hours for as many days as directed  Heat helps decrease pain and muscle spasms  · Apply ice  on the part of your body that hurts for 15 to 20 minutes every hour or as directed  Use an ice pack, or put crushed ice in a plastic bag  Cover it with a towel  Ice decreases pain and swelling, and helps prevent tissue damage  · Go to physical therapy as directed  A physical therapist teaches you exercises to help improve movement and strength, and to decrease pain  · Exercise for 30 minutes, 3 times a week  Regular physical activity can help decrease pain and improve your quality of life   Ask your healthcare provider about the best exercise plan for your type of pain  · Get enough sleep  Create a relaxing bedtime routine  Go to sleep and wake up at the same time every day  Avoid caffeine in the afternoon  · Talk with a counselor or therapist   A type of counseling called cognitive behavioral therapy (CBT) can help your chronic pain by changing the way you think about it  CBT can also improve your mood, sleep, and ability to move  What you must know if you take narcotic pain medicine:   · You may need to take a bowel movement softener  The most common side effect of prescription pain medicine is constipation  Bowel movement softeners are available over the counter  · Do not mix prescription pain medicines  This can cause an overdose of medicine, which can become life-threatening  Read labels  Make sure you know the ingredients in all of your medicines  · Do not drink alcohol  when you take prescription pain medicine  It is not safe to mix narcotics or opioids with alcohol or illegal drugs  · Prescription pain medicine may impair your ability to drive or work safely  They may also cause dizziness and increase your risk for falling  · Store prescription pain medicine in a safe location at home  Keep your medicine away from children and other people  Never share your medicine with anyone  Follow up with your healthcare provider as directed: You may be referred to a pain specialist  Write down your questions so you remember to ask them during your visits  © Copyright [address] Information is for End User's use only and may not be sold, redistributed or otherwise used for commercial purposes  All illustrations and images included in CareNotes® are the copyrighted property of A D A M , Inc  or [facility] Rhonda Irizarry   The above information is an  only  It is not intended as medical advice for individual conditions or treatments   Talk to your doctor, nurse or pharmacist before following any medical regimen to see if it is safe and effective for you

## 2021-03-11 NOTE — ED NOTES
Pt reminded to wear mask multiple times  Will continue to monitor        Josephine Webb RN  03/11/21 3247

## 2021-03-11 NOTE — ED PROVIDER NOTES
History  Chief Complaint   Patient presents with    Weakness - Generalized     Pt with generalized weakness and increasingly tired  78year old female brought in by EMS for evaluation of generalized weakness and fatigue  Patient states she feels unsteady on her feet due to her generalized weakness, but has not fallen  No lightheadedness or dizziness  She had initially called EMS with complaint of shortness of breath; however, she denies this now  She has chronic respiratory failure and is on 7 L supplemental oxygen at baseline with no increase in oxygen demand  Patient states she has also had left flank pain for the past 2 weeks with no exacerbating or alleviating factors  Patient has history of stage IV small cell lung cancer with last chemotherapy 2 days ago  She had a CTA PE study on 3/1/21 showing "Redemonstration of extensive left pleural tumor, encasing the left lung and narrowing the left bronchi  Emphysema  Septal thickening in the left lung due to lymphangitic spread of tumor versus lymphatic obstruction " Patient states she has received both doses of the COVID vaccine  She takes morphine for chronic pain, but did not take it this morning  She states she recently filled her prescription and does have the medication available at home  Patient currently lives alone        History provided by:  Patient, EMS personnel and medical records  Fatigue  Severity:  Severe  Onset quality:  Gradual  Duration:  2 weeks  Timing:  Constant  Progression:  Worsening  Chronicity:  New  Relieved by:  Nothing  Worsened by:  Nothing  Ineffective treatments:  Rest  Associated symptoms: abdominal pain    Associated symptoms: no chest pain, no cough, no diarrhea, no dizziness, no dysuria, no fever, no frequency, no headaches, no myalgias, no nausea, no shortness of breath and no vomiting    Abdominal pain:     Location:  L flank    Duration:  2 weeks    Timing:  Constant    Chronicity:  New  Risk factors comment:  Stage IV small cell lung cancer      Prior to Admission Medications   Prescriptions Last Dose Informant Patient Reported? Taking?    B Complex-Biotin-FA ( VITAMIN B 50/B-COMPLEX) TABS  Self Yes No   Sig: Take by mouth daily   COLCHICINE PO  Self Yes No   Sig: Take 0 5 mg by mouth 3 (three) times a day as needed    Capsaicin 0 025 % GEL  Self Yes No   Sig: Apply topically 3 (three) times a day as needed    Echinacea 400 MG CAPS  Self Yes No   Sig: Take by mouth 3 (three) times a day as needed   GINKGO BILOBA PLUS PO  Self Yes No   Sig: Take by mouth as needed 2 capsules every 2 mornings    Hyaluronic Acid 20-60 MG CAPS  Self Yes No   Sig: Take by mouth 3 (three) times a day    L-Lysine 500 MG CAPS  Self Yes No   Sig: Take by mouth 3 (three) times a day as needed   LORazepam (ATIVAN) 0 5 mg tablet   No No   Sig: Take 1 tablet (0 5 mg total) by mouth every 8 (eight) hours as needed for anxiety   Lactobacillus (PROBIOTIC ACIDOPHILUS PO)  Self Yes No   Sig: Take 1 capsule by mouth daily   NON FORMULARY  Self Yes No   Sig: Take 37 mg by mouth daily Eco Thyro 37 - reported by pt   S-Adenosylmethionine (RADHA-E) 200 MG TABS  Self Yes No   Sig: Take by mouth 3 (three) times a day   Tragacanth (ASTRAGALUS ROOT) POWD  Self Yes No   Si mg 3 (three) times a day as needed   acetaminophen (TYLENOL) 500 mg tablet  Self No No   Sig: Take 2 tablets (1,000 mg total) by mouth every 8 (eight) hours   albuterol (2 5 mg/3 mL) 0 083 % nebulizer solution   No No   Sig: Take 1 vial (2 5 mg total) by nebulization every 6 (six) hours as needed for wheezing or shortness of breath   albuterol (ProAir HFA) 90 mcg/act inhaler  Self No No   Sig: Inhale 2 puffs every 6 (six) hours as needed for wheezing or shortness of breath   cholecalciferol (VITAMIN D3) 1,000 units tablet  Self Yes No   Sig: Take 7,000 Units by mouth daily   cyclobenzaprine (FLEXERIL) 10 mg tablet  Self Yes No   Sig: Take 10 mg by mouth 3 (three) times a day as needed for muscle spasms   dexamethasone (DECADRON) 2 mg tablet   No No   Sig: Take 1 tablet (2 mg total) by mouth 2 (two) times a day with meals   fexofenadine-pseudoephedrine (ALLEGRA-D)  MG per tablet  Self Yes No   Sig: Take 1 tablet by mouth 2 (two) times a day as needed for allergies   hydrocortisone 2 5 % cream  Self No No   Sig: Apply topically 4 (four) times a day as needed for irritation or rash   lidocaine-prilocaine (EMLA) cream  Self No No   Sig: Apply topically as needed for mild pain 30-60 min prior to port access     loperamide (IMODIUM) 2 mg capsule  Self Yes No   Sig: Take 2 mg by mouth daily    mirtazapine (REMERON) 30 mg tablet  Self No No   Sig: Take 1 tablet (30 mg total) by mouth daily at bedtime   morphine (MSIR) 30 MG tablet   No No   Sig: Take 1 tablet (30 mg total) by mouth every 4 (four) hours as needed for severe painMax Daily Amount: 180 mg   nystatin (MYCOSTATIN) 500,000 units/5 mL suspension  Self No No   Sig: Apply 5 mL (500,000 Units total) to the mouth or throat 4 (four) times a day   omeprazole (PriLOSEC) 20 mg delayed release capsule  Self No No   Sig: Take 1 capsule (20 mg total) by mouth daily   ondansetron (ZOFRAN) 4 mg tablet  Self No No   Sig: Take 1 tablet (4 mg total) by mouth every 8 (eight) hours as needed for nausea or vomiting   other medication, see sig,  Self Yes No   Sig: Take 3 capsules by mouth daily [id number]   oxybutynin (DITROPAN-XL) 10 MG 24 hr tablet  Self Yes No   Sig: TAKE 1 TABLET BY MOUTH ONCE DAILY FOR INCONTINENCE   prochlorperazine (COMPAZINE) 10 mg tablet  Self Yes No   Sig: Take 10 mg by mouth every 6 (six) hours as needed for nausea or vomiting (migraines)   senna-docusate sodium (SENOKOT-S) 8 6-50 mg per tablet  Self No No   Sig: Take 1 tablet by mouth daily   sodium chloride (OCEAN) 0 65 % nasal spray  Self No No   Si spray into each nostril as needed for congestion or rhinitis   tolterodine (DETROL LA) 2 mg 24 hr capsule  Self Yes No umeclidinium-vilanterol (ANORO ELLIPTA) 62 5-25 MCG/INH inhaler  Self No No   Sig: Inhale 1 puff daily      Facility-Administered Medications: None       Past Medical History:   Diagnosis Date    Chronic fatigue syndrome with fibromyalgia     COPD (chronic obstructive pulmonary disease) (HCC)     DDD (degenerative disc disease), cervical     Emphysema of lung (HCC)     Hx of migraine headaches     Hypothyroidism     Lung cancer ([facility] [address] )        Past Surgical History:   Procedure Laterality Date    BARTHOLIN GLAND CYST EXCISION      CARPAL TUNNEL RELEASE Bilateral     COLONOSCOPY  07/09/2020     15 mm sessile polyp in the cecum removed by polypectomy  A 3 recall    IR PLEURAL EFFUSION LONG-TERM CATHETER PLACEMENT  11/13/2020    IR PLEURAL EFFUSION LONG-TERM CATHETER REMOVAL  12/21/2020    IR PORT PLACEMENT  12/11/2020    IR THORACENTESIS  11/3/2020    TONSILLECTOMY      TUBAL LIGATION      UPPER GASTROINTESTINAL ENDOSCOPY  07/09/2020     small hiatal hernia  Pathology negative  Family History   Problem Relation Age of Onset    Colon polyps Mother     Heart disease Mother     Colon cancer Mother     Heart disease Father     Lymphoma Father      I have reviewed and agree with the history as documented  E-Cigarette/Vaping    E-Cigarette Use Never User      E-Cigarette/Vaping Substances    Nicotine No     THC No     CBD No     Flavoring No     Other No     Unknown No      Social History     Tobacco Use    Smoking status: Former Smoker     Packs/day: 0 50     Years: 53 00     Pack years: 26 50     Types: Cigarettes     Start date: 1967    Smokeless tobacco: Never Used    Tobacco comment: 2 cigarettes daily   Substance Use Topics    Alcohol use: Not Currently    Drug use: Not Currently       Review of Systems   Constitutional: Positive for fatigue  Negative for appetite change, chills and fever  HENT: Negative for congestion, rhinorrhea and sore throat      Respiratory: Negative for cough, chest tightness and shortness of breath  Cardiovascular: Negative for chest pain, palpitations and leg swelling  Gastrointestinal: Positive for abdominal pain  Negative for constipation, diarrhea, nausea and vomiting  Genitourinary: Negative for dysuria, frequency and hematuria  Musculoskeletal: Negative for myalgias, neck pain and neck stiffness  Skin: Negative for pallor  Neurological: Positive for weakness (generalized)  Negative for dizziness, syncope and headaches  All other systems reviewed and are negative  Physical Exam  Physical Exam  Vitals signs and nursing note reviewed  Constitutional:       General: She is not in acute distress  Appearance: She is cachectic  She is ill-appearing  HENT:      Head: Normocephalic and atraumatic  Eyes:      Conjunctiva/sclera: Conjunctivae normal       Pupils: Pupils are equal, round, and reactive to light  Neck:      Musculoskeletal: Normal range of motion and neck supple  Thyroid: No thyromegaly  Trachea: No tracheal deviation  Cardiovascular:      Rate and Rhythm: Regular rhythm  Tachycardia present  Heart sounds: Normal heart sounds  Pulmonary:      Effort: Pulmonary effort is normal  Tachypnea present  Breath sounds: Examination of the left-upper field reveals decreased breath sounds  Examination of the left-middle field reveals decreased breath sounds  Examination of the left-lower field reveals decreased breath sounds  Decreased breath sounds present  Abdominal:      General: Bowel sounds are normal  There is no distension  Palpations: Abdomen is soft  Tenderness: There is no abdominal tenderness  Musculoskeletal:      Right lower leg: No edema  Left lower leg: No edema  Lymphadenopathy:      Cervical: No cervical adenopathy  Skin:     General: Skin is warm and dry  Neurological:      Mental Status: She is alert and oriented to person, place, and time           Vital Signs  ED Triage Vitals   Temperature Pulse Respirations Blood Pressure SpO2   03/11/21 1359 03/11/21 1402 03/11/21 1359 03/11/21 1415 03/11/21 1402   98 °F (36 7 °C) (!) 110 (!) 24 (!) 184/102 94 %      Temp Source Heart Rate Source Patient Position - Orthostatic VS BP Location FiO2 (%)   03/11/21 1359 03/11/21 1359 03/11/21 1359 03/11/21 1359 --   Temporal Monitor Sitting Right arm       Pain Score       03/11/21 1359       Worst Possible Pain           Vitals:    03/11/21 1500 03/11/21 1530 03/11/21 1600 03/11/21 1630   BP:  (!) 197/103 (!) 189/96 (!) 174/96   Pulse: (!) 106 (!) 108 105 104   Patient Position - Orthostatic VS:    Sitting         Visual Acuity  Visual Acuity      Most Recent Value   L Pupil Size (mm)  3   R Pupil Size (mm)  3          ED Medications  Medications   sodium chloride 0 9 % bolus 1,000 mL (0 mL Intravenous Stopped 3/11/21 1522)   morphine (MSIR) IR tablet 30 mg (30 mg Oral Given 3/11/21 1522)       Diagnostic Studies  Results Reviewed     Procedure Component Value Units Date/Time    Comprehensive metabolic panel [[id number]]  (Abnormal) Collected: 03/11/21 1418    Lab Status: Final result Specimen: Blood from Arm, Left Updated: 03/11/21 1644     Sodium 134 mmol/L      Potassium 3 7 mmol/L      Chloride 97 mmol/L      CO2 27 mmol/L      ANION GAP 10 mmol/L      BUN 19 mg/dL      Creatinine 0 56 mg/dL      Glucose 102 mg/dL      Calcium 9 0 mg/dL      Corrected Calcium 10 1 mg/dL      AST 33 U/L      ALT 23 U/L      Alkaline Phosphatase 86 U/L      Total Protein 6 1 g/dL      Albumin 2 6 g/dL      Total Bilirubin 0 60 mg/dL      eGFR 96 ml/min/1 73sq m     Narrative:      Dawson guidelines for Chronic Kidney Disease (CKD):     Stage 1 with normal or high GFR (GFR > 90 mL/min/1 73 square meters)    Stage 2 Mild CKD (GFR = 60-89 mL/min/1 73 square meters)    Stage 3A Moderate CKD (GFR = 45-59 mL/min/1 73 square meters)    Stage 3B Moderate CKD (GFR = 30-44 mL/min/1 73 square meters)    Stage 4 Severe CKD (GFR = 15-29 mL/min/1 73 square meters)    Stage 5 End Stage CKD (GFR <15 mL/min/1 73 square meters)  Note: GFR calculation is accurate only with a steady state creatinine    NT-BNP PRO [[id number]]  (Abnormal) Collected: 03/11/21 1418    Lab Status: Final result Specimen: Blood from Arm, Left Updated: 03/11/21 1622     NT-proBNP 1,068 pg/mL     Magnesium [[id number]]  (Normal) Collected: 03/11/21 1418    Lab Status: Final result Specimen: Blood from Arm, Left Updated: 03/11/21 1501     Magnesium 1 9 mg/dL     Phosphorus [[id number]]  (Normal) Collected: 03/11/21 1418    Lab Status: Final result Specimen: Blood from Arm, Left Updated: 03/11/21 1501     Phosphorus 3 6 mg/dL     CBC and differential [[id number]]  (Abnormal) Collected: 03/11/21 1418    Lab Status: Final result Specimen: Blood from Arm, Left Updated: 03/11/21 1451     WBC 6 69 Thousand/uL      RBC 3 94 Million/uL      Hemoglobin 12 1 g/dL      Hematocrit 37 9 %      MCV 96 fL      MCH 30 7 pg      MCHC 31 9 g/dL      RDW 16 2 %      MPV 8 8 fL      Platelets 521 Thousands/uL      nRBC 0 /100 WBCs      Neutrophils Relative 91 %      Immat GRANS % 1 %      Lymphocytes Relative 4 %      Monocytes Relative 3 %      Eosinophils Relative 1 %      Basophils Relative 0 %      Neutrophils Absolute 6 12 Thousands/µL      Immature Grans Absolute 0 05 Thousand/uL      Lymphocytes Absolute 0 26 Thousands/µL      Monocytes Absolute 0 22 Thousand/µL      Eosinophils Absolute 0 04 Thousand/µL      Basophils Absolute 0 00 Thousands/µL     Narrative: This is an appended report  These results have been appended to a previously verified report      Troponin I [[id number]]  (Normal) Collected: 03/11/21 1418    Lab Status: Final result Specimen: Blood from Arm, Left Updated: 03/11/21 1442     Troponin I 0 02 ng/mL                  XR chest 1 view portable   ED Interpretation by Isa Casiano MD (03/11 1457)   No acute pulmonary pathology  Opacities of the left lung similar to prior study      Final Result by Hardik Avilez MD (03/11 1638)   Interval worsening of parenchymal/ pleural disease in the left hemithorax  Workstation performed: [id number]                 Procedures  ECG 12 Lead Documentation Only    Date/Time: 3/11/2021 2:05 PM  Performed by: Florentino Andersen MD  Authorized by: Florentino Andersen MD     Indications / Diagnosis:  Fatigue  ECG reviewed by me, the ED Provider: yes    Patient location:  ED  Previous ECG:     Previous ECG:  Compared to current    Comparison ECG info:  3/1/21 normal sinus rhythm with short VA interval and prolonged QT as well as septal t wave inversions    Similarity:  Changes noted (QT interval improved)  Interpretation:     Interpretation: abnormal    Rate:     ECG rate:  111    ECG rate assessment: tachycardic    Rhythm:     Rhythm: sinus tachycardia    Ectopy:     Ectopy: none    QRS:     QRS axis:  Normal    QRS intervals:  Normal  Conduction:     Conduction: normal    ST segments:     ST segments:  Normal  T waves:     T waves: inverted      Inverted:  AVL, V2 and V3             ED Course  ED Course as of Mar 11 1654   u Mar 11, 2021   1627 3047 eleven days ago   NT-proBNP(!): 1,068                             SBIRT 20yo+      Most Recent Value   SBIRT (25 yo +)   In order to provide better care to our patients, we are screening all of our patients for alcohol and drug use  Would it be okay to ask you these screening questions? Yes Filed at: 03/11/2021 1402   Initial Alcohol Screen: US AUDIT-C    1  How often do you have a drink containing alcohol?  0 Filed at: 03/11/2021 1402   2  How many drinks containing alcohol do you have on a typical day you are drinking? 0 Filed at: 03/11/2021 1402   3a  Male UNDER 65: How often do you have five or more drinks on one occasion? 0 Filed at: 03/11/2021 1402   3b  FEMALE Any Age, or MALE 65+:  How often do you have 4 or more drinks on one occassion?  0 Filed at: 03/11/2021 1402   Audit-C Score  0 Filed at: 03/11/2021 1402   REX: How many times in the past year have you    Used an illegal drug or used a prescription medication for non-medical reasons? Never Filed at: 03/11/2021 1402                    [facility]  Number of Diagnoses or Management Options  Chronic pain: new and requires workup  Elevated blood pressure reading: new and requires workup  Generalized weakness: new and requires workup  Left flank pain: new and requires workup  Malnutrition [facility]): new and requires workup  Diagnosis management comments: 78year old female presents for evaluation of generalized weakness and left flank pain  Patient has normal oxygen saturations at baseline supplemental oxygen  No significant pleural effusion on CXR  CXR with slight worsening of left lung opacities  Labs unremarkable  1 L NS given in ED as well as patient's home morphine dose with improvement in symptoms  Patient states she feels comfortable going home at this point and states she feels safe living alone  Discussed return precautions with patient          Amount and/or Complexity of Data Reviewed  Clinical lab tests: ordered and reviewed  Tests in the radiology section of CPT®: ordered and reviewed  Independent visualization of images, tracings, or specimens: yes    Patient Progress  Patient progress: stable      Disposition  Final diagnoses:   Generalized weakness   Malnutrition ([address] )   Left flank pain   Elevated blood pressure reading   Chronic pain     Time reflects when diagnosis was documented in both MDM as applicable and the Disposition within this note     Time User Action Codes Description Comment    3/11/2021  2:57 PM Ruthanna Biswas Add [R53 1] Generalized weakness     3/11/2021  2:57 PM Ruthanna Biswas Add [E46] Malnutrition ([facility] [address] )     3/11/2021  4:51 PM Ruthanna Biswas Add [R10 9] Left flank pain     3/11/2021  4:51 PM Ruthanna Biswas Modify [R53 1] Generalized weakness     3/11/2021 4:51 PM Eather Mccollum Modify [R10 9] Left flank pain     3/11/2021  4:51 PM Sprague List J Modify [R53 1] Generalized weakness     3/11/2021  4:51 PM Felix Vela Modify [R10 9] Left flank pain     3/11/2021  4:51 PM Felix Vela Add [R03 0] Elevated blood pressure reading     3/11/2021  4:51 PM Eather Mccollum Add [G89 29] Chronic pain       ED Disposition     ED Disposition Condition Date/Time Comment    Discharge Stable Thu Mar 11, 2021  4:51 PM Ermalene Child discharge to home/self care  Follow-up Information     Follow up With Specialties Details Why Contact Info Additional Information    Asher Clink,  Family Medicine Schedule an appointment as soon as possible for a visit in 4 days for re-evaluation [address] Emergency Department Emergency Medicine Go to  If symptoms worsen [address] [address]  [address] [facility] Emergency Department, [address] [place] [address] Michael 10          Patient's Medications   Discharge Prescriptions    No medications on file     No discharge procedures on file      PDMP Review       Value Time User    PDMP Reviewed  Yes 3/9/2021  1:46 PM Harjinder Selby MD          ED Provider  Electronically Signed by           Marilynn Simmons MD  03/11/21 5130

## 2021-03-11 NOTE — ED NOTES
FORTUNATO arranging transport home to [facility] address, [address] [address] [place], [facility]       Tiffany Sequeira, SRIDHAR  03/11/21 3437

## 2021-03-12 LAB
ATRIAL RATE: 111 BPM
P AXIS: 80 DEGREES
PR INTERVAL: 116 MS
QRS AXIS: 81 DEGREES
QRSD INTERVAL: 72 MS
QT INTERVAL: 326 MS
QTC INTERVAL: 443 MS
T WAVE AXIS: 129 DEGREES
VENTRICULAR RATE: 111 BPM

## 2021-03-12 PROCEDURE — 93010 ELECTROCARDIOGRAM REPORT: CPT | Performed by: INTERNAL MEDICINE

## 2021-03-16 ENCOUNTER — TELEPHONE (OUTPATIENT)
Dept: PALLIATIVE MEDICINE | Facility: CLINIC | Age: 71
End: 2021-03-16

## 2021-03-16 NOTE — TELEPHONE ENCOUNTER
Patient no-showed today's appointment (3/16/2)  No show letter #2 has been sent to pt's home address  Additional No shows may result in a discharge letter  Please reach out to pt to discuss and/or assist as needed

## 2021-03-17 ENCOUNTER — HOSPITAL ENCOUNTER (INPATIENT)
Facility: HOSPITAL | Age: 71
LOS: 2 days | Discharge: HOME HEALTH | DRG: 388 | End: 2021-03-20
Attending: EMERGENCY MEDICINE | Admitting: INTERNAL MEDICINE
Payer: MEDICARE

## 2021-03-17 ENCOUNTER — RADIATION ONCOLOGY FOLLOW-UP (OUTPATIENT)
Dept: RADIATION ONCOLOGY | Facility: HOSPITAL | Age: 71
End: 2021-03-17
Attending: RADIOLOGY
Payer: MEDICARE

## 2021-03-17 ENCOUNTER — HOSPITAL ENCOUNTER (EMERGENCY)
Facility: HOSPITAL | Age: 71
Discharge: HOME | End: 2021-03-17
Attending: EMERGENCY MEDICINE | Admitting: EMERGENCY MEDICINE
Payer: MEDICARE

## 2021-03-17 ENCOUNTER — CLINICAL SUPPORT (OUTPATIENT)
Dept: RADIATION ONCOLOGY | Facility: HOSPITAL | Age: 71
End: 2021-03-17
Attending: RADIOLOGY

## 2021-03-17 ENCOUNTER — APPOINTMENT (EMERGENCY)
Dept: CT IMAGING | Facility: HOSPITAL | Age: 71
DRG: 388 | End: 2021-03-17
Payer: MEDICARE

## 2021-03-17 ENCOUNTER — PATIENT OUTREACH (OUTPATIENT)
Dept: CASE MANAGEMENT | Facility: HOSPITAL | Age: 71
End: 2021-03-17

## 2021-03-17 VITALS
TEMPERATURE: 97.1 F | WEIGHT: 88.4 LBS | RESPIRATION RATE: 18 BRPM | BODY MASS INDEX: 14.94 KG/M2 | SYSTOLIC BLOOD PRESSURE: 138 MMHG | OXYGEN SATURATION: 93 % | HEART RATE: 114 BPM | DIASTOLIC BLOOD PRESSURE: 84 MMHG

## 2021-03-17 VITALS
BODY MASS INDEX: 15.03 KG/M2 | DIASTOLIC BLOOD PRESSURE: 70 MMHG | OXYGEN SATURATION: 93 % | WEIGHT: 88 LBS | HEART RATE: 61 BPM | HEIGHT: 64 IN | TEMPERATURE: 98.1 F | RESPIRATION RATE: 18 BRPM | SYSTOLIC BLOOD PRESSURE: 186 MMHG

## 2021-03-17 DIAGNOSIS — C34.92 SCC (SQUAMOUS CELL CARCINOMA OF LUNG), LEFT (HCC): Primary | ICD-10-CM

## 2021-03-17 DIAGNOSIS — E43 SEVERE PROTEIN-CALORIE MALNUTRITION (HCC): ICD-10-CM

## 2021-03-17 DIAGNOSIS — E86.0 DEHYDRATION: ICD-10-CM

## 2021-03-17 DIAGNOSIS — C34.90 SMALL CELL LUNG CANCER (HCC): Primary | ICD-10-CM

## 2021-03-17 DIAGNOSIS — C34.90 SMALL CELL LUNG CANCER (HCC): ICD-10-CM

## 2021-03-17 DIAGNOSIS — R41.82 ALTERED MENTAL STATUS: ICD-10-CM

## 2021-03-17 DIAGNOSIS — R53.82 CHRONIC FATIGUE: ICD-10-CM

## 2021-03-17 DIAGNOSIS — K56.41 FECAL IMPACTION IN RECTUM (HCC): Primary | ICD-10-CM

## 2021-03-17 DIAGNOSIS — J43.9 PULMONARY EMPHYSEMA, UNSPECIFIED EMPHYSEMA TYPE (HCC): ICD-10-CM

## 2021-03-17 LAB
ALBUMIN SERPL BCP-MCNC: 2.7 G/DL (ref 3.5–5)
ALP SERPL-CCNC: 88 U/L (ref 46–116)
ALT SERPL W P-5'-P-CCNC: 23 U/L (ref 12–78)
ANION GAP SERPL CALCULATED.3IONS-SCNC: 10 MMOL/L (ref 4–13)
AST SERPL W P-5'-P-CCNC: 42 U/L (ref 5–45)
BASOPHILS # BLD AUTO: 0.02 THOUSANDS/ΜL (ref 0–0.1)
BASOPHILS NFR BLD AUTO: 0 % (ref 0–1)
BILIRUB SERPL-MCNC: 1 MG/DL (ref 0.2–1)
BUN SERPL-MCNC: 35 MG/DL (ref 5–25)
CALCIUM ALBUM COR SERPL-MCNC: 9.8 MG/DL (ref 8.3–10.1)
CALCIUM SERPL-MCNC: 8.8 MG/DL (ref 8.3–10.1)
CHLORIDE SERPL-SCNC: 101 MMOL/L (ref 100–108)
CO2 SERPL-SCNC: 26 MMOL/L (ref 21–32)
CREAT SERPL-MCNC: 0.68 MG/DL (ref 0.6–1.3)
EOSINOPHIL # BLD AUTO: 0.06 THOUSAND/ΜL (ref 0–0.61)
EOSINOPHIL NFR BLD AUTO: 1 % (ref 0–6)
ERYTHROCYTE [DISTWIDTH] IN BLOOD BY AUTOMATED COUNT: 15.9 % (ref 11.6–15.1)
GFR SERPL CREATININE-BSD FRML MDRD: 90 ML/MIN/1.73SQ M
GLUCOSE SERPL-MCNC: 80 MG/DL (ref 65–140)
HCT VFR BLD AUTO: 35.4 % (ref 34.8–46.1)
HGB BLD-MCNC: 11.4 G/DL (ref 11.5–15.4)
IMM GRANULOCYTES # BLD AUTO: 0.09 THOUSAND/UL (ref 0–0.2)
IMM GRANULOCYTES NFR BLD AUTO: 1 % (ref 0–2)
LYMPHOCYTES # BLD AUTO: 0.15 THOUSANDS/ΜL (ref 0.6–4.47)
LYMPHOCYTES NFR BLD AUTO: 2 % (ref 14–44)
MCH RBC QN AUTO: 31.1 PG (ref 26.8–34.3)
MCHC RBC AUTO-ENTMCNC: 32.2 G/DL (ref 31.4–37.4)
MCV RBC AUTO: 97 FL (ref 82–98)
MONOCYTES # BLD AUTO: 0.36 THOUSAND/ΜL (ref 0.17–1.22)
MONOCYTES NFR BLD AUTO: 4 % (ref 4–12)
NEUTROPHILS # BLD AUTO: 8.14 THOUSANDS/ΜL (ref 1.85–7.62)
NEUTS SEG NFR BLD AUTO: 92 % (ref 43–75)
NRBC BLD AUTO-RTO: 0 /100 WBCS
PLATELET # BLD AUTO: 211 THOUSANDS/UL (ref 149–390)
PMV BLD AUTO: 9.2 FL (ref 8.9–12.7)
POTASSIUM SERPL-SCNC: 3.7 MMOL/L (ref 3.5–5.3)
PROT SERPL-MCNC: 5.9 G/DL (ref 6.4–8.2)
RBC # BLD AUTO: 3.66 MILLION/UL (ref 3.81–5.12)
SODIUM SERPL-SCNC: 137 MMOL/L (ref 136–145)
WBC # BLD AUTO: 8.82 THOUSAND/UL (ref 4.31–10.16)

## 2021-03-17 PROCEDURE — 96374 THER/PROPH/DIAG INJ IV PUSH: CPT

## 2021-03-17 PROCEDURE — 99282 EMERGENCY DEPT VISIT SF MDM: CPT | Performed by: EMERGENCY MEDICINE

## 2021-03-17 PROCEDURE — 36415 COLL VENOUS BLD VENIPUNCTURE: CPT | Performed by: EMERGENCY MEDICINE

## 2021-03-17 PROCEDURE — 99284 EMERGENCY DEPT VISIT MOD MDM: CPT

## 2021-03-17 PROCEDURE — 99211 OFF/OP EST MAY X REQ PHY/QHP: CPT | Performed by: RADIOLOGY

## 2021-03-17 PROCEDURE — 70450 CT HEAD/BRAIN W/O DYE: CPT

## 2021-03-17 PROCEDURE — 99285 EMERGENCY DEPT VISIT HI MDM: CPT | Performed by: EMERGENCY MEDICINE

## 2021-03-17 PROCEDURE — 80053 COMPREHEN METABOLIC PANEL: CPT | Performed by: EMERGENCY MEDICINE

## 2021-03-17 PROCEDURE — 96361 HYDRATE IV INFUSION ADD-ON: CPT

## 2021-03-17 PROCEDURE — 84443 ASSAY THYROID STIM HORMONE: CPT | Performed by: PHYSICIAN ASSISTANT

## 2021-03-17 PROCEDURE — 99285 EMERGENCY DEPT VISIT HI MDM: CPT

## 2021-03-17 PROCEDURE — 85025 COMPLETE CBC W/AUTO DIFF WBC: CPT | Performed by: EMERGENCY MEDICINE

## 2021-03-17 PROCEDURE — G1004 CDSM NDSC: HCPCS

## 2021-03-17 RX ORDER — SODIUM CHLORIDE, SODIUM GLUCONATE, SODIUM ACETATE, POTASSIUM CHLORIDE, MAGNESIUM CHLORIDE, SODIUM PHOSPHATE, DIBASIC, AND POTASSIUM PHOSPHATE .53; .5; .37; .037; .03; .012; .00082 G/100ML; G/100ML; G/100ML; G/100ML; G/100ML; G/100ML; G/100ML
1000 INJECTION, SOLUTION INTRAVENOUS ONCE
Status: COMPLETED | OUTPATIENT
Start: 2021-03-17 | End: 2021-03-18

## 2021-03-17 RX ADMIN — SODIUM CHLORIDE, SODIUM GLUCONATE, SODIUM ACETATE, POTASSIUM CHLORIDE, MAGNESIUM CHLORIDE, SODIUM PHOSPHATE, DIBASIC, AND POTASSIUM PHOSPHATE 1000 ML: .53; .5; .37; .037; .03; .012; .00082 INJECTION, SOLUTION INTRAVENOUS at 23:07

## 2021-03-17 RX ADMIN — SODIUM CHLORIDE 1000 ML: 0.9 INJECTION, SOLUTION INTRAVENOUS at 21:50

## 2021-03-17 NOTE — PROGRESS NOTES
Bruce Garcia 1950 is a 78 y o  female       Follow up visit       Oncology History Overview Note   82-year-old female with extensive stage small cell lung cancer with significant pleural based disease throughout the left chest   She has now completed a course of palliative radiation therapy to a region along the posterior chest wall where the  pleural disease was beginning to protrude through the chest wall,  as well as to an area along the anterior medial chest where the patient was having discomfort related to the pleural base metastases  She completed radiation on 2/12/21      2/15/21 Imfinzi infusion (chemo held (C5 Carbo/Etoposide) and treated with immunotherapy alone  She reported SOB, nausea, decreased appetite, 7lb wt loss)    2/16/21 ER Visit for worsening SOB, sats 95% on 5L oxygen  CT showed slight progression of lung cancer, no PE  CTA ED chest PE Study  No pulmonary embolus  Slight progression of extensive left pleural-based and left hilar tumor with increase in overlying atelectasis and increase in mass effect on the heart/mediastinal structures  2/17/21 Med Onc follow-up, SUSY Ross  Continues to endorse left chest wall pain and discomfort  She does have palpable nodules in her left chest wall and flank region  Larry Afia conversation was held regarding findings seen on CT scan and concerns given her declining performance status and ability to continue disease directed therapies  She does wish to continue with treatment at this time  2/28/21 ER visit with worsening SOB, back pain and b/l leg edema  Admitted to [facility] 2/28 - 3/3/21   Dx with NSTEMI  Oxygen remained stable on 6L oxygen  She underwent home oxygen evaluation and will receive oxygen concentrators deliver up to 10L oxygen  PT/OT offered home therapy, she declined  3/5/21 Med Onc follow-up  Plan to start therapy with Onivyde, this was held on 3/1 secondary to her hospitalization     She is currently on 7L oxygen, at baseline  Discussed that her worsening pain and dyspnea is due to progression of disease, mass is compressing on her heart  She remains treatment focused  3/9/21 Start Onivyde infusion, cycle 1      3/11/21 ER visit with generalized weakness and increasing fatigue  XR chest revealed interval worsening of parenchymal/ pleural disease in the left hemithorax  1L NS IVF  Pt d/c home stable  3/16/21 Palliative care   3/22/21 Med Onc f/u   3/23/21 Pulmonology f/u  3/23/21 Infusion      Small cell lung cancer ([facility] [address] )   11/2020 Initial Diagnosis    Small cell lung cancer ([facility] [address] )     11/3/2020 Biopsy    Pleural fluid, left:     - Poorly-differentiated malignant neoplasm with neuroendocrine differentiation  11/10/2020 Biopsy    Cytology:   Pleural Fluid, thoracentesis:     Fluid removed amount:  1750 cc  Positive for malignancy  Poorly differentiated carcinoma          11/23/2020 - 3/7/2021 Chemotherapy    pegfilgrastim (NEULASTA ONPRO) subcutaneous injection kit 6 mg, 6 mg, Subcutaneous, Once, 4 of 5 cycles  Administration: 6 mg (11/25/2020), 6 mg (12/16/2020), 6 mg (1/6/2021), 6 mg (1/28/2021)  fosaprepitant (EMEND) 150 mg in sodium chloride 0 9 % 250 mL IVPB, 150 mg, Intravenous, Once, 4 of 5 cycles  Administration: 150 mg (11/23/2020), 150 mg (12/14/2020), 150 mg (1/4/2021), 150 mg (1/26/2021)  CARBOplatin (PARAPLATIN) 402 mg in sodium chloride 0 9 % 250 mL IVPB, 402 mg, Intravenous, Once, 4 of 5 cycles  Administration: 402 mg (11/23/2020), 371 5 mg (12/14/2020), 387 5 mg (1/4/2021), 310 mg (1/26/2021)  Durvalumab 1,500 mg in sodium chloride 0 9 % 100 mL IVPB, 1,500 mg (original dose 10 mg/kg), Intravenous, Once, 5 of 6 cycles  Dose modification: 1,500 mg (original dose 10 mg/kg, Cycle 1, Reason: Other (See Comments))  Administration: 1,500 mg (11/23/2020), 1,500 mg (12/14/2020), 1,500 mg (1/4/2021), 1,500 mg (1/26/2021), 1,500 mg (2/15/2021)  etoposide (TOPOSAR) 152 mg in sodium chloride 0 9 % 500 mL chemo infusion, 100 mg/m2 = 152 mg, Intravenous, Once, 4 of 5 cycles  Dose modification: 80 mg/m2 (original dose 100 mg/m2, Cycle 4, Reason: Dose Not Tolerated)  Administration: 152 mg (11/23/2020), 152 mg (11/25/2020), 152 mg (11/24/2020), 152 mg (12/14/2020), 152 mg (12/15/2020), 152 mg (12/16/2020), 144 mg (1/4/2021), 144 mg (1/5/2021), 144 mg (1/6/2021), 115 2 mg (1/26/2021), 115 2 mg (1/27/2021), 115 2 mg (1/28/2021)     2/8/2021 - 2/12/2021 Radiation    Course: C1    Plan ID Energy Fractions Dose per Fraction (cGy) Dose Correction (cGy) Total Dose Delivered (cGy) Elapsed Days   Ant L CW 6X 5 / 5 400 0 2,000 4   Post L CW 6X 5 / 5 400 0 2,000 4      Dr Allyson Aviles     3/9/2021 -  Chemotherapy    Onivyde       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[facility] [address] )   1/26/2021 Initial Diagnosis    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[facility] [address] )     3/9/2021 -  Chemotherapy    Onivyde           Clinical Trial: no      Health Maintenance   Topic Date Due    Medicare Annual Wellness Visit (AWV)  1950    Depression Screening PHQ  05/01/1964    COVID-19 Vaccine (1 of 2) 05/01/1968    BMI: Followup Plan  05/01/1970    DTaP,Tdap,and Td Vaccines (1 - Tdap) 05/01/1973    Fall Risk  05/01/2017    Influenza Vaccine (1) 09/01/2020    BMI: Adult  03/11/2022    Colorectal Cancer Screening  07/09/2030    Hepatitis C Screening  Completed    Pneumococcal Vaccine: 65+ Years  Completed    HIB Vaccine  Aged Out    Hepatitis B Vaccine  Aged Out    IPV Vaccine  Aged Out    Hepatitis A Vaccine  Aged Out    Meningococcal ACWY Vaccine  Aged Out    HPV Vaccine  Aged Out       Patient Active Problem List   Diagnosis    Chronic fatigue syndrome with fibromyalgia    Diarrhea    Weight loss    Abnormal LFTs    History of colon polyps    Right lower quadrant abdominal pain    Shortness of breath    Pleural effusion    Abnormal CT of the chest    Tobacco abuse    Elevated troponin    Elevated blood pressure reading    Acute on chronic respiratory failure with hypoxia (HCC)    Pulmonary emphysema (HCC)    Small cell lung cancer ([facility] [address] )    Chemotherapy induced neutropenia (HCC)    Chest pain    Lower extremity edema    Leukocytosis    Elevated d-dimer    Oral thrush    Severe protein-calorie malnutrition (HCC)    Lung cancer (HCC)    Cancer associated pain    Hemoptysis    Chronic bilateral thoracic back pain    Advanced care planning/counseling discussion    Port-A-Cath in place    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HCC)    CINV (chemotherapy-induced nausea and vomiting)    Anterior chest wall pain    Type 2 myocardial infarction (HCC)    Narcotic dependency, continuous (HCC)    Acute on chronic respiratory failure with hypoxemia (HCC)    SIRS (systemic inflammatory response syndrome) (HCC)    Anxiety     Past Medical History:   Diagnosis Date    Chronic fatigue syndrome with fibromyalgia     COPD (chronic obstructive pulmonary disease) (HCC)     DDD (degenerative disc disease), cervical     Emphysema of lung (HCC)     Hx of migraine headaches     Hypothyroidism     Lung cancer ([facility] [address] )      Past Surgical History:   Procedure Laterality Date    BARTHOLIN GLAND CYST EXCISION      CARPAL TUNNEL RELEASE Bilateral     COLONOSCOPY  07/09/2020     15 mm sessile polyp in the cecum removed by polypectomy  A 3 recall    IR PLEURAL EFFUSION LONG-TERM CATHETER PLACEMENT  11/13/2020    IR PLEURAL EFFUSION LONG-TERM CATHETER REMOVAL  12/21/2020    IR PORT PLACEMENT  12/11/2020    IR THORACENTESIS  11/3/2020    TONSILLECTOMY      TUBAL LIGATION      UPPER GASTROINTESTINAL ENDOSCOPY  07/09/2020     small hiatal hernia  Pathology negative       Family History   Problem Relation Age of Onset    Colon polyps Mother     Heart disease Mother     Colon cancer Mother     Heart disease Father     Lymphoma Father      Social History     Socioeconomic History    Marital status:      Spouse name: Not on file    Number of children: Not on file    Years of education: Not on file    Highest education level: Not on file   Occupational History    Not on file   Social Needs    Financial resource strain: Not on file    Food insecurity     Worry: Not on file     Inability: Not on file    Transportation needs     Medical: Not on file     Non-medical: Not on file   Tobacco Use    Smoking status: Former Smoker     Packs/day: 0 50     Years: 53 00     Pack years: 26 50     Types: Cigarettes     Start date: 1967    Smokeless tobacco: Never Used    Tobacco comment: 2 cigarettes daily   Substance and Sexual Activity    Alcohol use: Not Currently    Drug use: Not Currently    Sexual activity: Not Currently   Lifestyle    Physical activity     Days per week: Not on file     Minutes per session: Not on file    Stress: Not on file   Relationships    Social connections     Talks on phone: Not on file     Gets together: Not on file     Attends Congregation service: Not on file     Active member of club or organization: Not on file     Attends meetings of clubs or organizations: Not on file     Relationship status: Not on file    Intimate partner violence     Fear of current or ex partner: Not on file     Emotionally abused: Not on file     Physically abused: Not on file     Forced sexual activity: Not on file   Other Topics Concern    Not on file   Social History Narrative    Not on file       Current Outpatient Medications:     acetaminophen (TYLENOL) 500 mg tablet, Take 2 tablets (1,000 mg total) by mouth every 8 (eight) hours, Disp: , Rfl:     albuterol (2 5 mg/3 mL) 0 083 % nebulizer solution, Take 1 vial (2 5 mg total) by nebulization every 6 (six) hours as needed for wheezing or shortness of breath, Disp: 180 vial, Rfl: 5    albuterol (ProAir HFA) 90 mcg/act inhaler, Inhale 2 puffs every 6 (six) hours as needed for wheezing or shortness of breath, Disp: 8 5 g, Rfl: 6    B Complex-Biotin-FA (TH VITAMIN B 50/B-COMPLEX) TABS, Take by mouth daily, Disp: , Rfl:     Capsaicin 0 025 % GEL, Apply topically 3 (three) times a day as needed , Disp: , Rfl:     cholecalciferol (VITAMIN D3) 1,000 units tablet, Take 7,000 Units by mouth daily, Disp: , Rfl:     COLCHICINE PO, Take 0 5 mg by mouth 3 (three) times a day as needed , Disp: , Rfl:     cyclobenzaprine (FLEXERIL) 10 mg tablet, Take 10 mg by mouth 3 (three) times a day as needed for muscle spasms, Disp: , Rfl:     dexamethasone (DECADRON) 2 mg tablet, Take 1 tablet (2 mg total) by mouth 2 (two) times a day with meals, Disp: 60 tablet, Rfl: 0    fexofenadine-pseudoephedrine (ALLEGRA-D)  MG per tablet, Take 1 tablet by mouth 2 (two) times a day as needed for allergies, Disp: , Rfl:     hydrocortisone 2 5 % cream, Apply topically 4 (four) times a day as needed for irritation or rash, Disp: 30 g, Rfl: 0    lidocaine-prilocaine (EMLA) cream, Apply topically as needed for mild pain 30-60 min prior to port access  , Disp: 30 g, Rfl: 0    loperamide (IMODIUM) 2 mg capsule, Take 2 mg by mouth daily , Disp: , Rfl:     LORazepam (ATIVAN) 0 5 mg tablet, Take 1 tablet (0 5 mg total) by mouth every 8 (eight) hours as needed for anxiety, Disp: 30 tablet, Rfl: 0    mirtazapine (REMERON) 30 mg tablet, Take 1 tablet (30 mg total) by mouth daily at bedtime, Disp: 30 tablet, Rfl: 1    morphine (MSIR) 30 MG tablet, Take 1 tablet (30 mg total) by mouth every 4 (four) hours as needed for severe painMax Daily Amount: 180 mg, Disp: 90 tablet, Rfl: 0    NON FORMULARY, Take 37 mg by mouth daily Eco Thyro 37 - reported by pt, Disp: , Rfl:     nystatin (MYCOSTATIN) 500,000 units/5 mL suspension, Apply 5 mL (500,000 Units total) to the mouth or throat 4 (four) times a day, Disp: 500 mL, Rfl: 0    omeprazole (PriLOSEC) 20 mg delayed release capsule, Take 1 capsule (20 mg total) by mouth daily, Disp: 90 capsule, Rfl: 3    ondansetron (ZOFRAN) 4 mg tablet, Take 1 tablet (4 mg total) by mouth every 8 (eight) hours as needed for nausea or vomiting, Disp: 60 tablet, Rfl: 1    oxybutynin (DITROPAN-XL) 10 MG 24 hr tablet, TAKE 1 TABLET BY MOUTH ONCE DAILY FOR INCONTINENCE, Disp: , Rfl:     prochlorperazine (COMPAZINE) 10 mg tablet, Take 10 mg by mouth every 6 (six) hours as needed for nausea or vomiting (migraines), Disp: , Rfl:     S-Adenosylmethionine (RADHA-E) 200 MG TABS, Take by mouth 3 (three) times a day, Disp: , Rfl:     senna-docusate sodium (SENOKOT-S) 8 6-50 mg per tablet, Take 1 tablet by mouth daily, Disp: 60 tablet, Rfl: 0    sodium chloride (OCEAN) 0 65 % nasal spray, 1 spray into each nostril as needed for congestion or rhinitis, Disp: 30 mL, Rfl: 0    tolterodine (DETROL LA) 2 mg 24 hr capsule, , Disp: , Rfl:     Echinacea 400 MG CAPS, Take by mouth 3 (three) times a day as needed, Disp: , Rfl:     GINKGO BILOBA PLUS PO, Take by mouth as needed 2 capsules every 2 mornings , Disp: , Rfl:     Hyaluronic Acid 20-60 MG CAPS, Take by mouth 3 (three) times a day , Disp: , Rfl:     L-Lysine 500 MG CAPS, Take by mouth 3 (three) times a day as needed, Disp: , Rfl:     Lactobacillus (PROBIOTIC ACIDOPHILUS PO), Take 1 capsule by mouth daily, Disp: , Rfl:     other medication, see sig,, Take 3 capsules by mouth daily [id number], Disp: , Rfl:     Tragacanth (ASTRAGALUS ROOT) POWD, 470 mg 3 (three) times a day as needed, Disp: , Rfl:     umeclidinium-vilanterol (ANORO ELLIPTA) 62 5-25 MCG/INH inhaler, Inhale 1 puff daily, Disp: 1 Inhaler, Rfl: 5  Allergies   Allergen Reactions    Clarithromycin     Codeine Other (See Comments)     lethergic    Other      PHOSPHATE    Oxycodone Other (See Comments)     lethergic    Trazodone Other (See Comments)     lethergic    Penicillins Rash       Review of Systems:  Review of Systems   Constitutional: Positive for appetite change and fatigue  Her neighbors helps her with meals   HENT: Negative  Eyes: Negative      Respiratory: Positive for shortness of breath (continuous 7L oxygen )  Cardiovascular: Negative  Gastrointestinal: Negative  Endocrine: Negative  Genitourinary:        Urinary incontinence   Musculoskeletal: Positive for gait problem (due to weakness, unbalanced)  Skin: Negative  Allergic/Immunologic: Negative  Neurological: Positive for dizziness and weakness  Hematological: Negative  Psychiatric/Behavioral: Positive for agitation, confusion (pt alert and oriented to person place but has some confusion (wearing her pants backwards, she brought her home telephone in and tried to call her neighbor with the landline phone while in our office)) and sleep disturbance (states she's not able to sleep due to paperwork she has to do "legal forms" )  The patient is nervous/anxious  Vitals:    03/17/21 0900   BP: 138/84   BP Location: Left arm   Pulse: (!) 114   Resp: 18   Temp: (!) 97 1 °F (36 2 °C)   TempSrc: Temporal   SpO2: 93%   Weight: 40 1 kg (88 lb 6 4 oz)               Imaging:Xr Chest 1 View Portable    Result Date: 3/11/2021  Narrative: CHEST INDICATION:   left flank pain, lung cancer  COMPARISON:  2/28/2021 EXAM PERFORMED/VIEWS:  XR CHEST PORTABLE FINDINGS: Postop silhouette Persistent airspace and interstitial changes are noted in the left hemithorax with pleural thickening  Overall opacity in the left hemithorax has mildly worsened  Right lung is unchanged in appearance  There is no evidence of pneumothorax  Osseous structures appear within normal limits for patient age  Impression: Interval worsening of parenchymal/ pleural disease in the left hemithorax  Workstation performed: [id number]    Xr Chest 1 View Portable    Result Date: 2/28/2021  Narrative: CHEST INDICATION:   SOB  COMPARISON:  2/16/2021; CTA chest 2/16/2021 EXAM PERFORMED/VIEWS:  XR CHEST PORTABLE  AP semierect Images:  1 FINDINGS:  Right IJ catheter terminates at the caval atrial junction  No pneumothorax   Heart shadow is obscured by adjacent opacity  Persistent opacities throughout the left hemithorax with diffuse pleural nodular thickening again noted extending to the apex  Increased interstitial lung markings throughout the left hemithorax concerning for a superimposed infection versus vascular congestion  Right lung is hyperinflated but clear with blunting of the costophrenic angle stable  No pneumothorax at either apex  Osseous structures appear within normal limits for patient age  Impression: Stable opacities throughout the left hemithorax with underlying diffuse pleural thickening  Increased interstitial lung markings concerning for superimposed edema versus infection  Chronic hyperinflation right lung  Right IJ catheter  Workstation performed: [id number]     Xr Chest Portable    Result Date: 2/16/2021  Narrative: CHEST INDICATION:   Acute Resp Failure  COMPARISON:  1/20/2021, 1/8/2021  EXAM PERFORMED/VIEWS:  XR CHEST PORTABLE FINDINGS:  Right internal jugular chest port in satisfactory position  Cardiomediastinal silhouette appears unremarkable  Unchanged left lung masses  Emphysematous changes are noted consistent with chronic obstructive pulmonary disease  Small left pleural effusion again seen  Osseous structures appear within normal limits for patient age  Impression: No acute cardiopulmonary disease  Left lung masses and small left pleural effusion  Workstation performed: [id number]     Cta Chest Pe Study    Result Date: 3/1/2021  Narrative: CTA - CHEST WITH IV CONTRAST - PULMONARY ANGIOGRAM INDICATION:   Shortness of breath, tachycardia, tachypnea  History of lung cancer  Evaluate for pulmonary embolus    COMPARISON: Chest CT from 2/16/2021  TECHNIQUE: CT angiogram tailored to evaluate for pulmonary embolism  Axial, sagittal, and coronal 2D reformatted images created from source data  Coronal 3D MIP postprocessing on the acquisition scanner    Radiation dose length product (DLP):  181 mGy-cm   Techniques to minimize radiation dose exposure include iterative reconstruction and automated exposure control  IV Contrast:  85 mL of iohexol (OMNIPAQUE)  FINDINGS: PULMONARY ARTERIES:  No pulmonary embolus  LUNGS:  Compromised by motion  Moderate emphysema  Mild atelectasis in the left lower lobe  Persistent septal thickening in the left lung  AIRWAYS: No significant filling defects in the airways  PLEURA:  Redemonstration of extensive pleural tumor encasing the left lung  HEART/GREAT VESSELS:  Unremarkable for patient's age  MEDIASTINUM AND MINA:  Redemonstration of left pleural tumor narrowing the left bronchi  Right port at cavoatrial junction  CHEST WALL AND LOWER NECK:   Unremarkable  UPPER ABDOMEN:  Unremarkable  OSSEOUS STRUCTURES:  Mild degenerative disease in the spine  Impression: No pulmonary embolus  Redemonstration of extensive left pleural tumor, encasing the left lung and narrowing the left bronchi  Emphysema  Septal thickening in the left lung due to lymphangitic spread of tumor versus lymphatic obstruction  Workstation performed: [id number]     Cta Ed Chest Pe Study    Result Date: 2/16/2021  Narrative: CTA - CHEST WITH IV CONTRAST - PULMONARY ANGIOGRAM INDICATION:   Shortness of breath, active CA, elevated D-dimer  COMPARISON: None  TECHNIQUE: CTA examination of the chest was performed using angiographic technique according to a protocol specifically tailored to evaluate for pulmonary embolism  Axial, sagittal, and coronal 2D reformatted images were created from the source data and  submitted for interpretation  In addition, coronal 3D MIP postprocessing was performed on the acquisition scanner  Radiation dose length product (DLP) for this visit:  167 65 mGy-cm     This examination, like all CT scans performed in the [facility], was performed utilizing techniques to minimize radiation dose exposure, including the use of iterative  reconstruction and automated exposure control  IV Contrast:  100 mL of iohexol (OMNIPAQUE)  FINDINGS: PULMONARY ARTERIAL TREE:  No pulmonary embolus is seen  LUNGS:  Centrilobular and paraseptal emphysematous changes are reidentified  There is extensive atelectasis in the left lung base, similar from previous examination  There is likely invasion of pleural-based tumor into the lingula, though this is better characterized on January 20, 2021 CT performed on the more delayed phases after the administration of IV contrast   No tracheal or endobronchial mass  No new pulmonary mass in the aerated lung zones  No consolidated or groundglass airspace opacity in the aerated lung zones to suggest superimposed infectious process  PLEURA:  There is lobulated pleural-based tumor mass throughout the left hemithorax most notably in the base of the left hemithorax along left hemidiaphragm and throughout the left costophrenic angle  The overall volume of tumor appears somewhat increased when compared to January 20, 2021  A representative lesion deep to the left 7th rib just posterior to the major fissure measures approximately 2 9 x 2 7 cm on image 131 of series 2, compared with 2 2 x 1 9 cm when measured using similar technique on January 2021  Maximal thickness of representative pleural tumor mass deep to the lateral left 3rd rib measures up to 14 mm on image 79 of series 2, compared with no greater than 9 mm on January 2021  There is some loculated pleural fluid in the left chest, the fluid is difficult to differentiate from tumor and pulmonary angiogram images  In the medial left costophrenic angle on image 191 of series 2, some combination of tumor and/or fluid measures up to 2 7 cm in thickness compared with 2 1 cm when measured using similar technique on January 20, 2021   HEART/GREAT VESSELS:  Mass effect from tumor and pleural fluid in the left hemithorax results in increased mass effect when compared to previous examination showing the heart slightly more to the right than on the prior examination  There is narrowing of right upper and right lower pulmonary venous branches  There is encasement of pulmonary arteries and pulmonary dural branches with no greater than minimal narrowing MEDIASTINUM AND MINA:  Straightening of left hilar tumor mass appears slightly progressed in volume when compared to prior examination with soft tissue density nodules anterior and posterior to the lower pole left pulmonary artery measuring 12 and 11 mm on image 134 of series 2 compared with 10 and 8 mm when measured using similar technique on the previous examination  CHEST WALL AND LOWER NECK:   Patient is somewhat cachectic  Reflux of injected very high density contrast is seen throughout the left arm and lower neck due to probably due to narrowing of the left brachiocephalic vein at the level of the thoracic inlet  High right heart pressures could also contribute to this reflux of contrast  VISUALIZED STRUCTURES IN THE UPPER ABDOMEN:  Unremarkable  OSSEOUS STRUCTURES:  No acute fracture or destructive osseous lesion  Impression: No pulmonary embolus  Slight progression of extensive left pleural-based and left hilar tumor with increase in overlying atelectasis and increase in mass effect on the heart/mediastinal structures   Workstation performed: [id number]

## 2021-03-17 NOTE — ED PROVIDER NOTES
History  Chief Complaint   Patient presents with    Lethargy     Pt comes from Radiation Oncology, per nurses there patient was "altered " When RN asked the nurses how patient was altered, they stated, "Well not really altered, just kind of lethargic " Patient recieved radiation today for lung cancer and also recieves immunotherapy  Patient has GCS 15, reports, "I'm not altered, I'm tired, I need sleep "      Tadeo Pena is a 78y o  year old female with PMH of small cell lung CA presenting to the [facility] [place] ED for weakness  Patient seen at radiation oncology office this morning where staff said patient appears more weak than previously and referred to ED  Patient states she feels no different than previously  Patient feels tired which she attriubtes to a lack of sleep lately  Patient denies fevers, chest pain, dyspnea, cough, abdominal pain, new-onset back pain, leg pain/swelling  Patient on baseline 7L NC for SCC  SCC managed with chemotherapy and recently started on radiation therapy  Staff at office told patient she should be admitted to the hospital though patient declines  Patient states symptoms are unchanged today and she would like to be discharged without workup  History provided by:  Patient and medical records   used: No        Prior to Admission Medications   Prescriptions Last Dose Informant Patient Reported? Taking?    B Complex-Biotin-FA (TH VITAMIN B 50/B-COMPLEX) TABS  Self Yes No   Sig: Take by mouth daily   COLCHICINE PO  Self Yes No   Sig: Take 0 5 mg by mouth 3 (three) times a day as needed    Capsaicin 0 025 % GEL  Self Yes No   Sig: Apply topically 3 (three) times a day as needed    Echinacea 400 MG CAPS  Self Yes No   Sig: Take by mouth 3 (three) times a day as needed   GINKGO BILOBA PLUS PO  Self Yes No   Sig: Take by mouth as needed 2 capsules every 2 mornings    Hyaluronic Acid 20-60 MG CAPS  Self Yes No   Sig: Take by mouth 3 (three) times a day    L-Lysine 500 MG CAPS  Self Yes No   Sig: Take by mouth 3 (three) times a day as needed   LORazepam (ATIVAN) 0 5 mg tablet   No No   Sig: Take 1 tablet (0 5 mg total) by mouth every 8 (eight) hours as needed for anxiety   Lactobacillus (PROBIOTIC ACIDOPHILUS PO)  Self Yes No   Sig: Take 1 capsule by mouth daily   NON FORMULARY  Self Yes No   Sig: Take 37 mg by mouth daily Eco Thyro 37 - reported by pt   S-Adenosylmethionine (RADHA-E) 200 MG TABS  Self Yes No   Sig: Take by mouth 3 (three) times a day   Tragacanth (ASTRAGALUS ROOT) POWD  Self Yes No   Si mg 3 (three) times a day as needed   acetaminophen (TYLENOL) 500 mg tablet  Self No No   Sig: Take 2 tablets (1,000 mg total) by mouth every 8 (eight) hours   albuterol (2 5 mg/3 mL) 0 083 % nebulizer solution   No No   Sig: Take 1 vial (2 5 mg total) by nebulization every 6 (six) hours as needed for wheezing or shortness of breath   albuterol (ProAir HFA) 90 mcg/act inhaler  Self No No   Sig: Inhale 2 puffs every 6 (six) hours as needed for wheezing or shortness of breath   cholecalciferol (VITAMIN D3) 1,000 units tablet  Self Yes No   Sig: Take 7,000 Units by mouth daily   cyclobenzaprine (FLEXERIL) 10 mg tablet  Self Yes No   Sig: Take 10 mg by mouth 3 (three) times a day as needed for muscle spasms   dexamethasone (DECADRON) 2 mg tablet   No No   Sig: Take 1 tablet (2 mg total) by mouth 2 (two) times a day with meals   fexofenadine-pseudoephedrine (ALLEGRA-D)  MG per tablet  Self Yes No   Sig: Take 1 tablet by mouth 2 (two) times a day as needed for allergies   hydrocortisone 2 5 % cream  Self No No   Sig: Apply topically 4 (four) times a day as needed for irritation or rash   lidocaine-prilocaine (EMLA) cream  Self No No   Sig: Apply topically as needed for mild pain 30-60 min prior to port access     loperamide (IMODIUM) 2 mg capsule  Self Yes No   Sig: Take 2 mg by mouth daily    mirtazapine (REMERON) 30 mg tablet  Self No No   Sig: Take 1 tablet (30 mg total) by mouth daily at bedtime   morphine (MSIR) 30 MG tablet   No No   Sig: Take 1 tablet (30 mg total) by mouth every 4 (four) hours as needed for severe painMax Daily Amount: 180 mg   nystatin (MYCOSTATIN) 500,000 units/5 mL suspension  Self No No   Sig: Apply 5 mL (500,000 Units total) to the mouth or throat 4 (four) times a day   omeprazole (PriLOSEC) 20 mg delayed release capsule  Self No No   Sig: Take 1 capsule (20 mg total) by mouth daily   ondansetron (ZOFRAN) 4 mg tablet  Self No No   Sig: Take 1 tablet (4 mg total) by mouth every 8 (eight) hours as needed for nausea or vomiting   other medication, see sig,  Self Yes No   Sig: Take 3 capsules by mouth daily [id number]   oxybutynin (DITROPAN-XL) 10 MG 24 hr tablet  Self Yes No   Sig: TAKE 1 TABLET BY MOUTH ONCE DAILY FOR INCONTINENCE   prochlorperazine (COMPAZINE) 10 mg tablet  Self Yes No   Sig: Take 10 mg by mouth every 6 (six) hours as needed for nausea or vomiting (migraines)   senna-docusate sodium (SENOKOT-S) 8 6-50 mg per tablet  Self No No   Sig: Take 1 tablet by mouth daily   sodium chloride (OCEAN) 0 65 % nasal spray  Self No No   Si spray into each nostril as needed for congestion or rhinitis   tolterodine (DETROL LA) 2 mg 24 hr capsule  Self Yes No   umeclidinium-vilanterol (ANORO ELLIPTA) 62 5-25 MCG/INH inhaler  Self No No   Sig: Inhale 1 puff daily      Facility-Administered Medications: None       Past Medical History:   Diagnosis Date    Chronic fatigue syndrome with fibromyalgia     COPD (chronic obstructive pulmonary disease) (HCC)     DDD (degenerative disc disease), cervical     Emphysema of lung (HCC)     Hx of migraine headaches     Hypothyroidism     Lung cancer (HCC)        Past Surgical History:   Procedure Laterality Date    BARTHOLIN GLAND CYST EXCISION      CARPAL TUNNEL RELEASE Bilateral     COLONOSCOPY  2020     15 mm sessile polyp in the cecum removed by polypectomy    A 3 recall    IR PLEURAL EFFUSION LONG-TERM CATHETER PLACEMENT  11/13/2020    IR PLEURAL EFFUSION LONG-TERM CATHETER REMOVAL  12/21/2020    IR PORT PLACEMENT  12/11/2020    IR THORACENTESIS  11/3/2020    TONSILLECTOMY      TUBAL LIGATION      UPPER GASTROINTESTINAL ENDOSCOPY  07/09/2020     small hiatal hernia  Pathology negative  Family History   Problem Relation Age of Onset    Colon polyps Mother     Heart disease Mother     Colon cancer Mother     Heart disease Father     Lymphoma Father      I have reviewed and agree with the history as documented  E-Cigarette/Vaping    E-Cigarette Use Never User      E-Cigarette/Vaping Substances    Nicotine No     THC No     CBD No     Flavoring No     Other No     Unknown No      Social History     Tobacco Use    Smoking status: Former Smoker     Packs/day: 0 50     Years: 53 00     Pack years: 26 50     Types: Cigarettes     Start date: 1967    Smokeless tobacco: Never Used    Tobacco comment: 2 cigarettes daily   Substance Use Topics    Alcohol use: Not Currently    Drug use: Not Currently        Review of Systems   Constitutional: Positive for fatigue (chronic)  Negative for chills and fever  HENT: Negative for congestion and rhinorrhea  Eyes: Negative for visual disturbance  Respiratory: Positive for shortness of breath (chronic)  Negative for cough  Cardiovascular: Negative for chest pain and leg swelling  Gastrointestinal: Negative for abdominal pain, diarrhea, nausea and vomiting  Endocrine: Negative for polyuria  Genitourinary: Negative for difficulty urinating, dysuria and flank pain  Musculoskeletal: Negative for arthralgias and gait problem  Skin: Negative for rash  Neurological: Negative for syncope, weakness, light-headedness and headaches  Psychiatric/Behavioral: Negative for behavioral problems and confusion  All other systems reviewed and are negative        Physical Exam  ED Triage Vitals   Temperature Pulse Respirations Blood Pressure SpO2 03/17/21 1022 03/17/21 1022 03/17/21 1022 03/17/21 1023 03/17/21 1022   98 1 °F (36 7 °C) 61 18 (!) 186/70 93 %      Temp Source Heart Rate Source Patient Position - Orthostatic VS BP Location FiO2 (%)   03/17/21 1022 03/17/21 1022 -- -- --   Oral Monitor         Pain Score       --                    Orthostatic Vital Signs  Vitals:    03/17/21 1022 03/17/21 1023   BP:  (!) 186/70   Pulse: 61        Physical Exam  Vitals signs and nursing note reviewed  Constitutional:       General: She is not in acute distress  Appearance: She is well-developed  She is cachectic  She is ill-appearing  She is not toxic-appearing or diaphoretic  Interventions: Nasal cannula in place  HENT:      Head: Normocephalic and atraumatic  Nose: No congestion or rhinorrhea  Eyes:      General:         Right eye: No discharge  Left eye: No discharge  Neck:      Musculoskeletal: Normal range of motion and neck supple  Cardiovascular:      Rate and Rhythm: Normal rate and regular rhythm  Pulses:           Radial pulses are 2+ on the right side and 2+ on the left side  Pulmonary:      Effort: Pulmonary effort is normal  No tachypnea, accessory muscle usage, respiratory distress or retractions  Breath sounds: No stridor  Examination of the left-middle field reveals wheezing  Examination of the left-lower field reveals wheezing  Wheezing present  No decreased breath sounds, rhonchi or rales  Comments: 8L NC  Abdominal:      General: Bowel sounds are normal  There is no distension  Palpations: Abdomen is soft  Tenderness: There is no abdominal tenderness  There is no guarding or rebound  Musculoskeletal:      Right lower leg: She exhibits no tenderness  No edema  Left lower leg: She exhibits no tenderness  No edema  Skin:     Capillary Refill: Capillary refill takes less than 2 seconds  Findings: No rash     Neurological:      Mental Status: She is alert and oriented to person, place, and time  Psychiatric:         Mood and Affect: Mood normal          Behavior: Behavior normal          ED Medications  Medications - No data to display    Diagnostic Studies  Results Reviewed     None                 No orders to display         Procedures  Procedures      ED Course                             SBIRT 20yo+      Most Recent Value   SBIRT (22 yo +)   In order to provide better care to our patients, we are screening all of our patients for alcohol and drug use  Would it be okay to ask you these screening questions? No Filed at: 03/17/2021 1016                MDM  Number of Diagnoses or Management Options  Chronic fatigue:   SCC (squamous cell carcinoma of lung), left [facility]):   Diagnosis management comments: 78 y o  female presenting for chronic fatigue  Patient states she is at baseline health  AAOx3  D/w radiation oncology Dr Katie Oliva  Concern for brain metastasis and ability for patient to care for herself at home  Recommended labs and imaging to eval for this  These concerns were discussed with the patient  I discussed possibility of but not limited to lethargy, seizures, intracranial hemorrhage, worsening disease, permanent disability and death  The patient demonstrates understanding and would still like to be discharged without workup  I feel that the patient has decision-making capacity  She demonstrates ability to understand the current situation and is able to communicate a choice for what she wants to do  She expresses understanding of benefits, risks, and alternatives and is able to make logical and rational choices, even though they are not in line with my recommended medical discretion  There is no evidence of intoxication or altered mentation which would preclude normal cognitive function  Patient instructed to continue home medication regimen  Patient requesting discharge without diagnostic evaluation  Will honor this request at this time    I have discussed with the patient our plan to discharge them from the ED and the patient is in agreement with this plan  The patient was provided a written after visit summary with strict RTED precautions  I have also discussed with the patient plans for follow up with their PCP  Amount and/or Complexity of Data Reviewed  Review and summarize past medical records: yes  Discuss the patient with other providers: yes    Patient Progress  Patient progress: stable      Disposition  Final diagnoses:   SCC (squamous cell carcinoma of lung), left ([address] )   Chronic fatigue     Time reflects when diagnosis was documented in both MDM as applicable and the Disposition within this note     Time User Action Codes Description Comment    3/17/2021 10:36 AM Brendon Padilla Add [C34 92] SCC (squamous cell carcinoma of lung), left ([address] )     3/17/2021 10:37 AM Brendon Padilla Add [R53 82] Chronic fatigue       ED Disposition     ED Disposition Condition Date/Time Comment    Discharge Stable Wed Mar 17, 2021 10:36 AM Tadeo Pena discharge to home/self care              Follow-up Information     Follow up With Specialties Details Why Contact Info    Rachel Zhao DO Family Medicine Schedule an appointment as soon as possible for a visit  As needed [address] [place] [address]  [phone]            Discharge Medication List as of 3/17/2021 10:38 AM      CONTINUE these medications which have NOT CHANGED    Details   acetaminophen (TYLENOL) 500 mg tablet Take 2 tablets (1,000 mg total) by mouth every 8 (eight) hours, Starting Mon 1/4/2021, No Print      albuterol (2 5 mg/3 mL) 0 083 % nebulizer solution Take 1 vial (2 5 mg total) by nebulization every 6 (six) hours as needed for wheezing or shortness of breath, Starting Thu 3/4/2021, Normal      albuterol (ProAir HFA) 90 mcg/act inhaler Inhale 2 puffs every 6 (six) hours as needed for wheezing or shortness of breath, Starting Mon 1/11/2021, Normal      B Complex-Biotin-FA ( VITAMIN B 50/B-COMPLEX) TABS Take by mouth daily, Historical Med      Capsaicin 0 025 % GEL Apply topically 3 (three) times a day as needed , Historical Med      cholecalciferol (VITAMIN D3) 1,000 units tablet Take 7,000 Units by mouth daily, Historical Med      COLCHICINE PO Take 0 5 mg by mouth 3 (three) times a day as needed , Historical Med      cyclobenzaprine (FLEXERIL) 10 mg tablet Take 10 mg by mouth 3 (three) times a day as needed for muscle spasms, Historical Med      dexamethasone (DECADRON) 2 mg tablet Take 1 tablet (2 mg total) by mouth 2 (two) times a day with meals, Starting Fri 2/26/2021, Normal      Echinacea 400 MG CAPS Take by mouth 3 (three) times a day as needed, Historical Med      fexofenadine-pseudoephedrine (ALLEGRA-D)  MG per tablet Take 1 tablet by mouth 2 (two) times a day as needed for allergies, Historical Med      GINKGO BILOBA PLUS PO Take by mouth as needed 2 capsules every 2 mornings , Historical Med      Hyaluronic Acid 20-60 MG CAPS Take by mouth 3 (three) times a day , Historical Med      hydrocortisone 2 5 % cream Apply topically 4 (four) times a day as needed for irritation or rash, Starting Thu 2/11/2021, Normal      L-Lysine 500 MG CAPS Take by mouth 3 (three) times a day as needed, Historical Med      Lactobacillus (PROBIOTIC ACIDOPHILUS PO) Take 1 capsule by mouth daily, Historical Med      lidocaine-prilocaine (EMLA) cream Apply topically as needed for mild pain 30-60 min prior to port access  , Starting Tue 12/22/2020, Normal      loperamide (IMODIUM) 2 mg capsule Take 2 mg by mouth daily , Historical Med      LORazepam (ATIVAN) 0 5 mg tablet Take 1 tablet (0 5 mg total) by mouth every 8 (eight) hours as needed for anxiety, Starting Tue 3/9/2021, Normal      mirtazapine (REMERON) 30 mg tablet Take 1 tablet (30 mg total) by mouth daily at bedtime, Starting Tue 2/9/2021, Normal      morphine (MSIR) 30 MG tablet Take 1 tablet (30 mg total) by mouth every 4 (four) hours as needed for severe painMax Daily Amount: 180 mg, Starting Tue 3/9/2021, Normal      NON FORMULARY Take 37 mg by mouth daily Eco Thyro 37 - reported by pt, Historical Med      nystatin (MYCOSTATIN) 500,000 units/5 mL suspension Apply 5 mL (500,000 Units total) to the mouth or throat 4 (four) times a day, Starting Wed 1/6/2021, Normal      omeprazole (PriLOSEC) 20 mg delayed release capsule Take 1 capsule (20 mg total) by mouth daily, Starting Tue 1/26/2021, Normal      ondansetron (ZOFRAN) 4 mg tablet Take 1 tablet (4 mg total) by mouth every 8 (eight) hours as needed for nausea or vomiting, Starting Tue 1/26/2021, Normal      other medication, see sig, Take 3 capsules by mouth daily [id number], Historical Med      oxybutynin (DITROPAN-XL) 10 MG 24 hr tablet TAKE 1 TABLET BY MOUTH ONCE DAILY FOR INCONTINENCE, Historical Med      prochlorperazine (COMPAZINE) 10 mg tablet Take 10 mg by mouth every 6 (six) hours as needed for nausea or vomiting (migraines), Historical Med      S-Adenosylmethionine (RADHA-E) 200 MG TABS Take by mouth 3 (three) times a day, Historical Med      senna-docusate sodium (SENOKOT-S) 8 6-50 mg per tablet Take 1 tablet by mouth daily, Starting Tue 1/12/2021, Normal      sodium chloride (OCEAN) 0 65 % nasal spray 1 spray into each nostril as needed for congestion or rhinitis, Starting Tue 1/12/2021, Normal      tolterodine (DETROL LA) 2 mg 24 hr capsule Starting Fri 1/22/2021, Historical Med      Tragacanth (ASTRAGALUS ROOT) POWD 470 mg 3 (three) times a day as needed, Historical Med      umeclidinium-vilanterol (ANORO ELLIPTA) 62 5-25 MCG/INH inhaler Inhale 1 puff daily, Starting Thu 11/12/2020, Until Sat 12/19/2020, Normal           No discharge procedures on file  PDMP Review       Value Time User    PDMP Reviewed  Yes 3/9/2021  1:46 PM Norma Khan MD           ED Provider  Attending physically available and evaluated Ramona Hodges I managed the patient along with the ED Attending      Electronically Signed by         Zander Walter, DO  03/17/21 [address], DO  03/17/21 [id number]

## 2021-03-17 NOTE — DISCHARGE INSTRUCTIONS
You have been seen for chronic fatigue  Please continue your medication regimen  Return to the emergency department if you develop worsening weakness, trouble breathing, chest pain, lightheadedness or any other symptoms of concern  Please follow up with your PCP by calling the number provided

## 2021-03-17 NOTE — PROGRESS NOTES
Pt arrived for a follow up in [address] [place] [address] this day and presented as confused and SOB  MSW met with pt, along with the radiation nurse, Calista Barrera, RN  The pt was alert and oriented x2  She was inially unaware of how she arrived to her appointment, later stating that she was brought by a , followed with recalling her neighbor brought her  Pt appeared clean and her pants were on backwards  She admits to having difficulty ambulating  The pt's mouth appeared dry and MSW offered her water  The pt had difficulty holding this and spilled the water  As MSW assisted the pt with cleaning this, the pt asked if she had wet herself  MSW reminded the pt that she had dropped her water  MSW discussed with pt concerns for her living alone and addressed the need for her to have more care  The pt was dozing throughout this conversation and MSW repeated this several times  The pt states that she is in agreement and that it is getting more difficult for her to be home alone  MSW initially discussed the waiver program, however the more time that was spent with the pt, it became evident that she needs 24 hour care  MSW gently shared these concerns with the pt and she asked what that meant  MSW spoke with the pt about nursing home placement and she states she is "not ready for that "  MSW acknowledged this and validated her feeling this way  MSW also discussed her safety and concerns for her being alone  The pt shared that she is in bed all of the time and explained how she is not always able to get to the bathroom in time  MSW reminded pt that these are reasons that it would be helpful to have more care  Pt still stating she is not ready  Pt wanted to call her neighbor, who was in the parking lot and pt pulled her house phone out of her purse  MSW explained why that would not work and the pt was not able to process this    MSW called and spoke with the neighbor who expressed significant concerns for the pt's safety  Pt was seen by Dr Layla Mccray, who felt strongly that pt should be evaluated in the ED and admitted  Pt then taken to the ED

## 2021-03-17 NOTE — PROGRESS NOTES
Follow-up - Radiation Oncology   Kayley Story 1950 78 y o  female [id number]      History of Present Illness   Cancer Staging  Lung cancer ([facility] )  Staging form: Lung, AJCC 8th Edition  - Clinical: No stage assigned - Rufino Bean returns today for routine scheduled follow-up visit,   Approximately 1 month status post completion of palliative radiation therapy to pleural based posterior and anterior chest wall metastatic disease  She presents today stating that she no longer has pain in these areas  That being said, there has been a clear decline in her overall performance status  She presents today quite confused, unable to recall how she arrived in the Department (driven by a neighbor)  She is incontinent of urine and her clothes are clearly soiled  Her speech is somewhat slurred and she is unable to provide a clear review of systems per se  She does deny any recent headaches  She admits that her balance is quite poor but this has been for some time  She is unable to expand on the specifics of her disease process  Historical Information   Oncology History   Small cell lung cancer ([place] [address] )   11/2020 Initial Diagnosis    Small cell lung cancer ([place] [address] )     11/3/2020 Biopsy    Pleural fluid, left:     - Poorly-differentiated malignant neoplasm with neuroendocrine differentiation  11/10/2020 Biopsy    Cytology:   Pleural Fluid, thoracentesis:     Fluid removed amount:  1750 cc  Positive for malignancy  Poorly differentiated carcinoma          11/23/2020 - 3/7/2021 Chemotherapy    pegfilgrastim (NEULASTA ONPRO) subcutaneous injection kit 6 mg, 6 mg, Subcutaneous, Once, 4 of 5 cycles  Administration: 6 mg (11/25/2020), 6 mg (12/16/2020), 6 mg (1/6/2021), 6 mg (1/28/2021)  fosaprepitant (EMEND) 150 mg in sodium chloride 0 9 % 250 mL IVPB, 150 mg, Intravenous, Once, 4 of 5 cycles  Administration: 150 mg (11/23/2020), 150 mg (12/14/2020), 150 mg (1/4/2021), 150 mg (1/26/2021)  CARBOplatin (PARAPLATIN) 402 mg in sodium chloride 0 9 % 250 mL IVPB, 402 mg, Intravenous, Once, 4 of 5 cycles  Administration: 402 mg (11/23/2020), 371 5 mg (12/14/2020), 387 5 mg (1/4/2021), 310 mg (1/26/2021)  Durvalumab 1,500 mg in sodium chloride 0 9 % 100 mL IVPB, 1,500 mg (original dose 10 mg/kg), Intravenous, Once, 5 of 6 cycles  Dose modification: 1,500 mg (original dose 10 mg/kg, Cycle 1, Reason: Other (See Comments))  Administration: 1,500 mg (11/23/2020), 1,500 mg (12/14/2020), 1,500 mg (1/4/2021), 1,500 mg (1/26/2021), 1,500 mg (2/15/2021)  etoposide (TOPOSAR) 152 mg in sodium chloride 0 9 % 500 mL chemo infusion, 100 mg/m2 = 152 mg, Intravenous, Once, 4 of 5 cycles  Dose modification: 80 mg/m2 (original dose 100 mg/m2, Cycle 4, Reason: Dose Not Tolerated)  Administration: 152 mg (11/23/2020), 152 mg (11/25/2020), 152 mg (11/24/2020), 152 mg (12/14/2020), 152 mg (12/15/2020), 152 mg (12/16/2020), 144 mg (1/4/2021), 144 mg (1/5/2021), 144 mg (1/6/2021), 115 2 mg (1/26/2021), 115 2 mg (1/27/2021), 115 2 mg (1/28/2021)     2/8/2021 - 2/12/2021 Radiation    Course: C1    Plan ID Energy Fractions Dose per Fraction (cGy) Dose Correction (cGy) Total Dose Delivered (cGy) Elapsed Days   Ant L CW 6X 5 / 5 400 0 2,000 4   Post L CW 6X 5 / 5 400 0 2,000 4      Dr Jodee Payton     3/9/2021 -  Chemotherapy    Onivyde       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[facility] )   1/26/2021 Initial Diagnosis    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 )     3/9/2021 -  Chemotherapy    Onivyde           Past Medical History:   Diagnosis Date    Chronic fatigue syndrome with fibromyalgia     COPD (chronic obstructive pulmonary disease) ([facility] )     DDD (degenerative disc disease), cervical     [facility]     Hx of migraine headaches     Hypothyroidism     Lung cancer ([facility] )      Past Surgical History:   Procedure Laterality Date    BARTHOLIN GLAND CYST EXCISION      CARPAL TUNNEL RELEASE Bilateral     COLONOSCOPY  07/09/2020     15 mm sessile polyp in the cecum removed by polypectomy  A 3 recall    IR PLEURAL EFFUSION LONG-TERM CATHETER PLACEMENT  11/13/2020    IR PLEURAL EFFUSION LONG-TERM CATHETER REMOVAL  12/21/2020    IR PORT PLACEMENT  12/11/2020    IR THORACENTESIS  11/3/2020    TONSILLECTOMY      TUBAL LIGATION      UPPER GASTROINTESTINAL ENDOSCOPY  07/09/2020     small hiatal hernia  Pathology negative         Social History   Social History     Substance and Sexual Activity   Alcohol Use Not Currently     Social History     Substance and Sexual Activity   Drug Use Not Currently     Social History     Tobacco Use   Smoking Status Former Smoker    Packs/day: 0 50    Years: 53 00    Pack years: 26 50    Types: Cigarettes    Start date: 1967   Smokeless Tobacco Never Used   Tobacco Comment    2 cigarettes daily         Meds/Allergies     Current Outpatient Medications:     acetaminophen (TYLENOL) 500 mg tablet, Take 2 tablets (1,000 mg total) by mouth every 8 (eight) hours, Disp: , Rfl:     albuterol (2 5 mg/3 mL) 0 083 % nebulizer solution, Take 1 vial (2 5 mg total) by nebulization every 6 (six) hours as needed for wheezing or shortness of breath, Disp: 180 vial, Rfl: 5    albuterol (ProAir HFA) 90 mcg/act inhaler, Inhale 2 puffs every 6 (six) hours as needed for wheezing or shortness of breath, Disp: 8 5 g, Rfl: 6    B Complex-Biotin-FA (TH VITAMIN B 50/B-COMPLEX) TABS, Take by mouth daily, Disp: , Rfl:     Capsaicin 0 025 % GEL, Apply topically 3 (three) times a day as needed , Disp: , Rfl:     cholecalciferol (VITAMIN D3) 1,000 units tablet, Take 7,000 Units by mouth daily, Disp: , Rfl:     COLCHICINE PO, Take 0 5 mg by mouth 3 (three) times a day as needed , Disp: , Rfl:     cyclobenzaprine (FLEXERIL) 10 mg tablet, Take 10 mg by mouth 3 (three) times a day as needed for muscle spasms, Disp: , Rfl:     dexamethasone (DECADRON) 2 mg tablet, Take 1 tablet (2 mg total) by mouth 2 (two) times a day with meals, Disp: 60 tablet, Rfl: 0    fexofenadine-pseudoephedrine (ALLEGRA-D)  MG per tablet, Take 1 tablet by mouth 2 (two) times a day as needed for allergies, Disp: , Rfl:     hydrocortisone 2 5 % cream, Apply topically 4 (four) times a day as needed for irritation or rash, Disp: 30 g, Rfl: 0    lidocaine-prilocaine (EMLA) cream, Apply topically as needed for mild pain 30-60 min prior to port access  , Disp: 30 g, Rfl: 0    loperamide (IMODIUM) 2 mg capsule, Take 2 mg by mouth daily , Disp: , Rfl:     LORazepam (ATIVAN) 0 5 mg tablet, Take 1 tablet (0 5 mg total) by mouth every 8 (eight) hours as needed for anxiety, Disp: 30 tablet, Rfl: 0    mirtazapine (REMERON) 30 mg tablet, Take 1 tablet (30 mg total) by mouth daily at bedtime, Disp: 30 tablet, Rfl: 1    morphine (MSIR) 30 MG tablet, Take 1 tablet (30 mg total) by mouth every 4 (four) hours as needed for severe painMax Daily Amount: 180 mg, Disp: 90 tablet, Rfl: 0    NON FORMULARY, Take 37 mg by mouth daily Eco Thyro 37 - reported by pt, Disp: , Rfl:     nystatin (MYCOSTATIN) 500,000 units/5 mL suspension, Apply 5 mL (500,000 Units total) to the mouth or throat 4 (four) times a day, Disp: 500 mL, Rfl: 0    omeprazole (PriLOSEC) 20 mg delayed release capsule, Take 1 capsule (20 mg total) by mouth daily, Disp: 90 capsule, Rfl: 3    ondansetron (ZOFRAN) 4 mg tablet, Take 1 tablet (4 mg total) by mouth every 8 (eight) hours as needed for nausea or vomiting, Disp: 60 tablet, Rfl: 1    oxybutynin (DITROPAN-XL) 10 MG 24 hr tablet, TAKE 1 TABLET BY MOUTH ONCE DAILY FOR INCONTINENCE, Disp: , Rfl:     prochlorperazine (COMPAZINE) 10 mg tablet, Take 10 mg by mouth every 6 (six) hours as needed for nausea or vomiting (migraines), Disp: , Rfl:     S-Adenosylmethionine (RADHA-E) 200 MG TABS, Take by mouth 3 (three) times a day, Disp: , Rfl:     senna-docusate sodium (SENOKOT-S) 8 6-50 mg per tablet, Take 1 tablet by mouth daily, Disp: 60 tablet, Rfl: 0    sodium chloride (OCEAN) 0 65 % nasal spray, 1 spray into each nostril as needed for congestion or rhinitis, Disp: 30 mL, Rfl: 0    tolterodine (DETROL LA) 2 mg 24 hr capsule, , Disp: , Rfl:     Echinacea 400 MG CAPS, Take by mouth 3 (three) times a day as needed, Disp: , Rfl:     GINKGO BILOBA PLUS PO, Take by mouth as needed 2 capsules every 2 mornings , Disp: , Rfl:     Hyaluronic Acid 20-60 MG CAPS, Take by mouth 3 (three) times a day , Disp: , Rfl:     L-Lysine 500 MG CAPS, Take by mouth 3 (three) times a day as needed, Disp: , Rfl:     Lactobacillus (PROBIOTIC ACIDOPHILUS PO), Take 1 capsule by mouth daily, Disp: , Rfl:     other medication, see sig,, Take 3 capsules by mouth daily [id number], Disp: , Rfl:     Tragacanth (ASTRAGALUS ROOT) POWD, 470 mg 3 (three) times a day as needed, Disp: , Rfl:     umeclidinium-vilanterol (ANORO ELLIPTA) 62 5-25 MCG/INH inhaler, Inhale 1 puff daily, Disp: 1 Inhaler, Rfl: 5  Allergies   Allergen Reactions    Clarithromycin     Codeine Other (See Comments)     lethergic    Other      PHOSPHATE    Oxycodone Other (See Comments)     lethergic    Trazodone Other (See Comments)     lethergic    Penicillins Rash         Review of Systems   Review of Systems   Constitutional: Positive for appetite change and fatigue  Her neighbors helps her with meals   HENT: Negative  Eyes: Negative  Respiratory: Positive for shortness of breath (continuous 7L oxygen )  Cardiovascular: Negative  Gastrointestinal: Negative  Endocrine: Negative  Genitourinary:        Urinary incontinence   Musculoskeletal: Positive for gait problem (due to weakness, unbalanced)  Skin: Negative  Allergic/Immunologic: Negative  Neurological: Positive for dizziness and weakness  Hematological: Negative  Psychiatric/Behavioral: Positive for sleep disturbance (states she's not able to sleep due to paperwork she has to do "legal forms" )   The patient is nervous/anxious  OBJECTIVE:   /84 (BP Location: Left arm)   Pulse (!) 114   Temp (!) 97 1 °F (36 2 °C) (Temporal)   Resp 18   Wt 40 1 kg (88 lb 6 4 oz)   SpO2 93%   BMI 14 94 kg/m²   Pain Assessment:  0  Karn[place]sky: 40 - Disabled; requires special care and assistance     Physical Exam     The patient presents today in a wheelchair  She is incontinent of urine and her clothes are soiled  She was unaware of this fact  She is clearly confused and unable to recall how she arrived in the department today  Her speech is somewhat slurred although this may be due to severe xerostomia  She is on 5 L of oxygen at baseline but did not have oxygen with her in the department  Decreased breath sounds on the left lung          RESULTS    Lab Results:   Recent Results (from the past 672 hour(s))   CBC and differential    Collection Time: 02/26/21  1:05 PM   Result Value Ref Range    WBC 11 27 (H) 4 31 - 10 16 Thousand/uL    RBC 3 70 (L) 3 81 - 5 12 Million/uL    Hemoglobin 11 4 (L) 11 5 - 15 4 g/dL    Hematocrit 36 6 34 8 - 46 1 %    MCV 99 (H) 82 - 98 fL    MCH 30 8 26 8 - 34 3 pg    MCHC 31 1 (L) 31 4 - 37 4 g/dL    RDW 17 7 (H) 11 6 - 15 1 %    MPV 9 2 8 9 - 12 7 fL    Platelets 527 (H) 536 - 390 Thousands/uL    nRBC 0 /100 WBCs    Neutrophils Relative 89 (H) 43 - 75 %    Immat GRANS % 0 0 - 2 %    Lymphocytes Relative 4 (L) 14 - 44 %    Monocytes Relative 7 4 - 12 %    Eosinophils Relative 0 0 - 6 %    Basophils Relative 0 0 - 1 %    Neutrophils Absolute 10 01 (H) 1 85 - 7 62 Thousands/µL    Immature Grans Absolute 0 04 0 00 - 0 20 Thousand/uL    Lymphocytes Absolute 0 46 (L) 0 60 - 4 47 Thousands/µL    Monocytes Absolute 0 73 0 17 - 1 22 Thousand/µL    Eosinophils Absolute 0 00 0 00 - 0 61 Thousand/µL    Basophils Absolute 0 03 0 00 - 0 10 Thousands/µL   Comprehensive metabolic panel    Collection Time: 02/26/21  1:05 PM   Result Value Ref Range    Sodium 138 136 - 145 mmol/L    Potassium 4 2 3 5 - 5 3 mmol/L    Chloride 100 100 - 108 mmol/L    CO2 30 21 - 32 mmol/L    ANION GAP 8 4 - 13 mmol/L    BUN 21 5 - 25 mg/dL    Creatinine 0 74 0 60 - 1 30 mg/dL    Glucose, Fasting 101 (H) 65 - 99 mg/dL    Calcium 8 8 8 3 - 10 1 mg/dL    Corrected Calcium 9 8 8 3 - 10 1 mg/dL    AST 37 5 - 45 U/L    ALT 18 12 - 78 U/L    Alkaline Phosphatase 121 (H) 46 - 116 U/L    Total Protein 6 4 6 4 - 8 2 g/dL    Albumin 2 8 (L) 3 5 - 5 0 g/dL    Total Bilirubin 0 40 0 20 - 1 00 mg/dL    eGFR 84 ml/min/1 73sq m   ECG 12 lead    Collection Time: 02/28/21  8:24 AM   Result Value Ref Range    Ventricular Rate 108 BPM    Atrial Rate 108 BPM    NC Interval 116 ms    QRSD Interval 80 ms    QT Interval 338 ms    QTC Interval 452 ms    P [place] 73 degrees    QRS Axis 72 degrees    T Wave Axis 140 degrees   CBC and differential    Collection Time: 02/28/21  8:27 AM   Result Value Ref Range    WBC 12 03 (H) 4 31 - 10 16 Thousand/uL    RBC 3 59 (L) 3 81 - 5 12 Million/uL    Hemoglobin 11 2 (L) 11 5 - 15 4 g/dL    Hematocrit 35 3 34 8 - 46 1 %    MCV 98 82 - 98 fL    MCH 31 2 26 8 - 34 3 pg    MCHC 31 7 31 4 - 37 4 g/dL    RDW 17 9 (H) 11 6 - 15 1 %    MPV 8 6 (L) 8 9 - 12 7 fL    Platelets 812 165 - 285 Thousands/uL    nRBC 0 /100 WBCs    Neutrophils Relative 87 (H) 43 - 75 %    Immat GRANS % 0 0 - 2 %    Lymphocytes Relative 4 (L) 14 - 44 %    Monocytes Relative 9 4 - 12 %    Eosinophils Relative 0 0 - 6 %    Basophils Relative 0 0 - 1 %    Neutrophils Absolute 10 39 (H) 1 85 - 7 62 Thousands/µL    Immature Grans Absolute 0 04 0 00 - 0 20 Thousand/uL    Lymphocytes Absolute 0 52 (L) 0 60 - 4 47 Thousands/µL    Monocytes Absolute 1 03 0 17 - 1 22 Thousand/µL    Eosinophils Absolute 0 02 0 00 - 0 61 Thousand/µL    Basophils Absolute 0 03 0 00 - 0 10 Thousands/µL   Comprehensive metabolic panel    Collection Time: 02/28/21  8:27 AM   Result Value Ref Range    Sodium 136 136 - 145 mmol/L    Potassium 4 0 3 5 - 5 3 mmol/L    Chloride 103 100 - 108 mmol/L    CO2 28 21 - 32 mmol/L    ANION GAP 5 4 - 13 mmol/L    BUN 25 5 - 25 mg/dL    Creatinine 0 74 0 60 - 1 30 mg/dL    Glucose 108 65 - 140 mg/dL    Calcium 8 6 8 3 - 10 1 mg/dL    Corrected Calcium 9 8 8 3 - 10 1 mg/dL    AST 37 5 - 45 U/L    ALT 21 12 - 78 U/L    Alkaline Phosphatase 122 (H) 46 - 116 U/L    Total Protein 5 9 (L) 6 4 - 8 2 g/dL    Albumin 2 5 (L) 3 5 - 5 0 g/dL    Total Bilirubin 0 30 0 20 - 1 00 mg/dL    eGFR 84 ml/min/1 73sq m   Troponin I    Collection Time: 02/28/21  8:27 AM   Result Value Ref Range    Troponin I 0 12 (H) <=0 04 ng/mL   NT-BNP PRO    Collection Time: 02/28/21  8:27 AM   Result Value Ref Range    NT-proBNP 3,047 (H) <125 pg/mL   APTT six (6) hours after Heparin bolus/drip initiation or dosing change    Collection Time: 02/28/21  8:29 AM   Result Value Ref Range    PTT 30 23 - 37 seconds   Protime-INR    Collection Time: 02/28/21  8:29 AM   Result Value Ref Range    Protime 14 4 11 6 - 14 5 seconds    INR 1 12 0 84 - 1 19   ECG 12 lead    Collection Time: 02/28/21 10:58 AM   Result Value Ref Range    Ventricular Rate 107 BPM    Atrial Rate 107 BPM    AL Interval 116 ms    QRSD Interval 84 ms    QT Interval 372 ms    QTC Interval 496 ms    P [place] 72 degrees    QRS Axis 61 degrees    T Wave Axis 116 degrees   Troponin I    Collection Time: 02/28/21 11:47 AM   Result Value Ref Range    Troponin I 0 18 (H) <=0 04 ng/mL   ECG 12 lead    Collection Time: 02/28/21  2:25 PM   Result Value Ref Range    Ventricular Rate 93 BPM    Atrial Rate 93 BPM    AL Interval 128 ms    QRSD Interval 132 ms    QT Interval 382 ms    QTC Interval 474 ms    P Axis 67 degrees    QRS Axis 64 degrees    T Wave Axis 95 degrees   Troponin I    Collection Time: 02/28/21  2:34 PM   Result Value Ref Range    Troponin I 0 13 (H) <=0 04 ng/mL   APTT    Collection Time: 02/28/21  3:56 PM   Result Value Ref Range    PTT 39 (H) 23 - 37 seconds   ECG 12 lead    Collection Time: 02/28/21  5:46 PM   Result Value Ref Range    Ventricular Rate 94 BPM    Atrial Rate 94 BPM    AZ Interval 114 ms    QRSD Interval 114 ms    QT Interval 416 ms    QTC Interval 520 ms    P [place] 69 degrees    QRS Axis 60 degrees    T Wave Axis 124 degrees   Troponin I    Collection Time: 02/28/21  5:54 PM   Result Value Ref Range    Troponin I 0 14 (H) <=0 04 ng/mL   Basic metabolic panel    Collection Time: 03/01/21  5:58 AM   Result Value Ref Range    Sodium 137 136 - 145 mmol/L    Potassium 3 8 3 5 - 5 3 mmol/L    Chloride 102 100 - 108 mmol/L    CO2 29 21 - 32 mmol/L    ANION GAP 6 4 - 13 mmol/L    BUN 25 5 - 25 mg/dL    Creatinine 0 78 0 60 - 1 30 mg/dL    Glucose 92 65 - 140 mg/dL    Calcium 8 1 (L) 8 3 - 10 1 mg/dL    eGFR 78 ml/min/1 73sq m   CBC (With Platelets)    Collection Time: 03/01/21  5:58 AM   Result Value Ref Range    WBC 7 42 4 31 - 10 16 Thousand/uL    RBC 3 57 (L) 3 81 - 5 12 Million/uL    Hemoglobin 10 8 (L) 11 5 - 15 4 g/dL    Hematocrit 34 9 34 8 - 46 1 %    MCV 98 82 - 98 fL    MCH 30 3 26 8 - 34 3 pg    MCHC 30 9 (L) 31 4 - 37 4 g/dL    RDW 17 8 (H) 11 6 - 15 1 %    Platelets 557 033 - 407 Thousands/uL    MPV 8 8 (L) 8 9 - 12 7 fL   ECG 12 lead    Collection Time: 03/01/21  6:21 AM   Result Value Ref Range    Ventricular Rate 99 BPM    Atrial Rate 99 BPM    AZ Interval 106 ms    QRSD Interval 80 ms    QT Interval 394 ms    QTC Interval 505 ms    P Axis 73 degrees    QRS Axis 63 degrees    T Wave Axis 125 degrees   ECG 12 lead    Collection Time: 03/01/21  6:22 AM   Result Value Ref Range    Ventricular Rate 99 BPM    Atrial Rate 99 BPM    AZ Interval 110 ms    QRSD Interval 82 ms    QT Interval 396 ms    QTC Interval 508 ms    P Axis 75 degrees    QRS Axis 64 degrees    T Wave Axis 124 degrees   Procalcitonin with AM Reflex    Collection Time: 03/01/21 10:18 AM   Result Value Ref Range    Procalcitonin 0 22 <=0 25 ng/ml   D-dimer, quantitative    Collection Time: 03/01/21  3:26 PM   Result Value Ref Range    D-Dimer, Quant 1 51 (H) <0 50 ug/ml FEU   Procalcitonin Reflex    Collection Time: 03/02/21  4:56 AM   Result Value Ref Range    Procalcitonin 0 22 <=0 25 ng/ml   CBC    Collection Time: 03/02/21  4:56 AM   Result Value Ref Range    WBC 9 04 4 31 - 10 16 Thousand/uL    RBC 3 69 (L) 3 81 - 5 12 Million/uL    Hemoglobin 11 3 (L) 11 5 - 15 4 g/dL    Hematocrit 35 9 34 8 - 46 1 %    MCV 97 82 - 98 fL    MCH 30 6 26 8 - 34 3 pg    MCHC 31 5 31 4 - 37 4 g/dL    RDW 17 4 (H) 11 6 - 15 1 %    Platelets 182 382 - 257 Thousands/uL    MPV 9 2 8 9 - 12 7 fL   Basic metabolic panel    Collection Time: 03/02/21  4:56 AM   Result Value Ref Range    Sodium 136 136 - 145 mmol/L    Potassium 3 8 3 5 - 5 3 mmol/L    Chloride 101 100 - 108 mmol/L    CO2 28 21 - 32 mmol/L    ANION GAP 7 4 - 13 mmol/L    BUN 20 5 - 25 mg/dL    Creatinine 0 74 0 60 - 1 30 mg/dL    Glucose 94 65 - 140 mg/dL    Calcium 8 1 (L) 8 3 - 10 1 mg/dL    eGFR 84 ml/min/1 73sq m   CBC and differential    Collection Time: 03/03/21  5:27 AM   Result Value Ref Range    WBC 9 42 4 31 - 10 16 Thousand/uL    RBC 3 92 3 81 - 5 12 Million/uL    Hemoglobin 12 0 11 5 - 15 4 g/dL    Hematocrit 38 1 34 8 - 46 1 %    MCV 97 82 - 98 fL    MCH 30 6 26 8 - 34 3 pg    MCHC 31 5 31 4 - 37 4 g/dL    RDW 17 3 (H) 11 6 - 15 1 %    MPV 9 2 8 9 - 12 7 fL    Platelets 119 779 - 474 Thousands/uL    nRBC 0 /100 WBCs    Neutrophils Relative 77 (H) 43 - 75 %    Immat GRANS % 0 0 - 2 %    Lymphocytes Relative 11 (L) 14 - 44 %    Monocytes Relative 12 4 - 12 %    Eosinophils Relative 0 0 - 6 %    Basophils Relative 0 0 - 1 %    Neutrophils Absolute 7 28 1 85 - 7 62 Thousands/µL    Immature Grans Absolute 0 03 0 00 - 0 20 Thousand/uL    Lymphocytes Absolute 1 00 0 60 - 4 47 Thousands/µL    Monocytes Absolute 1 08 0 17 - 1 22 Thousand/µL    Eosinophils Absolute 0 02 0 00 - 0 61 Thousand/µL    Basophils Absolute 0 01 0 00 - 0 10 Thousands/µL   Basic metabolic panel    Collection Time: 03/03/21  5:27 AM Result Value Ref Range    Sodium 137 136 - 145 mmol/L    Potassium 4 6 3 5 - 5 3 mmol/L    Chloride 102 100 - 108 mmol/L    CO2 27 21 - 32 mmol/L    ANION GAP 8 4 - 13 mmol/L    BUN 16 5 - 25 mg/dL    Creatinine 0 63 0 60 - 1 30 mg/dL    Glucose 89 65 - 140 mg/dL    Calcium 8 5 8 3 - 10 1 mg/dL    eGFR 92 ml/min/1 73sq m   Comprehensive metabolic panel    Collection Time: 03/09/21  9:47 AM   Result Value Ref Range    Sodium 138 136 - 145 mmol/L    Potassium 4 1 3 5 - 5 3 mmol/L    Chloride 101 100 - 108 mmol/L    CO2 30 21 - 32 mmol/L    ANION GAP 7 4 - 13 mmol/L    BUN 29 (H) 5 - 25 mg/dL    Creatinine 0 78 0 60 - 1 30 mg/dL    Glucose 108 65 - 140 mg/dL    Calcium 8 5 8 3 - 10 1 mg/dL    Corrected Calcium 9 7 8 3 - 10 1 mg/dL    AST 38 5 - 45 U/L    ALT 23 12 - 78 U/L    Alkaline Phosphatase 100 46 - 116 U/L    Total Protein 5 9 (L) 6 4 - 8 2 g/dL    Albumin 2 5 (L) 3 5 - 5 0 g/dL    Total Bilirubin 0 30 0 20 - 1 00 mg/dL    eGFR 78 ml/min/1 73sq m   ECG 12 lead    Collection Time: 03/11/21  2:05 PM   Result Value Ref Range    Ventricular Rate 111 BPM    Atrial Rate 111 BPM    AZ Interval 116 ms    QRSD Interval 72 ms    QT Interval 326 ms    QTC Interval 443 ms    P Axis 80 degrees    QRS Axis 81 degrees    T Wave Axis 129 degrees   CBC and differential    Collection Time: 03/11/21  2:18 PM   Result Value Ref Range    WBC 6 69 4 31 - 10 16 Thousand/uL    RBC 3 94 3 81 - 5 12 Million/uL    Hemoglobin 12 1 11 5 - 15 4 g/dL    Hematocrit 37 9 34 8 - 46 1 %    MCV 96 82 - 98 fL    MCH 30 7 26 8 - 34 3 pg    MCHC 31 9 31 4 - 37 4 g/dL    RDW 16 2 (H) 11 6 - 15 1 %    MPV 8 8 (L) 8 9 - 12 7 fL    Platelets 959 763 - 469 Thousands/uL    nRBC 0 /100 WBCs    Neutrophils Relative 91 (H) 43 - 75 %    Immat GRANS % 1 0 - 2 %    Lymphocytes Relative 4 (L) 14 - 44 %    Monocytes Relative 3 (L) 4 - 12 %    Eosinophils Relative 1 0 - 6 %    Basophils Relative 0 0 - 1 %    Neutrophils Absolute 6 12 1 85 - 7 62 Thousands/µL Immature Grans Absolute 0 05 0 00 - 0 20 Thousand/uL    Lymphocytes Absolute 0 26 (L) 0 60 - 4 47 Thousands/µL    Monocytes Absolute 0 22 0 17 - 1 22 Thousand/µL    Eosinophils Absolute 0 04 0 00 - 0 61 Thousand/µL    Basophils Absolute 0 00 0 00 - 0 10 Thousands/µL   Comprehensive metabolic panel    Collection Time: 03/11/21  2:18 PM   Result Value Ref Range    Sodium 134 (L) 136 - 145 mmol/L    Potassium 3 7 3 5 - 5 3 mmol/L    Chloride 97 (L) 100 - 108 mmol/L    CO2 27 21 - 32 mmol/L    ANION GAP 10 4 - 13 mmol/L    BUN 19 5 - 25 mg/dL    Creatinine 0 56 (L) 0 60 - 1 30 mg/dL    Glucose 102 65 - 140 mg/dL    Calcium 9 0 8 3 - 10 1 mg/dL    Corrected Calcium 10 1 8 3 - 10 1 mg/dL    AST 33 5 - 45 U/L    ALT 23 12 - 78 U/L    Alkaline Phosphatase 86 46 - 116 U/L    Total Protein 6 1 (L) 6 4 - 8 2 g/dL    Albumin 2 6 (L) 3 5 - 5 0 g/dL    Total Bilirubin 0 60 0 20 - 1 00 mg/dL    eGFR 96 ml/min/1 73sq m   Troponin I    Collection Time: 03/11/21  2:18 PM   Result Value Ref Range    Troponin I 0 02 <=0 04 ng/mL   NT-BNP PRO    Collection Time: 03/11/21  2:18 PM   Result Value Ref Range    NT-proBNP 1,068 (H) <125 pg/mL   Magnesium    Collection Time: 03/11/21  2:18 PM   Result Value Ref Range    Magnesium 1 9 1 6 - 2 6 mg/dL   Phosphorus    Collection Time: 03/11/21  2:18 PM   Result Value Ref Range    Phosphorus 3 6 2 3 - 4 1 mg/dL       Imaging Studies:Xr Chest 1 View Portable    Result Date: 3/11/2021  Narrative: CHEST INDICATION:   left flank pain, lung cancer  COMPARISON:  2/28/2021 EXAM PERFORMED/VIEWS:  XR CHEST PORTABLE FINDINGS: Postop silhouette Persistent airspace and interstitial changes are noted in the left hemithorax with pleural thickening  Overall opacity in the left hemithorax has mildly worsened  Right lung is unchanged in appearance  There is no evidence of pneumothorax  Osseous structures appear within normal limits for patient age      Impression: Interval worsening of parenchymal/ pleural disease in the left hemithorax  Workstation performed: [id number]    Xr Chest 1 View Portable    Result Date: 2/28/2021  Narrative: CHEST INDICATION:   SOB  COMPARISON:  2/16/2021; CTA chest 2/16/2021 EXAM PERFORMED/VIEWS:  XR CHEST PORTABLE  AP semierect Images:  1 FINDINGS:  Right IJ catheter terminates at the caval atrial junction  No pneumothorax  Heart shadow is obscured by adjacent opacity  Persistent opacities throughout the left hemithorax with diffuse pleural nodular thickening again noted extending to the apex  Increased interstitial lung markings throughout the left hemithorax concerning for a superimposed infection versus vascular congestion  Right lung is hyperinflated but clear with blunting of the costophrenic angle stable  No pneumothorax at either apex  Osseous structures appear within normal limits for patient age  Impression: Stable opacities throughout the left hemithorax with underlying diffuse pleural thickening  Increased interstitial lung markings concerning for superimposed edema versus infection  Chronic hyperinflation right lung  Right IJ catheter  Workstation performed: [id number]     Xr Chest Portable    Result Date: 2/16/2021  Narrative: CHEST INDICATION:   Acute Resp Failure  COMPARISON:  1/20/2021, 1/8/2021  EXAM PERFORMED/VIEWS:  XR CHEST PORTABLE FINDINGS:  Right internal jugular chest port in satisfactory position  Cardiomediastinal silhouette appears unremarkable  Unchanged left lung masses  Emphysematous changes are noted consistent with chronic obstructive pulmonary disease  Small left pleural effusion again seen  Osseous structures appear within normal limits for patient age  Impression: No acute cardiopulmonary disease  Left lung masses and small left pleural effusion   Workstation performed: [id number]     Cta Chest Pe Study    Result Date: 3/1/2021  Narrative: CTA - CHEST WITH IV CONTRAST - PULMONARY ANGIOGRAM INDICATION:   Shortness of breath, tachycardia, tachypnea  History of lung cancer  Evaluate for pulmonary embolus    COMPARISON: Chest CT from 2/16/2021  TECHNIQUE: CT angiogram tailored to evaluate for pulmonary embolism  Axial, sagittal, and coronal 2D reformatted images created from source data  Coronal 3D MIP postprocessing on the acquisition scanner  Radiation dose length product (DLP):  181 mGy-cm   Techniques to minimize radiation dose exposure include iterative reconstruction and automated exposure control  IV Contrast:  85 mL of iohexol (OMNIPAQUE)  FINDINGS: PULMONARY ARTERIES:  No pulmonary embolus  LUNGS:  Compromised by motion  Moderate emphysema  Mild atelectasis in the left lower lobe  Persistent septal thickening in the left lung  AIRWAYS: No significant filling defects in the airways  PLEURA:  Redemonstration of extensive pleural tumor encasing the left lung  HEART/GREAT VESSELS:  Unremarkable for patient's age  MEDIASTINUM AND MINA:  Redemonstration of left pleural tumor narrowing the left bronchi  Right port at cavoatrial junction  CHEST WALL AND LOWER NECK:   Unremarkable  UPPER ABDOMEN:  Unremarkable  OSSEOUS STRUCTURES:  Mild degenerative disease in the spine  Impression: No pulmonary embolus  Redemonstration of extensive left pleural tumor, encasing the left lung and narrowing the left bronchi  Emphysema  Septal thickening in the left lung due to lymphangitic spread of tumor versus lymphatic obstruction  Workstation performed: [id number]     Cta Ed Chest Pe Study    Result Date: 2/16/2021  Narrative: CTA - CHEST WITH IV CONTRAST - PULMONARY ANGIOGRAM INDICATION:   Shortness of breath, active CA, elevated D-dimer  COMPARISON: None  TECHNIQUE: CTA examination of the chest was performed using angiographic technique according to a protocol specifically tailored to evaluate for pulmonary embolism  Axial, sagittal, and coronal 2D reformatted images were created from the source data and  submitted for interpretation    In addition, coronal 3D MIP postprocessing was performed on the acquisition scanner  Radiation dose length product (DLP) for this visit:  167 65 mGy-cm   This examination, like all CT scans performed in the [facility], was performed utilizing techniques to minimize radiation dose exposure, including the use of iterative  reconstruction and automated exposure control  IV Contrast:  100 mL of iohexol (OMNIPAQUE)  FINDINGS: PULMONARY ARTERIAL TREE:  No pulmonary embolus is seen  LUNGS:  Centrilobular and paraseptal emphysematous changes are reidentified  There is extensive atelectasis in the left lung base, similar from previous examination  There is likely invasion of pleural-based tumor into the lingula, though this is better characterized on January 20, 2021 CT performed on the more delayed phases after the administration of IV contrast   No tracheal or endobronchial mass  No new pulmonary mass in the aerated lung zones  No consolidated or groundglass airspace opacity in the aerated lung zones to suggest superimposed infectious process  PLEURA:  There is lobulated pleural-based tumor mass throughout the left hemithorax most notably in the base of the left hemithorax along left hemidiaphragm and throughout the left costophrenic angle  The overall volume of tumor appears somewhat increased when compared to January 20, 2021  A representative lesion deep to the left 7th rib just posterior to the major fissure measures approximately 2 9 x 2 7 cm on image 131 of series 2, compared with 2 2 x 1 9 cm when measured using similar technique on January 2021  Maximal thickness of representative pleural tumor mass deep to the lateral left 3rd rib measures up to 14 mm on image 79 of series 2, compared with no greater than 9 mm on January 2021  There is some loculated pleural fluid in the left chest, the fluid is difficult to differentiate from tumor and pulmonary angiogram images    In the medial left costophrenic angle on image 191 of series 2, some combination of tumor and/or fluid measures up to 2 7 cm in thickness compared with 2 1 cm when measured using similar technique on January 20, 2021  HEART/GREAT VESSELS:  Mass effect from tumor and pleural fluid in the left hemithorax results in increased mass effect when compared to previous examination showing the heart slightly more to the right than on the prior examination  There is narrowing of right upper and right lower pulmonary venous branches  There is encasement of pulmonary arteries and pulmonary dural branches with no greater than minimal narrowing MEDIASTINUM AND MINA:  Straightening of left hilar tumor mass appears slightly progressed in volume when compared to prior examination with soft tissue density nodules anterior and posterior to the lower pole left pulmonary artery measuring 12 and 11 mm on image 134 of series 2 compared with 10 and 8 mm when measured using similar technique on the previous examination  CHEST WALL AND LOWER NECK:   Patient is somewhat cachectic  Reflux of injected very high density contrast is seen throughout the left arm and lower neck due to probably due to narrowing of the left brachiocephalic vein at the level of the thoracic inlet  High right heart pressures could also contribute to this reflux of contrast  VISUALIZED STRUCTURES IN THE UPPER ABDOMEN:  Unremarkable  OSSEOUS STRUCTURES:  No acute fracture or destructive osseous lesion  Impression: No pulmonary embolus  Slight progression of extensive left pleural-based and left hilar tumor with increase in overlying atelectasis and increase in mass effect on the heart/mediastinal structures  Workstation performed: [id number]           Assessment/Plan:  No orders of the defined types were placed in this encounter  Bruce Garcia is a 78y o  year old female with   Metastatic small cell lung cancer, refractory to various lines of systemic therapy    She did recently initiate treatment with Onivyde, receiving 1 cycle thus far  Thankfully, it appears her chest wall pain has improved since palliative radiation therapy to pleural base lesions along the posterior and anterior chest wall  That being said, her disease continues to progress, and her performance status continues to decline  We had previously discussed with her her the concept of hospice and she had been resistant to this suggestion in the past   She currently is quite confused and there is concern for potential brain metastases  It is also possible that her confusion is simply related to poor p o  intake and underlying electrolyte abnormalities or potential infection  At this time, it is certainly not safe for the patient to return home where she lives alone with occasional help from a neighbor  She is not in touch with any family members although she does have 2 children  We believe she needs to be admitted to the hospital for placement consideration, assessment of competence, and potentially MRI of the brain although at this point we believe hospice generally speaking would be most appropriate  She will be transported to the emergency room by our staff and we will follow-up on her inpatient course  We will inform her medical oncologist regarding these developments  Jarod Raygoza MD  3/17/2021,10:10 AM    Portions of the record may have been created with voice recognition software   Occasional wrong word or "sound a like" substitutions may have occurred due to the inherent limitations of voice recognition software   Read the chart carefully and recognize, using context, where substitutions have occurred

## 2021-03-18 PROBLEM — N30.00 ACUTE CYSTITIS WITHOUT HEMATURIA: Status: ACTIVE | Noted: 2021-03-18

## 2021-03-18 PROBLEM — R41.82 AMS (ALTERED MENTAL STATUS): Status: ACTIVE | Noted: 2021-03-18

## 2021-03-18 PROBLEM — K59.00 CONSTIPATION: Status: ACTIVE | Noted: 2021-03-18

## 2021-03-18 PROBLEM — Z78.9 UNABLE TO CARE FOR SELF: Status: ACTIVE | Noted: 2021-03-18

## 2021-03-18 PROBLEM — E03.9 HYPOTHYROIDISM: Status: ACTIVE | Noted: 2021-03-18

## 2021-03-18 PROBLEM — D64.9 ANEMIA: Status: ACTIVE | Noted: 2021-03-18

## 2021-03-18 LAB
ANION GAP SERPL CALCULATED.3IONS-SCNC: 12 MMOL/L (ref 4–13)
BACTERIA UR QL AUTO: ABNORMAL /HPF
BILIRUB UR QL STRIP: NEGATIVE
BUN SERPL-MCNC: 27 MG/DL (ref 5–25)
CALCIUM SERPL-MCNC: 8 MG/DL (ref 8.3–10.1)
CHLORIDE SERPL-SCNC: 103 MMOL/L (ref 100–108)
CLARITY UR: ABNORMAL
CO2 SERPL-SCNC: 24 MMOL/L (ref 21–32)
COLOR UR: YELLOW
CREAT SERPL-MCNC: 0.59 MG/DL (ref 0.6–1.3)
ERYTHROCYTE [DISTWIDTH] IN BLOOD BY AUTOMATED COUNT: 16.3 % (ref 11.6–15.1)
GFR SERPL CREATININE-BSD FRML MDRD: 94 ML/MIN/1.73SQ M
GLUCOSE SERPL-MCNC: 81 MG/DL (ref 65–140)
GLUCOSE UR STRIP-MCNC: NEGATIVE MG/DL
HCT VFR BLD AUTO: 32.5 % (ref 34.8–46.1)
HGB BLD-MCNC: 10.3 G/DL (ref 11.5–15.4)
HGB UR QL STRIP.AUTO: ABNORMAL
KETONES UR STRIP-MCNC: NEGATIVE MG/DL
LEUKOCYTE ESTERASE UR QL STRIP: ABNORMAL
MCH RBC QN AUTO: 31.6 PG (ref 26.8–34.3)
MCHC RBC AUTO-ENTMCNC: 31.7 G/DL (ref 31.4–37.4)
MCV RBC AUTO: 100 FL (ref 82–98)
NITRITE UR QL STRIP: POSITIVE
NON-SQ EPI CELLS URNS QL MICRO: ABNORMAL /HPF
OTHER STN SPEC: ABNORMAL
PH UR STRIP.AUTO: 7 [PH]
PLATELET # BLD AUTO: 161 THOUSANDS/UL (ref 149–390)
PMV BLD AUTO: 9.9 FL (ref 8.9–12.7)
POTASSIUM SERPL-SCNC: 3.1 MMOL/L (ref 3.5–5.3)
PROT UR STRIP-MCNC: ABNORMAL MG/DL
RBC # BLD AUTO: 3.26 MILLION/UL (ref 3.81–5.12)
RBC #/AREA URNS AUTO: ABNORMAL /HPF
SODIUM SERPL-SCNC: 139 MMOL/L (ref 136–145)
SP GR UR STRIP.AUTO: 1.02 (ref 1–1.03)
TSH SERPL DL<=0.05 MIU/L-ACNC: 9.84 UIU/ML (ref 0.36–3.74)
UROBILINOGEN UR QL STRIP.AUTO: 0.2 E.U./DL
WBC # BLD AUTO: 7.9 THOUSAND/UL (ref 4.31–10.16)
WBC #/AREA URNS AUTO: ABNORMAL /HPF

## 2021-03-18 PROCEDURE — 97163 PT EVAL HIGH COMPLEX 45 MIN: CPT

## 2021-03-18 PROCEDURE — 87086 URINE CULTURE/COLONY COUNT: CPT | Performed by: PHYSICIAN ASSISTANT

## 2021-03-18 PROCEDURE — 99220 PR INITIAL OBSERVATION CARE/DAY 70 MINUTES: CPT | Performed by: INTERNAL MEDICINE

## 2021-03-18 PROCEDURE — 80048 BASIC METABOLIC PNL TOTAL CA: CPT | Performed by: PHYSICIAN ASSISTANT

## 2021-03-18 PROCEDURE — 81001 URINALYSIS AUTO W/SCOPE: CPT | Performed by: PHYSICIAN ASSISTANT

## 2021-03-18 PROCEDURE — 87186 SC STD MICRODIL/AGAR DIL: CPT | Performed by: PHYSICIAN ASSISTANT

## 2021-03-18 PROCEDURE — 87077 CULTURE AEROBIC IDENTIFY: CPT | Performed by: PHYSICIAN ASSISTANT

## 2021-03-18 PROCEDURE — 93005 ELECTROCARDIOGRAM TRACING: CPT

## 2021-03-18 PROCEDURE — 97166 OT EVAL MOD COMPLEX 45 MIN: CPT

## 2021-03-18 PROCEDURE — 85027 COMPLETE CBC AUTOMATED: CPT | Performed by: PHYSICIAN ASSISTANT

## 2021-03-18 RX ORDER — ONDANSETRON 2 MG/ML
4 INJECTION INTRAMUSCULAR; INTRAVENOUS EVERY 6 HOURS PRN
Status: DISCONTINUED | OUTPATIENT
Start: 2021-03-18 | End: 2021-03-20 | Stop reason: HOSPADM

## 2021-03-18 RX ORDER — DOCUSATE SODIUM 100 MG/1
100 CAPSULE, LIQUID FILLED ORAL 2 TIMES DAILY
Status: DISCONTINUED | OUTPATIENT
Start: 2021-03-18 | End: 2021-03-20 | Stop reason: HOSPADM

## 2021-03-18 RX ORDER — SODIUM CHLORIDE, SODIUM GLUCONATE, SODIUM ACETATE, POTASSIUM CHLORIDE, MAGNESIUM CHLORIDE, SODIUM PHOSPHATE, DIBASIC, AND POTASSIUM PHOSPHATE .53; .5; .37; .037; .03; .012; .00082 G/100ML; G/100ML; G/100ML; G/100ML; G/100ML; G/100ML; G/100ML
100 INJECTION, SOLUTION INTRAVENOUS CONTINUOUS
Status: DISCONTINUED | OUTPATIENT
Start: 2021-03-18 | End: 2021-03-19

## 2021-03-18 RX ORDER — ALBUTEROL SULFATE 2.5 MG/3ML
2.5 SOLUTION RESPIRATORY (INHALATION) EVERY 6 HOURS PRN
Status: DISCONTINUED | OUTPATIENT
Start: 2021-03-18 | End: 2021-03-19

## 2021-03-18 RX ORDER — CEFTRIAXONE 1 G/50ML
1000 INJECTION, SOLUTION INTRAVENOUS EVERY 24 HOURS
Status: DISCONTINUED | OUTPATIENT
Start: 2021-03-18 | End: 2021-03-20 | Stop reason: HOSPADM

## 2021-03-18 RX ORDER — OXYBUTYNIN CHLORIDE 5 MG/1
5 TABLET, EXTENDED RELEASE ORAL EVERY MORNING
Status: DISCONTINUED | OUTPATIENT
Start: 2021-03-19 | End: 2021-03-20 | Stop reason: HOSPADM

## 2021-03-18 RX ORDER — DEXAMETHASONE 0.5 MG/1
2 TABLET ORAL 2 TIMES DAILY WITH MEALS
Status: DISCONTINUED | OUTPATIENT
Start: 2021-03-18 | End: 2021-03-20 | Stop reason: HOSPADM

## 2021-03-18 RX ORDER — LORAZEPAM 0.5 MG/1
0.5 TABLET ORAL EVERY 8 HOURS PRN
Status: DISCONTINUED | OUTPATIENT
Start: 2021-03-18 | End: 2021-03-20 | Stop reason: HOSPADM

## 2021-03-18 RX ORDER — PANTOPRAZOLE SODIUM 40 MG/1
40 TABLET, DELAYED RELEASE ORAL
Status: DISCONTINUED | OUTPATIENT
Start: 2021-03-18 | End: 2021-03-20 | Stop reason: HOSPADM

## 2021-03-18 RX ORDER — POTASSIUM CHLORIDE 20 MEQ/1
40 TABLET, EXTENDED RELEASE ORAL ONCE
Status: COMPLETED | OUTPATIENT
Start: 2021-03-18 | End: 2021-03-18

## 2021-03-18 RX ORDER — OXYBUTYNIN CHLORIDE 5 MG/1
10 TABLET, EXTENDED RELEASE ORAL
Status: DISCONTINUED | OUTPATIENT
Start: 2021-03-18 | End: 2021-03-20 | Stop reason: HOSPADM

## 2021-03-18 RX ORDER — ECHINACEA PURPUREA EXTRACT 125 MG
1 TABLET ORAL
Status: DISCONTINUED | OUTPATIENT
Start: 2021-03-18 | End: 2021-03-20 | Stop reason: HOSPADM

## 2021-03-18 RX ORDER — BISACODYL 10 MG
10 SUPPOSITORY, RECTAL RECTAL ONCE
Status: COMPLETED | OUTPATIENT
Start: 2021-03-18 | End: 2021-03-18

## 2021-03-18 RX ORDER — SENNOSIDES 8.6 MG
1 TABLET ORAL DAILY
Status: DISCONTINUED | OUTPATIENT
Start: 2021-03-18 | End: 2021-03-20 | Stop reason: HOSPADM

## 2021-03-18 RX ORDER — OXYBUTYNIN CHLORIDE 5 MG/1
5 TABLET, EXTENDED RELEASE ORAL DAILY
Status: DISCONTINUED | OUTPATIENT
Start: 2021-03-18 | End: 2021-03-18

## 2021-03-18 RX ORDER — FLUTICASONE FUROATE AND VILANTEROL 100; 25 UG/1; UG/1
1 POWDER RESPIRATORY (INHALATION) DAILY
Status: DISCONTINUED | OUTPATIENT
Start: 2021-03-18 | End: 2021-03-20 | Stop reason: HOSPADM

## 2021-03-18 RX ORDER — POLYETHYLENE GLYCOL 3350 17 G/17G
17 POWDER, FOR SOLUTION ORAL DAILY
Status: DISCONTINUED | OUTPATIENT
Start: 2021-03-18 | End: 2021-03-19

## 2021-03-18 RX ORDER — CYCLOBENZAPRINE HCL 10 MG
10 TABLET ORAL 3 TIMES DAILY PRN
Status: DISCONTINUED | OUTPATIENT
Start: 2021-03-18 | End: 2021-03-20 | Stop reason: HOSPADM

## 2021-03-18 RX ORDER — MORPHINE SULFATE 15 MG/1
30 TABLET ORAL EVERY 4 HOURS PRN
Status: DISCONTINUED | OUTPATIENT
Start: 2021-03-18 | End: 2021-03-20 | Stop reason: HOSPADM

## 2021-03-18 RX ORDER — ALBUMIN, HUMAN INJ 5% 5 %
25 SOLUTION INTRAVENOUS ONCE
Status: COMPLETED | OUTPATIENT
Start: 2021-03-18 | End: 2021-03-18

## 2021-03-18 RX ORDER — ACETAMINOPHEN 325 MG/1
650 TABLET ORAL EVERY 6 HOURS PRN
Status: DISCONTINUED | OUTPATIENT
Start: 2021-03-18 | End: 2021-03-20 | Stop reason: HOSPADM

## 2021-03-18 RX ADMIN — POTASSIUM CHLORIDE 40 MEQ: 1500 TABLET, EXTENDED RELEASE ORAL at 09:49

## 2021-03-18 RX ADMIN — TIOTROPIUM BROMIDE 18 MCG: 18 CAPSULE ORAL; RESPIRATORY (INHALATION) at 09:49

## 2021-03-18 RX ADMIN — DOCUSATE SODIUM 100 MG: 100 CAPSULE, LIQUID FILLED ORAL at 09:48

## 2021-03-18 RX ADMIN — PANTOPRAZOLE SODIUM 40 MG: 40 TABLET, DELAYED RELEASE ORAL at 05:54

## 2021-03-18 RX ADMIN — SALINE NASAL SPRAY 1 SPRAY: 1.5 SOLUTION NASAL at 12:21

## 2021-03-18 RX ADMIN — CEFTRIAXONE 1000 MG: 1 INJECTION, SOLUTION INTRAVENOUS at 03:32

## 2021-03-18 RX ADMIN — DEXAMETHASONE 2 MG: 0.5 TABLET ORAL at 09:49

## 2021-03-18 RX ADMIN — SODIUM CHLORIDE, SODIUM GLUCONATE, SODIUM ACETATE, POTASSIUM CHLORIDE, MAGNESIUM CHLORIDE, SODIUM PHOSPHATE, DIBASIC, AND POTASSIUM PHOSPHATE 100 ML/HR: .53; .5; .37; .037; .03; .012; .00082 INJECTION, SOLUTION INTRAVENOUS at 02:34

## 2021-03-18 RX ADMIN — ENOXAPARIN SODIUM 40 MG: 40 INJECTION SUBCUTANEOUS at 09:48

## 2021-03-18 RX ADMIN — SODIUM CHLORIDE, SODIUM GLUCONATE, SODIUM ACETATE, POTASSIUM CHLORIDE, MAGNESIUM CHLORIDE, SODIUM PHOSPHATE, DIBASIC, AND POTASSIUM PHOSPHATE 100 ML/HR: .53; .5; .37; .037; .03; .012; .00082 INJECTION, SOLUTION INTRAVENOUS at 22:24

## 2021-03-18 RX ADMIN — BISACODYL 10 MG: 10 SUPPOSITORY RECTAL at 09:49

## 2021-03-18 RX ADMIN — ALBUMIN (HUMAN) 25 G: 12.5 INJECTION, SOLUTION INTRAVENOUS at 01:30

## 2021-03-18 RX ADMIN — DEXAMETHASONE 2 MG: 0.5 TABLET ORAL at 17:10

## 2021-03-18 RX ADMIN — OXYBUTYNIN CHLORIDE 10 MG: 5 TABLET, EXTENDED RELEASE ORAL at 21:04

## 2021-03-18 RX ADMIN — FLUTICASONE FUROATE AND VILANTEROL TRIFENATATE 1 PUFF: 100; 25 POWDER RESPIRATORY (INHALATION) at 09:49

## 2021-03-18 RX ADMIN — POLYETHYLENE GLYCOL 3350 17 G: 17 POWDER, FOR SOLUTION ORAL at 09:48

## 2021-03-18 RX ADMIN — SODIUM CHLORIDE, SODIUM GLUCONATE, SODIUM ACETATE, POTASSIUM CHLORIDE, MAGNESIUM CHLORIDE, SODIUM PHOSPHATE, DIBASIC, AND POTASSIUM PHOSPHATE 100 ML/HR: .53; .5; .37; .037; .03; .012; .00082 INJECTION, SOLUTION INTRAVENOUS at 12:25

## 2021-03-18 RX ADMIN — MORPHINE SULFATE 30 MG: 15 TABLET ORAL at 21:04

## 2021-03-18 RX ADMIN — STANDARDIZED SENNA CONCENTRATE 8.6 MG: 8.6 TABLET ORAL at 09:48

## 2021-03-18 RX ADMIN — DOCUSATE SODIUM 100 MG: 100 CAPSULE, LIQUID FILLED ORAL at 17:09

## 2021-03-18 NOTE — ASSESSMENT & PLAN NOTE
Metabolic encephalopathy present on admission the setting of urinary tract infection, dehydration and nonadherence with oxygen as evidenced by confusion and somnolence being treated with oxygen supplementation, IV fluids and IV antibiotics    Resolved  CTH no acute process  BUN at 35 - consistent with dehydration

## 2021-03-18 NOTE — CASE MANAGEMENT
LOS: 1 day  Pt is not a documented bundle  Pt is not a 30 day readmission  Pt is under observation status  Met with Pt  Pt presents AA&Ox2-3  Discussed role of case management and discharge planning  Pt reports she lives alone in [address] [place] [address] but stays on 1st floor, no steps to enter  Pt reports she is independent with adls and ambulation  Pt reports she has home oxygen through [facility] and has nebulizer  Pt reports she has concentrator and portable tanks for higher oxygen levels  Pt reports she is on 7LO2  Pt reports she uses [address], has prescription plan and is able to afford medications  Pt reports she her son and dtr(Reece and Sarai) are POA and she has living will  Pt reports she has had VNA in past through [facility]  Pt denies hx of SNF and mental health and drug and alcohol treatment  Pt reports her neighbor provides transportation and meals  Pt reports her son and dtr live in the area  Pt reports her neighbor will transport her home upon discharge  Pt is requesting VNA services upon discharge  Freedom of Choice given  Pt is choosing [facility] for SN  Referral sent to [facility] via [address]  Spoke with PT/OT, Pt can return home upon discharge no PT/OT skilled needs  CM to follow

## 2021-03-18 NOTE — ASSESSMENT & PLAN NOTE
· Secondary to small-cell lung cancer   · Started on Ativan 0 5mg [id number] p r n  during last admission   · Hold ativan until mental status improves

## 2021-03-18 NOTE — PLAN OF CARE
Problem: Prexisting or High Potential for Compromised Skin Integrity  Goal: Skin integrity is maintained or improved  Description: INTERVENTIONS:  - Identify patients at risk for skin breakdown  - Assess and monitor skin integrity  - Assess and monitor nutrition and hydration status  - Monitor labs   - Assess for incontinence   - Turn and reposition patient  - Assist with mobility/ambulation  - Relieve pressure over bony prominences  - Avoid friction and shearing  - Provide appropriate hygiene as needed including keeping skin clean and dry  - Evaluate need for skin moisturizer/barrier cream  - Collaborate with interdisciplinary team   - Patient/family teaching  - Consider wound care consult   Outcome: Progressing     Problem: Nutrition/Hydration-ADULT  Goal: Nutrient/Hydration intake appropriate for improving, restoring or maintaining nutritional needs  Description: Monitor and assess patient's nutrition/hydration status for malnutrition  Collaborate with interdisciplinary team and initiate plan and interventions as ordered  Monitor patient's weight and dietary intake as ordered or per policy  Utilize nutrition screening tool and intervene as necessary  Determine patient's food preferences and provide high-protein, high-caloric foods as appropriate       INTERVENTIONS:  - Monitor oral intake, urinary output, labs, and treatment plans  - Assess nutrition and hydration status and recommend course of action  - Evaluate amount of meals eaten  - Assist patient with eating if necessary   - Allow adequate time for meals  - Recommend/ encourage appropriate diets, oral nutritional supplements, and vitamin/mineral supplements  - Order, calculate, and assess calorie counts as needed  - Recommend, monitor, and adjust tube feedings and TPN/PPN based on assessed needs  - Assess need for intravenous fluids  - Provide specific nutrition/hydration education as appropriate  - Include patient/family/caregiver in decisions related to nutrition  Outcome: Progressing     Problem: Potential for Falls  Goal: Patient will remain free of falls  Description: INTERVENTIONS:  - Assess patient frequently for physical needs  -  Identify cognitive and physical deficits and behaviors that affect risk of falls    -  [place] fall precautions as indicated by assessment   - Educate patient/family on patient safety including physical limitations  - Instruct patient to call for assistance with activity based on assessment  - Modify environment to reduce risk of injury  - Consider OT/PT consult to assist with strengthening/mobility  Outcome: Progressing     Problem: PAIN - ADULT  Goal: Verbalizes/displays adequate comfort level or baseline comfort level  Description: Interventions:  - Encourage patient to monitor pain and request assistance  - Assess pain using appropriate pain scale  - Administer analgesics based on type and severity of pain and evaluate response  - Implement non-pharmacological measures as appropriate and evaluate response  - Consider cultural and social influences on pain and pain management  - Notify physician/advanced practitioner if interventions unsuccessful or patient reports new pain  Outcome: Progressing     Problem: INFECTION - ADULT  Goal: Absence or prevention of progression during hospitalization  Description: INTERVENTIONS:  - Assess and monitor for signs and symptoms of infection  - Monitor lab/diagnostic results  - Monitor all insertion sites, i e  indwelling lines, tubes, and drains  - Monitor endotracheal if appropriate and nasal secretions for changes in amount and color  - [place] appropriate cooling/warming therapies per order  - Administer medications as ordered  - Instruct and encourage patient and family to use good hand hygiene technique  - Identify and instruct in appropriate isolation precautions for identified infection/condition  Outcome: Progressing  Goal: Absence of fever/infection during neutropenic period  Description: INTERVENTIONS:  - Monitor WBC    Outcome: Progressing     Problem: SAFETY ADULT  Goal: Patient will remain free of falls  Description: INTERVENTIONS:  - Assess patient frequently for physical needs  -  Identify cognitive and physical deficits and behaviors that affect risk of falls    -  [place] fall precautions as indicated by assessment   - Educate patient/family on patient safety including physical limitations  - Instruct patient to call for assistance with activity based on assessment  - Modify environment to reduce risk of injury  - Consider OT/PT consult to assist with strengthening/mobility  Outcome: Progressing  Goal: Maintain or return to baseline ADL function  Description: INTERVENTIONS:  -  Assess patient's ability to carry out ADLs; assess patient's baseline for ADL function and identify physical deficits which impact ability to perform ADLs (bathing, care of mouth/teeth, toileting, grooming, dressing, etc )  - Assess/evaluate cause of self-care deficits   - Assess range of motion  - Assess patient's mobility; develop plan if impaired  - Assess patient's need for assistive devices and provide as appropriate  - Encourage maximum independence but intervene and supervise when necessary  - Involve family in performance of ADLs  - Assess for home care needs following discharge   - Consider OT consult to assist with ADL evaluation and planning for discharge  - Provide patient education as appropriate  Outcome: Progressing  Goal: Maintain or return mobility status to optimal level  Description: INTERVENTIONS:  - Assess patient's baseline mobility status (ambulation, transfers, stairs, etc )    - Identify cognitive and physical deficits and behaviors that affect mobility  - Identify mobility aids required to assist with transfers and/or ambulation (gait belt, sit-to-stand, lift, walker, cane, etc )  - [place] fall precautions as indicated by assessment  - Record patient progress and toleration of activity level on Mobility SBAR; progress patient to next Phase/Stage  - Instruct patient to call for assistance with activity based on assessment  - Consider rehabilitation consult to assist with strengthening/weightbearing, etc   Outcome: Progressing     Problem: DISCHARGE PLANNING  Goal: Discharge to home or other facility with appropriate resources  Description: INTERVENTIONS:  - Identify barriers to discharge w/patient and caregiver  - Arrange for needed discharge resources and transportation as appropriate  - Identify discharge learning needs (meds, wound care, etc )  - Arrange for interpretive services to assist at discharge as needed  - Refer to Case Management Department for coordinating discharge planning if the patient needs post-hospital services based on physician/advanced practitioner order or complex needs related to functional status, cognitive ability, or social support system  Outcome: Progressing     Problem: Knowledge Deficit  Goal: Patient/family/caregiver demonstrates understanding of disease process, treatment plan, medications, and discharge instructions  Description: Complete learning assessment and assess knowledge base    Interventions:  - Provide teaching at level of understanding  - Provide teaching via preferred learning methods  Outcome: Progressing     Problem: RESPIRATORY - ADULT  Goal: Achieves optimal ventilation and oxygenation  Description: INTERVENTIONS:  - Assess for changes in respiratory status  - Assess for changes in mentation and behavior  - Position to facilitate oxygenation and minimize respiratory effort  - Oxygen administered by appropriate delivery if ordered  - Initiate smoking cessation education as indicated  - Encourage broncho-pulmonary hygiene including cough, deep breathe, Incentive Spirometry  - Assess the need for suctioning and aspirate as needed  - Assess and instruct to report SOB or any respiratory difficulty  - Respiratory Therapy support as indicated  Outcome: Progressing     Problem: GASTROINTESTINAL - ADULT  Goal: Maintains adequate nutritional intake  Description: INTERVENTIONS:  - Monitor percentage of each meal consumed  - Identify factors contributing to decreased intake, treat as appropriate  - Assist with meals as needed  - Monitor I&O, weight, and lab values if indicated  - Obtain nutrition services referral as needed  Outcome: Progressing     Problem: GENITOURINARY - ADULT  Goal: Maintains or returns to baseline urinary function  Description: INTERVENTIONS:  - Assess urinary function  - Encourage oral fluids to ensure adequate hydration if ordered  - Administer IV fluids as ordered to ensure adequate hydration  - Administer ordered medications as needed  - Offer frequent toileting  - Follow urinary retention protocol if ordered  Outcome: Progressing     Problem: METABOLIC, FLUID AND ELECTROLYTES - ADULT  Goal: Electrolytes maintained within normal limits  Description: INTERVENTIONS:  - Monitor labs and assess patient for signs and symptoms of electrolyte imbalances  - Administer electrolyte replacement as ordered  - Monitor response to electrolyte replacements, including repeat lab results as appropriate  - Instruct patient on fluid and nutrition as appropriate  Outcome: Progressing  Goal: Fluid balance maintained  Description: INTERVENTIONS:  - Monitor labs   - Monitor I/O and WT  - Instruct patient on fluid and nutrition as appropriate  - Assess for signs & symptoms of volume excess or deficit  Outcome: Progressing     Problem: SKIN/TISSUE INTEGRITY - ADULT  Goal: Skin integrity remains intact  Description: INTERVENTIONS  - Identify patients at risk for skin breakdown  - Assess and monitor skin integrity  - Assess and monitor nutrition and hydration status  - Monitor labs (i e  albumin)  - Assess for incontinence   - Turn and reposition patient  - Assist with mobility/ambulation  - Relieve pressure over bony prominences  - Avoid friction and shearing  - Provide appropriate hygiene as needed including keeping skin clean and dry  - Evaluate need for skin moisturizer/barrier cream  - Collaborate with interdisciplinary team (i e  Nutrition, Rehabilitation, etc )   - Patient/family teaching  Outcome: Progressing     Problem: HEMATOLOGIC - ADULT  Goal: Maintains hematologic stability  Description: INTERVENTIONS  - Assess for signs and symptoms of bleeding or hemorrhage  - Monitor labs  - Administer supportive blood products/factors as ordered and appropriate  Outcome: Progressing     Problem: MUSCULOSKELETAL - ADULT  Goal: Maintain or return mobility to safest level of function  Description: INTERVENTIONS:  - Assess patient's ability to carry out ADLs; assess patient's baseline for ADL function and identify physical deficits which impact ability to perform ADLs (bathing, care of mouth/teeth, toileting, grooming, dressing, etc )  - Assess/evaluate cause of self-care deficits   - Assess range of motion  - Assess patient's mobility  - Assess patient's need for assistive devices and provide as appropriate  - Encourage maximum independence but intervene and supervise when necessary  - Involve family in performance of ADLs  - Assess for home care needs following discharge   - Consider OT consult to assist with ADL evaluation and planning for discharge  - Provide patient education as appropriate  Outcome: Progressing

## 2021-03-18 NOTE — ASSESSMENT & PLAN NOTE
· Seen by radiation oncology day of admission - patient was acutely confused and was sent to the ER  Patient was A&Ox3 on arrival to the [facility] ER and stated she wanted to go home without any workup  Patient then presented to [facility] due to complaints of constipation and was evidently confused, so admission was offered and she was agreeable to stay this time  · On admission - pt is only oriented to self, she is somnolent but responsive  Reports little to no PO intake   · CTH no acute process  · Labs:  · Albumin at 2 7 and total protein at 5 9 - consistent with malnutrition   · BUN at 35 - consistent with dehydration   · Suspect chronic hypoxia is likely contributory as pt is to be on 5L at baseline at home and she presented to her OV and [facility] ED without any oxygen  Dehydration and malnutrition also contributing to patient's mentation     · Received 2L of IVF in ED    · Will give albumin 5% 500mL and isolyte at 100mL/hour - monitor for improvement in mental status   · Will obtain UA to rule out infection   · Consider MRI brain if no improvement in mental status s/p fluids   · Urinary retention protocol in setting of known constipation

## 2021-03-18 NOTE — ASSESSMENT & PLAN NOTE
Malnutrition Findings:   ·  fat loss, muscle loss, prominent clavicle bone protrusion, orbital hallowing, temporal wasting        BMI Findings: There is no height or weight on file to calculate BMI       · BMI 15 11  · Patient has continued to lose weight from last admission   · Nutrition consult

## 2021-03-18 NOTE — H&P
Mau Gates  H&P- [place] 1950, 78 y o  female MRN: [id number]  Unit/Bed#: -01 Encounter: [id number]  Primary Care Provider: Karo Hernandez DO   Date and time admitted to hospital: 3/17/2021  9:05 PM    * AMS (altered mental status)  Assessment & Plan  · Seen by radiation oncology day of admission - patient was acutely confused and was sent to the ER  Patient was A&Ox3 on arrival to the [facility] ER and stated she wanted to go home without any workup  Patient then presented to [facility] due to complaints of constipation and was evidently confused, so admission was offered and she was agreeable to stay this time  · On admission - pt is only oriented to self, she is somnolent but responsive  Reports little to no PO intake   · CTH no acute process  · Labs:  · Albumin at 2 7 and total protein at 5 9 - consistent with malnutrition   · BUN at 35 - consistent with dehydration   · Suspect chronic hypoxia is likely contributory as pt is to be on 5L at baseline at home and she presented to her OV and [facility] ED without any oxygen  Dehydration and malnutrition also contributing to patient's mentation     · Received 2L of IVF in ED    · Will give albumin 5% 500mL and isolyte at 100mL/hour - monitor for improvement in mental status   · UA positive for infection - see below   · Consider MRI brain if no improvement in mental status s/p fluids   · Urinary retention protocol in setting of known constipation     Acute cystitis   ·  UA innumerable bacteria and WBCs  · U/C, pending   · Will start ceftriaxone     Constipation  Assessment & Plan  · Fecal impaction noted by ED  · Likely secondary to chronic opioid use   · S/p Soap Suds enema in ED  · Start senna and colace   · Will trial fleet enema in AM   · Monitor for urinary retention   · Hold home narcotics until resolved     Unable to care for self  Assessment & Plan  · Significant concern about patient living on her own   · Has continued to lose weight since recent admission   · Has not been wearing home oxygen continuously  · Reports little to no p o  intake  · PT, OT, and Case Management    Anemia  Assessment & Plan  · Hemoglobin on admission 11 4   · Baseline hemoglobin 10-12   · Will repeat CBC in a m  Hypothyroidism  Assessment & Plan  · Home regimen: Eco Thyro 37   · Last TSH in February 2021 was WNL  · Recheck TSH    Small cell lung cancer ([facility] )  Assessment & Plan  · Advanced stage small cell lung cancer  · Previously was on chemotherapy  · S/p palliative radiation therapy to pleural based posterior and anterior chest wall metastatic disease    Patient would benefit greatly from a goals of care discussion  Unfortunately, on admission she was too altered to have the discussion  · Palliative care consult - pt follows with Dr Corinne Rhein outpatient     Pulmonary emphysema [facility])  Assessment & Plan  · Home regimen:  Albuterol and Spiriva   · No acute signs of exacerbation on admission    Chronic respiratory failure with hypoxia [facility])  Assessment & Plan  · Home oxygen evaluation performed during last admission (2/28-2/3): "Baseline SPO2 on Room Air at rest 89-86 %  SPO2 on Oxygen at rest 90 % 6 lpm  SPO2 during exercise on Oxygen 92-90% liter flow of 8 lpm "  · She is to be on at least 5L at baseline per OV notes   · Patient was to be using oxygen at home, however she states she has been without x1 day     Severe protein-calorie malnutrition ([facility] )  Assessment & Plan  Malnutrition Findings:   ·  fat loss, muscle loss, prominent clavicle bone protrusion, orbital hallowing, temporal wasting        BMI Findings: There is no height or weight on file to calculate BMI  · BMI 15 11  · Patient has continued to lose weight from last admission   · Nutrition consult    Narcotic dependency, continuous (HCC)  Assessment & Plan  · Morphine sulfate 30 mg q 4 hours p r n    · Secondary to left-sided small cell lung cancer  · PDMP reviewed   · Continue morphine sulfate once constipation improved     Anxiety  Assessment & Plan  · Secondary to small-cell lung cancer   · Started on Ativan 0 5mg [id number] p r n  during last admission   · Hold ativan until mental status improves     VTE Prophylaxis: Enoxaparin (Lovenox)  / sequential compression device   Code Status: Level 1 Full Code   POLST: POLST form is not discussed and not completed at this time  Discussion with family:  I offered to call the patient's son, bridget, however she states that she would not like him to be called at the moment  Anticipated Length of Stay:  Patient will be admitted on an Observation basis with an anticipated length of stay of  < 2 midnights  Justification for Hospital Stay: IVF hydration and evaluation of AMS     Total Time for Visit, including Counseling / Coordination of Care: 1 hour  Greater than 50% of this total time spent on direct patient counseling and coordination of care  Chief Complaint:   "upset stomach"    History of Present Illness:    History is significantly limited secondary to patient's AMS  Winston Abdullahi is a 78 y o  female with PMHx of advanced small cell lung cancer, hypothyroidism, chronic respiratory failure on 5 L nasal cannula oxygen, narcotic dependence, emphysema, anxiety and anemia who presents to the [address] [place] [address] ED for evaluation of an "upset stomach" and constipation  Last bowel movement was 3/16  She describes the upset stomach as nausea  She states that her nausea has now resolved  On admission, she reports that she simply feels tired  On record review, patient presented to her radiation oncology appointment evidently confused  Her pants from backwards and she had soiled herself  She was unaware that she was incontinent of urine and she could not recall how she got to the appointment  She is also supposed to be wearing oxygen chronically, however she presented to the office without any oxygen    She was taken to the emergency department at [facility], however on arrival she was alert oriented x3  She refused any further workup the wanted to go home  She then presented to [facility] for constipation  On arrival she was noted to be confused and was offered admission  Patient was agreeable to stay at this time  On admission, patient is only oriented to self  She is somnolent but responsive  She is able to answer most questions, however her response is very delayed  Review of Systems:    Review of Systems   Unable to perform ROS: Mental status change   Constitutional: Positive for appetite change and fatigue  Negative for chills and fever  HENT: Negative for congestion and sore throat  Respiratory: Negative for cough and shortness of breath  Gastrointestinal: Positive for constipation and nausea  Psychiatric/Behavioral: Positive for confusion  Past Medical and Surgical History:     Past Medical History:   Diagnosis Date    Chronic fatigue syndrome with fibromyalgia     [facility]     DDD (degenerative disc disease), cervical     Em[facility] lung [facility]     Hx of migraine headaches     Hypothyroidism     Lung cancer ([facility] )        Past Surgical History:   Procedure Laterality Date    BARTHOLIN GLAND CYST EXCISION      CARPAL TUNNEL RELEASE Bilateral     COLONOSCOPY  07/09/2020     15 mm sessile polyp in the cecum removed by polypectomy  A 3 recall    IR PLEURAL EFFUSION LONG-TERM CATHETER PLACEMENT  11/13/2020    IR PLEURAL EFFUSION LONG-TERM CATHETER REMOVAL  12/21/2020    IR PORT PLACEMENT  12/11/2020    IR THORACENTESIS  11/3/2020    TONSILLECTOMY      TUBAL LIGATION      UPPER GASTROINTESTINAL ENDOSCOPY  07/09/2020     small hiatal hernia  Pathology negative  Meds/Allergies:    Prior to Admission medications    Medication Sig Start Date End Date Taking?  Authorizing Provider   acetaminophen (TYLENOL) 500 mg tablet Take 2 tablets (1,000 mg total) by mouth every 8 (eight) hours 1/4/21  Yes SUSY Alvarez   albuterol (2 5 mg/3 mL) 0 083 % nebulizer solution Take 1 vial (2 5 mg total) by nebulization every 6 (six) hours as needed for wheezing or shortness of breath 3/4/21  Yes SUSY Da Silva   albuterol (ProAir HFA) 90 mcg/act inhaler Inhale 2 puffs every 6 (six) hours as needed for wheezing or shortness of breath 1/11/21  Yes Gregorio Borges MD   Capsaicin 0 025 % GEL Apply topically 3 (three) times a day as needed    Yes Historical Provider, MD   COLCHICINE PO Take 0 5 mg by mouth 3 (three) times a day as needed    Yes Historical Provider, MD   cyclobenzaprine (FLEXERIL) 10 mg tablet Take 10 mg by mouth 3 (three) times a day as needed for muscle spasms   Yes Historical Provider, MD   dexamethasone (DECADRON) 2 mg tablet Take 1 tablet (2 mg total) by mouth 2 (two) times a day with meals 2/26/21  Yes Jose R López MD   Echinacea 400 MG CAPS Take by mouth 3 (three) times a day as needed   Yes Historical Provider, MD Manpreet Hunt BILOBA PLUS PO Take by mouth as needed 2 capsules every 2 mornings    Yes Historical Provider, MD   Hyaluronic Acid 20-60 MG CAPS Take by mouth 3 (three) times a day    Yes Historical Provider, MD   hydrocortisone 2 5 % cream Apply topically 4 (four) times a day as needed for irritation or rash 2/11/21  Yes Jose R López MD   L-Lysine 500 MG CAPS Take by mouth 3 (three) times a day as needed   Yes Historical Provider, MD   Lactobacillus (PROBIOTIC ACIDOPHILUS PO) Take 1 capsule by mouth daily   Yes Historical Provider, MD   lidocaine-prilocaine (EMLA) cream Apply topically as needed for mild pain 30-60 min prior to port access   12/22/20  Yes SUSY Alvarez   loperamide (IMODIUM) 2 mg capsule Take 2 mg by mouth daily    Yes Historical Provider, MD   LORazepam (ATIVAN) 0 5 mg tablet Take 1 tablet (0 5 mg total) by mouth every 8 (eight) hours as needed for anxiety 3/9/21  Yes Jose R López MD   mirtazapine (REMERON) 30 mg tablet Take 1 tablet (30 mg total) by mouth daily at bedtime 2/9/21  Yes Conrad Jacobson MD   morphine (MSIR) 30 MG tablet Take 1 tablet (30 mg total) by mouth every 4 (four) hours as needed for severe painMax Daily Amount: 180 mg 3/9/21  Yes Conrad Jacobson MD   nystatin (MYCOSTATIN) 500,000 units/5 mL suspension Apply 5 mL (500,000 Units total) to the mouth or throat 4 (four) times a day 1/6/21  Yes SUSY Camargo   omeprazole (PriLOSEC) 20 mg delayed release capsule Take 1 capsule (20 mg total) by mouth daily 1/26/21  Yes Conrad Jacobson MD   ondansetron (ZOFRAN) 4 mg tablet Take 1 tablet (4 mg total) by mouth every 8 (eight) hours as needed for nausea or vomiting 1/26/21  Yes Conrad Jacobson MD   oxybutynin (DITROPAN-XL) 10 MG 24 hr tablet TAKE 1 TABLET BY MOUTH ONCE DAILY FOR INCONTINENCE 2/6/21  Yes Historical Provider, MD   prochlorperazine (COMPAZINE) 10 mg tablet Take 10 mg by mouth every 6 (six) hours as needed for nausea or vomiting (migraines)   Yes Historical Provider, MD   sodium chloride (OCEAN) 0 65 % nasal spray 1 spray into each nostril as needed for congestion or rhinitis 1/12/21  Yes Conrad Jacobson MD   B Complex-Biotin-FA (TH VITAMIN B 50/B-COMPLEX) TABS Take by mouth daily    Historical Provider, MD   cholecalciferol (VITAMIN D3) 1,000 units tablet Take 7,000 Units by mouth daily    Historical Provider, MD   fexofenadine-pseudoephedrine (ALLEGRA-D)  MG per tablet Take 1 tablet by mouth 2 (two) times a day as needed for allergies    Historical Provider, MD   NON FORMULARY Take 37 mg by mouth daily Eco Thyro 37 - reported by pt    Historical Provider, MD   other medication, see sig, Take 3 capsules by mouth daily [id number]    Historical Provider, MD   S-Adenosylmethionine (RADHA-E) 200 MG TABS Take by mouth 3 (three) times a day    Historical Provider, MD   senna-docusate sodium (SENOKOT-S) 8 6-50 mg per tablet Take 1 tablet by mouth daily 1/12/21   Conrad Jacobson MD tolterodine (DETROL LA) 2 mg 24 hr capsule  1/22/21   Historical Provider, MD   Tragacanth (ASTRAGALUS ROOT) POWD 470 mg 3 (three) times a day as needed    Historical Provider, MD   umeclidinium-vilanterol (ANORO ELLIPTA) 62 5-25 MCG/INH inhaler Inhale 1 puff daily 11/12/20 12/19/20  Alfredo Madrid PA-C     I have reviewed home medications with patient personally  Allergies: Allergies   Allergen Reactions    Clarithromycin     Codeine Other (See Comments)     lethergic    Other      PHOSPHATE    Oxycodone Other (See Comments)     lethergic    Trazodone Other (See Comments)     lethergic    Penicillins Rash       Social History:     Marital Status:    Occupation: Retired  Patient Pre-hospital Living Situation:  Lives alone  Patient Pre-hospital Level of Mobility:  Previously was independent  Patient Pre-hospital Diet Restrictions:  None  Substance Use History:   Social History     Substance and Sexual Activity   Alcohol Use Not Currently     Social History     Tobacco Use   Smoking Status Former Smoker    Packs/day: 0 50    Years: 53 00    Pack years: 26 50    Types: Cigarettes    Start date: 1967   Smokeless Tobacco Never Used   Tobacco Comment    2 cigarettes daily     Social History     Substance and Sexual Activity   Drug Use Not Currently       Family History:    Family History   Problem Relation Age of Onset    Colon polyps Mother     Heart disease Mother     Colon cancer Mother     Heart disease Father     Lymphoma Father        Physical Exam:     Vitals:   Blood Pressure: 128/73 (03/17/21 2359)  Pulse: (!) 111 (03/17/21 2359)  Temperature: 97 9 °F (36 6 °C) (03/17/21 2359)  Temp Source: Oral (03/17/21 2359)  Respirations: 18 (03/17/21 2359)  SpO2: 92 % (03/17/21 2359)    Physical Exam  Vitals signs and nursing note reviewed  Constitutional:       Appearance: She is ill-appearing (Chronically  )  Comments: Somnolent but easily arousable  Answers questions  Cachectic  HENT:      Head: Normocephalic  Comments: Orbits are hollow  Nose: Nose normal       Mouth/Throat:      Mouth: Mucous membranes are dry  Eyes:      Extraocular Movements: Extraocular movements intact  Conjunctiva/sclera: Conjunctivae normal    Neck:      Musculoskeletal: Normal range of motion  Cardiovascular:      Rate and Rhythm: Regular rhythm  Tachycardia present  Comments: Radial pulses are +3 bilaterally  Pulmonary:      Effort: Pulmonary effort is normal       Breath sounds: Normal breath sounds  No wheezing, rhonchi or rales  Abdominal:      General: Abdomen is flat  Bowel sounds are normal  There is no distension  Palpations: Abdomen is soft  Tenderness: There is no abdominal tenderness  There is no guarding or rebound  Musculoskeletal: Normal range of motion  Comments: Clavicular prominence noted  2+ pitting pedal edema bilaterally  DP and PT pulses +2 bilaterally  Skin:     General: Skin is warm and dry  Capillary Refill: Capillary refill takes less than 2 seconds  Neurological:      Comments: She is alert oriented to self  She thought that she was at home  She thought the year was 2013 and the month was 2021, however on re-questioning she states the month is March  She is unable to name the president  She is not aware of the situation that brought her to the hospital    Psychiatric:      Comments: Unable to fully assess secondary to somnolence       Additional Data:     Lab Results: I have personally reviewed pertinent reports        Results from last 7 days   Lab Units 03/17/21  2147   WBC Thousand/uL 8 82   HEMOGLOBIN g/dL 11 4*   HEMATOCRIT % 35 4   PLATELETS Thousands/uL 211   NEUTROS PCT % 92*   LYMPHS PCT % 2*   MONOS PCT % 4   EOS PCT % 1     Results from last 7 days   Lab Units 03/17/21  2147   SODIUM mmol/L 137   POTASSIUM mmol/L 3 7   CHLORIDE mmol/L 101   CO2 mmol/L 26   BUN mg/dL 35*   CREATININE mg/dL 0 68   ANION GAP mmol/L 10 CALCIUM mg/dL 8 8   ALBUMIN g/dL 2 7*   TOTAL BILIRUBIN mg/dL 1 00   ALK PHOS U/L 88   ALT U/L 23   AST U/L 42   GLUCOSE RANDOM mg/dL 80                       Imaging: I have personally reviewed pertinent reports  CT head without contrast   Final Result by Cristine Uribe DO (03/17 2230)      No acute intracranial abnormality  Workstation performed: [id number]             EKG, Pathology, and Other Studies Reviewed on Admission:   · EKG: Not obtained on admission  Allscripts / Epic Records Reviewed: Yes     ** Please Note: This note has been constructed using a voice recognition system   **

## 2021-03-18 NOTE — UTILIZATION REVIEW
Initial Clinical Review    Admission: Date/Time/Statement: 3/17/2021 2322 Observation AND CHANGED 3/18/2021 1545 INPATIENT RE: PATIENT NEEDS > 2 MIDNIGHT STAY DUE TO NEED CONTINUED IV ANTIBIOTICS FOR COMPLICATED UTI   Start   Ordered   03/18/21 1545  Inpatient Admission Once     Question Answer Comment   Level of Care Med Surg    Estimated length of stay More than 2 Midnights    Certification I certify that inpatient services are medically necessary for this patient for a duration of greater than two midnights  See H&P and MD Progress Notes for additional information about the patient's course of treatment  03/18/21 1545       ED Arrival Information     Expected Arrival Acuity Means of Arrival Escorted By Service Admission Type    - 3/17/2021 21:02 Urgent Walk-In Family Member General Medicine Urgent    Arrival Complaint    sob        Chief Complaint   Patient presents with    Constipation     Pt states "I have bowel problems and I am constipated since this afternoon"      Assessment/Plan:   AMS (altered mental status)  Assessment & Plan  · Seen by radiation oncology day of admission - patient was acutely confused and was sent to the ER  Patient was A&Ox3 on arrival to the [facility] ER and stated she wanted to go home without any workup  Patient then presented to [facility] due to complaints of constipation and was evidently confused, so admission was offered and she was agreeable to stay this time  · On admission - pt is only oriented to self, she is somnolent but responsive  Reports little to no PO intake   · CTH no acute process  · Labs:  ? Albumin at 2 7 and total protein at 5 9 - consistent with malnutrition   ? BUN at 35 - consistent with dehydration   · Suspect chronic hypoxia is likely contributory as pt is to be on 5L at baseline at home and she presented to her OV and UB ED without any oxygen  Dehydration and malnutrition also contributing to patient's mentation     · Received 2L of IVF in ED    · Will give albumin 5% 500mL and isolyte at 100mL/hour - monitor for improvement in mental status   · UA positive for infection - see below   · Consider MRI brain if no improvement in mental status s/p fluids   · Urinary retention protocol in setting of known constipation      Acute cystitis   ·  UA innumerable bacteria and WBCs  · U/C, pending   · Will start ceftriaxone      Constipation  Assessment & Plan  · Fecal impaction noted by ED  · Likely secondary to chronic opioid use   · S/p Soap Suds enema in ED  · Start senna and colace   · Will trial fleet enema in AM   · Monitor for urinary retention   · Hold home narcotics until resolved      Unable to care for self  Assessment & Plan  · Significant concern about patient living on her own   · Has continued to lose weight since recent admission   · Has not been wearing home oxygen continuously  · Reports little to no p o  intake  · PT, OT, and Case Management     Anemia  Assessment & Plan  · Hemoglobin on admission 11 4   · Baseline hemoglobin 10-12   · Will repeat CBC in a m      Hypothyroidism  Assessment & Plan  · Home regimen: Eco Thyro 37   · Last TSH in February 2021 was WNL  · Recheck TSH     Small cell lung cancer ([facility] [address] )  Assessment & Plan  · Advanced stage small cell lung cancer  · Previously was on chemotherapy  · S/p palliative radiation therapy to pleural based posterior and anterior chest wall metastatic disease  Patient would benefit greatly from a goals of care discussion  Unfortunately, on admission she was too altered to have the discussion     · Palliative care consult - pt follows with Dr Joan Celestin outpatient      Pulmonary emphysema [facility])  Assessment & Plan  · Home regimen:  Albuterol and Spiriva   · No acute signs of exacerbation on admission     Chronic respiratory failure with hypoxia [facility])  Assessment & Plan  · Home oxygen evaluation performed during last admission (2/28-2/3): "Baseline SPO2 on Room Air at rest 89-86 %  SPO2 on Oxygen at rest 90 % 6 lpm  SPO2 during exercise on Oxygen 92-90% liter flow of 8 lpm "  · She is to be on at least 5L at baseline per OV notes   · Patient was to be using oxygen at home, however she states she has been without x1 day      Severe protein-calorie malnutrition ([address] )  Assessment & Plan  Malnutrition Findings:   ·  fat loss, muscle loss, prominent clavicle bone protrusion, orbital hallowing, temporal wasting     BMI Findings: There is no height or weight on file to calculate BMI       · BMI 15 11  · Patient has continued to lose weight from last admission   · Nutrition consult     Narcotic dependency, continuous (HCC)  Assessment & Plan  · Morphine sulfate 30 mg q 4 hours p r n    · Secondary to left-sided small cell lung cancer  · PDMP reviewed   · Continue morphine sulfate once constipation improved      Anxiety  Assessment & Plan  · Secondary to small-cell lung cancer   · Started on Ativan 0 5mg l1luade p r n  during last admission   · Hold ativan until mental status improves   ED Triage Vitals   Temperature Pulse Respirations Blood Pressure SpO2   03/17/21 2103 03/17/21 2103 03/17/21 2103 03/17/21 2103 03/17/21 2103   98 °F (36 7 °C) (!) 111 18 126/94 (!) 87 %      Temp Source Heart Rate Source Patient Position - Orthostatic VS BP Location FiO2 (%)   03/17/21 2103 03/17/21 2103 03/17/21 2230 03/17/21 2230 --   Oral Monitor Lying Left arm       Pain Score       03/18/21 0000       No Pain          Wt Readings from Last 1 Encounters:   03/17/21 39 9 kg (88 lb)     Additional Vital Signs:   03/18/21 08:02:35  98 °F (36 7 °C)  109Abnormal   --  128/75  93  93 %  --  --  --  --   03/18/21 0022  --  --  --  --  --  --  36  4 L/min  Nasal cannula  --   03/17/21 23:59:39  97 9 °F (36 6 °C)  111Abnormal   18  128/73  91  92 %  --  --  --  --   03/17/21 2330  --  112Abnormal   16  161/77  109  94 %  36  4 L/min  Nasal cannula  --   03/17/21 2300  --  111Abnormal   16  157/67  97  94 %  28  2 L/min  Nasal cannula         Pertinent Labs/Diagnostic Test Results:   3/17/2021 Ct head No acute intracranial abnormality      3/11/2021 ECG Sinus tachycardiaPossible Left atrial enlargementNonspecific ST and T wave abnormalityAbnormal ECGWhen compared with ECG of 01-MAR-2021 06:22,Nonspecific ST and T wave abnormality has improved  Confirmed  Results from last 7 days   Lab Units 03/18/21  0616 03/17/21 2147 03/11/21  1418   WBC Thousand/uL 7 90 8 82 6 69   HEMOGLOBIN g/dL 10 3* 11 4* 12 1   HEMATOCRIT % 32 5* 35 4 37 9   PLATELETS Thousands/uL 161 211 282   NEUTROS ABS Thousands/µL  --  8 14* 6 12     Results from last 7 days   Lab Units 03/18/21 0616 03/17/21 2147 03/11/21  1418   SODIUM mmol/L 139 137 134*   POTASSIUM mmol/L 3 1* 3 7 3 7   CHLORIDE mmol/L 103 101 97*   CO2 mmol/L 24 26 27   ANION GAP mmol/L 12 10 10   BUN mg/dL 27* 35* 19   CREATININE mg/dL 0 59* 0 68 0 56*   EGFR ml/min/1 73sq m 94 90 96   CALCIUM mg/dL 8 0* 8 8 9 0   MAGNESIUM mg/dL  --   --  1 9   PHOSPHORUS mg/dL  --   --  3 6     Results from last 7 days   Lab Units 03/17/21 2147 03/11/21  1418   AST U/L 42 33   ALT U/L 23 23   ALK PHOS U/L 88 86   TOTAL PROTEIN g/dL 5 9* 6 1*   ALBUMIN g/dL 2 7* 2 6*   TOTAL BILIRUBIN mg/dL 1 00 0 60     Results from last 7 days   Lab Units 03/18/21  0616 03/17/21 2147 03/11/21  1418   GLUCOSE RANDOM mg/dL 81 80 102     Results from last 7 days   Lab Units 03/11/21  1418   TROPONIN I ng/mL 0 02     Results from last 7 days   Lab Units 03/17/21  2147   TSH 3RD GENERATON uIU/mL 9 841*     Results from last 7 days   Lab Units 03/11/21  1418   NT-PRO BNP pg/mL 1,068*     Results from last 7 days   Lab Units 03/18/21  0214   CLARITY UA  Cloudy   COLOR UA  Yellow   SPEC GRAV UA  1 020   PH UA  7 0   GLUCOSE UA mg/dl Negative   KETONES UA mg/dl Negative   BLOOD UA  Large*   PROTEIN UA mg/dl Trace*   NITRITE UA  Positive*   BILIRUBIN UA  Negative   UROBILINOGEN UA E U /dl 0 2   LEUKOCYTES UA  Moderate*   WBC UA /hpf Innumerable*   RBC UA /hpf 20-30*   BACTERIA UA /hpf Innumerable*   EPITHELIAL CELLS WET PREP /hpf Occasional     ED Treatment:   Medication Administration from 03/17/2021 2102 to 03/17/2021 2355       Date/Time Order Dose Route Action Comments     03/17/2021 2150 sodium chloride 0 9 % bolus 1,000 mL 1,000 mL Intravenous New Bag      03/17/2021 2307 multi-electrolyte (PLASMALYTE-A/ISOLYTE-S PH 7 4) IV solution 1,000 mL 1,000 mL Intravenous New Bag         Past Medical History:   Diagnosis Date    Chronic fatigue syndrome with fibromyalgia     COPD [facility]chronic obstructive pulmonary disease) [facility] [address] )     DDD (degenerative disc disease), cervical     Emphysema of lung (HCC)     Hx of migraine headaches     Hypothyroidism     Lung cancer ([facility] [address] )      Present on Admission:   [facility]   Small cell lung cancer (HCC)   Chronic respiratory failure with hypoxia (HCC)   Narcotic dependency, continuous (HCC)   Anxiety   Severe protein-calorie malnutrition (HCC)      Admitting Diagnosis: Dehydration [E86 0]  Altered mental status [R41 82]  Constipation [K59 00]  Fecal impaction in rectum (HCC) [K56 41]  Age/Sex: 78 y o  female  Admission Orders:  Scheduled Medications:  bisacodyl, 10 mg, Rectal, Once  cefTRIAXone, 1,000 mg, Intravenous, Q24H  docusate sodium, 100 mg, Oral, BID  enoxaparin, 40 mg, Subcutaneous, Daily  oxybutynin, 10 mg, Oral, HS  pantoprazole, 40 mg, Oral, Early Morning  polyethylene glycol, 17 g, Oral, Daily  potassium chloride, 40 mEq, Oral, Once  senna, 1 tablet, Oral, Daily  tiotropium, 18 mcg, Inhalation, Daily      Continuous IV Infusions:  multi-electrolyte, 100 mL/hr, Intravenous, Continuous      PRN Meds: not used   acetaminophen, 650 mg, Oral, Q6H PRN  albuterol, 2 5 mg, Nebulization, Q6H PRN  cyclobenzaprine, 10 mg, Oral, TID PRN  ondansetron, 4 mg, Intravenous, Q6H PRN      IP CONSULT TO PALLIATIVE CARE  IP CONSULT TO NUTRITION SERVICES    Network Utilization Review Department  ATTENTION: Please call with any questions or concerns to [phone] and carefully listen to the prompts so that you are directed to the right person  All voicemails are confidential   Graciela Ingkelli all requests for admission clinical reviews, approved or denied determinations and any other requests to dedicated fax number below belonging to the campus where the patient is receiving treatment   List of dedicated fax numbers for the Facilities:  [address] DENIALS (Administrative/Medical Necessity) [phone]   [address] (Maternity/NICU/Pediatrics) [phone]   [address] Bernadette Friedman 2504 (  Peter Peterson "[address]" 103) [address] [place] [address] [address] [place]) [facility] [phone]

## 2021-03-18 NOTE — PLAN OF CARE
Problem: Potential for Falls  Goal: Patient will remain free of falls  Description: INTERVENTIONS:  - Assess patient frequently for physical needs  -  Identify cognitive and physical deficits and behaviors that affect risk of falls  -  [place] fall precautions as indicated by assessment   - Educate patient/family on patient safety including physical limitations  - Instruct patient to call for assistance with activity based on assessment  - Modify environment to reduce risk of injury  - Consider OT/PT consult to assist with strengthening/mobility  Outcome: Progressing     Problem: PAIN - ADULT  Goal: Verbalizes/displays adequate comfort level or baseline comfort level  Description: Interventions:  - Encourage patient to monitor pain and request assistance  - Assess pain using appropriate pain scale  - Administer analgesics based on type and severity of pain and evaluate response  - Implement non-pharmacological measures as appropriate and evaluate response  - Consider cultural and social influences on pain and pain management  - Notify physician/advanced practitioner if interventions unsuccessful or patient reports new pain  Outcome: Progressing     Problem: SAFETY ADULT  Goal: Patient will remain free of falls  Description: INTERVENTIONS:  - Assess patient frequently for physical needs  -  Identify cognitive and physical deficits and behaviors that affect risk of falls    -  [place] fall precautions as indicated by assessment   - Educate patient/family on patient safety including physical limitations  - Instruct patient to call for assistance with activity based on assessment  - Modify environment to reduce risk of injury  - Consider OT/PT consult to assist with strengthening/mobility  Outcome: Progressing     Problem: DISCHARGE PLANNING  Goal: Discharge to home or other facility with appropriate resources  Description: INTERVENTIONS:  - Identify barriers to discharge w/patient and caregiver  - Arrange for needed discharge resources and transportation as appropriate  - Identify discharge learning needs (meds, wound care, etc )  - Arrange for interpretive services to assist at discharge as needed  - Refer to Case Management Department for coordinating discharge planning if the patient needs post-hospital services based on physician/advanced practitioner order or complex needs related to functional status, cognitive ability, or social support system  Outcome: Progressing     Problem: Knowledge Deficit  Goal: Patient/family/caregiver demonstrates understanding of disease process, treatment plan, medications, and discharge instructions  Description: Complete learning assessment and assess knowledge base    Interventions:  - Provide teaching at level of understanding  - Provide teaching via preferred learning methods  Outcome: Progressing

## 2021-03-18 NOTE — ED ATTENDING ATTESTATION
3/17/2021  IPineda MD, saw and evaluated the patient  I have discussed the patient with the resident/non-physician practitioner and agree with the resident's/non-physician practitioner's findings, Plan of Care, and MDM as documented in the resident's/non-physician practitioner's note, except where noted  All available labs and Radiology studies were reviewed  I was present for key portions of any procedure(s) performed by the resident/non-physician practitioner and I was immediately available to provide assistance  At this point I agree with the current assessment done in the Emergency Department  I have conducted an independent evaluation of this patient a history and physical is as follows:    ED Course    Patient was referred from oncology office concerns for failure to thrive/lethargy  On arrival, she declines any workup in the emergency department including neuroimaging or lab work request discharge to home  She is currently calling a ride to provide a safe means for transport home  There was an extensive discussion at bedside regarding her oncologist concerns by both resident myself  Risks and benefits discussed  Despite being informed of all risks, patient still request to be discharged home at this time  Patient is alert and oriented to person place time and situation  Patient demonstrates capacity for medical decision making  Patient be discharged home  Strict return precautions given to patient  Patient understands that she may return any time to emergency department for re-evaluation should she reconsider her decision              Critical Care Time  Procedures

## 2021-03-18 NOTE — OCCUPATIONAL THERAPY NOTE
Occupational Therapy Evaluation     Patient Name: Bruce Garcia  SSVMS'X Date: 3/18/2021  Problem List  Principal Problem:    AMS (altered mental status)  Active Problems:    Pulmonary emphysema ([facility] [address] )    Small cell lung cancer ([facility] [address] )    Severe protein-calorie malnutrition ([facility] [address] )    Narcotic dependency, continuous ([facility] [address] )    Chronic respiratory failure with hypoxia (HCC)    Anxiety    Anemia    Constipation    Unable to care for self    Hypothyroidism    Acute cystitis without hematuria    Past Medical History  Past Medical History:   Diagnosis Date    Chronic fatigue syndrome with fibromyalgia     COPD (chronic obstructive pulmonary disease) ([facility] [address] )     DDD (degenerative disc disease), cervical     Emphysema of lung ([facility] [address] )     Hx of migraine headaches     Hypothyroidism     [facility]      Past Surgical History  Past Surgical History:   Procedure Laterality Date    BARTHOLIN GLAND CYST EXCISION      CARPAL TUNNEL RELEASE Bilateral     COLONOSCOPY  07/09/2020     15 mm sessile polyp in the cecum removed by polypectomy  A 3 recall    IR PLEURAL EFFUSION LONG-TERM CATHETER PLACEMENT  11/13/2020    IR PLEURAL EFFUSION LONG-TERM CATHETER REMOVAL  12/21/2020    IR PORT PLACEMENT  12/11/2020    IR THORACENTESIS  11/3/2020    TONSILLECTOMY      TUBAL LIGATION      UPPER GASTROINTESTINAL ENDOSCOPY  07/09/2020     small hiatal hernia  Pathology negative          03/18/21 0911   OT Last Visit   OT Visit Date 03/18/21   Note Type   Note type Evaluation   Restrictions/Precautions   Weight Bearing Precautions Per Order No   Other Precautions Fall Risk;O2   Pain Assessment   Pain Assessment Tool Pain Assessment not indicated - pt denies pain   Home Living   Type of [address] [place] [address] Two level; Able to live on main level with bedroom/bathroom;1/2 bath on main level   Bathroom Shower/Tub Tub/shower unit   [employer] Standard   Additional Comments Pt reports that she sleeps on couch on 1st floor  Pt endorses that she relies on sponge bathing rather than go to 2nd floor to access full bathroom  Prior Function   Level of [place] Independent with ADLs and functional mobility   Lives With Alone   Receives Help From Family; Neighbor   ADL Assistance Independent   IADLs Needs assistance   Falls in last 6 months 0   Comments Pt reports that children provide transportation/groceries  Pt reports that she typically relies on simple microwaveable meals  Pt further endorses that her home has "clutter" and defers any assistance to manage it at this time  Pt states "I go from the couch, to the chair, to the kitchen  I don't go too far"    Subjective   Subjective Pt received in semisupine position   Pt agreeable to session   ADL   Eating Assistance 7  Independent   Grooming Assistance 7  Independent   UB Bathing Assistance 6  Modified Independent   LB Bathing Assistance 6  Modified Independent   UB Dressing Assistance 6  Modified independent   LB Dressing Assistance 6  Modified independent   Toileting Assistance  6  Modified independent   Bed Mobility   Supine to Sit 7  Independent   Sit to Supine 7  Independent   Transfers   Sit to Stand 6  Modified independent   Stand to Sit 6  Modified independent   Stand pivot 6  Modified independent   Additional items   (no AD)   Functional Mobility   Functional Mobility 6  Modified independent   Additional Comments No AD   Balance   Static Sitting Good   Dynamic Sitting Good   Static Standing Good   Dynamic Standing Good   Activity Tolerance   Activity Tolerance Patient tolerated treatment well   Medical Staff Made Aware PT Bev   Nurse Made Aware RN Chester   RUE Assessment   RUE Assessment WFL   LUE Assessment   LUE Assessment WFL   Cognition   Overall Cognitive Status WFL   Arousal/Participation Alert   Attention Within functional limits   Orientation Level Oriented X4   Memory Within functional limits   Following Commands Follows all commands and directions without difficulty Assessment   Assessment Pt is a 78 y o  female seen for OT evaluation at [facility], admitted 3/17/2021 w/ AMS (altered mental status)  OT completed expandede review of pt's medical and social history  Comorbidities affecting pt's functional performance at time of assessment include: COPD, chronic fatigue with fibromyalgia, hypothyroidism, small cell lung cancer undergoing chemotherapy and recently started radiation therapy  Personal factors affecting pt at time of IE include:limited home support and difficulty performing IADLS   Prior to admission, pt was living alone in a house with 1st floor set-up  Pt was I w/  ADLS and required assist for groceries/transportation   Pt did not use any AD at baseline  Upon evaluation, pt performed functional mobility/ ADLs with mod I  Based on findings, pt is of moderate complexity  The patient's raw score on the AM-PAC Daily Activity inpatient short form is 24, standardized score is 57 54, greater than 39 4  Patients at this level are likely to benefit from DC to home  Please refer to the recommendation of the Occupational Therapist for safe DC planning  At this time, OT recommendations at time of discharge are home with continued social support  OT cognitive evaluation also ordered, however pt is oriented x 4, having meaningful verbal exchange, and able to speak logically  Therefore cognitive evaluation not indicated at this time     Goals   Patient Goals Pt wishes to get home   Plan   OT Frequency Eval only  (Pt appears to be at baseline for ADLs  and mobility )   Recommendation   OT Discharge Recommendation Return to previous environment with social support   AM-PAC Daily Activity Inpatient   Lower Body Dressing 4   Bathing 4   Toileting 4   Upper Body Dressing 4   Grooming 4   Eating 4   Daily Activity Raw Score 24   Daily Activity Standardized Score (Calc for Raw Score >=11) 57 54   AM-[facility] Applied Cognition Inpatient   Following a Speech/Presentation 4 Understanding Ordinary Conversation 4   Taking Medications 4   Remembering Where Things Are Placed or Put Away 4   Remembering List of 4-5 Errands 4   Taking Care of Complicated Tasks 3   Applied Cognition Raw Score 23   Applied Cognition Standardized Score 53 08         Ramona Mckeon OTR/L

## 2021-03-18 NOTE — PHYSICAL THERAPY NOTE
PT eval   03/18/21 0910   PT Last Visit   PT Visit Date 03/18/21   Note Type   Note type Evaluation   Pain Assessment   Pain Assessment Tool Pain Assessment not indicated - pt denies pain   Pain Score No Pain   Home Living   Type of [address]   (home 02 denies AD use)   Additional Comments states home is cluttered she is working on this   Prior Function   Level of [place] Independent with ADLs and functional mobility   Lives With Alone   Receives Help From [employer]   ADL Assistance Independent   IADLs Needs assistance   Vocational On disability   Restrictions/Precautions   Weight Bearing Precautions Per Order No   Other Precautions Fall Risk;O2   General   Additional Pertinent History lung ca starting with palliative care, rec'd radiation, has home 02, adm with confusion, neighbor concerned re ability to care for self at home   Family/Caregiver Present No   Cognition   Overall Cognitive Status WFL   Arousal/Participation Alert   Orientation Level Oriented X4   Memory Within functional limits   Following Commands Follows all commands and directions without difficulty   RUE Assessment   RUE Assessment WFL   LUE Assessment   LUE Assessment WFL   RLE Assessment   RLE Assessment WFL   LLE Assessment   LLE Assessment WFL  (very thin,cachectic)   Coordination   Movements are Fluid and Coordinated 1   Proprioception   RLE Proprioception Grossly intact   LLE Proprioception Grossly Intact   Bed Mobility   Rolling R 7  Independent   Rolling L 7  Independent   Supine to Sit 7  Independent   Sit to Supine 7  Independent   Transfers   Sit to Stand 7  Independent   Stand to Sit 7  Independent   Stand pivot 7  Independent   Ambulation/Elevation   Gait pattern WNL; Narrow ARI   Gait Assistance 5  Supervision   Assistive Device None   Distance 25'   Balance   Static Sitting Good   Dynamic Sitting Good   Static Standing Good   Dynamic Standing Good   Ambulatory Good   Endurance Deficit   Endurance Deficit No Activity Tolerance   Activity Tolerance Patient tolerated treatment well   Medical Staff Made Aware OT [place]   Nurse Made Aware Rn [place]   Assessment   Prognosis Good   Assessment Pt adm with change in mental status but has cleared adn is able ot mboilize without assistance or AD  NO current PT needs  States she will be fine at home  Has home 02 just couldn't find portable tank when she left home  Found to have uti and undergoing tx for same  D/c PT no skilled needs   Instr to ring for staff to mobilize due to IV etc    Barriers to Discharge   (medical clearance)   Goals   Patient Goals go home   Plan   PT Frequency   (d/c PT)   Recommendation   PT Discharge Recommendation Return to previous environment with social support   PT - OK to Discharge Yes   AM-PAC Basic Mobility Inpatient   Turning in Bed Without Bedrails 4   Lying on Back to Sitting on Edge of Flat Bed 4   Moving Bed to Chair 4   Standing Up From Chair 4   Walk in Room 4   Climb 3-5 Stairs 4   Basic Mobility Inpatient Raw Score 24   Basic Mobility Standardized Score 57 68   Modified Syeda Scale   Modified Chugach Scale 1   Bev Conner, PT

## 2021-03-18 NOTE — ASSESSMENT & PLAN NOTE
· Significant concern about patient living on her own   · Has continued to lose weight since recent admission   · Has not been wearing home oxygen continuously  · Reports little to no p o  intake  · PT, OT, and Case Management

## 2021-03-18 NOTE — ASSESSMENT & PLAN NOTE
· Morphine sulfate 30 mg q 4 hours p r n    · Secondary to left-sided small cell lung cancer  · PDMP reviewed   · Continue morphine sulfate once constipation improved

## 2021-03-18 NOTE — NUTRITION
03/18/21 1431   Assessment   Timepoint Initial  (MD consult for malnutrition, wt loss, poor po intake  muscle wasting, BMI)   Labs & Meds   Labs/Meds Review Lab values reviewed; Meds reviewed  (3/18/21 BUN 27, K+ 3 1 meds: albumin, decadron, colace, protonix, miralax, 100 ml/hr IVF, Kdur)   Feeding Route   PO Independent   Adequacy of Intake   Nutrition Modality PO  (regular)   Current Meal Intake 50-75%; Inadequate  (lunch: 2 ice cream, 1/2 applesauce  3 large water cups at bedside)   Estimated Calorie Intake 25-50%   Estimated Protein Intake  25-50%   Estimated Fluid Intake 50-75%   Nutrition Prognosis   Nutrition Concerns RN skin assessment reviewed   Comorbid Concerns   (per chart review: encephalopathy due to UTI, constipation, chronic respiratory failure, small cell lung cancer, severe Protein calorie malnutrition, emphysema, s/p 1 month post completion of palliative radiation)   Nutrition Precautions & Barriers to Adequate Nutrition   (PMH: advanced small cell lung cancer, hypothyroidism, chronic respiratory failure on 5 L nasal cannula oxygen, narcotic dependence, emphysema, anxiety and anemia)   Nutrition Considerations   (diet education: deferred, pt sleeping)   PES Statement   Problem Intake   Related to Catabolic illness;Decreased appetite   As evidenced by: Malnutrition; Intake < estimated needs   Patient Nutrition Goals   Goal Increase kcal/PRO intake; Avoid weight loss   Goal Status initiated   Timeframe to complete goal by next f/u   Recommendations/Interventions   Summary Per chart and encounter reviews, pt with Metastatic small cell lung cancer, refractory to various lines of systemic therapy, ongoing [address] conversations with Radiation Oncology  Pt remains Level 1 Code and treatment focused for her cancer per review of Oncology outpatient notes  Will order po supplements to attempt increase in caloric consumption and attempt more optimized nutrition   ? to consider discussions re: alternate nutrition and [facility] for nutrition rx  Malnutrition/BMI Present Yes   Adult Malnutrition type Chronic illness   Adult Degree of Malnutrition Other severe protein calorie malnutrition   Malnutrition Characteristics Fat loss;Muscle loss; Inadequate energy;Weight loss  (as evidenced by temporal hollowing, severe clavicle protrusion, orbital hollowing, 10% wt loss in 3 months (3/18/21 88 lbs, 12/14/20 98 lbs), 9% wt loss in 2 wks (3/18/21 88 lbs, 3/2/21 97 lbs) & <75% po intake > 1 month )   Adult BMI Classifications Underweight < 18 5   Interventions Diet: continued as ordered; Supplement initiate   Nutrition Recommendations Continue diet as ordered; Other (Specify)  (Per RD protocol added Magic Cup 1x daily, Ensure MAx 1x daily   Ensure Enlive 1x daily )   Nutrition Complexity Risk   Nutrition complexity level High risk   Nutrition review: 03/19/21  (supplement intake)   Follow up date 03/22/21

## 2021-03-18 NOTE — ASSESSMENT & PLAN NOTE
· Fecal impaction noted by ED  · Likely secondary to chronic opioid use   · S/p Soap Suds enema in ED  · Start senna and colace   · Will trial fleet enema in AM   · Monitor for urinary retention   · Hold home narcotics until resolved

## 2021-03-18 NOTE — ED PROVIDER NOTES
History  Chief Complaint   Patient presents with    Constipation     Pt states "I have bowel problems and I am constipated since this afternoon"      44-year-old female with history of COPD, chronic fatigue with fibromyalgia, hypothyroidism, small cell lung cancer undergoing chemotherapy and recently started radiation therapy is here initially complaining of constipation  Her last normal bowel movement was yesterday  She said today she has stool in the rectum that she cannot expel  She has rectal pressure but no rectal or abdominal pain  Patient seems very sleepy and is slow to respond  Patient admits to recent increased fatigue, anorexia and difficulty living alone at home  She admits to some urinary burning recently  Notes from earlier today show that she was clearly felt to have altered mental status when visiting her radiation oncologist today  She had incontinence and was disheveled and confused  She was sent to the emergency department but there was felt to be alert and oriented x3  She refused admission  Currently she still is sleepy and bit confused  She also has xerostomia which may be causing some slurred speech  At this point when asked she feels she is ill enough to need hospitalizations  She did arrive by car without any oxygen and was hypoxemic upon arrival before we start her on nasal cannula oxygen  History provided by:  Medical records and patient  History limited by:  Mental status change   used: No    Constipation  Severity:  Mild  Time since last bowel movement:  1 day  Timing:  Rare  Progression:  Unchanged  Chronicity:  New  Context: dehydration and narcotics    Stool description: It feels hard stool bolus in the rectum, unable to pass    Relieved by:  None tried  Ineffective treatments:  None tried  Associated symptoms: anorexia, dysuria and hematochezia    Associated symptoms: no abdominal pain, no diarrhea, no fever, no nausea and no vomiting Prior to Admission Medications   Prescriptions Last Dose Informant Patient Reported? Taking?    B Complex-Biotin-FA ( VITAMIN B 50/B-COMPLEX) TABS Not Taking at Unknown time Self Yes No   Sig: Take by mouth daily   COLCHICINE PO 3/17/2021 at Unknown time Self Yes Yes   Sig: Take 0 5 mg by mouth 3 (three) times a day as needed    Capsaicin 0 025 % GEL 3/17/2021 at Unknown time Self Yes Yes   Sig: Apply topically 3 (three) times a day as needed    Echinacea 400 MG CAPS Past Week at Unknown time Self Yes Yes   Sig: Take by mouth 3 (three) times a day as needed   GINKGO BILOBA PLUS PO Past Week at Unknown time Self Yes Yes   Sig: Take by mouth as needed 2 capsules every 2 mornings    Hyaluronic Acid 20-60 MG CAPS Past Week at Unknown time Self Yes Yes   Sig: Take by mouth 3 (three) times a day    L-Lysine 500 MG CAPS 3/16/2021 at Unknown time Self Yes Yes   Sig: Take by mouth 3 (three) times a day as needed   LORazepam (ATIVAN) 0 5 mg tablet 3/17/2021 at Unknown time  No Yes   Sig: Take 1 tablet (0 5 mg total) by mouth every 8 (eight) hours as needed for anxiety   Lactobacillus (PROBIOTIC ACIDOPHILUS PO) 3/17/2021 at Unknown time Self Yes Yes   Sig: Take 1 capsule by mouth daily   NON FORMULARY  Self Yes No   Sig: Take 37 mg by mouth daily Eco Thyro 37 - reported by pt   S-Adenosylmethionine (RADHA-E) 200 MG TABS Unknown at Unknown time Self Yes No   Sig: Take by mouth 3 (three) times a day   Tragacanth (ASTRAGALUS ROOT) POWD  Self Yes No   Si mg 3 (three) times a day as needed   acetaminophen (TYLENOL) 500 mg tablet 3/17/2021 at Unknown time Self No Yes   Sig: Take 2 tablets (1,000 mg total) by mouth every 8 (eight) hours   albuterol (2 5 mg/3 mL) 0 083 % nebulizer solution 3/17/2021 at Unknown time  No Yes   Sig: Take 1 vial (2 5 mg total) by nebulization every 6 (six) hours as needed for wheezing or shortness of breath   albuterol (ProAir HFA) 90 mcg/act inhaler 3/17/2021 at Unknown time Self No Yes   Sig: Inhale 2 puffs every 6 (six) hours as needed for wheezing or shortness of breath   cholecalciferol (VITAMIN D3) 1,000 units tablet Not Taking at Unknown time Self Yes No   Sig: Take 7,000 Units by mouth daily   cyclobenzaprine (FLEXERIL) 10 mg tablet 3/17/2021 at Unknown time Self Yes Yes   Sig: Take 10 mg by mouth 3 (three) times a day as needed for muscle spasms   dexamethasone (DECADRON) 2 mg tablet 3/17/2021 at Unknown time  No Yes   Sig: Take 1 tablet (2 mg total) by mouth 2 (two) times a day with meals   fexofenadine-pseudoephedrine (ALLEGRA-D)  MG per tablet Unknown at Unknown time Self Yes No   Sig: Take 1 tablet by mouth 2 (two) times a day as needed for allergies   hydrocortisone 2 5 % cream Past Week at Unknown time Self No Yes   Sig: Apply topically 4 (four) times a day as needed for irritation or rash   lidocaine-prilocaine (EMLA) cream Past Week at Unknown time Self No Yes   Sig: Apply topically as needed for mild pain 30-60 min prior to port access     loperamide (IMODIUM) 2 mg capsule Past Week at Unknown time Self Yes Yes   Sig: Take 2 mg by mouth daily    mirtazapine (REMERON) 30 mg tablet 3/16/2021 at Unknown time Self No Yes   Sig: Take 1 tablet (30 mg total) by mouth daily at bedtime   morphine (MSIR) 30 MG tablet 3/17/2021 at Unknown time  No Yes   Sig: Take 1 tablet (30 mg total) by mouth every 4 (four) hours as needed for severe painMax Daily Amount: 180 mg   nystatin (MYCOSTATIN) 500,000 units/5 mL suspension 3/17/2021 at Unknown time Self No Yes   Sig: Apply 5 mL (500,000 Units total) to the mouth or throat 4 (four) times a day   omeprazole (PriLOSEC) 20 mg delayed release capsule 3/17/2021 at Unknown time Self No Yes   Sig: Take 1 capsule (20 mg total) by mouth daily   ondansetron (ZOFRAN) 4 mg tablet 3/17/2021 at Unknown time Self No Yes   Sig: Take 1 tablet (4 mg total) by mouth every 8 (eight) hours as needed for nausea or vomiting   other medication, see sig,  Self Yes No   Sig: Take 3 capsules by mouth daily [id number]   oxybutynin (DITROPAN-XL) 10 MG 24 hr tablet 3/16/2021 at Unknown time Self Yes Yes   Sig: TAKE 1 TABLET BY MOUTH ONCE DAILY FOR INCONTINENCE   prochlorperazine (COMPAZINE) 10 mg tablet 3/17/2021 at Unknown time Self Yes Yes   Sig: Take 10 mg by mouth every 6 (six) hours as needed for nausea or vomiting (migraines)   senna-docusate sodium (SENOKOT-S) 8 6-50 mg per tablet Unknown at Unknown time Self No No   Sig: Take 1 tablet by mouth daily   sodium chloride (OCEAN) 0 65 % nasal spray 3/16/2021 at Unknown time Self No Yes   Si spray into each nostril as needed for congestion or rhinitis   tolterodine (DETROL LA) 2 mg 24 hr capsule Unknown at Unknown time Self Yes No   umeclidinium-vilanterol (ANORO ELLIPTA) 62 5-25 MCG/INH inhaler  Self No No   Sig: Inhale 1 puff daily      Facility-Administered Medications: None       Past Medical History:   Diagnosis Date    Chronic fatigue syndrome with fibromyalgia     COPD (chronic obstructive pulmonary disease) (HCC)     DDD (degenerative disc disease), cervical     Emphysema of lung (HCC)     Hx of migraine headaches     Hypothyroidism     Lung cancer ([facility] [address] )        Past Surgical History:   Procedure Laterality Date    BARTHOLIN GLAND CYST EXCISION      CARPAL TUNNEL RELEASE Bilateral     COLONOSCOPY  2020     15 mm sessile polyp in the cecum removed by polypectomy  A 3 recall    IR PLEURAL EFFUSION LONG-TERM CATHETER PLACEMENT  2020    IR PLEURAL EFFUSION LONG-TERM CATHETER REMOVAL  2020    IR PORT PLACEMENT  2020    IR THORACENTESIS  11/3/2020    TONSILLECTOMY      TUBAL LIGATION      UPPER GASTROINTESTINAL ENDOSCOPY  2020     small hiatal hernia  Pathology negative         Family History   Problem Relation Age of Onset    Colon polyps Mother     Heart disease Mother     Colon cancer Mother     Heart disease Father     Lymphoma Father      I have reviewed and agree with the history as documented  E-Cigarette/Vaping    E-Cigarette Use Never User      E-Cigarette/Vaping Substances    Nicotine No     THC No     CBD No     Flavoring No     Other No     Unknown No      Social History     Tobacco Use    Smoking status: Former Smoker     Packs/day: 0 50     Years: 53 00     Pack years: 26 50     Types: Cigarettes     Start date: 1967    Smokeless tobacco: Never Used    Tobacco comment: 2 cigarettes daily   Substance Use Topics    Alcohol use: Not Currently    Drug use: Not Currently       Review of Systems   Constitutional: Positive for activity change, appetite change and fatigue  Negative for fever  Eyes: Positive for visual disturbance ( chronic)  Gastrointestinal: Positive for anorexia, blood in stool (Tonight), constipation and hematochezia  Negative for abdominal pain, diarrhea, nausea and vomiting  Genitourinary: Positive for dysuria  Psychiatric/Behavioral: Positive for confusion and decreased concentration  All other systems reviewed and are negative  Physical Exam  Physical Exam  Constitutional:       Appearance: She is ill-appearing  She is not diaphoretic  Comments: Cachectic, sleepy, change in speech due to dry mouth   HENT:      Head: Normocephalic and atraumatic  Right Ear: External ear normal       Left Ear: External ear normal       Nose: Nose normal       Mouth/Throat:      Mouth: Mucous membranes are dry  Pharynx: No posterior oropharyngeal erythema  Eyes:      General: No scleral icterus  Extraocular Movements: Extraocular movements intact  Pupils: Pupils are equal, round, and reactive to light  Neck:      Musculoskeletal: Neck supple  No neck rigidity or muscular tenderness  Cardiovascular:      Rate and Rhythm: Regular rhythm  Tachycardia present  Pulses: Normal pulses  Heart sounds: No friction rub  Pulmonary:      Effort: No respiratory distress  Breath sounds: No stridor   No wheezing, rhonchi or rales  Comments: Diminished breath sounds on the left side  Abdominal:      General: Abdomen is flat  Bowel sounds are normal  There is no distension  Palpations: There is no mass  Tenderness: There is no abdominal tenderness  There is no guarding or rebound  Hernia: No hernia is present  Genitourinary:     Comments: Fecal impaction noted  Unable to remove most of it  Heme occult negative  Musculoskeletal: Normal range of motion  General: No swelling, deformity or signs of injury  Right lower leg: No edema  Left lower leg: No edema  Skin:     General: Skin is warm and dry  Capillary Refill: Capillary refill takes less than 2 seconds  Coloration: Skin is not jaundiced  Findings: No bruising  Neurological:      General: No focal deficit present  Mental Status: She is disoriented  Cranial Nerves: No cranial nerve deficit  Sensory: No sensory deficit  Motor: No weakness  Gait: Gait abnormal    Psychiatric:         Behavior: Behavior normal       Comments: Depressed affect  Responds to questions with short answers in a low volume voice           Vital Signs  ED Triage Vitals   Temperature Pulse Respirations Blood Pressure SpO2   03/17/21 2103 03/17/21 2103 03/17/21 2103 03/17/21 2103 03/17/21 2103   98 °F (36 7 °C) (!) 111 18 126/94 (!) 87 %      Temp Source Heart Rate Source Patient Position - Orthostatic VS BP Location FiO2 (%)   03/17/21 2103 03/17/21 2103 03/17/21 2230 03/17/21 2230 --   Oral Monitor Lying Left arm       Pain Score       03/18/21 0000       No Pain           Vitals:    03/17/21 2330 03/17/21 2359 03/18/21 0802 03/18/21 1527   BP: 161/77 128/73 128/75 123/71   Pulse: (!) 112 (!) 111 (!) 109 (!) 111   Patient Position - Orthostatic VS:   Sitting          Visual Acuity      ED Medications  Medications   multi-electrolyte (PLASMALYTE-A/ISOLYTE-S PH 7 4) IV solution (100 mL/hr Intravenous New Bag 3/18/21 1225) pantoprazole (PROTONIX) EC tablet 40 mg (40 mg Oral Given 3/18/21 0554)   albuterol inhalation solution 2 5 mg (has no administration in time range)   cyclobenzaprine (FLEXERIL) tablet 10 mg (has no administration in time range)   oxybutynin (DITROPAN-XL) 24 hr tablet 10 mg (has no administration in time range)   acetaminophen (TYLENOL) tablet 650 mg (has no administration in time range)   docusate sodium (COLACE) capsule 100 mg (100 mg Oral Given 3/18/21 1709)   senna (SENOKOT) tablet 8 6 mg (8 6 mg Oral Given 3/18/21 0948)   ondansetron (ZOFRAN) injection 4 mg (has no administration in time range)   enoxaparin (LOVENOX) subcutaneous injection 40 mg (40 mg Subcutaneous Given 3/18/21 0948)   tiotropium (SPIRIVA) capsule for inhaler 18 mcg (18 mcg Inhalation Given 3/18/21 0949)   cefTRIAXone (ROCEPHIN) IVPB (premix in dextrose) 1,000 mg 50 mL (1,000 mg Intravenous New Bag 3/18/21 0332)   polyethylene glycol (MIRALAX) packet 17 g (17 g Oral Given 3/18/21 0948)   dexamethasone (DECADRON) tablet 2 mg (2 mg Oral Given 3/18/21 1710)   LORazepam (ATIVAN) tablet 0 5 mg (has no administration in time range)   sodium chloride (OCEAN) 0 65 % nasal spray 1 spray (1 spray Each Nare Given 3/18/21 1221)   fluticasone-vilanterol (BREO ELLIPTA) 100-25 mcg/inh inhaler 1 puff (1 puff Inhalation Given 3/18/21 0949)   oxybutynin (DITROPAN-XL) 24 hr tablet 5 mg (has no administration in time range)   sodium chloride 0 9 % bolus 1,000 mL (0 mL Intravenous Stopped 3/17/21 2256)   multi-electrolyte (PLASMALYTE-A/ISOLYTE-S PH 7 4) IV solution 1,000 mL (0 mL Intravenous Stopped 3/18/21 0131)   albumin human (FLEXBUMIN) 5 % injection 25 g (0 g Intravenous Stopped 3/18/21 0247)   bisacodyl (DULCOLAX) rectal suppository 10 mg (10 mg Rectal Given 3/18/21 0949)   potassium chloride (K-DUR,KLOR-CON) CR tablet 40 mEq (40 mEq Oral Given 3/18/21 0949)       Diagnostic Studies  Results Reviewed     Procedure Component Value Units Date/Time    TSH, 3rd generation [[id number]]  (Abnormal) Collected: 03/17/21 2147    Lab Status: Final result Specimen: Blood from Arm, Left Updated: 03/18/21 0412     TSH 3RD Paramjit Pyo 9 841 uIU/mL     Narrative:      Patients undergoing fluorescein dye angiography may retain small amounts of fluorescein in the body for 48-72 hours post procedure  Samples containing fluorescein can produce falsely depressed TSH values  If the patient had this procedure,a specimen should be resubmitted post fluorescein clearance  UA (URINE) with reflex to Scope [[id number]]  (Abnormal) Collected: 03/18/21 0214    Lab Status: Final result Specimen: Urine Updated: 03/18/21 0217     Color, UA Yellow     Clarity, UA Cloudy     Specific [place], UA 1 020     pH, UA 7 0     Leukocytes, UA Moderate     Nitrite, UA Positive     Protein, UA Trace mg/dl      Glucose, UA Negative mg/dl      Ketones, UA Negative mg/dl      Urobilinogen, UA 0 2 E U /dl      Bilirubin, UA Negative     Blood, UA Large    CBC and differential [[id number]]  (Abnormal) Collected: 03/17/21 2147    Lab Status: Final result Specimen: Blood from Arm, Left Updated: 03/17/21 2244     WBC 8 82 Thousand/uL      RBC 3 66 Million/uL      Hemoglobin 11 4 g/dL      Hematocrit 35 4 %      MCV 97 fL      MCH 31 1 pg      MCHC 32 2 g/dL      RDW 15 9 %      MPV 9 2 fL      Platelets 265 Thousands/uL      nRBC 0 /100 WBCs      Neutrophils Relative 92 %      Immat GRANS % 1 %      Lymphocytes Relative 2 %      Monocytes Relative 4 %      Eosinophils Relative 1 %      Basophils Relative 0 %      Neutrophils Absolute 8 14 Thousands/µL      Immature Grans Absolute 0 09 Thousand/uL      Lymphocytes Absolute 0 15 Thousands/µL      Monocytes Absolute 0 36 Thousand/µL      Eosinophils Absolute 0 06 Thousand/µL      Basophils Absolute 0 02 Thousands/µL     Narrative: This is an appended report  These results have been appended to a previously verified report      Comprehensive metabolic panel [[id number]]  (Abnormal) Collected: 03/17/21 2147    Lab Status: Final result Specimen: Blood from Arm, Left Updated: 03/17/21 2230     Sodium 137 mmol/L      Potassium 3 7 mmol/L      Chloride 101 mmol/L      CO2 26 mmol/L      ANION GAP 10 mmol/L      BUN 35 mg/dL      Creatinine 0 68 mg/dL      Glucose 80 mg/dL      Calcium 8 8 mg/dL      Corrected Calcium 9 8 mg/dL      AST 42 U/L      ALT 23 U/L      Alkaline Phosphatase 88 U/L      Total Protein 5 9 g/dL      Albumin 2 7 g/dL      Total Bilirubin 1 00 mg/dL      eGFR 90 ml/min/1 73sq m     Narrative:      [place] guidelines for Chronic Kidney Disease (CKD):     Stage 1 with normal or high GFR (GFR > 90 mL/min/1 73 square meters)    Stage 2 Mild CKD (GFR = 60-89 mL/min/1 73 square meters)    Stage 3A Moderate CKD (GFR = 45-59 mL/min/1 73 square meters)    Stage 3B Moderate CKD (GFR = 30-44 mL/min/1 73 square meters)    Stage 4 Severe CKD (GFR = 15-29 mL/min/1 73 square meters)    Stage 5 End Stage CKD (GFR <15 mL/min/1 73 square meters)  Note: GFR calculation is accurate only with a steady state creatinine                 CT head without contrast   Final Result by Key Dias DO (03/17 2230)      No acute intracranial abnormality  Workstation performed: [id number]                    Procedures  Procedures         ED Course  ED Course as of Mar 18 1859   Wed Mar 17, 2021   2247 After 1 liter of fluid and soapsuds enema patient has only put out small amount of stool  She still complains of rectal discomfort  Still tachycardic although seems more awake and alert and responsive  Will get and the liter fluid  Spoke to her neighbor who brought her here  She reports that the patient is frequently confused over the past 3-4 weeks     She says recently the patient told her that she cannot find her oxygen at home    The patient admits that she has not been eating and has been very weak and feels unable to live by herself  Due to her new onset of confusion CT of head was ordered  No abnormality noted  Patient needs MRI of the brain  2300 Signed out to Dr Arnaud Durant  Patient would benefit from admission  She needs workup for altered mental status including MRI to r/o intracranial metastases  Patient states she has been to fatigued to live by herself at home  She is unable to find her oxygen  Her neighbor confirms this  SBIRT 22yo+      Most Recent Value   SBIRT (22 yo +)   In order to provide better care to our patients, we are screening all of our patients for alcohol and drug use  Would it be okay to ask you these screening questions? No Filed at: 03/17/2021 2111                    MDM    Disposition  Final diagnoses:   Fecal impaction in rectum [facility])   Dehydration   Altered mental status     Time reflects when diagnosis was documented in both MDM as applicable and the Disposition within this note     Time User Action Codes Description Comment    3/17/2021 10:59 PM Rachel Pae Add [K56 41] Fecal impaction in rectum ([facility] [address] )     3/17/2021 10:59 PM Rachel Pae Add [E86 0] Dehydration     3/17/2021 11:00 PM Rachel Pae Add [R41 82] Altered mental status     3/18/2021  2:02 AM Alvy Bronwyn Add [C34 90] Small cell lung cancer ([facility] [address] )     3/18/2021  2:06 AM Alvy Bronwyn Add [E43] Severe protein-calorie malnutrition [facility])       ED Disposition     ED Disposition Condition Date/Time Comment    Admit Stable Wed Mar 17, 2021 11:22 PM Case was discussed with *PA covering Dr Aravind Claudio** and the patient's admission status was agreed to be Admission Status: observation status to the service of Dr Aravind Claudio           Follow-up Information     Follow up With Specialties Details Why [address] [facility]  Follow up  [address] [facility]  [phone]            Current Discharge Medication List      CONTINUE these medications which have NOT CHANGED    Details   acetaminophen (TYLENOL) 500 mg tablet Take 2 tablets (1,000 mg total) by mouth every 8 (eight) hours    Associated Diagnoses: Cancer associated pain      albuterol (2 5 mg/3 mL) 0 083 % nebulizer solution Take 1 vial (2 5 mg total) by nebulization every 6 (six) hours as needed for wheezing or shortness of breath  Qty: 180 vial, Refills: 5    Associated Diagnoses: Chronic obstructive pulmonary disease, unspecified COPD type (HCC)      albuterol (ProAir HFA) 90 mcg/act inhaler Inhale 2 puffs every 6 (six) hours as needed for wheezing or shortness of breath  Qty: 8 5 g, Refills: 6    Comments: Substitution to a formulary equivalent within the same pharmaceutical class is authorized  Associated Diagnoses: Shortness of breath      Capsaicin 0 025 % GEL Apply topically 3 (three) times a day as needed       COLCHICINE PO Take 0 5 mg by mouth 3 (three) times a day as needed       cyclobenzaprine (FLEXERIL) 10 mg tablet Take 10 mg by mouth 3 (three) times a day as needed for muscle spasms      dexamethasone (DECADRON) 2 mg tablet Take 1 tablet (2 mg total) by mouth 2 (two) times a day with meals  Qty: 60 tablet, Refills: 0    Associated Diagnoses: Loss of appetite      Echinacea 400 MG CAPS Take by mouth 3 (three) times a day as needed      GINKGO BILOBA PLUS PO Take by mouth as needed 2 capsules every 2 mornings       Hyaluronic Acid 20-60 MG CAPS Take by mouth 3 (three) times a day       hydrocortisone 2 5 % cream Apply topically 4 (four) times a day as needed for irritation or rash  Qty: 30 g, Refills: 0    Associated Diagnoses: Pruritus of skin      L-Lysine 500 MG CAPS Take by mouth 3 (three) times a day as needed      Lactobacillus (PROBIOTIC ACIDOPHILUS PO) Take 1 capsule by mouth daily      lidocaine-prilocaine (EMLA) cream Apply topically as needed for mild pain 30-60 min prior to port access    Qty: 30 g, Refills: 0    Associated Diagnoses: Port-A-Cath in place loperamide (IMODIUM) 2 mg capsule Take 2 mg by mouth daily       LORazepam (ATIVAN) 0 5 mg tablet Take 1 tablet (0 5 mg total) by mouth every 8 (eight) hours as needed for anxiety  Qty: 30 tablet, Refills: 0    Associated Diagnoses: Small cell lung cancer ([facility] ); Anxiety      mirtazapine (REMERON) 30 mg tablet Take 1 tablet (30 mg total) by mouth daily at bedtime  Qty: 30 tablet, Refills: 1    Associated Diagnoses: Weight loss; Small cell lung cancer (HCC)      morphine (MSIR) 30 MG tablet Take 1 tablet (30 mg total) by mouth every 4 (four) hours as needed for severe painMax Daily Amount: 180 mg  Qty: 90 tablet, Refills: 0    Associated Diagnoses: Small cell lung cancer ([facility] ); Cancer associated pain      nystatin (MYCOSTATIN) 500,000 units/5 mL suspension Apply 5 mL (500,000 Units total) to the mouth or throat 4 (four) times a day  Qty: 500 mL, Refills: 0    Associated Diagnoses:  Thrush      omeprazole (PriLOSEC) 20 mg delayed release capsule Take 1 capsule (20 mg total) by mouth daily  Qty: 90 capsule, Refills: 3    Associated Diagnoses: Cancer associated pain      ondansetron (ZOFRAN) 4 mg tablet Take 1 tablet (4 mg total) by mouth every 8 (eight) hours as needed for nausea or vomiting  Qty: 60 tablet, Refills: 1    Associated Diagnoses: Non-intractable vomiting with nausea, unspecified vomiting type      oxybutynin (DITROPAN-XL) 10 MG 24 hr tablet TAKE 1 TABLET BY MOUTH ONCE DAILY FOR INCONTINENCE      prochlorperazine (COMPAZINE) 10 mg tablet Take 10 mg by mouth every 6 (six) hours as needed for nausea or vomiting (migraines)      sodium chloride (OCEAN) 0 65 % nasal spray 1 spray into each nostril as needed for congestion or rhinitis  Qty: 30 mL, Refills: 0    Associated Diagnoses: Nasal congestion      B Complex-Biotin-FA (TH VITAMIN B 50/B-COMPLEX) TABS Take by mouth daily      cholecalciferol (VITAMIN D3) 1,000 units tablet Take 7,000 Units by mouth daily      fexofenadine-pseudoephedrine (ALLEGRA-D)  MG per tablet Take 1 tablet by mouth 2 (two) times a day as needed for allergies      NON FORMULARY Take 37 mg by mouth daily Eco Thyro 37 - reported by pt      other medication, see sig, Take 3 capsules by mouth daily [id number]      S-Adenosylmethionine (RADHA-E) 200 MG TABS Take by mouth 3 (three) times a day      senna-docusate sodium (SENOKOT-S) 8 6-50 mg per tablet Take 1 tablet by mouth daily  Qty: 60 tablet, Refills: 0    Associated Diagnoses: Drug induced constipation      tolterodine (DETROL LA) 2 mg 24 hr capsule       Tragacanth (ASTRAGALUS ROOT) POWD 470 mg 3 (three) times a day as needed      umeclidinium-vilanterol (ANORO ELLIPTA) 62 5-25 MCG/INH inhaler Inhale 1 puff daily  Qty: 1 Inhaler, Refills: 5    Associated Diagnoses: Pulmonary emphysema, unspecified emphysema type (HCC)           No discharge procedures on file      PDMP Review       Value Time User    PDMP Reviewed  Yes 3/9/2021  1:46 PM Maria D Guerrero MD          ED Provider  Electronically Signed by           Sabrina Anne DO  03/18/21 6602

## 2021-03-18 NOTE — ED NOTES
Pt reports she is having constipation with blood mixed in stools  Pt reports the blood in stool is a "copper like color    bright"  Pt reports pain while attempting to move bowels        Chilango Zavala RN  03/17/21 2114

## 2021-03-18 NOTE — ASSESSMENT & PLAN NOTE
· Home oxygen evaluation performed during last admission (2/28-2/3): "Baseline SPO2 on Room Air at rest 89-86 %   SPO2 on Oxygen at rest 90 % 6 lpm  SPO2 during exercise on Oxygen 92-90% liter flow of 8 lpm "  · She is to be on at least 5L at baseline per OV notes   · Patient was to be using oxygen at home, however she states she has been without x1 day

## 2021-03-18 NOTE — MALNUTRITION/BMI
This medical record reflects one or more clinical indicators suggestive of malnutrition and/or morbid obesity  Malnutrition Findings:   Adult Malnutrition type: Chronic illness  Adult Degree of Malnutrition: Other severe protein calorie malnutrition  Malnutrition Characteristics: Fat loss, Muscle loss, Inadequate energy, Weight loss(as evidenced by temporal hollowing, severe clavicle protrusion, orbital hollowing, 10% wt loss in 3 months (3/18/21 88 lbs, 12/14/20 98 lbs), 9% wt loss in 2 wks (3/18/21 88 lbs, 3/2/21 97 lbs) & <75% po intake > 1 month )    Treat with regular diet and supplements TID  BMI Findings:  Adult BMI Classifications: Underweight < 18 5     There is no height or weight on file to calculate BMI  See Nutrition note dated 3/18/21  for additional details  Completed nutrition assessment is viewable in the nutrition documentation

## 2021-03-18 NOTE — ASSESSMENT & PLAN NOTE
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

## 2021-03-19 ENCOUNTER — TELEPHONE (OUTPATIENT)
Dept: HEMATOLOGY ONCOLOGY | Facility: CLINIC | Age: 71
End: 2021-03-19

## 2021-03-19 PROBLEM — G93.40 ENCEPHALOPATHY: Status: ACTIVE | Noted: 2021-03-18

## 2021-03-19 LAB
ANION GAP SERPL CALCULATED.3IONS-SCNC: 10 MMOL/L (ref 4–13)
ATRIAL RATE: 107 BPM
BUN SERPL-MCNC: 21 MG/DL (ref 5–25)
CALCIUM SERPL-MCNC: 8.2 MG/DL (ref 8.3–10.1)
CHLORIDE SERPL-SCNC: 102 MMOL/L (ref 100–108)
CO2 SERPL-SCNC: 27 MMOL/L (ref 21–32)
CREAT SERPL-MCNC: 0.67 MG/DL (ref 0.6–1.3)
ERYTHROCYTE [DISTWIDTH] IN BLOOD BY AUTOMATED COUNT: 16.2 % (ref 11.6–15.1)
GFR SERPL CREATININE-BSD FRML MDRD: 91 ML/MIN/1.73SQ M
GLUCOSE SERPL-MCNC: 139 MG/DL (ref 65–140)
HCT VFR BLD AUTO: 33.7 % (ref 34.8–46.1)
HGB BLD-MCNC: 10.8 G/DL (ref 11.5–15.4)
MCH RBC QN AUTO: 31.4 PG (ref 26.8–34.3)
MCHC RBC AUTO-ENTMCNC: 32 G/DL (ref 31.4–37.4)
MCV RBC AUTO: 98 FL (ref 82–98)
P AXIS: 75 DEGREES
PLATELET # BLD AUTO: 169 THOUSANDS/UL (ref 149–390)
PMV BLD AUTO: 9.3 FL (ref 8.9–12.7)
POTASSIUM SERPL-SCNC: 2.9 MMOL/L (ref 3.5–5.3)
PR INTERVAL: 128 MS
QRS AXIS: 60 DEGREES
QRSD INTERVAL: 82 MS
QT INTERVAL: 332 MS
QTC INTERVAL: 443 MS
RBC # BLD AUTO: 3.44 MILLION/UL (ref 3.81–5.12)
SODIUM SERPL-SCNC: 139 MMOL/L (ref 136–145)
T WAVE AXIS: 136 DEGREES
VENTRICULAR RATE: 107 BPM
WBC # BLD AUTO: 6.94 THOUSAND/UL (ref 4.31–10.16)

## 2021-03-19 PROCEDURE — 94760 N-INVAS EAR/PLS OXIMETRY 1: CPT

## 2021-03-19 PROCEDURE — 99232 SBSQ HOSP IP/OBS MODERATE 35: CPT | Performed by: INTERNAL MEDICINE

## 2021-03-19 PROCEDURE — 80048 BASIC METABOLIC PNL TOTAL CA: CPT | Performed by: PHYSICIAN ASSISTANT

## 2021-03-19 PROCEDURE — 94640 AIRWAY INHALATION TREATMENT: CPT

## 2021-03-19 PROCEDURE — 85027 COMPLETE CBC AUTOMATED: CPT | Performed by: PHYSICIAN ASSISTANT

## 2021-03-19 PROCEDURE — 94002 VENT MGMT INPAT INIT DAY: CPT

## 2021-03-19 PROCEDURE — 93010 ELECTROCARDIOGRAM REPORT: CPT | Performed by: INTERNAL MEDICINE

## 2021-03-19 RX ORDER — ALBUTEROL SULFATE 90 UG/1
2 AEROSOL, METERED RESPIRATORY (INHALATION) EVERY 6 HOURS PRN
Status: DISCONTINUED | OUTPATIENT
Start: 2021-03-19 | End: 2021-03-20 | Stop reason: HOSPADM

## 2021-03-19 RX ORDER — ASPIRIN 81 MG
TABLET,CHEWABLE ORAL 3 TIMES DAILY PRN
Status: DISCONTINUED | OUTPATIENT
Start: 2021-03-19 | End: 2021-03-20 | Stop reason: HOSPADM

## 2021-03-19 RX ORDER — POTASSIUM CHLORIDE 20 MEQ/1
40 TABLET, EXTENDED RELEASE ORAL 2 TIMES DAILY
Status: COMPLETED | OUTPATIENT
Start: 2021-03-19 | End: 2021-03-19

## 2021-03-19 RX ORDER — LIDOCAINE 40 MG/G
CREAM TOPICAL DAILY PRN
Status: DISCONTINUED | OUTPATIENT
Start: 2021-03-19 | End: 2021-03-19

## 2021-03-19 RX ORDER — LORATADINE AND PSEUDOEPHEDRINE 10; 240 MG/1; MG/1
1 TABLET, EXTENDED RELEASE ORAL DAILY
Status: DISCONTINUED | OUTPATIENT
Start: 2021-03-19 | End: 2021-03-20 | Stop reason: HOSPADM

## 2021-03-19 RX ORDER — SACCHAROMYCES BOULARDII 250 MG
250 CAPSULE ORAL 2 TIMES DAILY
Status: DISCONTINUED | OUTPATIENT
Start: 2021-03-19 | End: 2021-03-20 | Stop reason: HOSPADM

## 2021-03-19 RX ORDER — MORPHINE SULFATE 15 MG/1
30 TABLET ORAL EVERY 4 HOURS PRN
Status: DISCONTINUED | OUTPATIENT
Start: 2021-03-19 | End: 2021-03-19

## 2021-03-19 RX ADMIN — FLUTICASONE FUROATE AND VILANTEROL TRIFENATATE 1 PUFF: 100; 25 POWDER RESPIRATORY (INHALATION) at 08:06

## 2021-03-19 RX ADMIN — POTASSIUM CHLORIDE 40 MEQ: 1500 TABLET, EXTENDED RELEASE ORAL at 08:04

## 2021-03-19 RX ADMIN — OXYBUTYNIN CHLORIDE 10 MG: 5 TABLET, EXTENDED RELEASE ORAL at 21:35

## 2021-03-19 RX ADMIN — ACETAMINOPHEN 650 MG: 325 TABLET, FILM COATED ORAL at 07:56

## 2021-03-19 RX ADMIN — MORPHINE SULFATE 30 MG: 15 TABLET ORAL at 04:10

## 2021-03-19 RX ADMIN — PANTOPRAZOLE SODIUM 40 MG: 40 TABLET, DELAYED RELEASE ORAL at 05:02

## 2021-03-19 RX ADMIN — DEXAMETHASONE 2 MG: 0.5 TABLET ORAL at 08:02

## 2021-03-19 RX ADMIN — ALBUTEROL SULFATE 2 PUFF: 90 AEROSOL, METERED RESPIRATORY (INHALATION) at 21:53

## 2021-03-19 RX ADMIN — CEFTRIAXONE 1000 MG: 1 INJECTION, SOLUTION INTRAVENOUS at 03:44

## 2021-03-19 RX ADMIN — DEXAMETHASONE 2 MG: 0.5 TABLET ORAL at 17:05

## 2021-03-19 RX ADMIN — Medication 250 MG: at 10:32

## 2021-03-19 RX ADMIN — DOCUSATE SODIUM 100 MG: 100 CAPSULE, LIQUID FILLED ORAL at 08:04

## 2021-03-19 RX ADMIN — ENOXAPARIN SODIUM 40 MG: 40 INJECTION SUBCUTANEOUS at 08:05

## 2021-03-19 RX ADMIN — POTASSIUM CHLORIDE 40 MEQ: 1500 TABLET, EXTENDED RELEASE ORAL at 17:05

## 2021-03-19 RX ADMIN — TIOTROPIUM BROMIDE 18 MCG: 18 CAPSULE ORAL; RESPIRATORY (INHALATION) at 08:03

## 2021-03-19 RX ADMIN — ALBUTEROL SULFATE 2.5 MG: 2.5 SOLUTION RESPIRATORY (INHALATION) at 03:30

## 2021-03-19 RX ADMIN — LORAZEPAM 0.5 MG: 0.5 TABLET ORAL at 21:35

## 2021-03-19 RX ADMIN — Medication 250 MG: at 17:05

## 2021-03-19 RX ADMIN — OXYBUTYNIN CHLORIDE 5 MG: 5 TABLET, EXTENDED RELEASE ORAL at 08:05

## 2021-03-19 RX ADMIN — MORPHINE SULFATE 30 MG: 15 TABLET ORAL at 15:16

## 2021-03-19 RX ADMIN — LORAZEPAM 0.5 MG: 0.5 TABLET ORAL at 03:21

## 2021-03-19 RX ADMIN — STANDARDIZED SENNA CONCENTRATE 8.6 MG: 8.6 TABLET ORAL at 08:05

## 2021-03-19 RX ADMIN — LORATADINE AND PSEUDOEPHEDRINE 1 TABLET: 10; 240 TABLET, EXTENDED RELEASE ORAL at 10:31

## 2021-03-19 NOTE — NUTRITION
03/19/21 1028   Assessment   Timepoint Nutrition Review   Feeding Route   PO Independent   Adequacy of Intake   Nutrition Modality PO  (Regaular, Magic Cup 1x daily, Ensure Enlive 1x daily, Ensure Max 1x daily)   Current Meal Intake 25-50%   Intake Supplements 0-25%   Estimated Calorie Intake 25-50%   Estimated Protein Intake  25-50%   Estimated Fluid Intake 50-75%   PES Statement   Problem Continue previous diagnosis   Recommendations/Interventions   Summary Reports neighbor helps her with food  Admits not eating very much at all at home and eats whatever she feels like when she can  Dislikes ALL supplements, reports trying them all and won't drink them  Offered Ensure Clears, Ensure MAX, Ensure Enlive, [employer], [employer] and [employer]  Refusing all  Per tray reviews at meals appears to be eating only pudding like consistencies (ice cream, yogurt ) and liquids (soups, juice, soda)  When asked whether she eats any solid foods, pt reports eating what she wants when she wants  Reviewed concern for signifcant wt loss and inability to get enough calories without supplementation with pt  Pt reports understanding but does not want anything currently  Will d/c supplements per patient request  Discussed at interdiscplinary rounds  Pt remains Level 1 Code, ongoing outpatient discussions re: Lloyd 64 noted  ? to consider discussion re: alternate nutrition via TF and GOC for nutrition in light of Level 1 Code status     Interventions Diet: continued as ordered   Nutrition Recommendations Continue diet as ordered  (see summary, consider appetite stimulant)   Nutrition Complexity Risk   Nutrition complexity level High risk   Nutrition review: 03/22/21   Follow up date 03/22/21  (po intake)

## 2021-03-19 NOTE — ASSESSMENT & PLAN NOTE
· Home oxygen evaluation performed during last admission (2/28-2/3): "Baseline SPO2 on Room Air at rest 89-86 %   SPO2 on Oxygen at rest 90 % 6 lpm  SPO2 during exercise on Oxygen 92-90% liter flow of 8 lpm "  · 5-6L at baseline

## 2021-03-19 NOTE — ASSESSMENT & PLAN NOTE
· Morphine sulfate 30 mg q 4 hours p r n    · Secondary to left-sided small cell lung cancer  · PDMP reviewed   · resume and continue morphine sulfate

## 2021-03-19 NOTE — PLAN OF CARE
Problem: Prexisting or High Potential for Compromised Skin Integrity  Goal: Skin integrity is maintained or improved  Description: INTERVENTIONS:  - Identify patients at risk for skin breakdown  - Assess and monitor skin integrity  - Assess and monitor nutrition and hydration status  - Monitor labs   - Assess for incontinence   - Turn and reposition patient  - Assist with mobility/ambulation  - Relieve pressure over bony prominences  - Avoid friction and shearing  - Provide appropriate hygiene as needed including keeping skin clean and dry  - Evaluate need for skin moisturizer/barrier cream  - Collaborate with interdisciplinary team   - Patient/family teaching  - Consider wound care consult   Outcome: Progressing     Problem: Nutrition/Hydration-ADULT  Goal: Nutrient/Hydration intake appropriate for improving, restoring or maintaining nutritional needs  Description: Monitor and assess patient's nutrition/hydration status for malnutrition  Collaborate with interdisciplinary team and initiate plan and interventions as ordered  Monitor patient's weight and dietary intake as ordered or per policy  Utilize nutrition screening tool and intervene as necessary  Determine patient's food preferences and provide high-protein, high-caloric foods as appropriate       INTERVENTIONS:  - Monitor oral intake, urinary output, labs, and treatment plans  - Assess nutrition and hydration status and recommend course of action  - Evaluate amount of meals eaten  - Assist patient with eating if necessary   - Allow adequate time for meals  - Recommend/ encourage appropriate diets, oral nutritional supplements, and vitamin/mineral supplements  - Order, calculate, and assess calorie counts as needed  - Recommend, monitor, and adjust tube feedings and TPN/PPN based on assessed needs  - Assess need for intravenous fluids  - Provide specific nutrition/hydration education as appropriate  - Include patient/family/caregiver in decisions related to nutrition  Outcome: Progressing     Problem: Potential for Falls  Goal: Patient will remain free of falls  Description: INTERVENTIONS:  - Assess patient frequently for physical needs  -  Identify cognitive and physical deficits and behaviors that affect risk of falls    -  [place] fall precautions as indicated by assessment   - Educate patient/family on patient safety including physical limitations  - Instruct patient to call for assistance with activity based on assessment  - Modify environment to reduce risk of injury  - Consider OT/PT consult to assist with strengthening/mobility  Outcome: Progressing     Problem: PAIN - ADULT  Goal: Verbalizes/displays adequate comfort level or baseline comfort level  Description: Interventions:  - Encourage patient to monitor pain and request assistance  - Assess pain using appropriate pain scale  - Administer analgesics based on type and severity of pain and evaluate response  - Implement non-pharmacological measures as appropriate and evaluate response  - Consider cultural and social influences on pain and pain management  - Notify physician/advanced practitioner if interventions unsuccessful or patient reports new pain  Outcome: Progressing     Problem: INFECTION - ADULT  Goal: Absence or prevention of progression during hospitalization  Description: INTERVENTIONS:  - Assess and monitor for signs and symptoms of infection  - Monitor lab/diagnostic results  - Monitor all insertion sites, i e  indwelling lines, tubes, and drains  - Monitor endotracheal if appropriate and nasal secretions for changes in amount and color  - [place] appropriate cooling/warming therapies per order  - Administer medications as ordered  - Instruct and encourage patient and family to use good hand hygiene technique  - Identify and instruct in appropriate isolation precautions for identified infection/condition  Outcome: Progressing  Goal: Absence of fever/infection during neutropenic period  Description: INTERVENTIONS:  - Monitor WBC    Outcome: Progressing     Problem: SAFETY ADULT  Goal: Patient will remain free of falls  Description: INTERVENTIONS:  - Assess patient frequently for physical needs  -  Identify cognitive and physical deficits and behaviors that affect risk of falls    -  [place] fall precautions as indicated by assessment   - Educate patient/family on patient safety including physical limitations  - Instruct patient to call for assistance with activity based on assessment  - Modify environment to reduce risk of injury  - Consider OT/PT consult to assist with strengthening/mobility  Outcome: Progressing  Goal: Maintain or return to baseline ADL function  Description: INTERVENTIONS:  -  Assess patient's ability to carry out ADLs; assess patient's baseline for ADL function and identify physical deficits which impact ability to perform ADLs (bathing, care of mouth/teeth, toileting, grooming, dressing, etc )  - Assess/evaluate cause of self-care deficits   - Assess range of motion  - Assess patient's mobility; develop plan if impaired  - Assess patient's need for assistive devices and provide as appropriate  - Encourage maximum independence but intervene and supervise when necessary  - Involve family in performance of ADLs  - Assess for home care needs following discharge   - Consider OT consult to assist with ADL evaluation and planning for discharge  - Provide patient education as appropriate  Outcome: Progressing  Goal: Maintain or return mobility status to optimal level  Description: INTERVENTIONS:  - Assess patient's baseline mobility status (ambulation, transfers, stairs, etc )    - Identify cognitive and physical deficits and behaviors that affect mobility  - Identify mobility aids required to assist with transfers and/or ambulation (gait belt, sit-to-stand, lift, walker, cane, etc )  - [place] fall precautions as indicated by assessment  - Record patient progress and toleration of activity level on Mobility SBAR; progress patient to next Phase/Stage  - Instruct patient to call for assistance with activity based on assessment  - Consider rehabilitation consult to assist with strengthening/weightbearing, etc   Outcome: Progressing     Problem: DISCHARGE PLANNING  Goal: Discharge to home or other facility with appropriate resources  Description: INTERVENTIONS:  - Identify barriers to discharge w/patient and caregiver  - Arrange for needed discharge resources and transportation as appropriate  - Identify discharge learning needs (meds, wound care, etc )  - Arrange for interpretive services to assist at discharge as needed  - Refer to Case Management Department for coordinating discharge planning if the patient needs post-hospital services based on physician/advanced practitioner order or complex needs related to functional status, cognitive ability, or social support system  Outcome: Progressing     Problem: Knowledge Deficit  Goal: Patient/family/caregiver demonstrates understanding of disease process, treatment plan, medications, and discharge instructions  Description: Complete learning assessment and assess knowledge base    Interventions:  - Provide teaching at level of understanding  - Provide teaching via preferred learning methods  Outcome: Progressing     Problem: RESPIRATORY - ADULT  Goal: Achieves optimal ventilation and oxygenation  Description: INTERVENTIONS:  - Assess for changes in respiratory status  - Assess for changes in mentation and behavior  - Position to facilitate oxygenation and minimize respiratory effort  - Oxygen administered by appropriate delivery if ordered  - Initiate smoking cessation education as indicated  - Encourage broncho-pulmonary hygiene including cough, deep breathe, Incentive Spirometry  - Assess the need for suctioning and aspirate as needed  - Assess and instruct to report SOB or any respiratory difficulty  - Respiratory Therapy support as indicated  Outcome: Progressing     Problem: GASTROINTESTINAL - ADULT  Goal: Maintains adequate nutritional intake  Description: INTERVENTIONS:  - Monitor percentage of each meal consumed  - Identify factors contributing to decreased intake, treat as appropriate  - Assist with meals as needed  - Monitor I&O, weight, and lab values if indicated  - Obtain nutrition services referral as needed  Outcome: Progressing     Problem: GENITOURINARY - ADULT  Goal: Maintains or returns to baseline urinary function  Description: INTERVENTIONS:  - Assess urinary function  - Encourage oral fluids to ensure adequate hydration if ordered  - Administer IV fluids as ordered to ensure adequate hydration  - Administer ordered medications as needed  - Offer frequent toileting  - Follow urinary retention protocol if ordered  Outcome: Progressing     Problem: METABOLIC, FLUID AND ELECTROLYTES - ADULT  Goal: Electrolytes maintained within normal limits  Description: INTERVENTIONS:  - Monitor labs and assess patient for signs and symptoms of electrolyte imbalances  - Administer electrolyte replacement as ordered  - Monitor response to electrolyte replacements, including repeat lab results as appropriate  - Instruct patient on fluid and nutrition as appropriate  Outcome: Progressing  Goal: Fluid balance maintained  Description: INTERVENTIONS:  - Monitor labs   - Monitor I/O and WT  - Instruct patient on fluid and nutrition as appropriate  - Assess for signs & symptoms of volume excess or deficit  Outcome: Progressing     Problem: SKIN/TISSUE INTEGRITY - ADULT  Goal: Skin integrity remains intact  Description: INTERVENTIONS  - Identify patients at risk for skin breakdown  - Assess and monitor skin integrity  - Assess and monitor nutrition and hydration status  - Monitor labs (i e  albumin)  - Assess for incontinence   - Turn and reposition patient  - Assist with mobility/ambulation  - Relieve pressure over bony prominences  - Avoid friction and shearing  - Provide appropriate hygiene as needed including keeping skin clean and dry  - Evaluate need for skin moisturizer/barrier cream  - Collaborate with interdisciplinary team (i e  Nutrition, Rehabilitation, etc )   - Patient/family teaching  Outcome: Progressing     Problem: HEMATOLOGIC - ADULT  Goal: Maintains hematologic stability  Description: INTERVENTIONS  - Assess for signs and symptoms of bleeding or hemorrhage  - Monitor labs  - Administer supportive blood products/factors as ordered and appropriate  Outcome: Progressing     Problem: MUSCULOSKELETAL - ADULT  Goal: Maintain or return mobility to safest level of function  Description: INTERVENTIONS:  - Assess patient's ability to carry out ADLs; assess patient's baseline for ADL function and identify physical deficits which impact ability to perform ADLs (bathing, care of mouth/teeth, toileting, grooming, dressing, etc )  - Assess/evaluate cause of self-care deficits   - Assess range of motion  - Assess patient's mobility  - Assess patient's need for assistive devices and provide as appropriate  - Encourage maximum independence but intervene and supervise when necessary  - Involve family in performance of ADLs  - Assess for home care needs following discharge   - Consider OT consult to assist with ADL evaluation and planning for discharge  - Provide patient education as appropriate  Outcome: Progressing

## 2021-03-19 NOTE — DISCHARGE INSTRUCTIONS
Anorexia in Older Adults   WHAT YOU NEED TO KNOW:   What is anorexia? Anorexia is a loss of appetite, decreased food intake, or both  Your appetite naturally decreases as you get older  You also get full faster than you used to  This occurs because your body needs less energy  Other natural body changes can also lead to a decreased appetite  Even though some appetite loss is normal, you still need to get enough calories and nutrients to keep you healthy  You can start to lose too much weight if you do not eat as much food as your body needs  Unwanted weight loss can cause health problems, or worsen health problems you already have  You can also become dehydrated if you do not drink enough liquid  What causes anorexia in older adults? · Decreased sense of taste and smell    · Living alone    · Change in environment, such as moving to a nursing home    · Low income    · Dental problems, dentures that do not fit well, or chewing or swallowing problems    · Medical conditions that affect your appetite, such as cancer, depression, dementia, or alcoholism    · Medical conditions that affect your ability to prepare food or eat, such as poor eyesight or Parkinson disease    · Medicines that decrease your appetite or sense of taste and smell    · Alcoholism or other substance abuse    How can I eat healthy and get enough nutrients? · Choose healthy foods  Eat a variety of fruits, vegetables, whole grains, low-fat dairy foods, lean meats, and other protein foods  Limit foods high in fat, sugar, and salt  Limit or avoid alcohol as directed  Work with a dietitian to help you plan your meals if you need to follow a special diet  A dietitian can also teach you how to modify foods if you have trouble chewing or swallowing  · Snack on healthy foods between meals  if you only eat a small amount during meals  Snacks provide extra healthy nutrients and calories between meals   Examples include fruit, cheese, and whole grain crackers  · Drink liquids as directed  to avoid dehydration  Drink liquids between meals if they cause you to get full too quickly during meals  Ask how much liquid to drink each day and which liquids are best for you  · Use herbs, spices, and flavor enhancers to add flavor to foods  Avoid using herbs and spice blends that also contain sodium  Ask your healthcare provider or dietitian about flavor enhancers  Flavor enhancers with ham, natural willingham, and roast beef flavors can also be sprinkled on food to add flavor  · Share meals with others as often as you can  Eating with others may help you to eat better during meal time  Ask family members, neighbors, or friends to join you for lunch  There are also senior centers where you can meet people, and share meals with them  · Ask family and friends for help  with shopping or preparing foods  Ask for a ride to the grocery store, if needed  How can I work with my healthcare provider to stay healthy? · Tell your healthcare provider about any illnesses or medicines that have decreased your appetite  He or she may be able to change your medicines  Your healthcare provider may also be able to prescribe medicines that can increase your appetite  · Ask your healthcare provider about nutrition supplements  you can have between meals  Nutrition supplements can provide extra calories and nutrients if you are not getting enough through food  Nutrition supplements are available in liquids, puddings, bars, and soups  · Talk to your healthcare provider about safe physical activities you can do  Physical activity may help to increase your appetite  It can also help to strengthen your muscles and bones  · Ask your healthcare provider about programs that can help you buy food or provide meals  There are programs that provide financial assistance for food if you have a low income   There are also programs in some areas that may be able to deliver healthy prepared meals to your home  When should I call my doctor? · You are losing weight  · You feel depressed, confused, tired, irritable, and you do not feel like eating  · You have signs of dehydration  Examples include dark yellow urine, dry mouth and lips, dry skin, fast heartbeat, and urinating less than usual     · You have questions or concerns about your condition or care  CARE AGREEMENT:   You have the right to help plan your care  Learn about your health condition and how it may be treated  Discuss treatment options with your healthcare providers to decide what care you want to receive  You always have the right to refuse treatment  The above information is an  only  It is not intended as medical advice for individual conditions or treatments  Talk to your doctor, nurse or pharmacist before following any medical regimen to see if it is safe and effective for you  © Copyright [address] Information is for End User's use only and may not be sold, redistributed or otherwise used for commercial purposes  All illustrations and images included in CareNotes® are the copyrighted property of [employer]  or [address] [place] [address]    Constipation   AMBULATORY CARE:   Constipation  is when you have hard, dry bowel movements, or you go longer than usual between bowel movements  Constipation may be caused by a lack of water or high-fiber foods  Certain medicines, or a lack of fiber or physical activity may also cause constipation  Common symptoms include the following:   · Trouble pushing out your bowel movement    · Pain or bleeding during your bowel movement    · A feeling that you did not finish having your bowel movement    · Nausea    · Bloating    · Headache    Call your doctor if:   · You have blood in your bowel movements  · You have a fever and abdominal pain with the constipation  · Your constipation gets worse  · You start to vomit      · You have questions or concerns about your condition or care  Relieve constipation:  Medicine can keep you have a bowel movement more easily  Medicines may increase moisture in your bowel movement or increase the motion of your intestines  · A suppository  may be used to help soften your bowel movements  This may make them easier to pass  A suppository is guided into your rectum through your anus  · Laxatives  can help stimulate your bowels to have a bowel movement  Your provider may recommend you only use laxatives for a short time  Long-term use may make your bowels dependent on the medicine  · An enema  is liquid medicine used to clear bowel movement from your rectum  The medicine is put into your rectum through your anus  Prevent constipation:   · Drink liquids as directed  You may need to drink extra liquids to help soften and move your bowels  Ask how much liquid to drink each day and which liquids are best for you  · Eat high-fiber foods  This may help decrease constipation by adding bulk to your bowel movements  High-fiber foods include fruit, vegetables, whole-grain breads and cereals, and beans  Your healthcare provider or dietitian can help you create a high-fiber meal plan  Your provider may also recommend a fiber supplement if you cannot get enough fiber from food  · Exercise regularly  Regular physical activity can help stimulate your intestines  Walking is a good exercise to prevent or relieve constipation  Ask which exercises are best for you  · Schedule a time each day to have a bowel movement  This may help train your body to have regular bowel movements  Bend forward while you are on the toilet to help move the bowel movement out  Sit on the toilet for at least 10 minutes, even if you do not have a bowel movement  · Talk to your healthcare provider about your medicines  Certain medicines, such as opioids, can cause constipation   Your provider may be able to make medicine changes  For example, he or she may change the kind of medicine, or change when you take it  Follow up with your healthcare provider as directed:  Write down your questions so you remember to ask them during your visits  © Copyright [address] Information is for End User's use only and may not be sold, redistributed or otherwise used for commercial purposes  All illustrations and images included in CareNotes® are the copyrighted property of [employer] A M , Inc  or [facility] Rhonda Irizarry   The above information is an  only  It is not intended as medical advice for individual conditions or treatments  Talk to your doctor, nurse or pharmacist before following any medical regimen to see if it is safe and effective for you  Constipation   WHAT YOU NEED TO KNOW:   Constipation is when you have hard, dry bowel movements, or you go longer than usual between bowel movements  DISCHARGE INSTRUCTIONS:   Call your doctor if:   · You have blood in your bowel movements  · You have a fever and abdominal pain with the constipation  · Your constipation gets worse  · You start to vomit  · You have questions or concerns about your condition or care  Medicines:   · Medicine  such as a laxative may help relax and loosen your intestines to help you have a bowel movement  Your provider may recommend you only use laxatives for a short time  Long-term use may make your bowels dependent on the medicine  · Take your medicine as directed  Contact your healthcare provider if you think your medicine is not helping or if you have side effects  Tell him of her if you are allergic to any medicine  Keep a list of the medicines, vitamins, and herbs you take  Include the amounts, and when and why you take them  Bring the list or the pill bottles to follow-up visits  Carry your medicine list with you in case of an emergency      Relieve constipation:   · A suppository  may be used to help soften your bowel movements  This may make them easier to pass  A suppository is guided into your rectum through your anus  · An enema  is liquid medicine used to clear bowel movement from your rectum  The medicine is put into your rectum through your anus  Prevent constipation:   · Drink liquids as directed  You may need to drink extra liquids to help soften and move your bowels  Ask how much liquid to drink each day and which liquids are best for you  · Eat high-fiber foods  This may help decrease constipation by adding bulk to your bowel movements  High-fiber foods include fruit, vegetables, whole-grain breads and cereals, and beans  Your healthcare provider or dietitian can help you create a high-fiber meal plan  Your provider may also recommend a fiber supplement if you cannot get enough fiber from food  · Exercise regularly  Regular physical activity can help stimulate your intestines  Walking is a good exercise to prevent or relieve constipation  Ask which exercises are best for you  · Schedule a time each day to have a bowel movement  This may help train your body to have regular bowel movements  Bend forward while you are on the toilet to help move the bowel movement out  Sit on the toilet for at least 10 minutes, even if you do not have a bowel movement  · Talk to your healthcare provider about your medicines  Certain medicines, such as opioids, can cause constipation  Your provider may be able to make medicine changes  For example, he or she may change the kind of medicine, or change when you take it  Follow up with your healthcare provider as directed:  Write down your questions so you remember to ask them during your visits  © Copyright [address] Information is for End User's use only and may not be sold, redistributed or otherwise used for commercial purposes   All illustrations and images included in CareNotes® are the copyrighted property of [employer]  or [address]   The above information is an  only  It is not intended as medical advice for individual conditions or treatments  Talk to your doctor, nurse or pharmacist before following any medical regimen to see if it is safe and effective for you  Urinary Tract Infection in Older Adults   WHAT YOU NEED TO KNOW:   A urinary tract infection (UTI) is caused by bacteria that get inside your urinary tract  Your urinary tract includes your kidneys, ureters, bladder, and urethra  Urine is made in your kidneys, and it flows from the ureters to the bladder  Urine leaves the bladder through the urethra  A UTI is more common in your lower urinary tract, which includes your bladder and urethra  DISCHARGE INSTRUCTIONS:   Seek care immediately if:   · You are urinating very little or not at all  · You are vomiting  · You have a high fever with shaking chills  · You have side or back pain that gets worse  Call your doctor if:   · You have a fever  · You are a woman and you have increased white or yellow discharge from your vagina  · You do not feel better after 2 days of taking antibiotics  · You have questions or concerns about your condition or care  Medicines:   · Medicines  help treat the bacterial infection or decrease pain and burning when you urinate  You may also need medicines to decrease the urge to urinate often  If you have UTIs often (called recurrent UTIs), you may be given antibiotics to take regularly  You will be given directions for when and how to use antibiotics  The goal is to prevent UTIs but not cause antibiotic resistance by using antibiotics too often  · Take your medicine as directed  Contact your healthcare provider if you think your medicine is not helping or if you have side effects  Tell him or her if you are allergic to any medicine  Keep a list of the medicines, vitamins, and herbs you take   Include the amounts, and when and why you take them  Bring the list or the pill bottles to follow-up visits  Carry your medicine list with you in case of an emergency  Self-care:   · Drink liquids as directed  Liquids can help flush bacteria from your urinary tract  Ask how much liquid to drink each day and which liquids are best for you  You may need to drink more liquids than usual to help flush out the bacteria  Do not drink alcohol, caffeine, and citrus juices  These can irritate your bladder and increase your symptoms  · Apply heat  on your abdomen for 20 to 30 minutes every 2 hours for as many days as directed  Heat helps decrease discomfort and pressure in your bladder  Prevent a UTI:   · Urinate when you feel the urge  Do not hold your urine  Bacteria can grow if urine stays in the bladder too long  It may be helpful to urinate at least every 3 to 4 hours  · Urinate after you have sex  to flush away bacteria that can enter your urinary tract during sex  · Wear cotton underwear and clothes that are loose  Tight pants and nylon underwear can trap moisture and cause bacteria to grow  · Cranberry juice or cranberry supplements  may help prevent UTIs  Your healthcare provider can recommend the right juice or supplement for you  · Women should wipe front to back  after urinating or having a bowel movement  This may prevent germs from getting into the urinary tract  Do not douche or use feminine deodorants  These can change the chemical balance in your vagina  You may also be given vaginal estrogen medicine  This medicine helps prevent recurrent UTIs in women who have gone through menopause or are in marta-menopause  Follow up with your healthcare provider as directed:  Write down your questions so you remember to ask them during your visits  © Copyright [address] Information is for End User's use only and may not be sold, redistributed or otherwise used for commercial purposes   All illustrations and images included in [facility] are the copyrighted property of [employer]  or [address] [place], [place] [address]faye   The above information is an  only  It is not intended as medical advice for individual conditions or treatments  Talk to your doctor, nurse or pharmacist before following any medical regimen to see if it is safe and effective for you

## 2021-03-19 NOTE — ASSESSMENT & PLAN NOTE
· Advanced stage small cell lung cancer  · Previously was on chemotherapy  · S/p palliative radiation therapy to pleural based posterior and anterior chest wall metastatic disease

## 2021-03-19 NOTE — ASSESSMENT & PLAN NOTE
Malnutrition Findings:   · Adult Malnutrition type: Chronic illnessfat loss, muscle loss, prominent clavicle bone protrusion, orbital hallowing, temporal wasting   Adult Degree of Malnutrition: Other severe protein calorie malnutrition    BMI Findings:  Adult BMI Classifications: Underweight < 18 5  There is no height or weight on file to calculate BMI       · BMI 15 11  · Nutrition consult  · Continue Ensure supplements

## 2021-03-19 NOTE — DISCHARGE INSTR - AVS FIRST PAGE
Dear Elisa Ac,     It was our pleasure to care for you here at [facility], [address] [place] [address]  It is our hope that we were always able to exceed the expected standards for your care during your stay  You were hospitalized due to altered mental status due to urinary tract infection  You were treated with IV antibiotics  You also had constipation and treated with bowel regimen  You are being discharged in stable condition with home visiting nurses  Raul Ding were cared for on the medicine floor by Mary Desai MD with the [facility] who covers for your primary care physician (PCP), Cathy Clay DO, while you were hospitalized  If you have any questions or concerns related to this hospitalization, you may contact us at [phone]  For follow up as well as any medication refills, we recommend that you follow up with your primary care physician  A registered nurse will reach out to you by phone within a few days after your discharge to answer any additional questions that you may have after going home  However, at this time we provide for you here, the most important instructions / recommendations at discharge:     · Notable Medication Adjustments -   · none  · Testing Required after Discharge -   ·   · Important follow up information -   · Primary care physician within 1 week of discharge  · Hematology and oncology as scheduled  · Palliative care scheduled  · Other Instructions -   · We discussed turning your oxygen up early in the morning before getting out of bed  Moving slowly to prevent worsening exertion and shortness of breath  Having your medication around you for easy access  Following with all your scheduled doctor visits     · Please review this entire after visit summary as additional general instructions including medication list, appointments, activity, diet, any pertinent wound care, and other additional recommendations from your care team that may be provided for you        Sincerely,     Reece Mcintosh MD

## 2021-03-19 NOTE — ASSESSMENT & PLAN NOTE
· Significant concern about patient living on her own   · Has continued to lose weight since recent admission   · Has not been wearing home oxygen continuously  · Reports little to no p o  intake  · PT, OT, and Case Management, patient refusing home therapy

## 2021-03-19 NOTE — TELEPHONE ENCOUNTER
Appointment Cancellation Or Reschedule     Person calling in Patient    Provider [address] [facility]   Office Visit Date and Time 03/22 at 1700 [facility] Visit Location [facility]   Did patient want to reschedule their office appointment? If so, when was it scheduled to? Yes ( Joaquin to 03/24 at 9:40am)   Is this patient calling to reschedule an infusion appointment? no   Is this patient a Chemo patient? yes   When is their next infusion visit? 03/23   Reason for Cancellation or Reschedule Hospitalized      If the patient is a treatment patient, please route this to the office nurse  If the patient is not on treatment, please route to the office MA

## 2021-03-19 NOTE — DISCHARGE SUMMARY
[place]  Discharge- Winston Hidden 1950, 78 y o  female MRN: [id number]  Unit/Bed#: -01 Encounter: [id number]  Primary Care Provider: Meet Olivas DO   Date and time admitted to hospital: 3/17/2021  9:05 PM    Chronic respiratory failure with hypoxia [facility])  Assessment & Plan  · Home oxygen evaluation performed during last admission (2/28-2/3): "Baseline SPO2 on Room Air at rest 89-86 %  SPO2 on Oxygen at rest 90 % 6 lpm  SPO2 during exercise on Oxygen 92-90% liter flow of 8 lpm "  · 5-6L at baseline  · Has ability to increase oxygen at home to 10L      * Encephalopathy  Assessment & Plan  Metabolic encephalopathy present on admission the setting of urinary tract infection, dehydration and nonadherence with oxygen as evidenced by confusion and somnolence being treated with oxygen supplementation, IV fluids and IV antibiotics  Resolved  CTH no acute process  BUN at 35 - consistent with dehydration        Acute cystitis without hematuria  Assessment & Plan  · UA with innumerable bacteria and WBCs  · U/C, growing GNR  · Completed ceftriaxone #3    Hypothyroidism  Assessment & Plan  Home regimen: [employer] [facility]         Unable to care for self  Assessment & Plan  · Significant concern about patient living on her own   · Has continued to lose weight since recent admission   · Has not been wearing home oxygen continuously  · Reports little to no p o  intake  · PT, OT, and Case Management, patient now agreeable to home therapy  · She states her neighbor helps her out when needed and she is okay with this    Constipation  Assessment & Plan  · Fecal impaction, treated with bowel regimen and resolved   Likely secondary to chronic opioid use       Anemia  Assessment & Plan  · Hemoglobin on admission 11 4   · Baseline hemoglobin 10-12       Anxiety  Assessment & Plan  · Secondary to small-cell lung cancer   · Continue Ativan 0 5mg [id number] p r n  during last admission       Narcotic dependency, continuous (HCC)  Assessment & Plan  · Morphine sulfate 30 mg q 4 hours p r n  · Secondary to left-sided small cell lung cancer  · PDMP reviewed   · resume and continue morphine sulfate     Severe protein-calorie malnutrition (HCC)  Assessment & Plan  Malnutrition Findings:   · Adult Malnutrition type: Chronic illnessfat loss, muscle loss, prominent clavicle bone protrusion, orbital hallowing, temporal wasting   Adult Degree of Malnutrition: Other severe protein calorie malnutrition    BMI Findings:  Adult BMI Classifications: Underweight < 18 5  There is no height or weight on file to calculate BMI  · BMI 15 11  · Nutrition consult recommending supplements refused by patient       Small cell lung cancer ([facility] [address] )  Assessment & Plan  · Advanced stage small cell lung cancer  · Previously was on chemotherapy  · S/p palliative radiation therapy to pleural based posterior and anterior chest wall metastatic disease  · will need to keep her heme/onc and palliative visits as scheduled          Pulmonary emphysema (HCC)  Assessment & Plan  · Home regimen:  Albuterol and Spiriva   · No acute signs of exacerbation on admission        Discharging Physician / Practitioner: Reece Mcintosh MD  PCP: Darryl Giron DO  Admission Date:   Admission Orders (From admission, onward)     Ordered        03/18/21 1545  Inpatient Admission  Once         03/17/21 2322  Place in Observation  Once                   Discharge Date: 03/20/21    Resolved Problems  Date Reviewed: 3/17/2021    None          Consultations During Hospital Stay:  ·     Procedures Performed:   ·     Significant Findings / Test Results:   · CT head without contrast:  FINDINGS:     PARENCHYMA:  No intracranial mass, mass effect or midline shift  No CT signs of acute infarction    No acute parenchymal hemorrhage      VENTRICLES AND EXTRA-AXIAL SPACES:  Normal for the patient's age      VISUALIZED ORBITS AND PARANASAL SINUSES: Unremarkable      CALVARIUM AND EXTRACRANIAL SOFT TISSUES:  Normal      IMPRESSION:     No acute intracranial abnormality    Incidental Findings:   ·      Test Results Pending at Discharge (will require follow up):   ·      Outpatient Tests Requested:  ·     Complications:      Reason for Admission:  Altered mental status, constipation    Hospital Course:     Ramona Hodges is a 78 y o  female patient who originally presented to the hospital on 3/17/2021 due to altered mental status and constipation  Urinalysis was suggestive of a urinary tract infection, urine cultures grew Gram-negative rods, completed treatment with IV ceftriaxone for 3 days  Constipation was treated with bowel regimen and resolved  During her admission she was seen by Nutrition specialist given failure to thrive, recommended nutritional supplements which she refused  Had physical and occupational therapy, recommended home with skilled therapy  She has been discharged in stable condition  Will need follow-up with PCP within 1 week  Will need to keep scheduled Hematology and Oncology, palliative visit  The patient, initially admitted to the hospital as inpatient, was discharged earlier than expected given the following: Improvement in mentation  Please see above list of diagnoses and related plan for additional information  Condition at Discharge: stable     Discharge Day Visit / Exam:     Subjective:  Seen this morning  Continues to desaturate with certain movements, not new, not worse from prior  She has no other complaints to offer at this time  Vitals: Blood Pressure: 159/94 (03/20/21 0714)  Pulse: 104 (03/20/21 0714)  Temperature: (!) 97 4 °F (36 3 °C) (03/20/21 0714)  Temp Source: Oral (03/18/21 2315)  Respirations: 18 (03/20/21 0714)  SpO2: (!) 86 %(while pt using bedside commode) (03/20/21 0801)  Exam:   Physical Exam  Vitals signs reviewed  Constitutional:       General: She is not in acute distress  Appearance: Normal appearance  She is not ill-appearing, toxic-appearing or diaphoretic  HENT:      Head: Normocephalic  Eyes:      Extraocular Movements: Extraocular movements intact  Pupils: Pupils are equal, round, and reactive to light  Neck:      Musculoskeletal: Normal range of motion and neck supple  No neck rigidity or muscular tenderness  Vascular: No carotid bruit  Cardiovascular:      Rate and Rhythm: Normal rate and regular rhythm  Pulses: Normal pulses  Heart sounds: Normal heart sounds  No murmur  No friction rub  Pulmonary:      Effort: Pulmonary effort is normal       Breath sounds: No stridor  No wheezing, rhonchi or rales  Comments: Reduced breath sounds bilaterally  Chest:      Chest wall: No tenderness  Abdominal:      General: Abdomen is flat  Bowel sounds are normal  There is no distension  Palpations: Abdomen is soft  Tenderness: There is no abdominal tenderness  There is no guarding or rebound  Musculoskeletal: Normal range of motion  General: No swelling or tenderness  Right lower leg: Edema present  Left lower leg: Edema present  Skin:     General: Skin is warm and dry  Coloration: Skin is not jaundiced  Neurological:      General: No focal deficit present  Mental Status: She is alert and oriented to person, place, and time  Cranial Nerves: No cranial nerve deficit  Sensory: No sensory deficit  Discussion with Family:  Declined family communication    Discharge instructions/Information to patient and family:   See after visit summary for information provided to patient and family  Provisions for Follow-Up Care:  See after visit summary for information related to follow-up care and any pertinent home health orders        Disposition:     Home with VNA Services (Reminder: Complete face to face encounter)    For Discharges to [facility]:   · Not Applicable to this Patient - Not Applicable to this Patient    Planned Readmission:  No     Discharge Statement:  I spent 65 minutes discharging the patient  This time was spent on the day of discharge  I had direct contact with the patient on the day of discharge  Greater than 50% of the total time was spent examining patient, answering all patient questions, arranging and discussing plan of care with patient as well as directly providing post-discharge instructions  Additional time then spent on discharge activities  Discharge Medications:  See after visit summary for reconciled discharge medications provided to patient and family        ** Please Note: This note has been constructed using a voice recognition system **

## 2021-03-19 NOTE — ASSESSMENT & PLAN NOTE
· Secondary to small-cell lung cancer   · Continue Ativan 0 5mg [id number] p r n  during last admission

## 2021-03-19 NOTE — ASSESSMENT & PLAN NOTE
Malnutrition Findings:   · Adult Malnutrition type: Chronic illnessfat loss, muscle loss, prominent clavicle bone protrusion, orbital hallowing, temporal wasting   Adult Degree of Malnutrition: Other severe protein calorie malnutrition    BMI Findings:  Adult BMI Classifications: Underweight < 18 5  There is no height or weight on file to calculate BMI       · BMI 15 11  · Nutrition consult recommending supplements refused by patient

## 2021-03-19 NOTE — ASSESSMENT & PLAN NOTE
· Significant concern about patient living on her own   · Has continued to lose weight since recent admission   · Has not been wearing home oxygen continuously  · Reports little to no p o  intake  · PT, OT, and Case Management, patient now agreeable to home therapy .

## 2021-03-20 VITALS
SYSTOLIC BLOOD PRESSURE: 156 MMHG | HEART RATE: 98 BPM | DIASTOLIC BLOOD PRESSURE: 92 MMHG | RESPIRATION RATE: 16 BRPM | TEMPERATURE: 97.6 F | OXYGEN SATURATION: 98 %

## 2021-03-20 LAB
ANION GAP SERPL CALCULATED.3IONS-SCNC: 7 MMOL/L (ref 4–13)
BACTERIA UR CULT: ABNORMAL
BUN SERPL-MCNC: 23 MG/DL (ref 5–25)
CALCIUM SERPL-MCNC: 8.3 MG/DL (ref 8.3–10.1)
CHLORIDE SERPL-SCNC: 104 MMOL/L (ref 100–108)
CO2 SERPL-SCNC: 26 MMOL/L (ref 21–32)
CREAT SERPL-MCNC: 0.56 MG/DL (ref 0.6–1.3)
GFR SERPL CREATININE-BSD FRML MDRD: 96 ML/MIN/1.73SQ M
GLUCOSE SERPL-MCNC: 109 MG/DL (ref 65–140)
POTASSIUM SERPL-SCNC: 4 MMOL/L (ref 3.5–5.3)
SODIUM SERPL-SCNC: 137 MMOL/L (ref 136–145)

## 2021-03-20 PROCEDURE — 99239 HOSP IP/OBS DSCHRG MGMT >30: CPT | Performed by: INTERNAL MEDICINE

## 2021-03-20 PROCEDURE — 80048 BASIC METABOLIC PNL TOTAL CA: CPT | Performed by: INTERNAL MEDICINE

## 2021-03-20 PROCEDURE — 94760 N-INVAS EAR/PLS OXIMETRY 1: CPT

## 2021-03-20 PROCEDURE — 94762 N-INVAS EAR/PLS OXIMTRY CONT: CPT

## 2021-03-20 RX ORDER — HYDROXYZINE HYDROCHLORIDE 25 MG/1
25 TABLET, FILM COATED ORAL ONCE
Status: COMPLETED | OUTPATIENT
Start: 2021-03-20 | End: 2021-03-20

## 2021-03-20 RX ADMIN — PANTOPRAZOLE SODIUM 40 MG: 40 TABLET, DELAYED RELEASE ORAL at 06:37

## 2021-03-20 RX ADMIN — MORPHINE SULFATE 30 MG: 15 TABLET ORAL at 03:15

## 2021-03-20 RX ADMIN — DOCUSATE SODIUM 100 MG: 100 CAPSULE, LIQUID FILLED ORAL at 08:47

## 2021-03-20 RX ADMIN — DOCUSATE SODIUM 100 MG: 100 CAPSULE, LIQUID FILLED ORAL at 17:11

## 2021-03-20 RX ADMIN — TIOTROPIUM BROMIDE 18 MCG: 18 CAPSULE ORAL; RESPIRATORY (INHALATION) at 09:31

## 2021-03-20 RX ADMIN — LORATADINE AND PSEUDOEPHEDRINE 1 TABLET: 10; 240 TABLET, EXTENDED RELEASE ORAL at 08:49

## 2021-03-20 RX ADMIN — LORAZEPAM 0.5 MG: 0.5 TABLET ORAL at 06:50

## 2021-03-20 RX ADMIN — Medication 250 MG: at 08:49

## 2021-03-20 RX ADMIN — DEXAMETHASONE 2 MG: 0.5 TABLET ORAL at 17:11

## 2021-03-20 RX ADMIN — LORAZEPAM 0.5 MG: 0.5 TABLET ORAL at 15:17

## 2021-03-20 RX ADMIN — Medication 250 MG: at 17:11

## 2021-03-20 RX ADMIN — DEXAMETHASONE 2 MG: 0.5 TABLET ORAL at 08:54

## 2021-03-20 RX ADMIN — FLUTICASONE FUROATE AND VILANTEROL TRIFENATATE 1 PUFF: 100; 25 POWDER RESPIRATORY (INHALATION) at 08:52

## 2021-03-20 RX ADMIN — MORPHINE SULFATE 30 MG: 15 TABLET ORAL at 08:48

## 2021-03-20 RX ADMIN — MORPHINE SULFATE 30 MG: 15 TABLET ORAL at 17:13

## 2021-03-20 RX ADMIN — OXYBUTYNIN CHLORIDE 5 MG: 5 TABLET, EXTENDED RELEASE ORAL at 08:48

## 2021-03-20 RX ADMIN — STANDARDIZED SENNA CONCENTRATE 8.6 MG: 8.6 TABLET ORAL at 08:49

## 2021-03-20 RX ADMIN — HYDROXYZINE HYDROCHLORIDE 25 MG: 25 TABLET ORAL at 03:13

## 2021-03-20 RX ADMIN — CEFTRIAXONE 1000 MG: 1 INJECTION, SOLUTION INTRAVENOUS at 03:22

## 2021-03-20 NOTE — CASE MANAGEMENT
CM notified by bedside RN that patient is interested in additional information on resources for meals  [employer] on Wheels added to follow up providers  CM utilized [employer] to compile a list of 11 additional resources for patient to look into  List emailed to nursing as CM is off site and nursing reports they will provide to patient

## 2021-03-20 NOTE — CASE MANAGEMENT
Patient is written for DC today to return home  Patient is accepted by [facility] for RN services  [facility] added to follow up providers, and CM made [facility] aware via [employer] of patient's DC today  Per chart review, neighbor to provide transportation home  CM will be available for further care coordination needs

## 2021-03-20 NOTE — PLAN OF CARE
Problem: Prexisting or High Potential for Compromised Skin Integrity  Goal: Skin integrity is maintained or improved  Description: INTERVENTIONS:  - Identify patients at risk for skin breakdown  - Assess and monitor skin integrity  - Assess and monitor nutrition and hydration status  - Monitor labs   - Assess for incontinence   - Turn and reposition patient  - Assist with mobility/ambulation  - Relieve pressure over bony prominences  - Avoid friction and shearing  - Provide appropriate hygiene as needed including keeping skin clean and dry  - Evaluate need for skin moisturizer/barrier cream  - Collaborate with interdisciplinary team   - Patient/family teaching  - Consider wound care consult   Outcome: Adequate for Discharge     Problem: Nutrition/Hydration-ADULT  Goal: Nutrient/Hydration intake appropriate for improving, restoring or maintaining nutritional needs  Description: Monitor and assess patient's nutrition/hydration status for malnutrition  Collaborate with interdisciplinary team and initiate plan and interventions as ordered  Monitor patient's weight and dietary intake as ordered or per policy  Utilize nutrition screening tool and intervene as necessary  Determine patient's food preferences and provide high-protein, high-caloric foods as appropriate       INTERVENTIONS:  - Monitor oral intake, urinary output, labs, and treatment plans  - Assess nutrition and hydration status and recommend course of action  - Evaluate amount of meals eaten  - Assist patient with eating if necessary   - Allow adequate time for meals  - Recommend/ encourage appropriate diets, oral nutritional supplements, and vitamin/mineral supplements  - Order, calculate, and assess calorie counts as needed  - Recommend, monitor, and adjust tube feedings and TPN/PPN based on assessed needs  - Assess need for intravenous fluids  - Provide specific nutrition/hydration education as appropriate  - Include patient/family/caregiver in decisions related to nutrition  Outcome: Adequate for Discharge     Problem: Potential for Falls  Goal: Patient will remain free of falls  Description: INTERVENTIONS:  - Assess patient frequently for physical needs  -  Identify cognitive and physical deficits and behaviors that affect risk of falls    -  Given fall precautions as indicated by assessment   - Educate patient/family on patient safety including physical limitations  - Instruct patient to call for assistance with activity based on assessment  - Modify environment to reduce risk of injury  - Consider OT/PT consult to assist with strengthening/mobility  Outcome: Adequate for Discharge     Problem: PAIN - ADULT  Goal: Verbalizes/displays adequate comfort level or baseline comfort level  Description: Interventions:  - Encourage patient to monitor pain and request assistance  - Assess pain using appropriate pain scale  - Administer analgesics based on type and severity of pain and evaluate response  - Implement non-pharmacological measures as appropriate and evaluate response  - Consider cultural and social influences on pain and pain management  - Notify physician/advanced practitioner if interventions unsuccessful or patient reports new pain  Outcome: Adequate for Discharge     Problem: INFECTION - ADULT  Goal: Absence or prevention of progression during hospitalization  Description: INTERVENTIONS:  - Assess and monitor for signs and symptoms of infection  - Monitor lab/diagnostic results  - Monitor all insertion sites, i e  indwelling lines, tubes, and drains  - Monitor endotracheal if appropriate and nasal secretions for changes in amount and color  - Given appropriate cooling/warming therapies per order  - Administer medications as ordered  - Instruct and encourage patient and family to use good hand hygiene technique  - Identify and instruct in appropriate isolation precautions for identified infection/condition  Outcome: Adequate for Discharge  Goal: Absence of fever/infection during neutropenic period  Description: INTERVENTIONS:  - Monitor WBC    Outcome: Adequate for Discharge     Problem: SAFETY ADULT  Goal: Patient will remain free of falls  Description: INTERVENTIONS:  - Assess patient frequently for physical needs  -  Identify cognitive and physical deficits and behaviors that affect risk of falls    -  [place] fall precautions as indicated by assessment   - Educate patient/family on patient safety including physical limitations  - Instruct patient to call for assistance with activity based on assessment  - Modify environment to reduce risk of injury  - Consider OT/PT consult to assist with strengthening/mobility  Outcome: Adequate for Discharge  Goal: Maintain or return to baseline ADL function  Description: INTERVENTIONS:  -  Assess patient's ability to carry out ADLs; assess patient's baseline for ADL function and identify physical deficits which impact ability to perform ADLs (bathing, care of mouth/teeth, toileting, grooming, dressing, etc )  - Assess/evaluate cause of self-care deficits   - Assess range of motion  - Assess patient's mobility; develop plan if impaired  - Assess patient's need for assistive devices and provide as appropriate  - Encourage maximum independence but intervene and supervise when necessary  - Involve family in performance of ADLs  - Assess for home care needs following discharge   - Consider OT consult to assist with ADL evaluation and planning for discharge  - Provide patient education as appropriate  Outcome: Adequate for Discharge  Goal: Maintain or return mobility status to optimal level  Description: INTERVENTIONS:  - Assess patient's baseline mobility status (ambulation, transfers, stairs, etc )    - Identify cognitive and physical deficits and behaviors that affect mobility  - Identify mobility aids required to assist with transfers and/or ambulation (gait belt, sit-to-stand, lift, walker, cane, etc )  - [place] fall precautions as indicated by assessment  - Record patient progress and toleration of activity level on Mobility SBAR; progress patient to next Phase/Stage  - Instruct patient to call for assistance with activity based on assessment  - Consider rehabilitation consult to assist with strengthening/weightbearing, etc   Outcome: Adequate for Discharge     Problem: DISCHARGE PLANNING  Goal: Discharge to home or other facility with appropriate resources  Description: INTERVENTIONS:  - Identify barriers to discharge w/patient and caregiver  - Arrange for needed discharge resources and transportation as appropriate  - Identify discharge learning needs (meds, wound care, etc )  - Arrange for interpretive services to assist at discharge as needed  - Refer to Case Management Department for coordinating discharge planning if the patient needs post-hospital services based on physician/advanced practitioner order or complex needs related to functional status, cognitive ability, or social support system  Outcome: Adequate for Discharge     Problem: Knowledge Deficit  Goal: Patient/family/caregiver demonstrates understanding of disease process, treatment plan, medications, and discharge instructions  Description: Complete learning assessment and assess knowledge base    Interventions:  - Provide teaching at level of understanding  - Provide teaching via preferred learning methods  Outcome: Adequate for Discharge     Problem: RESPIRATORY - ADULT  Goal: Achieves optimal ventilation and oxygenation  Description: INTERVENTIONS:  - Assess for changes in respiratory status  - Assess for changes in mentation and behavior  - Position to facilitate oxygenation and minimize respiratory effort  - Oxygen administered by appropriate delivery if ordered  - Initiate smoking cessation education as indicated  - Encourage broncho-pulmonary hygiene including cough, deep breathe, Incentive Spirometry  - Assess the need for suctioning and aspirate as needed  - Assess and instruct to report SOB or any respiratory difficulty  - Respiratory Therapy support as indicated  Outcome: Adequate for Discharge     Problem: GASTROINTESTINAL - ADULT  Goal: Maintains adequate nutritional intake  Description: INTERVENTIONS:  - Monitor percentage of each meal consumed  - Identify factors contributing to decreased intake, treat as appropriate  - Assist with meals as needed  - Monitor I&O, weight, and lab values if indicated  - Obtain nutrition services referral as needed  Outcome: Adequate for Discharge     Problem: GENITOURINARY - ADULT  Goal: Maintains or returns to baseline urinary function  Description: INTERVENTIONS:  - Assess urinary function  - Encourage oral fluids to ensure adequate hydration if ordered  - Administer IV fluids as ordered to ensure adequate hydration  - Administer ordered medications as needed  - Offer frequent toileting  - Follow urinary retention protocol if ordered  Outcome: Adequate for Discharge     Problem: METABOLIC, FLUID AND ELECTROLYTES - ADULT  Goal: Electrolytes maintained within normal limits  Description: INTERVENTIONS:  - Monitor labs and assess patient for signs and symptoms of electrolyte imbalances  - Administer electrolyte replacement as ordered  - Monitor response to electrolyte replacements, including repeat lab results as appropriate  - Instruct patient on fluid and nutrition as appropriate  Outcome: Adequate for Discharge  Goal: Fluid balance maintained  Description: INTERVENTIONS:  - Monitor labs   - Monitor I/O and WT  - Instruct patient on fluid and nutrition as appropriate  - Assess for signs & symptoms of volume excess or deficit  Outcome: Adequate for Discharge     Problem: SKIN/TISSUE INTEGRITY - ADULT  Goal: Skin integrity remains intact  Description: INTERVENTIONS  - Identify patients at risk for skin breakdown  - Assess and monitor skin integrity  - Assess and monitor nutrition and hydration status  - Monitor labs (i e  albumin)  - Assess for incontinence   - Turn and reposition patient  - Assist with mobility/ambulation  - Relieve pressure over bony prominences  - Avoid friction and shearing  - Provide appropriate hygiene as needed including keeping skin clean and dry  - Evaluate need for skin moisturizer/barrier cream  - Collaborate with interdisciplinary team (i e  Nutrition, Rehabilitation, etc )   - Patient/family teaching  Outcome: Adequate for Discharge     Problem: HEMATOLOGIC - ADULT  Goal: Maintains hematologic stability  Description: INTERVENTIONS  - Assess for signs and symptoms of bleeding or hemorrhage  - Monitor labs  - Administer supportive blood products/factors as ordered and appropriate  Outcome: Adequate for Discharge     Problem: MUSCULOSKELETAL - ADULT  Goal: Maintain or return mobility to safest level of function  Description: INTERVENTIONS:  - Assess patient's ability to carry out ADLs; assess patient's baseline for ADL function and identify physical deficits which impact ability to perform ADLs (bathing, care of mouth/teeth, toileting, grooming, dressing, etc )  - Assess/evaluate cause of self-care deficits   - Assess range of motion  - Assess patient's mobility  - Assess patient's need for assistive devices and provide as appropriate  - Encourage maximum independence but intervene and supervise when necessary  - Involve family in performance of ADLs  - Assess for home care needs following discharge   - Consider OT consult to assist with ADL evaluation and planning for discharge  - Provide patient education as appropriate  Outcome: Adequate for Discharge

## 2021-03-21 ENCOUNTER — HOSPITAL ENCOUNTER (INPATIENT)
Facility: HOSPITAL | Age: 71
LOS: 2 days | DRG: 180 | End: 2021-03-23
Attending: EMERGENCY MEDICINE | Admitting: INTERNAL MEDICINE
Payer: MEDICARE

## 2021-03-21 DIAGNOSIS — R62.7 ADULT FAILURE TO THRIVE: ICD-10-CM

## 2021-03-21 DIAGNOSIS — C34.90 SMALL CELL LUNG CANCER (HCC): ICD-10-CM

## 2021-03-21 DIAGNOSIS — Z51.5 HOSPICE CARE: ICD-10-CM

## 2021-03-21 DIAGNOSIS — R06.03 ACUTE RESPIRATORY DISTRESS: Primary | ICD-10-CM

## 2021-03-21 LAB
ALBUMIN SERPL BCP-MCNC: 2.9 G/DL (ref 3.5–5)
ALP SERPL-CCNC: 98 U/L (ref 46–116)
ALT SERPL W P-5'-P-CCNC: 37 U/L (ref 12–78)
ANION GAP SERPL CALCULATED.3IONS-SCNC: 10 MMOL/L (ref 4–13)
AST SERPL W P-5'-P-CCNC: 39 U/L (ref 5–45)
BASOPHILS # BLD AUTO: 0.02 THOUSANDS/ΜL (ref 0–0.1)
BASOPHILS NFR BLD AUTO: 0 % (ref 0–1)
BILIRUB SERPL-MCNC: 1 MG/DL (ref 0.2–1)
BUN SERPL-MCNC: 16 MG/DL (ref 5–25)
CALCIUM ALBUM COR SERPL-MCNC: 9.8 MG/DL (ref 8.3–10.1)
CALCIUM SERPL-MCNC: 8.9 MG/DL (ref 8.3–10.1)
CHLORIDE SERPL-SCNC: 97 MMOL/L (ref 100–108)
CO2 SERPL-SCNC: 25 MMOL/L (ref 21–32)
CREAT SERPL-MCNC: 0.52 MG/DL (ref 0.6–1.3)
EOSINOPHIL # BLD AUTO: 0 THOUSAND/ΜL (ref 0–0.61)
EOSINOPHIL NFR BLD AUTO: 0 % (ref 0–6)
ERYTHROCYTE [DISTWIDTH] IN BLOOD BY AUTOMATED COUNT: 16 % (ref 11.6–15.1)
GFR SERPL CREATININE-BSD FRML MDRD: 98 ML/MIN/1.73SQ M
GLUCOSE SERPL-MCNC: 84 MG/DL (ref 65–140)
HCT VFR BLD AUTO: 37.3 % (ref 34.8–46.1)
HGB BLD-MCNC: 12.2 G/DL (ref 11.5–15.4)
IMM GRANULOCYTES # BLD AUTO: 0.07 THOUSAND/UL (ref 0–0.2)
IMM GRANULOCYTES NFR BLD AUTO: 1 % (ref 0–2)
LACTATE SERPL-SCNC: 3 MMOL/L (ref 0.5–2)
LIPASE SERPL-CCNC: 48 U/L (ref 73–393)
LYMPHOCYTES # BLD AUTO: 0.16 THOUSANDS/ΜL (ref 0.6–4.47)
LYMPHOCYTES NFR BLD AUTO: 2 % (ref 14–44)
MCH RBC QN AUTO: 31.2 PG (ref 26.8–34.3)
MCHC RBC AUTO-ENTMCNC: 32.7 G/DL (ref 31.4–37.4)
MCV RBC AUTO: 95 FL (ref 82–98)
MONOCYTES # BLD AUTO: 0.39 THOUSAND/ΜL (ref 0.17–1.22)
MONOCYTES NFR BLD AUTO: 5 % (ref 4–12)
NEUTROPHILS # BLD AUTO: 7.95 THOUSANDS/ΜL (ref 1.85–7.62)
NEUTS SEG NFR BLD AUTO: 92 % (ref 43–75)
NRBC BLD AUTO-RTO: 0 /100 WBCS
PLATELET # BLD AUTO: 187 THOUSANDS/UL (ref 149–390)
PMV BLD AUTO: 9.7 FL (ref 8.9–12.7)
POTASSIUM SERPL-SCNC: 3.5 MMOL/L (ref 3.5–5.3)
PROT SERPL-MCNC: 5.9 G/DL (ref 6.4–8.2)
RBC # BLD AUTO: 3.91 MILLION/UL (ref 3.81–5.12)
SODIUM SERPL-SCNC: 132 MMOL/L (ref 136–145)
WBC # BLD AUTO: 8.59 THOUSAND/UL (ref 4.31–10.16)

## 2021-03-21 PROCEDURE — 93005 ELECTROCARDIOGRAM TRACING: CPT

## 2021-03-21 PROCEDURE — 99285 EMERGENCY DEPT VISIT HI MDM: CPT

## 2021-03-21 PROCEDURE — 99222 1ST HOSP IP/OBS MODERATE 55: CPT | Performed by: INTERNAL MEDICINE

## 2021-03-21 PROCEDURE — 83605 ASSAY OF LACTIC ACID: CPT | Performed by: EMERGENCY MEDICINE

## 2021-03-21 PROCEDURE — 99285 EMERGENCY DEPT VISIT HI MDM: CPT | Performed by: EMERGENCY MEDICINE

## 2021-03-21 PROCEDURE — 85025 COMPLETE CBC W/AUTO DIFF WBC: CPT | Performed by: EMERGENCY MEDICINE

## 2021-03-21 PROCEDURE — 96365 THER/PROPH/DIAG IV INF INIT: CPT

## 2021-03-21 PROCEDURE — 83690 ASSAY OF LIPASE: CPT | Performed by: EMERGENCY MEDICINE

## 2021-03-21 PROCEDURE — 36415 COLL VENOUS BLD VENIPUNCTURE: CPT | Performed by: EMERGENCY MEDICINE

## 2021-03-21 PROCEDURE — 96361 HYDRATE IV INFUSION ADD-ON: CPT

## 2021-03-21 PROCEDURE — 80053 COMPREHEN METABOLIC PANEL: CPT | Performed by: EMERGENCY MEDICINE

## 2021-03-21 RX ORDER — ONDANSETRON 4 MG/1
4 TABLET, ORALLY DISINTEGRATING ORAL EVERY 8 HOURS PRN
Status: DISCONTINUED | OUTPATIENT
Start: 2021-03-21 | End: 2021-03-23 | Stop reason: HOSPADM

## 2021-03-21 RX ORDER — AMOXICILLIN 250 MG
1 CAPSULE ORAL DAILY
Status: DISCONTINUED | OUTPATIENT
Start: 2021-03-22 | End: 2021-03-23 | Stop reason: HOSPADM

## 2021-03-21 RX ORDER — SODIUM CHLORIDE, SODIUM GLUCONATE, SODIUM ACETATE, POTASSIUM CHLORIDE, MAGNESIUM CHLORIDE, SODIUM PHOSPHATE, DIBASIC, AND POTASSIUM PHOSPHATE .53; .5; .37; .037; .03; .012; .00082 G/100ML; G/100ML; G/100ML; G/100ML; G/100ML; G/100ML; G/100ML
1000 INJECTION, SOLUTION INTRAVENOUS ONCE
Status: COMPLETED | OUTPATIENT
Start: 2021-03-21 | End: 2021-03-21

## 2021-03-21 RX ORDER — LORAZEPAM 0.5 MG/1
0.5 TABLET ORAL EVERY 8 HOURS PRN
Status: DISCONTINUED | OUTPATIENT
Start: 2021-03-21 | End: 2021-03-22

## 2021-03-21 RX ORDER — DEXAMETHASONE 0.5 MG/1
2 TABLET ORAL 2 TIMES DAILY WITH MEALS
Status: DISCONTINUED | OUTPATIENT
Start: 2021-03-22 | End: 2021-03-23 | Stop reason: HOSPADM

## 2021-03-21 RX ORDER — ALBUTEROL SULFATE 90 UG/1
2 AEROSOL, METERED RESPIRATORY (INHALATION) EVERY 6 HOURS PRN
Status: DISCONTINUED | OUTPATIENT
Start: 2021-03-21 | End: 2021-03-23 | Stop reason: HOSPADM

## 2021-03-21 RX ORDER — ACETAMINOPHEN 325 MG/1
975 TABLET ORAL ONCE
Status: COMPLETED | OUTPATIENT
Start: 2021-03-21 | End: 2021-03-21

## 2021-03-21 RX ORDER — SODIUM CHLORIDE 9 MG/ML
100 INJECTION, SOLUTION INTRAVENOUS CONTINUOUS
Status: DISCONTINUED | OUTPATIENT
Start: 2021-03-21 | End: 2021-03-22

## 2021-03-21 RX ORDER — MORPHINE SULFATE 15 MG/1
15 TABLET ORAL ONCE
Status: COMPLETED | OUTPATIENT
Start: 2021-03-21 | End: 2021-03-21

## 2021-03-21 RX ORDER — FLUTICASONE FUROATE AND VILANTEROL 100; 25 UG/1; UG/1
1 POWDER RESPIRATORY (INHALATION) DAILY
Status: DISCONTINUED | OUTPATIENT
Start: 2021-03-22 | End: 2021-03-23 | Stop reason: HOSPADM

## 2021-03-21 RX ORDER — LORAZEPAM 2 MG/ML
1 INJECTION INTRAMUSCULAR ONCE
Status: COMPLETED | OUTPATIENT
Start: 2021-03-21 | End: 2021-03-21

## 2021-03-21 RX ORDER — MORPHINE SULFATE 15 MG/1
30 TABLET ORAL EVERY 4 HOURS PRN
Status: DISCONTINUED | OUTPATIENT
Start: 2021-03-21 | End: 2021-03-22

## 2021-03-21 RX ADMIN — SODIUM CHLORIDE, SODIUM GLUCONATE, SODIUM ACETATE, POTASSIUM CHLORIDE, MAGNESIUM CHLORIDE, SODIUM PHOSPHATE, DIBASIC, AND POTASSIUM PHOSPHATE 1000 ML: .53; .5; .37; .037; .03; .012; .00082 INJECTION, SOLUTION INTRAVENOUS at 16:45

## 2021-03-21 RX ADMIN — SODIUM CHLORIDE 100 ML/HR: 0.9 INJECTION, SOLUTION INTRAVENOUS at 19:54

## 2021-03-21 RX ADMIN — ACETAMINOPHEN 975 MG: 325 TABLET, FILM COATED ORAL at 14:00

## 2021-03-21 RX ADMIN — LORAZEPAM 1 MG: 2 INJECTION INTRAMUSCULAR; INTRAVENOUS at 18:22

## 2021-03-21 RX ADMIN — SODIUM CHLORIDE 1000 ML: 0.9 INJECTION, SOLUTION INTRAVENOUS at 13:37

## 2021-03-21 RX ADMIN — MORPHINE SULFATE 15 MG: 15 TABLET ORAL at 17:44

## 2021-03-21 NOTE — ED NOTES
Patient states that she took all of her daily medications yesterday and that she is "out of all of her medications today "     Gabriele Burgess RN  03/21/21 3225

## 2021-03-21 NOTE — ASSESSMENT & PLAN NOTE
· Home oxygen evaluation performed during last admission (2/28-2/3): "Baseline SPO2 on Room Air at rest 89-86 %  SPO2 on Oxygen at rest 90 % 6 lpm  SPO2 during exercise on Oxygen 92-90% liter flow of 8 lpm "  · 5-6L at baseline  · Has ability to increase oxygen at home to 10L

## 2021-03-21 NOTE — ED PROVIDER NOTES
History  Chief Complaint   Patient presents with    Weakness - Generalized     Patient presents to the ER with report of being in pain, having SOB and Generalized Weakness  Patient lives alone and may be at the point of needing alternative living situation  This 29-year-old female lives alone at home with history of chronic fatigue syndrome with fibromyalgia, chronic respiratory failure, COPD, small cell lung cancer undergoing palliative care, severe protein calorie malnutrition, continuous narcotic dependent and hypothyroidism is brought by ambulance from home complaining that she has been anorexic unable to eat or drink since yesterday afternoon  She complains of worsening fatigue and her chronic back pain  She states she lost her narcotic prescription in his out of morphine currently  She is is home oxygen at 8 liters/minute  She was found have a pulse ox of 95-98% on her home oxygen  She denies new chest pain, vomiting, diarrhea, fever and chills  Patient apparently has no family  She is a hoarder with very small narrow always in her home according to the paramedics  She does have some neighbors to help her out  She tells me that she does not feel that she is able to go back home today  Prior to Admission Medications   Prescriptions Last Dose Informant Patient Reported? Taking?    B Complex-Biotin-FA (TH VITAMIN B 50/B-COMPLEX) TABS 3/20/2021 at Unknown time Self Yes Yes   Sig: Take by mouth daily   COLCHICINE PO 3/20/2021 at Unknown time Self Yes Yes   Sig: Take 0 5 mg by mouth 3 (three) times a day as needed    Capsaicin 0 025 % GEL 3/20/2021 at Unknown time Self Yes Yes   Sig: Apply topically 3 (three) times a day as needed    Echinacea 400 MG CAPS 3/20/2021 at Unknown time Self Yes Yes   Sig: Take by mouth 3 (three) times a day as needed   GINKGO BILOBA PLUS PO 3/20/2021 at Unknown time Self Yes Yes   Sig: Take by mouth as needed 2 capsules every 2 mornings    Hyaluronic Acid 20-60 MG CAPS 3/20/2021 at Unknown time Self Yes Yes   Sig: Take by mouth 3 (three) times a day    L-Lysine 500 MG CAPS 3/20/2021 at Unknown time Self Yes Yes   Sig: Take by mouth 3 (three) times a day as needed   LORazepam (ATIVAN) 0 5 mg tablet 3/20/2021 at Unknown time  No Yes   Sig: Take 1 tablet (0 5 mg total) by mouth every 8 (eight) hours as needed for anxiety   Lactobacillus (PROBIOTIC ACIDOPHILUS PO) 3/20/2021 at Unknown time Self Yes Yes   Sig: Take 1 capsule by mouth daily   NON FORMULARY  Self Yes No   Sig: Take 37 mg by mouth daily Eco Thyro 37 - reported by pt   S-Adenosylmethionine (RADHA-E) 200 MG TABS 3/20/2021 at Unknown time Self Yes Yes   Sig: Take by mouth 3 (three) times a day   Tragacanth (ASTRAGALUS ROOT) POWD 3/20/2021 at Unknown time Self Yes Yes   Si mg 3 (three) times a day as needed   acetaminophen (TYLENOL) 500 mg tablet  Self No No   Sig: Take 2 tablets (1,000 mg total) by mouth every 8 (eight) hours   albuterol (2 5 mg/3 mL) 0 083 % nebulizer solution   No No   Sig: Take 1 vial (2 5 mg total) by nebulization every 6 (six) hours as needed for wheezing or shortness of breath   albuterol (ProAir HFA) 90 mcg/act inhaler 3/20/2021 at Unknown time Self No Yes   Sig: Inhale 2 puffs every 6 (six) hours as needed for wheezing or shortness of breath   cholecalciferol (VITAMIN D3) 1,000 units tablet 3/20/2021 at Unknown time Self Yes Yes   Sig: Take 7,000 Units by mouth daily   cyclobenzaprine (FLEXERIL) 10 mg tablet 3/20/2021 at Unknown time Self Yes Yes   Sig: Take 10 mg by mouth 3 (three) times a day as needed for muscle spasms   dexamethasone (DECADRON) 2 mg tablet 3/21/2021 at Unknown time  No Yes   Sig: Take 1 tablet (2 mg total) by mouth 2 (two) times a day with meals   fexofenadine-pseudoephedrine (ALLEGRA-D)  MG per tablet 3/20/2021 at Unknown time Self Yes Yes   Sig: Take 1 tablet by mouth 2 (two) times a day as needed for allergies   hydrocortisone 2 5 % cream 3/20/2021 at Unknown time Self No Yes   Sig: Apply topically 4 (four) times a day as needed for irritation or rash   lidocaine-prilocaine (EMLA) cream 3/20/2021 at Unknown time Self No Yes   Sig: Apply topically as needed for mild pain 30-60 min prior to port access     loperamide (IMODIUM) 2 mg capsule 3/20/2021 at Unknown time Self Yes Yes   Sig: Take 2 mg by mouth daily    mirtazapine (REMERON) 30 mg tablet 3/20/2021 at Unknown time Self No Yes   Sig: Take 1 tablet (30 mg total) by mouth daily at bedtime   morphine (MSIR) 30 MG tablet 3/20/2021 at Unknown time  No Yes   Sig: Take 1 tablet (30 mg total) by mouth every 4 (four) hours as needed for severe painMax Daily Amount: 180 mg   nystatin (MYCOSTATIN) 500,000 units/5 mL suspension 3/20/2021 at Unknown time Self No Yes   Sig: Apply 5 mL (500,000 Units total) to the mouth or throat 4 (four) times a day   omeprazole (PriLOSEC) 20 mg delayed release capsule 3/20/2021 at Unknown time Self No Yes   Sig: Take 1 capsule (20 mg total) by mouth daily   ondansetron (ZOFRAN) 4 mg tablet 3/20/2021 at Unknown time Self No Yes   Sig: Take 1 tablet (4 mg total) by mouth every 8 (eight) hours as needed for nausea or vomiting   other medication, see sig, 3/20/2021 at Unknown time Self Yes Yes   Sig: Take 3 capsules by mouth daily [id number]   oxybutynin (DITROPAN-XL) 10 MG 24 hr tablet 3/20/2021 at Unknown time Self Yes Yes   Sig: TAKE 1 TABLET BY MOUTH ONCE DAILY FOR INCONTINENCE   prochlorperazine (COMPAZINE) 10 mg tablet 3/20/2021 at Unknown time Self Yes Yes   Sig: Take 10 mg by mouth every 6 (six) hours as needed for nausea or vomiting (migraines)   senna-docusate sodium (SENOKOT-S) 8 6-50 mg per tablet 3/20/2021 at Unknown time Self No Yes   Sig: Take 1 tablet by mouth daily   sodium chloride (OCEAN) 0 65 % nasal spray 3/20/2021 at Unknown time Self No Yes   Si spray into each nostril as needed for congestion or rhinitis   tolterodine (DETROL LA) 2 mg 24 hr capsule 3/20/2021 at Unknown time Self Yes Yes   umeclidinium-vilanterol (ANORO ELLIPTA) 62 5-25 MCG/INH inhaler  Self No No   Sig: Inhale 1 puff daily      Facility-Administered Medications: None       Past Medical History:   Diagnosis Date    Chronic fatigue syndrome with fibromyalgia     COPD (chronic obstructive pulmonary disease) (HCC)     DDD (degenerative disc disease), cervical     Emphysema of lung (HCC)     Hx of migraine headaches     Hypothyroidism     Lung cancer ([facility] [address] )        Past Surgical History:   Procedure Laterality Date    BARTHOLIN GLAND CYST EXCISION      CARPAL TUNNEL RELEASE Bilateral     COLONOSCOPY  07/09/2020     15 mm sessile polyp in the cecum removed by polypectomy  A 3 recall    IR PLEURAL EFFUSION LONG-TERM CATHETER PLACEMENT  11/13/2020    IR PLEURAL EFFUSION LONG-TERM CATHETER REMOVAL  12/21/2020    IR PORT PLACEMENT  12/11/2020    IR THORACENTESIS  11/3/2020    TONSILLECTOMY      TUBAL LIGATION      UPPER GASTROINTESTINAL ENDOSCOPY  07/09/2020     small hiatal hernia  Pathology negative  Family History   Problem Relation Age of Onset    Colon polyps Mother     Heart disease Mother     Colon cancer Mother     Heart disease Father     Lymphoma Father      I have reviewed and agree with the history as documented  E-Cigarette/Vaping    E-Cigarette Use Never User      E-Cigarette/Vaping Substances    Nicotine No     THC No     CBD No     Flavoring No     Other No     Unknown No      Social History     Tobacco Use    Smoking status: Former Smoker     Packs/day: 0 50     Years: 53 00     Pack years: 26 50     Types: Cigarettes     Start date: 1967    Smokeless tobacco: Never Used    Tobacco comment: 2 cigarettes daily   Substance Use Topics    Alcohol use: Not Currently    Drug use: Not Currently       Review of Systems   Constitutional: Positive for activity change, appetite change and fatigue  Negative for chills, diaphoresis and fever     Respiratory: Positive for cough ( chronic), shortness of breath (Chronic) and wheezing  Cardiovascular: Positive for leg swelling ( chronic)  Musculoskeletal: Positive for back pain and myalgias  Neurological: Positive for weakness  Negative for seizures, syncope and speech difficulty  All other systems reviewed and are negative  Physical Exam  Physical Exam  Vitals signs and nursing note reviewed  Constitutional:       General: She is not in acute distress  Appearance: She is ill-appearing  She is not toxic-appearing or diaphoretic  Comments: Malnourished   HENT:      Head: Normocephalic and atraumatic  Right Ear: External ear normal       Left Ear: External ear normal       Nose: Nose normal  No rhinorrhea  Mouth/Throat:      Mouth: Mucous membranes are dry  Pharynx: No oropharyngeal exudate  Eyes:      Extraocular Movements: Extraocular movements intact  Conjunctiva/sclera: Conjunctivae normal       Pupils: Pupils are equal, round, and reactive to light  Neck:      Musculoskeletal: Neck supple  No neck rigidity or muscular tenderness  Cardiovascular:      Rate and Rhythm: Regular rhythm  Tachycardia present  Pulses: Normal pulses  Pulmonary:      Effort: Pulmonary effort is normal       Comments: Diminished breath sounds bilaterally, left greater than right  Abdominal:      General: Abdomen is flat  Bowel sounds are normal  There is no distension  Palpations: There is no mass  Tenderness: There is no abdominal tenderness  There is no guarding or rebound  Musculoskeletal: Normal range of motion  General: No tenderness or signs of injury  Comments: Trace pedal edema bilaterally  Skin:     General: Skin is warm and dry  Capillary Refill: Capillary refill takes less than 2 seconds  Neurological:      General: No focal deficit present  Mental Status: She is alert  Mental status is at baseline  Motor: Weakness present     Psychiatric:         Attention and Perception: Attention and perception normal          Mood and Affect: Affect is blunt and angry  Speech: Speech normal          Behavior: Behavior is withdrawn  Behavior is cooperative  Thought Content: Thought content is not paranoid  Thought content does not include homicidal or suicidal ideation  Cognition and Memory: Cognition normal          Vital Signs  ED Triage Vitals [03/21/21 1313]   Temperature Pulse Respirations Blood Pressure SpO2   (!) 97 3 °F (36 3 °C) 61 20 150/94 93 %      Temp Source Heart Rate Source Patient Position - Orthostatic VS BP Location FiO2 (%)   Oral Monitor Lying Right arm --      Pain Score       Worst Possible Pain           Vitals:    03/22/21 0709 03/22/21 1445 03/22/21 2224 03/23/21 0759   BP: 161/93 152/91 151/100 138/70   Pulse:  (!) 111 (!) 115 (!) 109   Patient Position - Orthostatic VS:             Visual Acuity      ED Medications  Medications   sodium chloride 0 9 % bolus 1,000 mL (0 mL Intravenous Stopped 3/21/21 1646)   acetaminophen (TYLENOL) tablet 975 mg (975 mg Oral Given 3/21/21 1400)   multi-electrolyte (PLASMALYTE-A/ISOLYTE-S PH 7 4) IV solution 1,000 mL (0 mL Intravenous Stopped 3/21/21 1746)   morphine (MSIR) IR tablet 15 mg (15 mg Oral Given 3/21/21 1744)   LORazepam (ATIVAN) injection 1 mg (1 mg Intravenous Given 3/21/21 1822)   ipratropium-albuterol (DUO-NEB) 0 5-2 5 mg/3 mL inhalation solution 3 mL (3 mL Nebulization Given 3/22/21 0653)   LORazepam (ATIVAN) injection 0 5 mg (0 5 mg Intravenous Given 3/23/21 1346)       Diagnostic Studies  Results Reviewed     Procedure Component Value Units Date/Time    Lactic acid [[id number]]  (Abnormal) Collected: 03/21/21 1341    Lab Status: Final result Specimen: Blood from Arm, Right Updated: 03/21/21 1427     LACTIC ACID 3 0 mmol/L     Narrative:      Result may be elevated if tourniquet was used during collection      CBC and differential [[id number]]  (Abnormal) Collected: 03/21/21 1341 Lab Status: Final result Specimen: Blood from Arm, Right Updated: 03/21/21 1420     WBC 8 59 Thousand/uL      RBC 3 91 Million/uL      Hemoglobin 12 2 g/dL      Hematocrit 37 3 %      MCV 95 fL      MCH 31 2 pg      MCHC 32 7 g/dL      RDW 16 0 %      MPV 9 7 fL      Platelets 619 Thousands/uL      nRBC 0 /100 WBCs      Neutrophils Relative 92 %      Immat GRANS % 1 %      Lymphocytes Relative 2 %      Monocytes Relative 5 %      Eosinophils Relative 0 %      Basophils Relative 0 %      Neutrophils Absolute 7 95 Thousands/µL      Immature Grans Absolute 0 07 Thousand/uL      Lymphocytes Absolute 0 16 Thousands/µL      Monocytes Absolute 0 39 Thousand/µL      Eosinophils Absolute 0 00 Thousand/µL      Basophils Absolute 0 02 Thousands/µL     Narrative: This is an appended report  These results have been appended to a previously verified report      Comprehensive metabolic panel [[id number]]  (Abnormal) Collected: 03/21/21 1340    Lab Status: Final result Specimen: Blood from Arm, Right Updated: 03/21/21 1408     Sodium 132 mmol/L      Potassium 3 5 mmol/L      Chloride 97 mmol/L      CO2 25 mmol/L      ANION GAP 10 mmol/L      BUN 16 mg/dL      Creatinine 0 52 mg/dL      Glucose 84 mg/dL      Calcium 8 9 mg/dL      Corrected Calcium 9 8 mg/dL      AST 39 U/L      ALT 37 U/L      Alkaline Phosphatase 98 U/L      Total Protein 5 9 g/dL      Albumin 2 9 g/dL      Total Bilirubin 1 00 mg/dL      eGFR 98 ml/min/1 73sq m     Narrative:      [place] guidelines for Chronic Kidney Disease (CKD):     Stage 1 with normal or high GFR (GFR > 90 mL/min/1 73 square meters)    Stage 2 Mild CKD (GFR = 60-89 mL/min/1 73 square meters)    Stage 3A Moderate CKD (GFR = 45-59 mL/min/1 73 square meters)    Stage 3B Moderate CKD (GFR = 30-44 mL/min/1 73 square meters)    Stage 4 Severe CKD (GFR = 15-29 mL/min/1 73 square meters)    Stage 5 End Stage CKD (GFR <15 mL/min/1 73 square meters)  Note: GFR calculation is accurate only with a steady state creatinine    Lipase [[id number]]  (Abnormal) Collected: 03/21/21 1340    Lab Status: Final result Specimen: Blood from Arm, Right Updated: 03/21/21 1408     Lipase 48 u/L                  No orders to display              Procedures  ECG 12 Lead Documentation Only    Date/Time: 3/21/2021 1:48 PM  Performed by: Joaquin Stringer DO  Authorized by: Joaquin Stringer DO     ECG reviewed by me, the ED Provider: yes    Patient location:  ED  Previous ECG:     Previous ECG:  Compared to current    Comparison ECG info: Mar 18, 2021 - PACs now present, nonspecific T-wave abnormality inferior leads now resolved  Nonspecific ST T-wave abnormality improved in lateral leads  Similarity:  Changes noted  Rhythm:     Rhythm: sinus tachycardia    Ectopy:     Ectopy: PAC    QRS:     QRS axis:  Normal    QRS intervals:  Normal  Conduction:     Conduction: normal    ST segments:     ST segments:  Normal  T waves:     T waves: normal    Other findings:     Other findings: LAE               ED Course  ED Course as of Mar 30 1459   Sun Mar 21, 2021   1604 Case discussed with Dr Gorge Jennings who states patient is not appropriate for admission  She was just admitted here  She is a high utilizer  He had instructed her to follow-up with outpatient palliative care  Palliative Care does not come to the hospital currently  Meals on Wheels and home visiting nurses are already assigned to her case  She just finished a course of ceftriaxone in the hospital  He feels the lactic acid elevation is not due to sepsis  Patient is instructed again on any use her home oxygen  Her machine is adjustable for up to 10 liters/minute which is more that she requires  Feels there is no benefit to be gained by admission to the hospital today  I discussed this with the patient  She is asking for dose of her morphine which I will give her  I will give more fluids and repeat lactic acid     Lipase(!): 48             Lactic acid elevation not due to sepsis  Tachycardia due to dehydration  Normal WBC  Recent admission and completion of course of IV Ceftriaxone  Signed out to Dr Peggy Hernandes  Plan is to give a liter of Isolyte, repeat lactic acid  If lower, to be discharged to home  Has VNA services, Meals on Wheels set up  Has oxygen at home and instructed on how to use it  Needs to make appointment for Palliative care from home  Patient was educated on these treatments by Care Management while here a few days ago  The discharging team instructed her to contact her PCP for refill of her narcotic medicines, which she told me she misplaced  MDM    Disposition  Final diagnoses:   Acute respiratory distress   Adult failure to thrive   Hospice care     Time reflects when diagnosis was documented in both MDM as applicable and the Disposition within this note     Time User Action Codes Description Comment    3/21/2021  5:58 PM Tony Quick Add [R06 03] Acute respiratory distress     3/21/2021  5:58 PM Tony Quick Add [R62 7] Adult failure to thrive     3/21/2021  5:58 PM Tony Quick Add [Z51 5] Hospice care     3/22/2021  3:13 PM Nicki Karlo Add [C34 90] Small cell lung cancer [facility])       ED Disposition     ED Disposition Condition Date/Time Comment    Admit Stable Sun Mar 21, 2021  5:58 PM Case was discussed with **NANCY Goodman* and the patient's admission status was agreed to be Admission Status: inpatient status to the service of Dr Michelle Whiting**           MD Documentation      Most Recent Value   Transported by (Company and Unit #)  [employer]      RN Documentation      Most Recent Value   Transported by [employer] and Unit #)  [facility]      Follow-up Information    None         Discharge Medication List as of 3/23/2021 11:49 AM      CONTINUE these medications which have NOT CHANGED    Details   acetaminophen (TYLENOL) 500 mg tablet Take 2 tablets (1,000 mg total) by mouth every 8 (eight) hours, Starting Mon 1/4/2021, No Print      albuterol (2 5 mg/3 mL) 0 083 % nebulizer solution Take 1 vial (2 5 mg total) by nebulization every 6 (six) hours as needed for wheezing or shortness of breath, Starting Thu 3/4/2021, Normal      albuterol (ProAir HFA) 90 mcg/act inhaler Inhale 2 puffs every 6 (six) hours as needed for wheezing or shortness of breath, Starting Mon 1/11/2021, Normal      B Complex-Biotin-FA (TH VITAMIN B 50/B-COMPLEX) TABS Take by mouth daily, Historical Med      Capsaicin 0 025 % GEL Apply topically 3 (three) times a day as needed , Historical Med      cholecalciferol (VITAMIN D3) 1,000 units tablet Take 7,000 Units by mouth daily, Historical Med      COLCHICINE PO Take 0 5 mg by mouth 3 (three) times a day as needed , Historical Med      cyclobenzaprine (FLEXERIL) 10 mg tablet Take 10 mg by mouth 3 (three) times a day as needed for muscle spasms, Historical Med      dexamethasone (DECADRON) 2 mg tablet Take 1 tablet (2 mg total) by mouth 2 (two) times a day with meals, Starting Fri 2/26/2021, Normal      Echinacea 400 MG CAPS Take by mouth 3 (three) times a day as needed, Historical Med      fexofenadine-pseudoephedrine (ALLEGRA-D)  MG per tablet Take 1 tablet by mouth 2 (two) times a day as needed for allergies, Historical Med      GINKGO BILOBA PLUS PO Take by mouth as needed 2 capsules every 2 mornings , Historical Med      Hyaluronic Acid 20-60 MG CAPS Take by mouth 3 (three) times a day , Historical Med      hydrocortisone 2 5 % cream Apply topically 4 (four) times a day as needed for irritation or rash, Starting Thu 2/11/2021, Normal      L-Lysine 500 MG CAPS Take by mouth 3 (three) times a day as needed, Historical Med      Lactobacillus (PROBIOTIC ACIDOPHILUS PO) Take 1 capsule by mouth daily, Historical Med      lidocaine-prilocaine (EMLA) cream Apply topically as needed for mild pain 30-60 min prior to port access  , Starting Tue 12/22/2020, Normal      loperamide (IMODIUM) 2 mg capsule Take 2 mg by mouth daily , Historical Med      LORazepam (ATIVAN) 0 5 mg tablet Take 1 tablet (0 5 mg total) by mouth every 8 (eight) hours as needed for anxiety, Starting Tue 3/9/2021, Normal      mirtazapine (REMERON) 30 mg tablet Take 1 tablet (30 mg total) by mouth daily at bedtime, Starting Tue 2/9/2021, Normal      morphine (MSIR) 30 MG tablet Take 1 tablet (30 mg total) by mouth every 4 (four) hours as needed for severe painMax Daily Amount: 180 mg, Starting Tue 3/9/2021, Normal      NON FORMULARY Take 37 mg by mouth daily Eco Thyro 37 - reported by pt, Historical Med      nystatin (MYCOSTATIN) 500,000 units/5 mL suspension Apply 5 mL (500,000 Units total) to the mouth or throat 4 (four) times a day, Starting Wed 1/6/2021, Normal      omeprazole (PriLOSEC) 20 mg delayed release capsule Take 1 capsule (20 mg total) by mouth daily, Starting Tue 1/26/2021, Normal      ondansetron (ZOFRAN) 4 mg tablet Take 1 tablet (4 mg total) by mouth every 8 (eight) hours as needed for nausea or vomiting, Starting Tue 1/26/2021, Normal      other medication, see sig, Take 3 capsules by mouth daily [id number], Historical Med      oxybutynin (DITROPAN-XL) 10 MG 24 hr tablet TAKE 1 TABLET BY MOUTH ONCE DAILY FOR INCONTINENCE, Historical Med      prochlorperazine (COMPAZINE) 10 mg tablet Take 10 mg by mouth every 6 (six) hours as needed for nausea or vomiting (migraines), Historical Med      S-Adenosylmethionine (RADHA-E) 200 MG TABS Take by mouth 3 (three) times a day, Historical Med      senna-docusate sodium (SENOKOT-S) 8 6-50 mg per tablet Take 1 tablet by mouth daily, Starting Tue 1/12/2021, Normal      sodium chloride (OCEAN) 0 65 % nasal spray 1 spray into each nostril as needed for congestion or rhinitis, Starting Tue 1/12/2021, Normal      tolterodine (DETROL LA) 2 mg 24 hr capsule Starting Fri 1/22/2021, Historical Med      Tragacanth (ASTRAGALUS ROOT) POWD 470 mg 3 (three) times a day as needed, Historical Med umeclidinium-vilanterol (ANORO ELLIPTA) 62 5-25 MCG/INH inhaler Inhale 1 puff daily, Starting Thu 11/12/2020, Until Sat 12/19/2020, Normal           No discharge procedures on file      PDMP Review       Value Time User    PDMP Reviewed  Yes 3/21/2021  6:24 PM Angie Astorga MD          ED Provider  Electronically Signed by           Alisha Childs DO  03/30/21 [id number]

## 2021-03-21 NOTE — ASSESSMENT & PLAN NOTE
Patient with end stage lung cancer, chronic respiratory failure from advanced lung CA and emphysema, follows with palliative care presents and states she can not take this suffering any longer and is ready to die  She states she is unable to care for herself and has worsening back pain which is debilitating with excruciating pain  She is interested in [facility] [place] and is willing to pursue this route  I explained the concept of HOSPICE and she understands and wants to proceed  She understands she will just be made comfortable and that is all she wants for now     Will place consult to [facility] [place]

## 2021-03-21 NOTE — H&P
[place]  H&P- Carlton Child 1950, 78 y o  female MRN: [id number]  Unit/Bed#: ED 04 Encounter: [id number]  Primary Care Provider: Asher Muñoz DO   Date and time admitted to hospital: 3/21/2021  1:10 PM    * Chronic respiratory failure with hypoxia (HCC)  Assessment & Plan  · Home oxygen 5 - 8L, at baseline     SIRS (systemic inflammatory response syndrome) ([facility])  Assessment & Plan  SIRS criteria met with tachycardia HR , tachypnea with RR 21, noted elevated lactic acid of 3, doubt infection, likely dehydration  Recently treated for urinary tract infection with IV antibiotics  IVF NS at 100 cc/hr  Patient states she wants to be HOSPICE  She just wants to be made comfortable   Repeat lactic acid canceled      Goals of care, counseling/discussion  Assessment & Plan  Patient with end stage lung cancer, chronic respiratory failure from advanced lung CA and emphysema, follows with palliative care presents and states she can not take this suffering any longer and is ready to die  She states she is unable to care for herself and has worsening back pain which is debilitating with excruciating pain  She is interested in [facility] and is willing to pursue this route  I explained the concept of HOSPICE and she understands and wants to proceed  She understands she will just be made comfortable and that is all she wants for now  Will place consult to HOSPICE    Severe protein-calorie malnutrition ([address] )  Assessment & Plan  Malnutrition Findings:   ·   Adult Malnutrition type: Chronic illnessfat loss, muscle loss, prominent clavicle bone protrusion, orbital hallowing, temporal wasting   Adult Degree of Malnutrition: Other severe protein calorie malnutrition       BMI Findings: Body mass index is 15 11 kg/m²  Narcotic dependency, continuous (HCC)  Assessment & Plan  · Morphine sulfate 30 mg q 4 hours p r n    · Secondary to left-sided small cell lung cancer  · PDMP reviewed   · resume and continue morphine sulfate     Small cell lung cancer (HCC)  Assessment & Plan  Hx of small cell cancer s/p chemotherapy,  palliative radiation therapy  Was supposed to f/u with hematology/oncology this week to resume infusion and discuss treatment options but now wants to be made comfortable     Pulmonary emphysema (HCC)  Assessment & Plan  · Home regimen:  Albuterol and Spiriva   · No acute signs of exacerbation on admission      VTE Prophylaxis: Enoxaparin (Lovenox)  / foot pump applied   Code Status: DNR/DNI  POLST: There is no POLST form on file for this patient (pre-hospital)  Discussion with family: Declines family communication, states she does not want anyone contacted      Anticipated Length of Stay:  Patient will be admitted on an Inpatient basis with an anticipated length of stay of  greater 2 midnights  Justification for Hospital Stay: unable to care for self wanting HOSPICE     Total Time for Visit, including Counseling / Coordination of Care: 70 minutes   Greater than 50% of this total time spent on direct patient counseling and coordination of care  Chief Complaint:   Unable to care for herself     History of Present Illness:    Tessa Singer is a 78 y o  female with past medical history of stage IV lung cancer who presents with concerns she is unable to care for herself  Patient was recently discharged yesterday 03/20/2021 after being admitted with toxic metabolic encephalopathy from urinary tract infection, treated with 3 days of IV ceftriaxone and resolved  Under admission she was also treated for constipation which resolved  She states that she is unable to do anything at home, able to walk, use the bathroom without getting out of breath and is constantly anxious about death  She states she is ready to go to heaven  In the past several discussions with patient about hospice to which he had adamantly refused, agreeable this time around    Concept of hospice explained to patient who is agreeable  Wants to be made comfortable  Would like to talk to hospice        Review of Systems:    Review of Systems   Constitutional: Positive for activity change, appetite change and fatigue  Negative for chills, diaphoresis, fever and unexpected weight change  Respiratory: Positive for shortness of breath  Negative for cough, chest tightness and wheezing  Cardiovascular: Negative for chest pain, palpitations and leg swelling  Gastrointestinal: Negative for abdominal distention, abdominal pain, anal bleeding, blood in stool, constipation, diarrhea, nausea, rectal pain and vomiting  Genitourinary: Negative for decreased urine volume, difficulty urinating, dyspareunia, dysuria, flank pain, frequency, hematuria, urgency and vaginal bleeding  Musculoskeletal: Positive for back pain  Neurological: Negative for dizziness, light-headedness, numbness and headaches  Past Medical and Surgical History:     Past Medical History:   Diagnosis Date    Chronic fatigue syndrome with fibromyalgia     COPD (chronic obstructive pulmonary disease) ([facility] )     DDD (degenerative disc disease), cervical     Emphysema of lung ([facility] )     Hx of migraine headaches     Hypothyroidism     Lung cancer ([place] [id number] )        Past Surgical History:   Procedure Laterality Date    BARTHOLIN GLAND CYST EXCISION      CARPAL TUNNEL RELEASE Bilateral     COLONOSCOPY  07/09/2020     15 mm sessile polyp in the cecum removed by polypectomy  A 3 recall    IR PLEURAL EFFUSION LONG-TERM CATHETER PLACEMENT  11/13/2020    IR PLEURAL EFFUSION LONG-TERM CATHETER REMOVAL  12/21/2020    IR PORT PLACEMENT  12/11/2020    IR THORACENTESIS  11/3/2020    TONSILLECTOMY      TUBAL LIGATION      UPPER GASTROINTESTINAL ENDOSCOPY  07/09/2020     small hiatal hernia  Pathology negative  Meds/Allergies:    Prior to Admission medications    Medication Sig Start Date End Date Taking?  Authorizing Provider   albuterol (ProAir HFA) 90 mcg/act inhaler Inhale 2 puffs every 6 (six) hours as needed for wheezing or shortness of breath 1/11/21  Yes Anjana Moss MD   B Complex-Biotin-FA (TH VITAMIN B 50/B-COMPLEX) TABS Take by mouth daily   Yes Historical Provider, MD   Capsaicin 0 025 % GEL Apply topically 3 (three) times a day as needed    Yes Historical Provider, MD   cholecalciferol (VITAMIN D3) 1,000 units tablet Take 7,000 Units by mouth daily   Yes Historical Provider, MD   COLCHICINE PO Take 0 5 mg by mouth 3 (three) times a day as needed    Yes Historical Provider, MD   cyclobenzaprine (FLEXERIL) 10 mg tablet Take 10 mg by mouth 3 (three) times a day as needed for muscle spasms   Yes Historical Provider, MD   dexamethasone (DECADRON) 2 mg tablet Take 1 tablet (2 mg total) by mouth 2 (two) times a day with meals 2/26/21  Yes Prem Martin MD   Echinacea 400 MG CAPS Take by mouth 3 (three) times a day as needed   Yes Historical Provider, MD   fexofenadine-pseudoephedrine (ALLEGRA-D)  MG per tablet Take 1 tablet by mouth 2 (two) times a day as needed for allergies   Yes Historical Provider, MD   David Linger BILOBA PLUS PO Take by mouth as needed 2 capsules every 2 mornings    Yes Historical Provider, MD   Hyaluronic Acid 20-60 MG CAPS Take by mouth 3 (three) times a day    Yes Historical Provider, MD   hydrocortisone 2 5 % cream Apply topically 4 (four) times a day as needed for irritation or rash 2/11/21  Yes Prem Martin MD   L-Lysine 500 MG CAPS Take by mouth 3 (three) times a day as needed   Yes Historical Provider, MD   Lactobacillus (PROBIOTIC ACIDOPHILUS PO) Take 1 capsule by mouth daily   Yes Historical Provider, MD   lidocaine-prilocaine (EMLA) cream Apply topically as needed for mild pain 30-60 min prior to port access   12/22/20  Yes SUSY Lewis   loperamide (IMODIUM) 2 mg capsule Take 2 mg by mouth daily    Yes Historical Provider, MD   LORazepam (ATIVAN) 0 5 mg tablet Take 1 tablet (0 5 mg total) by mouth every 8 (eight) hours as needed for anxiety 3/9/21  Yes Nick Bassett MD   mirtazapine (REMERON) 30 mg tablet Take 1 tablet (30 mg total) by mouth daily at bedtime 2/9/21  Yes Nick Bassett MD   morphine (MSIR) 30 MG tablet Take 1 tablet (30 mg total) by mouth every 4 (four) hours as needed for severe painMax Daily Amount: 180 mg 3/9/21  Yes Nick Bassett MD   nystatin (MYCOSTATIN) 500,000 units/5 mL suspension Apply 5 mL (500,000 Units total) to the mouth or throat 4 (four) times a day 1/6/21  Yes SUSY Cowart   omeprazole (PriLOSEC) 20 mg delayed release capsule Take 1 capsule (20 mg total) by mouth daily 1/26/21  Yes Nick Bassett MD   ondansetron (ZOFRAN) 4 mg tablet Take 1 tablet (4 mg total) by mouth every 8 (eight) hours as needed for nausea or vomiting 1/26/21  Yes Nick Bassett MD   other medication, see sig, Take 3 capsules by mouth daily [id number]   Yes Historical Provider, MD   oxybutynin (DITROPAN-XL) 10 MG 24 hr tablet TAKE 1 TABLET BY MOUTH ONCE DAILY FOR INCONTINENCE 2/6/21  Yes Historical Provider, MD   prochlorperazine (COMPAZINE) 10 mg tablet Take 10 mg by mouth every 6 (six) hours as needed for nausea or vomiting (migraines)   Yes Historical Provider, MD   S-Adenosylmethionine (RADHA-E) 200 MG TABS Take by mouth 3 (three) times a day   Yes Historical Provider, MD   senna-docusate sodium (SENOKOT-S) 8 6-50 mg per tablet Take 1 tablet by mouth daily 1/12/21  Yes Nick Bassett MD   sodium chloride (OCEAN) 0 65 % nasal spray 1 spray into each nostril as needed for congestion or rhinitis 1/12/21  Yes Nick Bassett MD   tolterodine (DETROL LA) 2 mg 24 hr capsule  1/22/21  Yes Historical Provider, MD   Tragacanth (ASTRAGALUS ROOT) POWD 470 mg 3 (three) times a day as needed   Yes Historical Provider, MD   acetaminophen (TYLENOL) 500 mg tablet Take 2 tablets (1,000 mg total) by mouth every 8 (eight) hours 1/4/21   SUYS Cowart albuterol (2 5 mg/3 mL) 0 083 % nebulizer solution Take 1 vial (2 5 mg total) by nebulization every 6 (six) hours as needed for wheezing or shortness of breath 3/4/21   SUSY Alves   NON FORMULARY Take 37 mg by mouth daily [facility] 37 - reported by pt    Historical Provider, MD   umeclidinium-vilanterol [facility] ELLIPTA) 62 5-25 MCG/INH inhaler Inhale 1 puff daily 11/12/20 12/19/20  Prema Cherry PA-C     I have reviewed home medications using allscripts  Allergies: Allergies   Allergen Reactions    Clarithromycin     Codeine Other (See Comments)     lethergic    Other      PHOSPHATE    Oxycodone Other (See Comments)     lethergic    Trazodone Other (See Comments)     lethergic    Penicillins Rash       Social History:     Marital Status:    Occupation:   Patient Pre-hospital Living Situation:   Patient Pre-hospital Level of Mobility:   Patient Pre-hospital Diet Restrictions:   Substance Use History:   Social History     Substance and Sexual Activity   Alcohol Use Not Currently     Social History     Tobacco Use   Smoking Status Former Smoker    Packs/day: 0 50    Years: 53 00    Pack years: 26 50    Types: Cigarettes    Start date: 1967   Smokeless Tobacco Never Used   Tobacco Comment    2 cigarettes daily     Social History     Substance and Sexual Activity   Drug Use Not Currently       Family History:    Family History   Problem Relation Age of Onset    Colon polyps Mother     Heart disease Mother     Colon cancer Mother     Heart disease Father     Lymphoma Father        Physical Exam:     Vitals:   Blood Pressure: 165/87 (03/21/21 1530)  Pulse: (!) 111 (03/21/21 1530)  Temperature: (!) 97 3 °F (36 3 °C) (03/21/21 1313)  Temp Source: Oral (03/21/21 1313)  Respirations: 21 (03/21/21 1530)  Height: 5' 4" (162 6 cm) (03/21/21 1313)  Weight - Scale: 39 9 kg (88 lb) (03/21/21 1313)  SpO2: 94 % (03/21/21 1530)    Physical Exam  Vitals signs reviewed     Constitutional: General: She is not in acute distress  Appearance: She is not ill-appearing, toxic-appearing or diaphoretic  Comments: Cachectic   HENT:      Head: Normocephalic  Eyes:      Extraocular Movements: Extraocular movements intact  Pupils: Pupils are equal, round, and reactive to light  Neck:      Musculoskeletal: Normal range of motion and neck supple  No neck rigidity or muscular tenderness  Vascular: No carotid bruit  Cardiovascular:      Rate and Rhythm: Regular rhythm  Tachycardia present  Pulses: Normal pulses  Heart sounds: Normal heart sounds  No murmur  No friction rub  Pulmonary:      Effort: Pulmonary effort is normal       Breath sounds: Normal breath sounds  No stridor  No wheezing, rhonchi or rales  Comments: Right-sided chest port  Decreased breath sounds bilateral lower lungs   Chest:      Chest wall: No tenderness  Abdominal:      General: Abdomen is flat  Bowel sounds are normal  There is no distension  Palpations: Abdomen is soft  Tenderness: There is no abdominal tenderness  There is no guarding or rebound  Musculoskeletal: Normal range of motion  General: No swelling or tenderness  Right lower leg: No edema  Left lower leg: No edema  Skin:     General: Skin is warm and dry  Coloration: Skin is not jaundiced  Neurological:      General: No focal deficit present  Mental Status: She is alert and oriented to person, place, and time  Cranial Nerves: No cranial nerve deficit  Sensory: No sensory deficit  Psychiatric:      Comments: Extremely anxious affect             Additional Data:     Lab Results: I have personally reviewed pertinent reports        Results from last 7 days   Lab Units 03/21/21  1341   WBC Thousand/uL 8 59   HEMOGLOBIN g/dL 12 2   HEMATOCRIT % 37 3   PLATELETS Thousands/uL 187   NEUTROS PCT % 92*   LYMPHS PCT % 2*   MONOS PCT % 5   EOS PCT % 0     Results from last 7 days   Lab Units 03/21/21  1340   SODIUM mmol/L 132*   POTASSIUM mmol/L 3 5   CHLORIDE mmol/L 97*   CO2 mmol/L 25   BUN mg/dL 16   CREATININE mg/dL 0 52*   ANION GAP mmol/L 10   CALCIUM mg/dL 8 9   ALBUMIN g/dL 2 9*   TOTAL BILIRUBIN mg/dL 1 00   ALK PHOS U/L 98   ALT U/L 37   AST U/L 39   GLUCOSE RANDOM mg/dL 84                 Results from last 7 days   Lab Units 03/21/21  1341   LACTIC ACID mmol/L 3 0*       Imaging:     No orders to display       EKG, Pathology, and Other Studies Reviewed on Admission:   · EKG:  Sinus tachycardia, no ST or T-wave changes    Allscripts / Epic Records Reviewed: Yes     ** Please Note: This note has been constructed using a voice recognition system   **

## 2021-03-21 NOTE — ASSESSMENT & PLAN NOTE
Hx of small cell cancer s/p chemotherapy,  palliative radiation therapy  Was supposed to f/u with hematology/oncology this week to discuss treatment options but now wants to be made comfortable

## 2021-03-21 NOTE — ASSESSMENT & PLAN NOTE
SIRS criteria met with tachycardia HR , tachypnea with RR 21, noted elevated lactic acid of 3, doubt infection  Recently treated for urinary tract infection with IV antibiotics  Recheck lactic acid now  IVF NS at 100 cc/hr  Patient states she wants to be HOSPICE and does not want abx   She just wants to be made comfortable

## 2021-03-21 NOTE — ASSESSMENT & PLAN NOTE
Malnutrition Findings:   ·   Adult Malnutrition type: Chronic illnessfat loss, muscle loss, prominent clavicle bone protrusion, orbital hallowing, temporal wasting   Adult Degree of Malnutrition: Other severe protein calorie malnutrition       BMI Findings: Body mass index is 15 11 kg/m²

## 2021-03-22 ENCOUNTER — PATIENT OUTREACH (OUTPATIENT)
Dept: CASE MANAGEMENT | Facility: HOSPITAL | Age: 71
End: 2021-03-22

## 2021-03-22 LAB
ATRIAL RATE: 104 BPM
P AXIS: 81 DEGREES
PR INTERVAL: 128 MS
QRS AXIS: 80 DEGREES
QRSD INTERVAL: 80 MS
QT INTERVAL: 356 MS
QTC INTERVAL: 468 MS
T WAVE AXIS: 97 DEGREES
VENTRICULAR RATE: 104 BPM

## 2021-03-22 PROCEDURE — 94640 AIRWAY INHALATION TREATMENT: CPT

## 2021-03-22 PROCEDURE — 99223 1ST HOSP IP/OBS HIGH 75: CPT | Performed by: NURSE PRACTITIONER

## 2021-03-22 PROCEDURE — 93010 ELECTROCARDIOGRAM REPORT: CPT | Performed by: INTERNAL MEDICINE

## 2021-03-22 PROCEDURE — 99232 SBSQ HOSP IP/OBS MODERATE 35: CPT | Performed by: INTERNAL MEDICINE

## 2021-03-22 RX ORDER — IPRATROPIUM BROMIDE AND ALBUTEROL SULFATE 2.5; .5 MG/3ML; MG/3ML
3 SOLUTION RESPIRATORY (INHALATION) ONCE
Status: COMPLETED | OUTPATIENT
Start: 2021-03-22 | End: 2021-03-22

## 2021-03-22 RX ORDER — HYDROMORPHONE HCL/PF 1 MG/ML
0.5 SYRINGE (ML) INJECTION EVERY 2 HOUR PRN
Status: DISCONTINUED | OUTPATIENT
Start: 2021-03-22 | End: 2021-03-23 | Stop reason: HOSPADM

## 2021-03-22 RX ORDER — HYDROMORPHONE HCL/PF 1 MG/ML
0.5 SYRINGE (ML) INJECTION EVERY 4 HOURS PRN
Status: DISCONTINUED | OUTPATIENT
Start: 2021-03-22 | End: 2021-03-22

## 2021-03-22 RX ORDER — LORAZEPAM 2 MG/ML
0.5 INJECTION INTRAMUSCULAR EVERY 4 HOURS PRN
Status: DISCONTINUED | OUTPATIENT
Start: 2021-03-22 | End: 2021-03-23 | Stop reason: HOSPADM

## 2021-03-22 RX ADMIN — IPRATROPIUM BROMIDE AND ALBUTEROL SULFATE 3 ML: 2.5; .5 SOLUTION RESPIRATORY (INHALATION) at 06:53

## 2021-03-22 RX ADMIN — MORPHINE SULFATE 30 MG: 15 TABLET ORAL at 07:49

## 2021-03-22 RX ADMIN — LORAZEPAM 0.5 MG: 2 INJECTION INTRAMUSCULAR; INTRAVENOUS at 10:17

## 2021-03-22 RX ADMIN — DEXAMETHASONE 2 MG: 0.5 TABLET ORAL at 07:33

## 2021-03-22 RX ADMIN — LORAZEPAM 0.5 MG: 2 INJECTION INTRAMUSCULAR; INTRAVENOUS at 22:36

## 2021-03-22 RX ADMIN — HYDROMORPHONE HYDROCHLORIDE 0.5 MG: 1 INJECTION, SOLUTION INTRAMUSCULAR; INTRAVENOUS; SUBCUTANEOUS at 20:02

## 2021-03-22 RX ADMIN — HYDROMORPHONE HYDROCHLORIDE 0.5 MG: 1 INJECTION, SOLUTION INTRAMUSCULAR; INTRAVENOUS; SUBCUTANEOUS at 09:19

## 2021-03-22 RX ADMIN — ALBUTEROL SULFATE 2 PUFF: 90 AEROSOL, METERED RESPIRATORY (INHALATION) at 20:21

## 2021-03-22 RX ADMIN — LORAZEPAM 0.5 MG: 0.5 TABLET ORAL at 02:50

## 2021-03-22 RX ADMIN — DEXAMETHASONE 2 MG: 0.5 TABLET ORAL at 16:22

## 2021-03-22 RX ADMIN — HYDROMORPHONE HYDROCHLORIDE 0.5 MG: 1 INJECTION, SOLUTION INTRAMUSCULAR; INTRAVENOUS; SUBCUTANEOUS at 16:23

## 2021-03-22 RX ADMIN — SODIUM CHLORIDE 100 ML/HR: 0.9 INJECTION, SOLUTION INTRAVENOUS at 06:09

## 2021-03-22 NOTE — OCCUPATIONAL THERAPY NOTE
Occupational Therapy Screen    Patient Name: Feliz MARTIE Date: 3/22/2021    OT orders received and chart reviewed  Per chart, pt would like hospice care  Please reconsult OT if needs change      Telly, MS, OTR/L

## 2021-03-22 NOTE — ASSESSMENT & PLAN NOTE
Patient with end stage lung cancer, chronic respiratory failure from advanced lung CA and emphysema, follows with palliative care presents and states she can not take this suffering any longer and is ready to die  She states she is unable to care for herself and has worsening back pain which is debilitating with excruciating pain  She is interested in [facility] and is willing to pursue this route  I explained the concept of HOSPICE and she understands and wants to proceed  She understands she will just be made comfortable and that is all she wants for now  Consult placed HOSPICE, patient unsure about home hospice vs IP care    She refuses to have health team call her family and is adamant about that

## 2021-03-22 NOTE — PROGRESS NOTES
[place]  Progress Note - Ramona Hodges 1950, 78 y o  female MRN: [id number]  Unit/Bed#: -Fanny Encounter: [id number]  Primary Care Provider: Nathaniel Magdaleno DO   Date and time admitted to hospital: 3/21/2021  1:10 PM    Goals of care, counseling/discussion  Assessment & Plan  Patient with end stage lung cancer, chronic respiratory failure from advanced lung CA and emphysema, follows with palliative care presents and states she can not take this suffering any longer and is ready to die  She states she is unable to care for herself and has worsening back pain which is debilitating with excruciating pain  She is interested in [facility] and is willing to pursue this route  I explained the concept of HOSPICE and she understands and wants to proceed  She understands she will just be made comfortable and that is all she wants for now  Consult placed HOSPICE, patient unsure about home hospice vs IP care  She refuses to have health team call her family and is adamant about that    SIRS (systemic inflammatory response syndrome) ([facility] [place] [address] )  Assessment & Plan  SIRS criteria met with tachycardia HR , tachypnea with RR 21, noted elevated lactic acid of 3 in the setting of malignancy  Doubt infection  Recently treated for urinary tract infection with IV antibiotics  S/p IVFs  patient refusing blood draws  Patient states she wants to be HOSPICE and does not want abx  She just wants to be made comfortable     Narcotic dependency, continuous (HCC)  Assessment & Plan  · Morphine sulfate 30 mg q 4 hours p r n    · Secondary to left-sided small cell lung cancer  · PDMP reviewed   · resume and continue morphine sulfate     Severe protein-calorie malnutrition (HCC)  Assessment & Plan  Malnutrition Findings:   ·   Adult Malnutrition type: Chronic illnessfat loss, muscle loss, prominent clavicle bone protrusion, orbital hallowing, temporal wasting   Adult Degree of Malnutrition: Other severe protein calorie malnutrition   Severe protein energy malnutrition, present on admission, in the setting of small cell lung cancer, as evidenced by bilateral temporal muscle wasting, prominent clavicle and rib protrusion, hollow orbits  patient currently opting for hospice care    BMI Findings: Body mass index is 15 11 kg/m²  Small cell lung cancer (HCC)  Assessment & Plan  Hx of small cell cancer s/p chemotherapy,  palliative radiation therapy  Was supposed to f/u with hematology/oncology this week to discuss treatment options but now wants to be made comfortable     Pulmonary emphysema ([facility] [address] )  Assessment & Plan  · Home regimen:  Albuterol and Spiriva   · No acute signs of exacerbation on admission    * Chronic respiratory failure with hypoxia (HCC)  Assessment & Plan  · Home oxygen evaluation performed during last admission (2/28-2/3): "Baseline SPO2 on Room Air at rest 89-86 %  SPO2 on Oxygen at rest 90 % 6 lpm  SPO2 during exercise on Oxygen 92-90% liter flow of 8 lpm "  · 5-6L at baseline  · Has ability to increase oxygen at home to 10L  · At baseline oxygen requirements      VTE Pharmacologic Prophylaxis:   Pharmacologic: Enoxaparin (Lovenox)  Mechanical VTE Prophylaxis in Place: Yes    Patient Centered Rounds: I have performed bedside rounds with nursing staff today  Discussions with Specialists or Other Care Team Provider: CM    Education and Discussions with Family / Patient: patient declined family update, does not want her daughter or son called    Time Spent for Care: 30 minutes  More than 50% of total time spent on counseling and coordination of care as described above      Current Length of Stay: 1 day(s)    Current Patient Status: Inpatient   Certification Statement: The patient will continue to require additional inpatient hospital stay due to as above    Discharge Plan: tomorrow    Code Status: Level 3 - DNAR and DNI      Subjective:   No overnight events, no new complaints this am ,feels tired    Objective:     Vitals:   Temp (24hrs), Av 3 °F (36 3 °C), Min:97 3 °F (36 3 °C), Max:97 3 °F (36 3 °C)    Temp:  [97 3 °F (36 3 °C)] 97 3 °F (36 3 °C)  HR:  [108-116] 116  Resp:  [17-24] 17  BP: (161-193)/() 161/93  SpO2:  [92 %-97 %] 94 %  Body mass index is 15 11 kg/m²  Input and Output Summary (last 24 hours): Intake/Output Summary (Last 24 hours) at 3/22/2021 1317  Last data filed at 3/22/2021 0827  Gross per 24 hour   Intake 1255 ml   Output 150 ml   Net 1105 ml       Physical Exam:     Physical Exam  Vitals signs and nursing note reviewed  Constitutional:       General: She is not in acute distress  Appearance: Normal appearance  She is not toxic-appearing or diaphoretic  Comments: Chronically ill looking   Cardiovascular:      Rate and Rhythm: Normal rate and regular rhythm  Pulses: Normal pulses  Heart sounds: No murmur  Pulmonary:      Effort: Pulmonary effort is normal  No respiratory distress  Breath sounds: Normal breath sounds  No stridor  No wheezing, rhonchi or rales  Abdominal:      General: Bowel sounds are normal  There is no distension  Palpations: Abdomen is soft  Tenderness: There is no abdominal tenderness  There is no right CVA tenderness, left CVA tenderness or guarding  Musculoskeletal: Normal range of motion  General: No swelling or deformity  Right lower leg: No edema  Left lower leg: No edema  Skin:     General: Skin is warm and dry  Capillary Refill: Capillary refill takes less than 2 seconds  Coloration: Skin is not jaundiced  Findings: No bruising  Neurological:      General: No focal deficit present  Mental Status: She is alert  Mental status is at baseline  Cranial Nerves: No cranial nerve deficit     Psychiatric:         Mood and Affect: Mood normal          Speech: Speech normal          Additional Data:     Labs:    Results from last 7 days   Lab Units 03/21/21  1341   WBC Thousand/uL 8 59   HEMOGLOBIN g/dL 12 2   HEMATOCRIT % 37 3   PLATELETS Thousands/uL 187   NEUTROS PCT % 92*   LYMPHS PCT % 2*   MONOS PCT % 5   EOS PCT % 0     Results from last 7 days   Lab Units 03/21/21  1340   SODIUM mmol/L 132*   POTASSIUM mmol/L 3 5   CHLORIDE mmol/L 97*   CO2 mmol/L 25   BUN mg/dL 16   CREATININE mg/dL 0 52*   ANION GAP mmol/L 10   CALCIUM mg/dL 8 9   ALBUMIN g/dL 2 9*   TOTAL BILIRUBIN mg/dL 1 00   ALK PHOS U/L 98   ALT U/L 37   AST U/L 39   GLUCOSE RANDOM mg/dL 84                 Results from last 7 days   Lab Units 03/21/21  1341   LACTIC ACID mmol/L 3 0*           * I Have Reviewed All Lab Data Listed Above  * Additional Pertinent Lab Tests Reviewed: All Labs Within Last 24 Hours Reviewed    Imaging:    Imaging Reports Reviewed Today Include:   Imaging Personally Reviewed by Myself Includes:      Recent Cultures (last 7 days):     Results from last 7 days   Lab Units 03/18/21  0200   URINE CULTURE  >100,000 cfu/ml Escherichia coli*       Last 24 Hours Medication List:   Current Facility-Administered Medications   Medication Dose Route Frequency Provider Last Rate    albuterol  2 puff Inhalation Q6H PRN Mira Sears MD      dexamethasone  2 mg Oral BID With Meals Mira Sears MD      enoxaparin  40 mg Subcutaneous Daily Mira Sears MD      fluticasone-vilanterol  1 puff Inhalation Daily Mira Sears MD      HYDROmorphone  0 5 mg Intravenous Q4H PRN Laura Nicholson MD      LORazepam  0 5 mg Intravenous Q4H PRN Laura Nicholson MD      ondansetron  4 mg Oral Q8H PRN Mira Sears MD      senna-docusate sodium  1 tablet Oral Daily Mira Sears MD          Today, Patient Was Seen By: Laura Nicholson MD    ** Please Note: Dictation voice to text software may have been used in the creation of this document   **

## 2021-03-22 NOTE — CONSULTS
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 and some areas of invasion of the chest wall  She was also noticed to have palpable disease in her left chest wall where she has been experiencing significant pain  She was referred to Radiation Oncology and completed a course of palliative radiation to the left parasternal region anteriorly in February  Patient's disease has continued to progress and unfortunately her performance status has continued to decline; ECOG 4  She is no longer a candidate for any systemic therapy  Adamaris Cagle conversation was held with patient today explaining that she is no longer a candidate for any additional treatment  Prognosis extremely poor  Hospice care is recommended  Given acuity of her symptoms and inability to care for herself and lack of support system, patient would benefit from inpatient level of hospice care  Hospice team is following closely and hopefully patient will be transitioned into hospice cares by tomorrow  Time spent providing support  Call was placed to patient's daughter Lucila Davis with her permission of dating her on her mother's clinical status and poor prognosis with the recommendation for hospice care        Please do not hesitate to contact me if you have any questions or need additional information  Thank you for this consult      Reason for Consultation:  Small cell lung cancer/goals of care    Chief complaint:   Chief Complaint   Patient presents with    Weakness - Generalized     Patient presents to the ER with report of being in pain, having SOB and Generalized Weakness  Patient lives alone and may be at the point of needing alternative living situation  History of present illness:   Radha Ontiveros is a 78 y o  female with stage IV metastatic small cell lung cancer who is well known to me and [address] [place] [address] medical oncology  Her disease has been refractory to various lines of systemic therapy  She recently completed palliative radiation therapy to pleural base metastatic lesions along the posterior and anterior chest wall  Her disease has progressed through systemic chemotherapy and immunotherapy  More recently she was started on single agent Onivyde  She has only completed 1 cycle (last treated on 03/09/2021) secondary to multiple recurrent hospitalizations for symptoms of worsening dyspnea and chest pain  Patient was recently admitted and discharged 2 days ago with toxic metabolic encephalopathy secondary to UTI  She re-presented to the ED yesterday with worsening dyspnea, generalized weakness, and pain  Patient was requiring hospice evaluation  Patient was evaluated by hospice liaison today and deemed eligible for inpatient hospice level of care  Medical oncology consulted as patient is now wavering on her decision  Interval history:  Patient seen and examined, she appears weak, frail and cachectic  Patient asked me what her options are  I honestly explained that unfortunately, her disease is so advanced that she is no longer a candidate for any additional systemic therapy  She has declined in her performance status now ECOG 4 and given her overall performance status and aggressive nature of her disease I explained hospice would be in her best interest   She has been in severe pain I reviewed hospice plan of care and told her that the focus would be on managing her symptoms and allow her to be comfortable    I showed her a video on [facility] so that she could see this facility  Patient did give me permission to call her children  I spoke to her daughter Nona Brandt and updated her on her mother's current condition and overall prognosis  I explained that mom likely only has days to live  I encouraged Sarai and her brother to come visit her mother while in the hospital   Patient is agreeable to hospice level care and transition to [facility]   and Naun Squires notified and updated on my conversation with the patient today      Review of Systems   Constitutional: Positive for activity change, appetite change, fatigue and unexpected weight change  Respiratory: Positive for shortness of breath  Cardiovascular: Positive for chest pain  Neurological: Positive for weakness  Psychiatric/Behavioral: Positive for decreased concentration  All other systems reviewed and are negative  All other review of systems were negative  Past medical history:   Past Medical History:   Diagnosis Date    Chronic fatigue syndrome with fibromyalgia     [facility]     DDD (degenerative disc disease), cervical     Emphysema of lung ([facility]     Hx of migraine headaches     Hypothyroidism     [facility]        Past surgical history:   Past Surgical History:   Procedure Laterality Date    BARTHOLIN GLAND CYST EXCISION      CARPAL TUNNEL RELEASE Bilateral     COLONOSCOPY  07/09/2020     15 mm sessile polyp in the cecum removed by polypectomy  A 3 recall    IR PLEURAL EFFUSION LONG-TERM CATHETER PLACEMENT  11/13/2020    IR PLEURAL EFFUSION LONG-TERM CATHETER REMOVAL  12/21/2020    IR PORT PLACEMENT  12/11/2020    IR THORACENTESIS  11/3/2020    TONSILLECTOMY      TUBAL LIGATION      UPPER GASTROINTESTINAL ENDOSCOPY  07/09/2020     small hiatal hernia  Pathology negative  Allergies:    Allergies   Allergen Reactions    Clarithromycin     Codeine Other (See Comments) lethergic    Other      PHOSPHATE    Oxycodone Other (See Comments)     lethergic    Trazodone Other (See Comments)     lethergic    Penicillins Rash       Home medications:   Medications Prior to Admission   Medication    albuterol (ProAir HFA) 90 mcg/act inhaler    B Complex-Biotin-FA (TH VITAMIN B 50/B-COMPLEX) TABS    Capsaicin 0 025 % GEL    cholecalciferol (VITAMIN D3) 1,000 units tablet    COLCHICINE PO    cyclobenzaprine (FLEXERIL) 10 mg tablet    dexamethasone (DECADRON) 2 mg tablet    Echinacea 400 MG CAPS    fexofenadine-pseudoephedrine (ALLEGRA-D)  MG per tablet    GINKGO BILOBA PLUS PO    Hyaluronic Acid 20-60 MG CAPS    hydrocortisone 2 5 % cream    L-Lysine 500 MG CAPS    Lactobacillus (PROBIOTIC ACIDOPHILUS PO)    lidocaine-prilocaine (EMLA) cream    loperamide (IMODIUM) 2 mg capsule    LORazepam (ATIVAN) 0 5 mg tablet    mirtazapine (REMERON) 30 mg tablet    morphine (MSIR) 30 MG tablet    nystatin (MYCOSTATIN) 500,000 units/5 mL suspension    omeprazole (PriLOSEC) 20 mg delayed release capsule    ondansetron (ZOFRAN) 4 mg tablet    other medication, see sig,    oxybutynin (DITROPAN-XL) 10 MG 24 hr tablet    prochlorperazine (COMPAZINE) 10 mg tablet    S-Adenosylmethionine (RADHA-E) 200 MG TABS    senna-docusate sodium (SENOKOT-S) 8 6-50 mg per tablet    sodium chloride (OCEAN) 0 65 % nasal spray    tolterodine (DETROL LA) 2 mg 24 hr capsule    Tragacanth (ASTRAGALUS ROOT) POWD    acetaminophen (TYLENOL) 500 mg tablet    albuterol (2 5 mg/3 mL) 0 083 % nebulizer solution    NON FORMULARY    umeclidinium-vilanterol (ANORO ELLIPTA) 62 5-25 MCG/INH inhaler       Hospital medications:   Current Facility-Administered Medications:     albuterol (PROVENTIL HFA,VENTOLIN HFA) inhaler 2 puff, 2 puff, Inhalation, Q6H PRN, Prferoz Brice MD    dexamethasone (DECADRON) tablet 2 mg, 2 mg, Oral, BID With Meals, Prferoz Brice MD, 2 mg at 03/22/21 4984    enoxaparin (LOVENOX) subcutaneous injection 40 mg, 40 mg, Subcutaneous, Daily, Dany Goodman MD    fluticasone-vilanterol (BREO ELLIPTA) 100-25 mcg/inh inhaler 1 puff, 1 puff, Inhalation, Daily, Guero Prasad MD    HYDROmorphone (DILAUDID) injection 0 5 mg, 0 5 mg, Intravenous, Q4H PRN, Jerilyn Boeck, MD, 0 5 mg at 03/22/21 0919    LORazepam (ATIVAN) injection 0 5 mg, 0 5 mg, Intravenous, Q4H PRN, Jerilyn Boeck, MD, 0 5 mg at 03/22/21 1017    ondansetron (ZOFRAN-ODT) dispersible tablet 4 mg, 4 mg, Oral, Q8H PRN, Guero Prasad MD    senna-docusate sodium (SENOKOT S) 8 6-50 mg per tablet 1 tablet, 1 tablet, Oral, Daily, Guero Prasad MD    Social history:   Social History     Tobacco Use    Smoking status: Former Smoker     Packs/day: 0 50     Years: 53 00     Pack years: 26 50     Types: Cigarettes     Start date: 1967    Smokeless tobacco: Never Used    Tobacco comment: 2 cigarettes daily   Substance Use Topics    Alcohol use: Not Currently    Drug use: Not Currently       Family history:   Family History   Problem Relation Age of Onset    Colon polyps Mother     Heart disease Mother     Colon cancer Mother     Heart disease Father     Lymphoma Father        Vitals:  Vitals:    03/22/21 1445   BP: 152/91   Pulse: (!) 111   Resp: 19   Temp:    SpO2: (!) 88%       Physical Exam  Constitutional:       Appearance: She is ill-appearing  Comments: Patient appears frail, weak, cachectic   HENT:      Head: Normocephalic and atraumatic  Comments: Bitemporal wasting  Eyes:      General: No scleral icterus  Right eye: No discharge  Left eye: No discharge  Pulmonary:      Effort: Pulmonary effort is normal  No respiratory distress  Musculoskeletal:      Comments: Significant muscle wasting   Neurological:      Mental Status: She is alert and oriented to person, place, and time  Motor: No weakness     Psychiatric: Mood and Affect: Mood normal          Recent Results (from the past 48 hour(s))   Comprehensive metabolic panel    Collection Time: 03/21/21  1:40 PM   Result Value Ref Range    Sodium 132 (L) 136 - 145 mmol/L    Potassium 3 5 3 5 - 5 3 mmol/L    Chloride 97 (L) 100 - 108 mmol/L    CO2 25 21 - 32 mmol/L    ANION GAP 10 4 - 13 mmol/L    BUN 16 5 - 25 mg/dL    Creatinine 0 52 (L) 0 60 - 1 30 mg/dL    Glucose 84 65 - 140 mg/dL    Calcium 8 9 8 3 - 10 1 mg/dL    Corrected Calcium 9 8 8 3 - 10 1 mg/dL    AST 39 5 - 45 U/L    ALT 37 12 - 78 U/L    Alkaline Phosphatase 98 46 - 116 U/L    Total Protein 5 9 (L) 6 4 - 8 2 g/dL    Albumin 2 9 (L) 3 5 - 5 0 g/dL    Total Bilirubin 1 00 0 20 - 1 00 mg/dL    eGFR 98 ml/min/1 73sq m   Lipase    Collection Time: 03/21/21  1:40 PM   Result Value Ref Range    Lipase 48 (L) 73 - 393 u/L   CBC and differential    Collection Time: 03/21/21  1:41 PM   Result Value Ref Range    WBC 8 59 4 31 - 10 16 Thousand/uL    RBC 3 91 3 81 - 5 12 Million/uL    Hemoglobin 12 2 11 5 - 15 4 g/dL    Hematocrit 37 3 34 8 - 46 1 %    MCV 95 82 - 98 fL    MCH 31 2 26 8 - 34 3 pg    MCHC 32 7 31 4 - 37 4 g/dL    RDW 16 0 (H) 11 6 - 15 1 %    MPV 9 7 8 9 - 12 7 fL    Platelets 448 868 - 291 Thousands/uL    nRBC 0 /100 WBCs    Neutrophils Relative 92 (H) 43 - 75 %    Immat GRANS % 1 0 - 2 %    Lymphocytes Relative 2 (L) 14 - 44 %    Monocytes Relative 5 4 - 12 %    Eosinophils Relative 0 0 - 6 %    Basophils Relative 0 0 - 1 %    Neutrophils Absolute 7 95 (H) 1 85 - 7 62 Thousands/µL    Immature Grans Absolute 0 07 0 00 - 0 20 Thousand/uL    Lymphocytes Absolute 0 16 (L) 0 60 - 4 47 Thousands/µL    Monocytes Absolute 0 39 0 17 - 1 22 Thousand/µL    Eosinophils Absolute 0 00 0 00 - 0 61 Thousand/µL    Basophils Absolute 0 02 0 00 - 0 10 Thousands/µL   Lactic acid    Collection Time: 03/21/21  1:41 PM   Result Value Ref Range    LACTIC ACID 3 0 (HH) 0 5 - 2 0 mmol/L   ECG 12 lead    Collection Time: 03/21/21 1:45 PM   Result Value Ref Range    Ventricular Rate 104 BPM    Atrial Rate 104 BPM    MN Interval 128 ms    QRSD Interval 80 ms    QT Interval 356 ms    QTC Interval 468 ms    P Axis 81 degrees    QRS Axis 80 degrees    T Wave [place] 97 degrees       Imaging Studies:   Xr Chest 1 View Portable    Result Date: 3/11/2021  Narrative: CHEST INDICATION:   left flank pain, lung cancer  COMPARISON:  2/28/2021 EXAM PERFORMED/VIEWS:  XR CHEST PORTABLE FINDINGS: Postop silhouette Persistent airspace and interstitial changes are noted in the left hemithorax with pleural thickening  Overall opacity in the left hemithorax has mildly worsened  Right lung is unchanged in appearance  There is no evidence of pneumothorax  Osseous structures appear within normal limits for patient age  Impression: Interval worsening of parenchymal/ pleural disease in the left hemithorax  Workstation performed: [id number]    Xr Chest 1 View Portable    Result Date: 2/28/2021  Narrative: CHEST INDICATION:   SOB  COMPARISON:  2/16/2021; CTA chest 2/16/2021 EXAM PERFORMED/VIEWS:  XR CHEST PORTABLE  AP semierect Images:  1 FINDINGS:  Right IJ catheter terminates at the caval atrial junction  No pneumothorax  Heart shadow is obscured by adjacent opacity  Persistent opacities throughout the left hemithorax with diffuse pleural nodular thickening again noted extending to the apex  Increased interstitial lung markings throughout the left hemithorax concerning for a superimposed infection versus vascular congestion  Right lung is hyperinflated but clear with blunting of the costophrenic angle stable  No pneumothorax at either apex  Osseous structures appear within normal limits for patient age  Impression: Stable opacities throughout the left hemithorax with underlying diffuse pleural thickening  Increased interstitial lung markings concerning for superimposed edema versus infection  Chronic hyperinflation right lung  Right IJ catheter  Workstation performed: [id number]     Ct Head Without Contrast    Result Date: 3/17/2021  Narrative: CT BRAIN - WITHOUT CONTRAST INDICATION:   Altered mental status ams, history of sclc  COMPARISON:  December 21, 2020 TECHNIQUE:  CT examination of the brain was performed  In addition to axial images, sagittal and coronal 2D reformatted images were created and submitted for interpretation  Radiation dose length product (DLP) for this visit:  804 mGy-cm   This examination, like all CT scans performed in the [facility], was performed utilizing techniques to minimize radiation dose exposure, including the use of iterative reconstruction and automated exposure control  IMAGE QUALITY:  Diagnostic  FINDINGS: PARENCHYMA:  No intracranial mass, mass effect or midline shift  No CT signs of acute infarction  No acute parenchymal hemorrhage  VENTRICLES AND EXTRA-AXIAL SPACES:  Normal for the patient's age  VISUALIZED ORBITS AND PARANASAL SINUSES:  Unremarkable  CALVARIUM AND EXTRACRANIAL SOFT TISSUES:  Normal      Impression: No acute intracranial abnormality  Workstation performed: [id number]     Cta Chest Pe Study    Result Date: 3/1/2021  Narrative: CTA - CHEST WITH IV CONTRAST - PULMONARY ANGIOGRAM INDICATION:   Shortness of breath, tachycardia, tachypnea  History of lung cancer  Evaluate for pulmonary embolus    COMPARISON: Chest CT from 2/16/2021  TECHNIQUE: CT angiogram tailored to evaluate for pulmonary embolism  Axial, sagittal, and coronal 2D reformatted images created from source data  Coronal 3D MIP postprocessing on the acquisition scanner  Radiation dose length product (DLP):  181 mGy-cm   Techniques to minimize radiation dose exposure include iterative reconstruction and automated exposure control  IV Contrast:  85 mL of iohexol (OMNIPAQUE)  FINDINGS: PULMONARY ARTERIES:  No pulmonary embolus  LUNGS:  Compromised by motion  Moderate emphysema  Mild atelectasis in the left lower lobe  Persistent septal thickening in the left lung  AIRWAYS: No significant filling defects in the airways  PLEURA:  Redemonstration of extensive pleural tumor encasing the left lung  HEART/GREAT VESSELS:  Unremarkable for patient's age  MEDIASTINUM AND MINA:  Redemonstration of left pleural tumor narrowing the left bronchi  Right port at cavoatrial junction  CHEST WALL AND LOWER NECK:   Unremarkable  UPPER ABDOMEN:  Unremarkable  OSSEOUS STRUCTURES:  Mild degenerative disease in the spine  Impression: No pulmonary embolus  Redemonstration of extensive left pleural tumor, encasing the left lung and narrowing the left bronchi  Emphysema  Septal thickening in the left lung due to lymphangitic spread of tumor versus lymphatic obstruction  Workstation performed: [id number]       Counseling / Coordination of Care  Total floor / unit time spent today 90 minutes  Greater than 50% of total time was spent with the patient and / or family counseling and / or coordination of care  A description of the counseling / coordination of care:  Goals of care conversation held with patient and her daughter over the telephone  Time spent discussing hospice care, addressing symptoms, discussion with primary team, coordination of care with Case Management and hospice team    SUSY Lebron    Please note: This report has been generated by voice recognition software system  Therefore, there may be syntax, spelling and/or grammatical errors  Please call if you have any questions

## 2021-03-22 NOTE — ASSESSMENT & PLAN NOTE
· Home oxygen evaluation performed during last admission (2/28-2/3): "Baseline SPO2 on Room Air at rest 89-86 %  SPO2 on Oxygen at rest 90 % 6 lpm  SPO2 during exercise on Oxygen 92-90% liter flow of 8 lpm "  · 5-6L at baseline  · Has ability to increase oxygen at home to 10L  · At baseline oxygen requirements

## 2021-03-22 NOTE — PHYSICAL THERAPY NOTE
PHYSICAL THERAPY SCREEN    Chart review completed  Patient going on hospice  Will discharge orders  Please re-consult if status changes  Zafar Villasenor PT            Patient Name: Patrice Bahena  VANESSA'S Date: 3/22/2021

## 2021-03-22 NOTE — ED CARE HANDOFF
Emergency Department Sign Out Note        Sign out and transfer of care from [facility]**  See Separate Emergency Department note  The patient, Darrell Galvez, was evaluated by the previous provider for **SOB, weakness, chronic pain*  Workup Completed:  Lactate 3    ED Course / Workup Pending (followup):  **patient with anxiety, chronic pain, [place] discussed with Dr Corinne America and no criteria for admission*                                  ED Course as of Mar 22 0226   Sun Mar 21, 2021   1730 Patient unstable, resp distress, incontinent of stool, anxious and tachycardia - extensive conversation with her and friend Karmen Barker, and daughter Jorge Polanco - patient would like hospice  [phone] I reviewed records - recent ct scan showed extension of tumor into bronchus  Differential includes worsening cancer as well  as PE or MI  I reviewed with patient to do more thorough evaluation or hospice  She would like to do hospice  1752 Giving patient ativan IV  I left oxygen for comfort  Texted Dr Corinne America for admission      [phone] [id number] I cancelled second lactate as patient would like to do hospice now          Procedures  MDM  Number of Diagnoses or Management Options  Acute respiratory distress: new and requires workup  Adult failure to thrive: established and worsening  Hospice care: new and requires workup     Amount and/or Complexity of Data Reviewed  Clinical lab tests: reviewed  Tests in the radiology section of CPT®: reviewed  Obtain history from someone other than the patient: yes  Discuss the patient with other providers: yes    Patient Progress  Patient progress: stable      Disposition  Final diagnoses:   Acute respiratory distress   Adult failure to thrive   Hospice care     Time reflects when diagnosis was documented in both MDM as applicable and the Disposition within this note     Time User Action Codes Description Comment    3/21/2021  5:58 PM Amina Dennis Add [R06 03] Acute respiratory distress     3/21/2021 5:58 PM Valerie June Add [R62 7] Adult failure to thrive     3/21/2021  5:58 PM Valerie June Luke [Z51 5] Hospice care       ED Disposition     ED Disposition Condition Date/Time Comment    Admit Stable Sun Mar 21, 2021  5:58 PM Case was discussed with *OANH Goodman* and the patient's admission status was agreed to be Admission Status: inpatient status to the service of Dr Arjun Gutierres**   Follow-up Information    None       Current Discharge Medication List      CONTINUE these medications which have NOT CHANGED    Details   albuterol (ProAir HFA) 90 mcg/act inhaler Inhale 2 puffs every 6 (six) hours as needed for wheezing or shortness of breath  Qty: 8 5 g, Refills: 6    Comments: Substitution to a formulary equivalent within the same pharmaceutical class is authorized    Associated Diagnoses: Shortness of breath      B Complex-Biotin-FA (TH VITAMIN B 50/B-COMPLEX) TABS Take by mouth daily      Capsaicin 0 025 % GEL Apply topically 3 (three) times a day as needed       cholecalciferol (VITAMIN D3) 1,000 units tablet Take 7,000 Units by mouth daily      COLCHICINE PO Take 0 5 mg by mouth 3 (three) times a day as needed       cyclobenzaprine (FLEXERIL) 10 mg tablet Take 10 mg by mouth 3 (three) times a day as needed for muscle spasms      dexamethasone (DECADRON) 2 mg tablet Take 1 tablet (2 mg total) by mouth 2 (two) times a day with meals  Qty: 60 tablet, Refills: 0    Associated Diagnoses: Loss of appetite      Echinacea 400 MG CAPS Take by mouth 3 (three) times a day as needed      fexofenadine-pseudoephedrine (ALLEGRA-D)  MG per tablet Take 1 tablet by mouth 2 (two) times a day as needed for allergies      GINKGO BILOBA PLUS PO Take by mouth as needed 2 capsules every 2 mornings       Hyaluronic Acid 20-60 MG CAPS Take by mouth 3 (three) times a day       hydrocortisone 2 5 % cream Apply topically 4 (four) times a day as needed for irritation or rash  Qty: 30 g, Refills: 0    Associated Diagnoses: Pruritus of skin      L-Lysine 500 MG CAPS Take by mouth 3 (three) times a day as needed      Lactobacillus (PROBIOTIC ACIDOPHILUS PO) Take 1 capsule by mouth daily      lidocaine-prilocaine (EMLA) cream Apply topically as needed for mild pain 30-60 min prior to port access  Qty: 30 g, Refills: 0    Associated Diagnoses: Port-A-Cath in place      loperamide (IMODIUM) 2 mg capsule Take 2 mg by mouth daily       LORazepam (ATIVAN) 0 5 mg tablet Take 1 tablet (0 5 mg total) by mouth every 8 (eight) hours as needed for anxiety  Qty: 30 tablet, Refills: 0    Associated Diagnoses: Small cell lung cancer ([facility] ); Anxiety      mirtazapine (REMERON) 30 mg tablet Take 1 tablet (30 mg total) by mouth daily at bedtime  Qty: 30 tablet, Refills: 1    Associated Diagnoses: Weight loss; Small cell lung cancer (HCC)      morphine (MSIR) 30 MG tablet Take 1 tablet (30 mg total) by mouth every 4 (four) hours as needed for severe painMax Daily Amount: 180 mg  Qty: 90 tablet, Refills: 0    Associated Diagnoses: Small cell lung cancer ([facility] ); Cancer associated pain      nystatin (MYCOSTATIN) 500,000 units/5 mL suspension Apply 5 mL (500,000 Units total) to the mouth or throat 4 (four) times a day  Qty: 500 mL, Refills: 0    Associated Diagnoses:  Thrush      omeprazole (PriLOSEC) 20 mg delayed release capsule Take 1 capsule (20 mg total) by mouth daily  Qty: 90 capsule, Refills: 3    Associated Diagnoses: Cancer associated pain      ondansetron (ZOFRAN) 4 mg tablet Take 1 tablet (4 mg total) by mouth every 8 (eight) hours as needed for nausea or vomiting  Qty: 60 tablet, Refills: 1    Associated Diagnoses: Non-intractable vomiting with nausea, unspecified vomiting type      other medication, see sig, Take 3 capsules by mouth daily [id number]      oxybutynin (DITROPAN-XL) 10 MG 24 hr tablet TAKE 1 TABLET BY MOUTH ONCE DAILY FOR INCONTINENCE      prochlorperazine (COMPAZINE) 10 mg tablet Take 10 mg by mouth every 6 (six) hours as needed for nausea or vomiting (migraines)      S-Adenosylmethionine (ARDHA-E) 200 MG TABS Take by mouth 3 (three) times a day      senna-docusate sodium (SENOKOT-S) 8 6-50 mg per tablet Take 1 tablet by mouth daily  Qty: 60 tablet, Refills: 0    Associated Diagnoses: Drug induced constipation      sodium chloride (OCEAN) 0 65 % nasal spray 1 spray into each nostril as needed for congestion or rhinitis  Qty: 30 mL, Refills: 0    Associated Diagnoses: Nasal congestion      tolterodine (DETROL LA) 2 mg 24 hr capsule       Tragacanth (ASTRAGALUS ROOT) POWD 470 mg 3 (three) times a day as needed      acetaminophen (TYLENOL) 500 mg tablet Take 2 tablets (1,000 mg total) by mouth every 8 (eight) hours    Associated Diagnoses: Cancer associated pain      albuterol (2 5 mg/3 mL) 0 083 % nebulizer solution Take 1 vial (2 5 mg total) by nebulization every 6 (six) hours as needed for wheezing or shortness of breath  Qty: 180 vial, Refills: 5    Associated Diagnoses: Chronic obstructive pulmonary disease, unspecified COPD type (HCC)      NON FORMULARY Take 37 mg by mouth daily Eco Thyro 37 - reported by pt      umeclidinium-vilanterol (ANORO ELLIPTA) 62 5-25 MCG/INH inhaler Inhale 1 puff daily  Qty: 1 Inhaler, Refills: 5    Associated Diagnoses: Pulmonary emphysema, unspecified emphysema type (HCC)           No discharge procedures on file         ED Provider  Electronically Signed by     Loree Isabel DO  03/22/21 0671

## 2021-03-22 NOTE — ASSESSMENT & PLAN NOTE
Malnutrition Findings:   ·   Adult Malnutrition type: Chronic illnessfat loss, muscle loss, prominent clavicle bone protrusion, orbital hallowing, temporal wasting   Adult Degree of Malnutrition: Other severe protein calorie malnutrition   Severe protein energy malnutrition, present on admission, in the setting of small cell lung cancer, as evidenced by bilateral temporal muscle wasting, prominent clavicle and rib protrusion, hollow orbits  patient currently opting for hospice care    BMI Findings: Body mass index is 15 11 kg/m²

## 2021-03-22 NOTE — HOSPICE NOTE
MET WITH PT AT BEDSIDE  REVIEWED HOME HOPSICE VS IPU  REVEIWED HOSPICE SERVICES AND BENEFITS PER  GUIDELINES  PT NOT SURE WHAT SHE WANTS TO DO  PT VISIBLY UNCOMFORTABLE EVEN AFTER RECEIVING IV ELIEZER ANGUIANO RN GAVE PT IV ATIVAN  I LET DR Kavya Llanos KNOW THAT PT DEFINIELY APPEARS APPROPRIATE FOR GIP LOC BUT SHE HAS NOT MADE A DECISION YET  KENDRICK MARTINS AND I SPOKE WITH PT AGAIN  SHE WANTS TIME TO THINK ABOUT WHAT SHE WANTS TO DO  SHE DOES NOT HAVE ANYONE AT HOME TO HELP CARE FOR HER  SHE DID VERBALIZE SHE WISHES TO DIE  SHE REFUSED OFFER FROM KENDRICK AND MYSELF TO CINTACT HER SON OR DTR  I LEFT HOPSICE PACKET AND MY CONTACT INFORMATION AT HER BEDSIDE AS SHE REQUESTED  WILL CONTINUE TO [facility]

## 2021-03-22 NOTE — CASE MANAGEMENT
LOS: 1 day  PATIENT IS NOT A MEDICARE BUNDLE  SHE IS A 30 DAY READMISSION  UNPLANNED READMISSION RISK SCORE IS 59 - RED  Patient admitted to [address] 3/17 - 8/17/84 with metabolic encephalopathy secondary to UTI  She has a history of stage IV lung cancer and was unable to care for herself at home  She states she was very SOB and called 911  She is now interested in hospice care  Discussed hospice care and given freedom of choice  Patient does not want son or daughter called at this time  Referral via [facility] to [facility]  Met with patient  Explained role of care management  Patient lives alone in a [address] [place] [address] with first floor set up  No steps to enter  Prior to last admission she was independent adl's and ambulation  After returning home with this last admission, she states that she could not take care of herself, was unable to ambulate, and was very SOB with any activity  Her neighbor provides transport and helps with meals  Her son Vj Hopkins and daughter Lovette Severe live in the area  They have POA and she has an AD  DME - home O2 supplied by [employer] at 5-8l via nc, nebulizer  Past services - current with [facility]  Denies history of SNF, MH or D&A  Pharmacy of choice is [facility] in [place]  She has a prescription plan and is able to afford her medication  Her PCP is Teena Gonzalez  Await hospice consult for IP hospice care

## 2021-03-22 NOTE — PLAN OF CARE
Problem: Prexisting or High Potential for Compromised Skin Integrity  Goal: Skin integrity is maintained or improved  Description: INTERVENTIONS:  - Identify patients at risk for skin breakdown  - Assess and monitor skin integrity  - Assess and monitor nutrition and hydration status  - Monitor labs   - Assess for incontinence   - Turn and reposition patient  - Assist with mobility/ambulation  - Relieve pressure over bony prominences  - Avoid friction and shearing  - Provide appropriate hygiene as needed including keeping skin clean and dry  - Evaluate need for skin moisturizer/barrier cream  - Collaborate with interdisciplinary team   - Patient/family teaching  - Consider wound care consult   Outcome: Progressing     Problem: Potential for Falls  Goal: Patient will remain free of falls  Description: INTERVENTIONS:  - Assess patient frequently for physical needs  -  Identify cognitive and physical deficits and behaviors that affect risk of falls    -  [place] fall precautions as indicated by assessment   - Educate patient/family on patient safety including physical limitations  - Instruct patient to call for assistance with activity based on assessment  - Modify environment to reduce risk of injury  - Consider OT/PT consult to assist with strengthening/mobility  Outcome: Progressing     Problem: PAIN - ADULT  Goal: Verbalizes/displays adequate comfort level or baseline comfort level  Description: Interventions:  - Encourage patient to monitor pain and request assistance  - Assess pain using appropriate pain scale  - Administer analgesics based on type and severity of pain and evaluate response  - Implement non-pharmacological measures as appropriate and evaluate response  - Consider cultural and social influences on pain and pain management  - Notify physician/advanced practitioner if interventions unsuccessful or patient reports new pain  Outcome: Progressing     Problem: INFECTION - ADULT  Goal: Absence or prevention of progression during hospitalization  Description: INTERVENTIONS:  - Assess and monitor for signs and symptoms of infection  - Monitor lab/diagnostic results  - Monitor all insertion sites, i e  indwelling lines, tubes, and drains  - Monitor endotracheal if appropriate and nasal secretions for changes in amount and color  - [place] appropriate cooling/warming therapies per order  - Administer medications as ordered  - Instruct and encourage patient and family to use good hand hygiene technique  - Identify and instruct in appropriate isolation precautions for identified infection/condition  Outcome: Progressing     Problem: SAFETY ADULT  Goal: Patient will remain free of falls  Description: INTERVENTIONS:  - Assess patient frequently for physical needs  -  Identify cognitive and physical deficits and behaviors that affect risk of falls    -  [place] fall precautions as indicated by assessment   - Educate patient/family on patient safety including physical limitations  - Instruct patient to call for assistance with activity based on assessment  - Modify environment to reduce risk of injury  - Consider OT/PT consult to assist with strengthening/mobility  Outcome: Progressing  Goal: Maintain or return to baseline ADL function  Description: INTERVENTIONS:  -  Assess patient's ability to carry out ADLs; assess patient's baseline for ADL function and identify physical deficits which impact ability to perform ADLs (bathing, care of mouth/teeth, toileting, grooming, dressing, etc )  - Assess/evaluate cause of self-care deficits   - Assess range of motion  - Assess patient's mobility; develop plan if impaired  - Assess patient's need for assistive devices and provide as appropriate  - Encourage maximum independence but intervene and supervise when necessary  - Involve family in performance of ADLs  - Assess for home care needs following discharge   - Consider OT consult to assist with ADL evaluation and planning for discharge  - Provide patient education as appropriate  Outcome: Progressing  Goal: Maintain or return mobility status to optimal level  Description: INTERVENTIONS:  - Assess patient's baseline mobility status (ambulation, transfers, stairs, etc )    - Identify cognitive and physical deficits and behaviors that affect mobility  - Identify mobility aids required to assist with transfers and/or ambulation (gait belt, sit-to-stand, lift, walker, cane, etc )  - [place] fall precautions as indicated by assessment  - Record patient progress and toleration of activity level on Mobility SBAR; progress patient to next Phase/Stage  - Instruct patient to call for assistance with activity based on assessment  - Consider rehabilitation consult to assist with strengthening/weightbearing, etc   Outcome: Progressing     Problem: DISCHARGE PLANNING  Goal: Discharge to home or other facility with appropriate resources  Description: INTERVENTIONS:  - Identify barriers to discharge w/patient and caregiver  - Arrange for needed discharge resources and transportation as appropriate  - Identify discharge learning needs (meds, wound care, etc )  - Arrange for interpretive services to assist at discharge as needed  - Refer to Case Management Department for coordinating discharge planning if the patient needs post-hospital services based on physician/advanced practitioner order or complex needs related to functional status, cognitive ability, or social support system  Outcome: Progressing     Problem: Knowledge Deficit  Goal: Patient/family/caregiver demonstrates understanding of disease process, treatment plan, medications, and discharge instructions  Description: Complete learning assessment and assess knowledge base    Interventions:  - Provide teaching at level of understanding  - Provide teaching via preferred learning methods  Outcome: Progressing     Problem: Nutrition/Hydration-ADULT  Goal: Nutrient/Hydration intake appropriate for improving, restoring or maintaining nutritional needs  Description: Monitor and assess patient's nutrition/hydration status for malnutrition  Collaborate with interdisciplinary team and initiate plan and interventions as ordered  Monitor patient's weight and dietary intake as ordered or per policy  Utilize nutrition screening tool and intervene as necessary  Determine patient's food preferences and provide high-protein, high-caloric foods as appropriate       INTERVENTIONS:  - Monitor oral intake, urinary output, labs, and treatment plans  - Assess nutrition and hydration status and recommend course of action  - Evaluate amount of meals eaten  - Assist patient with eating if necessary   - Allow adequate time for meals  - Recommend/ encourage appropriate diets, oral nutritional supplements, and vitamin/mineral supplements  - Order, calculate, and assess calorie counts as needed  - Recommend, monitor, and adjust tube feedings and TPN/PPN based on assessed needs  - Assess need for intravenous fluids  - Provide specific nutrition/hydration education as appropriate  - Include patient/family/caregiver in decisions related to nutrition  Outcome: Progressing

## 2021-03-22 NOTE — HOSPICE NOTE
RECEIVED HOSPICE IPU REFERRAL  SPOKE MIRIAM MARKS CM  WILL EVALUATE PT THIS AM FOR APPROPRIATE LEVEL OF HOSPICE CARE

## 2021-03-22 NOTE — ASSESSMENT & PLAN NOTE
SIRS criteria met with tachycardia HR , tachypnea with RR 21, noted elevated lactic acid of 3 in the setting of malignancy  Doubt infection  Recently treated for urinary tract infection with IV antibiotics  S/p IVFs  patient refusing blood draws  Patient states she wants to be HOSPICE and does not want abx   She just wants to be made comfortable

## 2021-03-23 ENCOUNTER — HOSPITAL ENCOUNTER (OUTPATIENT)
Dept: INFUSION CENTER | Facility: HOSPITAL | Age: 71
End: 2021-03-23
Attending: INTERNAL MEDICINE

## 2021-03-23 VITALS
HEART RATE: 109 BPM | SYSTOLIC BLOOD PRESSURE: 138 MMHG | OXYGEN SATURATION: 94 % | TEMPERATURE: 98.4 F | DIASTOLIC BLOOD PRESSURE: 70 MMHG | WEIGHT: 88 LBS | HEIGHT: 64 IN | RESPIRATION RATE: 18 BRPM | BODY MASS INDEX: 15.03 KG/M2

## 2021-03-23 PROCEDURE — 99239 HOSP IP/OBS DSCHRG MGMT >30: CPT | Performed by: PHYSICIAN ASSISTANT

## 2021-03-23 RX ORDER — ALBUTEROL SULFATE 90 UG/1
2 AEROSOL, METERED RESPIRATORY (INHALATION) EVERY 6 HOURS PRN
Status: CANCELLED | OUTPATIENT
Start: 2021-03-23

## 2021-03-23 RX ORDER — ONDANSETRON 4 MG/1
4 TABLET, ORALLY DISINTEGRATING ORAL EVERY 8 HOURS PRN
Status: CANCELLED | OUTPATIENT
Start: 2021-03-23

## 2021-03-23 RX ORDER — FLUTICASONE FUROATE AND VILANTEROL 100; 25 UG/1; UG/1
1 POWDER RESPIRATORY (INHALATION) DAILY
Status: CANCELLED | OUTPATIENT
Start: 2021-03-24

## 2021-03-23 RX ORDER — AMOXICILLIN 250 MG
1 CAPSULE ORAL DAILY
Status: CANCELLED | OUTPATIENT
Start: 2021-03-24

## 2021-03-23 RX ORDER — DEXAMETHASONE 0.5 MG/1
2 TABLET ORAL 2 TIMES DAILY WITH MEALS
Status: CANCELLED | OUTPATIENT
Start: 2021-03-23

## 2021-03-23 RX ORDER — LORAZEPAM 2 MG/ML
0.5 INJECTION INTRAMUSCULAR EVERY 4 HOURS PRN
Status: CANCELLED | OUTPATIENT
Start: 2021-03-23

## 2021-03-23 RX ORDER — HYDROMORPHONE HCL/PF 1 MG/ML
0.5 SYRINGE (ML) INJECTION EVERY 2 HOUR PRN
Status: CANCELLED | OUTPATIENT
Start: 2021-03-23

## 2021-03-23 RX ORDER — LORAZEPAM 2 MG/ML
0.5 INJECTION INTRAMUSCULAR ONCE AS NEEDED
Status: COMPLETED | OUTPATIENT
Start: 2021-03-23 | End: 2021-03-23

## 2021-03-23 RX ADMIN — LORAZEPAM 0.5 MG: 2 INJECTION INTRAMUSCULAR; INTRAVENOUS at 03:40

## 2021-03-23 RX ADMIN — HYDROMORPHONE HYDROCHLORIDE 0.5 MG: 1 INJECTION, SOLUTION INTRAMUSCULAR; INTRAVENOUS; SUBCUTANEOUS at 07:12

## 2021-03-23 RX ADMIN — HYDROMORPHONE HYDROCHLORIDE 0.5 MG: 1 INJECTION, SOLUTION INTRAMUSCULAR; INTRAVENOUS; SUBCUTANEOUS at 09:57

## 2021-03-23 RX ADMIN — HYDROMORPHONE HYDROCHLORIDE 0.5 MG: 1 INJECTION, SOLUTION INTRAMUSCULAR; INTRAVENOUS; SUBCUTANEOUS at 01:17

## 2021-03-23 RX ADMIN — HYDROMORPHONE HYDROCHLORIDE 0.5 MG: 1 INJECTION, SOLUTION INTRAMUSCULAR; INTRAVENOUS; SUBCUTANEOUS at 13:46

## 2021-03-23 RX ADMIN — DEXAMETHASONE 2 MG: 0.5 TABLET ORAL at 07:13

## 2021-03-23 RX ADMIN — LORAZEPAM 0.5 MG: 2 INJECTION INTRAMUSCULAR; INTRAVENOUS at 11:20

## 2021-03-23 RX ADMIN — LORAZEPAM 0.5 MG: 2 INJECTION INTRAMUSCULAR; INTRAVENOUS at 13:46

## 2021-03-23 RX ADMIN — FLUTICASONE FUROATE AND VILANTEROL TRIFENATATE 1 PUFF: 100; 25 POWDER RESPIRATORY (INHALATION) at 09:57

## 2021-03-23 RX ADMIN — ENOXAPARIN SODIUM 40 MG: 40 INJECTION SUBCUTANEOUS at 09:57

## 2021-03-23 NOTE — HOSPICE NOTE
Hospice Liaison met with patient and her 2 children this am   Kota Salas and Sarai  All in agreement with transfer to hospice IPU for acute symptom management  Consents explained to all 3 and signed by them  Transport arranged for 100 today  RN to please call with report  [phone] and medicate for the trip

## 2021-03-23 NOTE — TRANSPORTATION MEDICAL NECESSITY
Section I - General Information    Name of Patient: Chantale Osorio                 : 1950    Medicare #: [id number]  Transport Date: 21 (PCS is valid for round trips on this date and for all repetitive trips in the 60-day range as noted below )  Origin: [facility]                                                         Destination: [address]  Is the pt's stay covered under Medicare Part A (PPS/DRG)   []     Closest appropriate facility? If no, why is transport to more distant facility required? YES  If hospice pt, is this transport related to pt's terminal illness? NA       Section II - Medical Necessity Questionnaire  Ambulance transportation is medically necessary only if other means of transport are contraindicated or would be potentially harmful to the patient  To meet this requirement, the patient must either be "bed confined" or suffer from a condition such that transport by means other than ambulance is contraindicated by the patient's condition  The following questions must be answered by the medical professional signing below for this form to be valid:    1)  Describe the MEDICAL CONDITION (physical and/or mental) of this patient AT [address] [place] [address] that requires the patient to be transported in an ambulance and why transport by other means is contraindicated by the patient's condition:  Patient is bed bound, fall risk, unable to get oob without max assist, on O2 @ 7l nc    2) Is the patient "bed confined" as defined below? YES  To be "be confined" the patient must satisfy all three of the following conditions: (1) unable to get up from bed without Assistance; AND (2) unable to ambulate; AND (3) unable to sit in a chair or wheelchair  3) Can this patient safely be transported by car or wheelchair van (i e , seated during transport without a medical attendant or monitoring)?    NO    4) In addition to completing questions 1-3 above, please check any of the following conditions that apply*:   *Note: supporting documentation for any boxes checked must be maintained in the patient's medical records  If hosp-hosp transfer, describe services needed at 2nd facility not available at 1st facility? Requires oxygen-unable to self administer  Unable to tolerate seated position for time needed to transport       Section III - Signature of Physician or Healthcare Professional  I certify that the above information is true and correct based on my evaluation of this patient, and represent that the patient requires transport by ambulance and that other forms of transport are contraindicated  I understand that this information will be used by the Centers for Medicare and Medicaid Services (CMS) to support the determination of medical necessity for ambulance services, and I represent that I have personal knowledge of the patient's condition at time of transport  []  If this box is checked, I also certify that the patient is physically or mentally incapable of signing the ambulance service's claim and that the institution with which I am affiliated has furnished care, services, or assistance to the patient  My signature below is made on behalf of the patient pursuant to 42 CFR §424 36(b)(4)  In accordance with 42 CFR §424 37, the specific reason(s) that the patient is physically or mentally incapable of signing the claim form is as follows:       Signature of Physician* or Healthcare Professional_______________________Soraida Quesada_______________________________________  Signature Date 03/23/21 (For scheduled repetitive transports, this form is not valid for transports performed more than 60 days after this date)    Printed Name & Credentials of Physician or Healthcare Professional (MD, DO, RN, etc )________________________________  *Form must be signed by patient's attending physician for scheduled, repetitive transports   For non-repetitive, unscheduled ambulance transports, if unable to obtain the signature of the attending physician, any of the following may sign (choose appropriate option below)  [] Physician Assistant []  Clinical Nurse Specialist [x]  Registered Nurse  []  Nurse Practitioner  [x] Discharge Planner

## 2021-03-23 NOTE — PLAN OF CARE
Problem: Prexisting or High Potential for Compromised Skin Integrity  Goal: Skin integrity is maintained or improved  Description: INTERVENTIONS:  - Identify patients at risk for skin breakdown  - Assess and monitor skin integrity  - Assess and monitor nutrition and hydration status  - Monitor labs   - Assess for incontinence   - Turn and reposition patient  - Assist with mobility/ambulation  - Relieve pressure over bony prominences  - Avoid friction and shearing  - Provide appropriate hygiene as needed including keeping skin clean and dry  - Evaluate need for skin moisturizer/barrier cream  - Collaborate with interdisciplinary team   - Patient/family teaching  - Consider wound care consult   3/23/2021 1129 by Bryant Menon RN  Outcome: Adequate for Discharge  3/23/2021 0726 by Bryant Menon RN  Outcome: Progressing     Problem: Potential for Falls  Goal: Patient will remain free of falls  Description: INTERVENTIONS:  - Assess patient frequently for physical needs  -  Identify cognitive and physical deficits and behaviors that affect risk of falls    -  [place] fall precautions as indicated by assessment   - Educate patient/family on patient safety including physical limitations  - Instruct patient to call for assistance with activity based on assessment  - Modify environment to reduce risk of injury  - Consider OT/PT consult to assist with strengthening/mobility  3/23/2021 1129 by Bryant Menon RN  Outcome: Adequate for Discharge  3/23/2021 0726 by Bryant Menon RN  Outcome: Progressing     Problem: PAIN - ADULT  Goal: Verbalizes/displays adequate comfort level or baseline comfort level  Description: Interventions:  - Encourage patient to monitor pain and request assistance  - Assess pain using appropriate pain scale  - Administer analgesics based on type and severity of pain and evaluate response  - Implement non-pharmacological measures as appropriate and evaluate response  - Consider cultural and social influences on pain and pain management  - Notify physician/advanced practitioner if interventions unsuccessful or patient reports new pain  3/23/2021 1129 by Cesilia Good RN  Outcome: Adequate for Discharge  3/23/2021 0726 by Cesilia Good RN  Outcome: Progressing     Problem: INFECTION - ADULT  Goal: Absence or prevention of progression during hospitalization  Description: INTERVENTIONS:  - Assess and monitor for signs and symptoms of infection  - Monitor lab/diagnostic results  - Monitor all insertion sites, i e  indwelling lines, tubes, and drains  - Monitor endotracheal if appropriate and nasal secretions for changes in amount and color  - [place] appropriate cooling/warming therapies per order  - Administer medications as ordered  - Instruct and encourage patient and family to use good hand hygiene technique  - Identify and instruct in appropriate isolation precautions for identified infection/condition  3/23/2021 1129 by Cesilia Good RN  Outcome: Adequate for Discharge  3/23/2021 0726 by Cesilia Good RN  Outcome: Progressing     Problem: SAFETY ADULT  Goal: Patient will remain free of falls  Description: INTERVENTIONS:  - Assess patient frequently for physical needs  -  Identify cognitive and physical deficits and behaviors that affect risk of falls    -  [place] fall precautions as indicated by assessment   - Educate patient/family on patient safety including physical limitations  - Instruct patient to call for assistance with activity based on assessment  - Modify environment to reduce risk of injury  - Consider OT/PT consult to assist with strengthening/mobility  3/23/2021 1129 by Cesilia Good RN  Outcome: Adequate for Discharge  3/23/2021 0726 by Cesilia Good RN  Outcome: Progressing  Goal: Maintain or return to baseline ADL function  Description: INTERVENTIONS:  -  Assess patient's ability to carry out ADLs; assess patient's baseline for ADL function and identify physical deficits which impact ability to perform ADLs (bathing, care of mouth/teeth, toileting, grooming, dressing, etc )  - Assess/evaluate cause of self-care deficits   - Assess range of motion  - Assess patient's mobility; develop plan if impaired  - Assess patient's need for assistive devices and provide as appropriate  - Encourage maximum independence but intervene and supervise when necessary  - Involve family in performance of ADLs  - Assess for home care needs following discharge   - Consider OT consult to assist with ADL evaluation and planning for discharge  - Provide patient education as appropriate  3/23/2021 1129 by David Garcia RN  Outcome: Adequate for Discharge  3/23/2021 0726 by David Garcia RN  Outcome: Progressing  Goal: Maintain or return mobility status to optimal level  Description: INTERVENTIONS:  - Assess patient's baseline mobility status (ambulation, transfers, stairs, etc )    - Identify cognitive and physical deficits and behaviors that affect mobility  - Identify mobility aids required to assist with transfers and/or ambulation (gait belt, sit-to-stand, lift, walker, cane, etc )  - [place] fall precautions as indicated by assessment  - Record patient progress and toleration of activity level on Mobility SBAR; progress patient to next Phase/Stage  - Instruct patient to call for assistance with activity based on assessment  - Consider rehabilitation consult to assist with strengthening/weightbearing, etc   3/23/2021 1129 by David Garcia RN  Outcome: Adequate for Discharge  3/23/2021 0726 by David Garcia RN  Outcome: Progressing     Problem: DISCHARGE PLANNING  Goal: Discharge to home or other facility with appropriate resources  Description: INTERVENTIONS:  - Identify barriers to discharge w/patient and caregiver  - Arrange for needed discharge resources and transportation as appropriate  - Identify discharge learning needs (meds, wound care, etc )  - Arrange for interpretive services to assist at discharge as needed  - Refer to Case Management Department for coordinating discharge planning if the patient needs post-hospital services based on physician/advanced practitioner order or complex needs related to functional status, cognitive ability, or social support system  3/23/2021 1129 by Kamaljit Cardenas RN  Outcome: Adequate for Discharge  3/23/2021 0726 by Kamaljit Cardenas RN  Outcome: Progressing     Problem: Knowledge Deficit  Goal: Patient/family/caregiver demonstrates understanding of disease process, treatment plan, medications, and discharge instructions  Description: Complete learning assessment and assess knowledge base  Interventions:  - Provide teaching at level of understanding  - Provide teaching via preferred learning methods  3/23/2021 1129 by Kamaljit Cardenas RN  Outcome: Adequate for Discharge  3/23/2021 0726 by Kamaljit Cardenas RN  Outcome: Progressing     Problem: Nutrition/Hydration-ADULT  Goal: Nutrient/Hydration intake appropriate for improving, restoring or maintaining nutritional needs  Description: Monitor and assess patient's nutrition/hydration status for malnutrition  Collaborate with interdisciplinary team and initiate plan and interventions as ordered  Monitor patient's weight and dietary intake as ordered or per policy  Utilize nutrition screening tool and intervene as necessary  Determine patient's food preferences and provide high-protein, high-caloric foods as appropriate       INTERVENTIONS:  - Monitor oral intake, urinary output, labs, and treatment plans  - Assess nutrition and hydration status and recommend course of action  - Evaluate amount of meals eaten  - Assist patient with eating if necessary   - Allow adequate time for meals  - Recommend/ encourage appropriate diets, oral nutritional supplements, and vitamin/mineral supplements  - Order, calculate, and assess calorie counts as needed  - Recommend, monitor, and adjust tube feedings and TPN/PPN based on assessed needs  - Assess need for intravenous fluids  - Provide specific nutrition/hydration education as appropriate  - Include patient/family/caregiver in decisions related to nutrition  3/23/2021 1129 by Sd Meyer RN  Outcome: Adequate for Discharge  3/23/2021 0726 by Sd Meyer RN  Outcome: Progressing

## 2021-03-23 NOTE — PLAN OF CARE
Problem: Prexisting or High Potential for Compromised Skin Integrity  Goal: Skin integrity is maintained or improved  Description: INTERVENTIONS:  - Identify patients at risk for skin breakdown  - Assess and monitor skin integrity  - Assess and monitor nutrition and hydration status  - Monitor labs   - Assess for incontinence   - Turn and reposition patient  - Assist with mobility/ambulation  - Relieve pressure over bony prominences  - Avoid friction and shearing  - Provide appropriate hygiene as needed including keeping skin clean and dry  - Evaluate need for skin moisturizer/barrier cream  - Collaborate with interdisciplinary team   - Patient/family teaching  - Consider wound care consult   Outcome: Progressing     Problem: Potential for Falls  Goal: Patient will remain free of falls  Description: INTERVENTIONS:  - Assess patient frequently for physical needs  -  Identify cognitive and physical deficits and behaviors that affect risk of falls    -  [place] fall precautions as indicated by assessment   - Educate patient/family on patient safety including physical limitations  - Instruct patient to call for assistance with activity based on assessment  - Modify environment to reduce risk of injury  - Consider OT/PT consult to assist with strengthening/mobility  Outcome: Progressing     Problem: PAIN - ADULT  Goal: Verbalizes/displays adequate comfort level or baseline comfort level  Description: Interventions:  - Encourage patient to monitor pain and request assistance  - Assess pain using appropriate pain scale  - Administer analgesics based on type and severity of pain and evaluate response  - Implement non-pharmacological measures as appropriate and evaluate response  - Consider cultural and social influences on pain and pain management  - Notify physician/advanced practitioner if interventions unsuccessful or patient reports new pain  Outcome: Progressing     Problem: INFECTION - ADULT  Goal: Absence or prevention of progression during hospitalization  Description: INTERVENTIONS:  - Assess and monitor for signs and symptoms of infection  - Monitor lab/diagnostic results  - Monitor all insertion sites, i e  indwelling lines, tubes, and drains  - Monitor endotracheal if appropriate and nasal secretions for changes in amount and color  - [place] appropriate cooling/warming therapies per order  - Administer medications as ordered  - Instruct and encourage patient and family to use good hand hygiene technique  - Identify and instruct in appropriate isolation precautions for identified infection/condition  Outcome: Progressing     Problem: SAFETY ADULT  Goal: Patient will remain free of falls  Description: INTERVENTIONS:  - Assess patient frequently for physical needs  -  Identify cognitive and physical deficits and behaviors that affect risk of falls    -  [place] fall precautions as indicated by assessment   - Educate patient/family on patient safety including physical limitations  - Instruct patient to call for assistance with activity based on assessment  - Modify environment to reduce risk of injury  - Consider OT/PT consult to assist with strengthening/mobility  Outcome: Progressing  Goal: Maintain or return to baseline ADL function  Description: INTERVENTIONS:  -  Assess patient's ability to carry out ADLs; assess patient's baseline for ADL function and identify physical deficits which impact ability to perform ADLs (bathing, care of mouth/teeth, toileting, grooming, dressing, etc )  - Assess/evaluate cause of self-care deficits   - Assess range of motion  - Assess patient's mobility; develop plan if impaired  - Assess patient's need for assistive devices and provide as appropriate  - Encourage maximum independence but intervene and supervise when necessary  - Involve family in performance of ADLs  - Assess for home care needs following discharge   - Consider OT consult to assist with ADL evaluation and planning for discharge  - Provide patient education as appropriate  Outcome: Progressing  Goal: Maintain or return mobility status to optimal level  Description: INTERVENTIONS:  - Assess patient's baseline mobility status (ambulation, transfers, stairs, etc )    - Identify cognitive and physical deficits and behaviors that affect mobility  - Identify mobility aids required to assist with transfers and/or ambulation (gait belt, sit-to-stand, lift, walker, cane, etc )  - [place] fall precautions as indicated by assessment  - Record patient progress and toleration of activity level on Mobility SBAR; progress patient to next Phase/Stage  - Instruct patient to call for assistance with activity based on assessment  - Consider rehabilitation consult to assist with strengthening/weightbearing, etc   Outcome: Progressing     Problem: DISCHARGE PLANNING  Goal: Discharge to home or other facility with appropriate resources  Description: INTERVENTIONS:  - Identify barriers to discharge w/patient and caregiver  - Arrange for needed discharge resources and transportation as appropriate  - Identify discharge learning needs (meds, wound care, etc )  - Arrange for interpretive services to assist at discharge as needed  - Refer to Case Management Department for coordinating discharge planning if the patient needs post-hospital services based on physician/advanced practitioner order or complex needs related to functional status, cognitive ability, or social support system  Outcome: Progressing     Problem: Knowledge Deficit  Goal: Patient/family/caregiver demonstrates understanding of disease process, treatment plan, medications, and discharge instructions  Description: Complete learning assessment and assess knowledge base    Interventions:  - Provide teaching at level of understanding  - Provide teaching via preferred learning methods  Outcome: Progressing     Problem: Nutrition/Hydration-ADULT  Goal: Nutrient/Hydration intake appropriate for improving, restoring or maintaining nutritional needs  Description: Monitor and assess patient's nutrition/hydration status for malnutrition  Collaborate with interdisciplinary team and initiate plan and interventions as ordered  Monitor patient's weight and dietary intake as ordered or per policy  Utilize nutrition screening tool and intervene as necessary  Determine patient's food preferences and provide high-protein, high-caloric foods as appropriate       INTERVENTIONS:  - Monitor oral intake, urinary output, labs, and treatment plans  - Assess nutrition and hydration status and recommend course of action  - Evaluate amount of meals eaten  - Assist patient with eating if necessary   - Allow adequate time for meals  - Recommend/ encourage appropriate diets, oral nutritional supplements, and vitamin/mineral supplements  - Order, calculate, and assess calorie counts as needed  - Recommend, monitor, and adjust tube feedings and TPN/PPN based on assessed needs  - Assess need for intravenous fluids  - Provide specific nutrition/hydration education as appropriate  - Include patient/family/caregiver in decisions related to nutrition  Outcome: Progressing

## 2021-03-23 NOTE — ASSESSMENT & PLAN NOTE
· SIRS criteria met with tachycardia HR , tachypnea with RR 21, noted elevated lactic acid of 3 in the setting of malignancy  · Doubt infection  Recently treated for urinary tract infection with IV antibiotics  · S/p IVFs  · Patient refusing blood draws   · Patient states she wants to be hospice and does not want abx   She just wants to be made comfortable

## 2021-03-23 NOTE — CASE MANAGEMENT
LOS: 2 days  Continuing to follow patient  Patient remains agreeable to IP hospice  [facility] hospice liason, Stanley King, here to see patient and consents to be signed  Bed is available at [address]  [facility] to transport patient today at 1300 via BLS to [address]

## 2021-03-23 NOTE — DISCHARGE SUMMARY
[facility]  Discharge- Tadeo Pena 1950, 78 y o  female MRN: [id number]  Unit/Bed#: -01 Encounter: [id number]  Primary Care Provider: Rachel Zhao DO   Date and time admitted to hospital: 3/21/2021  1:10 PM    SIRS (systemic inflammatory response syndrome) ([facility] )  Assessment & Plan  · SIRS criteria met with tachycardia HR , tachypnea with RR 21, noted elevated lactic acid of 3 in the setting of malignancy  · Doubt infection  Recently treated for urinary tract infection with IV antibiotics  · S/p IVFs  · Patient refusing blood draws   · Patient states she wants to be hospice and does not want abx  She just wants to be made comfortable     Narcotic dependency, continuous (HCC)  Assessment & Plan  · Morphine sulfate 30 mg q 4 hours p r n  · Secondary to left-sided small cell lung cancer  · PDMP reviewed   · resume and continue morphine sulfate     Goals of care, counseling/discussion  Assessment & Plan  · Patient with end stage lung cancer, chronic respiratory failure from advanced lung CA and emphysema, follows with palliative care presents and states she can not take this suffering any longer and is ready to die  She states she is unable to care for herself and has worsening back pain which is debilitating with excruciating pain  · She is interested in hospice and is willing to pursue this route  Prior provider explained the concept of hospice and she understands and wants to proceed  She understands she will just be made comfortable and that is all she wants for now  · Consult placed hospice, patient for admission to [facility], transport for 1:00 p m  Today    · She refuses to have health team call her family and is adamant about that    Severe protein-calorie malnutrition ([facility] )  Assessment & Plan  Malnutrition Findings:   ·   Adult Malnutrition type: Chronic illnessfat loss, muscle loss, prominent clavicle bone protrusion, orbital hallowing, temporal wasting   Adult Degree of Malnutrition: Other severe protein calorie malnutrition   Severe protein energy malnutrition, present on admission, in the setting of small cell lung cancer, as evidenced by bilateral temporal muscle wasting, prominent clavicle and rib protrusion, hollow orbits  patient currently opting for hospice care    BMI Findings: Body mass index is 15 11 kg/m²  Small cell lung cancer (HCC)  Assessment & Plan  · Hx of small cell cancer s/p chemotherapy,  palliative radiation therapy  · Was supposed to f/u with hematology/oncology this week to discuss treatment options but now wants to be made comfortable     Pulmonary emphysema ([facility] )  Assessment & Plan  · Home regimen:  Albuterol and Spiriva   · No acute signs of exacerbation on admission    * Chronic respiratory failure with hypoxia ([facility] )  Assessment & Plan  · Home oxygen evaluation performed during last admission (2/28-2/3): "Baseline SPO2 on Room Air at rest 89-86 %  SPO2 on Oxygen at rest 90 % 6 lpm  SPO2 during exercise on Oxygen 92-90% liter flow of 8 lpm "  · 5-6L at baseline  · Has ability to increase oxygen at home to 10L  · At baseline oxygen requirements        Discharging Physician / Practitioner: Karolyn Valdes PA-C  PCP: Ramy Sewell,   Admission Date:   Admission Orders (From admission, onward)     Ordered        03/21/21 1754  Inpatient Admission  Once                   Discharge Date: 03/23/21    Resolved Problems  Date Reviewed: 3/23/2021    None          Consultations During Hospital Stay:  · Medical oncology    Procedures Performed:   · None    Significant Findings / Test Results:   · Lactic acid 3 0    Incidental Findings:   · None    Test Results Pending at Discharge (will require follow up):    · None     Outpatient Tests Requested:  · None    Complications:  None    Reason for Admission:  Unable to care for self requesting hospice    Hospital Course:     Jonny Brewster is a 78 y o  female patient with past medical history of stage IV lung cancer who originally presented to the hospital on 3/21/2021 due to concerns over being unable to care for herself  Patient was recently discharged the day prior after being admitted for toxic metabolic encephalopathy from UTI  She was treated with 3 days of IV ceftriaxone  Patient reported she was unable to walk, use the bathroom without becoming short of breath  She is constantly anxious about dying  Claims she is [facility] to go to [facility]  Patient previously adamantly refused hospice discussions in the past   She was agreeable this time  Medical Oncology was consulted to discuss options with patient  She was agreeable to inpatient hospice house, met with liaison 3/22  Stable for transfer to [place] [address] at this time  Please see above list of diagnoses and related plan for additional information  Condition at Discharge: poor     Discharge Day Visit / Exam:     Subjective:  Minimally interactive, wants to just be with family  Vitals: Blood Pressure: 138/70 (03/23/21 0759)  Pulse: (!) 109 (03/23/21 0759)  Temperature: 98 4 °F (36 9 °C) (03/23/21 0759)  Temp Source: Oral (03/21/21 1313)  Respirations: 18 (03/23/21 0759)  Height: 5' 4" (162 6 cm) (03/21/21 1313)  Weight - Scale: 39 9 kg (88 lb) (03/21/21 1313)  SpO2: 94 % (03/23/21 0759)  Exam:   Physical Exam  Constitutional:       General: She is not in acute distress  Appearance: Normal appearance  She is well-developed and well-groomed  Interventions: Nasal cannula in place  HENT:      Head: Normocephalic and atraumatic  Eyes:      General: Lids are normal  No scleral icterus  Pulmonary:      Effort: Pulmonary effort is normal    Skin:     Findings: No lesion or rash  Neurological:      Mental Status: She is alert     Psychiatric:         Mood and Affect: Mood and affect normal        Discussion with Family:  Discussed with patient's children at bedside, no additional questions at this time    Discharge instructions/Information to patient and family:   See after visit summary for information provided to patient and family  Provisions for Follow-Up Care:  See after visit summary for information related to follow-up care and any pertinent home health orders  Disposition:     Other: Hospice House    For Discharges to [facility] SNF:   · Not Applicable to this Patient - Not Applicable to this Patient    Planned Readmission: Hospice House     Discharge Statement:  I spent 65 minutes discharging the patient  This time was spent on the day of discharge  I had direct contact with the patient on the day of discharge  Greater than 50% of the total time was spent examining patient, answering all patient questions, arranging and discussing plan of care with patient as well as directly providing post-discharge instructions  Additional time then spent on discharge activities  Discharge Medications:  See after visit summary for reconciled discharge medications provided to patient and family        ** Please Note: This note has been constructed using a voice recognition system **

## 2021-03-23 NOTE — ASSESSMENT & PLAN NOTE
· Hx of small cell cancer s/p chemotherapy,  palliative radiation therapy  · Was supposed to f/u with hematology/oncology this week to discuss treatment options but now wants to be made comfortable

## 2021-04-09 ENCOUNTER — TELEPHONE (OUTPATIENT)
Dept: HEMATOLOGY ONCOLOGY | Facility: CLINIC | Age: 71
End: 2021-04-09

## 2021-04-09 NOTE — TELEPHONE ENCOUNTER
Received orders from [facility] that need to be signed, email has been sent to [facility]

## 2021-12-31 NOTE — NURSING NOTE
Pt became anxious/ SOB when ambulating to commode  Pt did desat to 80% on 8L  Increased oxygen to 10L, encouraged pt to deep breathe and relax, and gave ativan  Albuterol also given with spacer to help with breathing  Respiratory made aware of this  Pt remains at 10L and is resting comfortably with o2 saturation of 91%  Will continue to monitor 
knee pain/injury

## 2023-11-08 NOTE — PLAN OF CARE
Problem: Knowledge Deficit  Goal: Patient/family/caregiver demonstrates understanding of disease process, treatment plan, medications, and discharge instructions  Description: Complete learning assessment and assess knowledge base    Interventions:  - Provide teaching at level of understanding  - Provide teaching via preferred learning methods  Outcome: Progressing Detail Level: Generalized Detail Level: Detailed Patient Specific Counseling (Will Not Stick From Patient To Patient): Can call for Nystatin BID if flaring up Detail Level: Simple

## 2024-12-08 NOTE — ED NOTES
Dr Alexandria Fiore at bedside for patient evaluation      Malvin Washington, RN  03/17/21 7109
c/o abdominal pain

## 2024-12-26 NOTE — PROGRESS NOTES
[place]  Progress Note - Fidelina Kulkarni 1950, 78 y o  female MRN: [id number]  Unit/Bed#: -01 Encounter: [id number]  Primary Care Provider: Ken Ortiz DO   Date and time admitted to hospital: 3/17/2021  9:05 PM    * Encephalopathy  Assessment & Plan  Metabolic encephalopathy present on admission the setting of urinary tract infection, dehydration and nonadherence with oxygen as evidenced by confusion and somnolence being treated with oxygen supplementation, IV fluids and IV antibiotics  Resolved  CTH no acute process  BUN at 35 - consistent with dehydration        Acute cystitis without hematuria  Assessment & Plan  · UA with innumerable bacteria and WBCs  · U/C, pending   · Continue ceftriaxone #2    Hypothyroidism  Assessment & Plan  Home regimen: [employer] [facility] 37         Unable to care for self  Assessment & Plan  · Significant concern about patient living on her own   · Has continued to lose weight since recent admission   · Has not been wearing home oxygen continuously  · Reports little to no p o  intake  · PT, OT, and Case Management, patient refusing home therapy    Narcotic dependency, continuous (HCC)  Assessment & Plan  · Morphine sulfate 30 mg q 4 hours p r n  · Secondary to left-sided small cell lung cancer  · PDMP reviewed   · resume and continue morphine sulfate     Severe protein-calorie malnutrition (HCC)  Assessment & Plan  Malnutrition Findings:   · Adult Malnutrition type: Chronic illnessfat loss, muscle loss, prominent clavicle bone protrusion, orbital hallowing, temporal wasting   Adult Degree of Malnutrition: Other severe protein calorie malnutrition    BMI Findings:  Adult BMI Classifications: Underweight < 18 5  There is no height or weight on file to calculate BMI       · BMI 15 11  · Nutrition consult  · Continue Ensure supplements    Small cell lung cancer ([facility], [place] [address] )  Assessment & Plan  · Advanced stage small cell lung cancer  · Previously was on chemotherapy  · S/p palliative radiation therapy to pleural based posterior and anterior chest wall metastatic disease         Pulmonary emphysema (HCC)  Assessment & Plan  · Home regimen:  Albuterol and Spiriva   · No acute signs of exacerbation on admission      VTE Pharmacologic Prophylaxis:   Pharmacologic: Enoxaparin (Lovenox)  Mechanical VTE Prophylaxis in Place: No    Patient Centered Rounds: I have performed bedside rounds with nursing staff today  Discussions with Specialists or Other Care Team Provider:     Education and Discussions with Family / Patient:  Patient, declines family communication    Time Spent for Care: 30 minutes  More than 50% of total time spent on counseling and coordination of care as described above  Current Length of Stay: 1 day(s)    Current Patient Status: Inpatient   Certification Statement: The patient will continue to require additional inpatient hospital stay due to Worsening shortness of breath on intranasal oxygen, anxiety, urinary tract infection on IV antibiotics    Discharge Plan:  Home tomorrow    Code Status: Level 1 - Full Code      Subjective:   Seen this morning  Had an episode of worsening hypoxia, needed increase oxygen requirements to mid flow  At present she has been weaned down and is on 6 L nasal cannula  Denies worsening shortness of breath or chest pain  Occasionally she is anxious necessitating increase oxygen requirement  Does not feel safe going home today    Objective:     Vitals:   Temp (24hrs), Av 7 °F (36 5 °C), Min:97 2 °F (36 2 °C), Max:98 2 °F (36 8 °C)    Temp:  [97 2 °F (36 2 °C)-98 2 °F (36 8 °C)] 97 3 °F (36 3 °C)  HR:  [102-118] 104  Resp:  [17-22] 19  BP: (123-145)/(71-83) 141/83  SpO2:  [88 %-95 %] 89 %  There is no height or weight on file to calculate BMI  Input and Output Summary (last 24 hours):        Intake/Output Summary (Last 24 hours) at 3/19/2021 1000  Last data filed at 3/19/2021 0350  Gross per 24 hour   Intake 1118 ml   Output 275 ml   Net 843 ml       Physical Exam:     Physical Exam  Vitals signs reviewed  Constitutional:       General: She is not in acute distress  Appearance: Normal appearance  She is not ill-appearing, toxic-appearing or diaphoretic  HENT:      Head: Normocephalic  Eyes:      Extraocular Movements: Extraocular movements intact  Pupils: Pupils are equal, round, and reactive to light  Neck:      Musculoskeletal: Normal range of motion and neck supple  No neck rigidity or muscular tenderness  Vascular: No carotid bruit  Cardiovascular:      Rate and Rhythm: Normal rate and regular rhythm  Pulses: Normal pulses  Heart sounds: Normal heart sounds  No murmur  No friction rub  Pulmonary:      Effort: Pulmonary effort is normal       Breath sounds: No stridor  No wheezing, rhonchi or rales  Comments: Reduced breath sounds bilateral lung bases  Chest:      Chest wall: No tenderness  Abdominal:      General: Abdomen is flat  Bowel sounds are normal  There is no distension  Palpations: Abdomen is soft  Tenderness: There is no abdominal tenderness  There is no guarding or rebound  Musculoskeletal: Normal range of motion  General: No swelling or tenderness  Right lower leg: Edema present  Left lower leg: Edema present  Skin:     General: Skin is warm and dry  Coloration: Skin is not jaundiced  Neurological:      General: No focal deficit present  Mental Status: She is alert and oriented to person, place, and time  Cranial Nerves: No cranial nerve deficit  Sensory: No sensory deficit             Additional Data:     Labs:    Results from last 7 days   Lab Units 03/19/21  0452  03/17/21  2147   WBC Thousand/uL 6 94   < > 8 82   HEMOGLOBIN g/dL 10 8*   < > 11 4*   HEMATOCRIT % 33 7*   < > 35 4   PLATELETS Thousands/uL 169   < > 211   NEUTROS PCT %  --   --  92*   LYMPHS PCT %  --   --  2*   MONOS PCT %  --   --  4   EOS PCT %  --   --  1    < > = values in this interval not displayed  Results from last 7 days   Lab Units 03/19/21  0452  03/17/21  2147   SODIUM mmol/L 139   < > 137   POTASSIUM mmol/L 2 9*   < > 3 7   CHLORIDE mmol/L 102   < > 101   CO2 mmol/L 27   < > 26   BUN mg/dL 21   < > 35*   CREATININE mg/dL 0 67   < > 0 68   ANION GAP mmol/L 10   < > 10   CALCIUM mg/dL 8 2*   < > 8 8   ALBUMIN g/dL  --   --  2 7*   TOTAL BILIRUBIN mg/dL  --   --  1 00   ALK PHOS U/L  --   --  88   ALT U/L  --   --  23   AST U/L  --   --  42   GLUCOSE RANDOM mg/dL 139   < > 80    < > = values in this interval not displayed  * I Have Reviewed All Lab Data Listed Above  * Additional Pertinent Lab Tests Reviewed:  All Labs Within Last 24 Hours Reviewed    Imaging:    Imaging Reports Reviewed Today Include:   Imaging Personally Reviewed by Myself Includes:      Recent Cultures (last 7 days):     Results from last 7 days   Lab Units 03/18/21  0200   URINE CULTURE  >100,000 cfu/ml Gram Negative Karlo Enteric Like*       Last 24 Hours Medication List:   Current Facility-Administered Medications   Medication Dose Route Frequency Provider Last Rate    acetaminophen  650 mg Oral Q6H PRN Felicitas Kingsley PA-C      albuterol  2 puff Inhalation Q6H PRN Hipolito Porter MD      cefTRIAXone  1,000 mg Intravenous Q24H CALOS [place]-C 1,000 mg (03/19/21 0344)    cyclobenzaprine  10 mg Oral TID PRN Felicitas Kingsley PA-C      dexamethasone  2 mg Oral BID With Meals Hipolito Porter MD      docusate sodium  100 mg Oral BID Felicitas Kingsley PA-C      enoxaparin  40 mg Subcutaneous Daily Felicitas Kingsley PA-C      fluticasone-vilanterol  1 puff Inhalation Daily Hipolito Proter MD      loratadine-pseudoephedrine  1 tablet Oral Daily Hipolito Porter MD      LORazepam  0 5 mg Oral Q8H PRN Hipolito Porter MD      morphine  30 mg Oral Q4H PRN Felicitas Kingsley PA-C      ondansetron 4 mg Intravenous Q6H PRN Darryl Favorjeff, PA-C      oxybutynin  10 mg Oral HS Darrylcharmaine Baer, PA-C      oxybutynin  5 mg Oral QAM Kalyan Liu MD      pantoprazole  40 mg Oral Early Morning Darrylcharmaine Baer, GABY      potassium chloride  40 mEq Oral BID Kalyan Liu MD      saccharomyces boulardii  250 mg Oral BID Kalyan Liu MD      senna  1 tablet Oral Daily Darryl Favorjeff, GABY      sodium chloride  1 spray Each Nare Q1H PRN Kalyan Liu MD      tiotropium  18 mcg Inhalation Daily Darryl Baer, GABY          Today, Patient Was Seen By: Kalyan Liu MD    ** Please Note: Dictation voice to text software may have been used in the creation of this document   ** scalp

## 2025-06-19 NOTE — ASSESSMENT & PLAN NOTE
Associated with ongoing issues with lung cancer  Initiate 0 25 mg of Ativan q 6 hours p r n  Your symptoms and exam are consistent with a viral upper respiratory infection. There were no signs of bacterial infection. No antibiotic indicated at this time.     Please drink plenty of fluids, warm liquids, honey water, add a humidifier to bedroom, and get plenty of rest.     You may take ibuprofen 200-400mg or acetaminophen 500-1000mg every 4-6 hours for discomfort or oral temperature over 100.4F/38C      If you have a bothersome cough, you may try one of these over the counter medications, but they are not to be taken for more than 1-2 weeks at a time: (Please follow specific package labelling)     Dextromethorphan 20-40mg every 6 hours as needed for cough (Robitussin, Delsym)   Guaifenesin as needed for chest congestion (Mucinex)      If you develop worsening shortness of breath, wheezing, weakness, inability to tolerate fluids, persistent vomiting, or lethargy please head immediately to the ER.